# Patient Record
Sex: MALE | Race: BLACK OR AFRICAN AMERICAN | NOT HISPANIC OR LATINO | Employment: OTHER | URBAN - METROPOLITAN AREA
[De-identification: names, ages, dates, MRNs, and addresses within clinical notes are randomized per-mention and may not be internally consistent; named-entity substitution may affect disease eponyms.]

---

## 2018-03-14 ENCOUNTER — HOSPITAL ENCOUNTER (EMERGENCY)
Facility: HOSPITAL | Age: 68
Discharge: HOME | End: 2018-03-14
Attending: EMERGENCY MEDICINE | Admitting: EMERGENCY MEDICINE
Payer: OTHER GOVERNMENT

## 2018-03-14 VITALS
RESPIRATION RATE: 20 BRPM | DIASTOLIC BLOOD PRESSURE: 96 MMHG | SYSTOLIC BLOOD PRESSURE: 154 MMHG | OXYGEN SATURATION: 97 % | BODY MASS INDEX: 36.4 KG/M2 | HEART RATE: 82 BPM | HEIGHT: 71 IN | WEIGHT: 260 LBS | TEMPERATURE: 98.4 F

## 2018-03-14 DIAGNOSIS — F32.A DEPRESSION: Primary | ICD-10-CM

## 2018-03-14 DIAGNOSIS — M25.50 JOINT PAIN: ICD-10-CM

## 2018-03-14 LAB
ALBUMIN SERPL BCP-MCNC: 3.8 G/DL (ref 3.5–5)
ALP SERPL-CCNC: 72 U/L (ref 46–116)
ALT SERPL W P-5'-P-CCNC: 37 U/L (ref 12–78)
ANION GAP SERPL CALCULATED.3IONS-SCNC: 10 MMOL/L (ref 4–13)
AST SERPL W P-5'-P-CCNC: 15 U/L (ref 5–45)
BASOPHILS # BLD AUTO: 0.05 THOUSANDS/ΜL (ref 0–0.1)
BASOPHILS NFR BLD AUTO: 1 % (ref 0–1)
BILIRUB SERPL-MCNC: 0.3 MG/DL (ref 0.2–1)
BUN SERPL-MCNC: 13 MG/DL (ref 5–25)
CALCIUM SERPL-MCNC: 9.5 MG/DL (ref 8.3–10.1)
CHLORIDE SERPL-SCNC: 106 MMOL/L (ref 100–108)
CO2 SERPL-SCNC: 27 MMOL/L (ref 21–32)
CREAT SERPL-MCNC: 1 MG/DL (ref 0.6–1.3)
EOSINOPHIL # BLD AUTO: 0.13 THOUSAND/ΜL (ref 0–0.61)
EOSINOPHIL NFR BLD AUTO: 2 % (ref 0–6)
ERYTHROCYTE [DISTWIDTH] IN BLOOD BY AUTOMATED COUNT: 13.7 % (ref 11.6–15.1)
ERYTHROCYTE [SEDIMENTATION RATE] IN BLOOD: 4 MM/HOUR (ref 0–10)
GFR SERPL CREATININE-BSD FRML MDRD: 88 ML/MIN/1.73SQ M
GLUCOSE SERPL-MCNC: 122 MG/DL (ref 65–140)
HCT VFR BLD AUTO: 39.4 % (ref 36.5–49.3)
HGB BLD-MCNC: 12.3 G/DL (ref 12–17)
LYMPHOCYTES # BLD AUTO: 3.61 THOUSANDS/ΜL (ref 0.6–4.47)
LYMPHOCYTES NFR BLD AUTO: 49 % (ref 14–44)
MCH RBC QN AUTO: 24 PG (ref 26.8–34.3)
MCHC RBC AUTO-ENTMCNC: 31.2 G/DL (ref 31.4–37.4)
MCV RBC AUTO: 77 FL (ref 82–98)
MONOCYTES # BLD AUTO: 0.52 THOUSAND/ΜL (ref 0.17–1.22)
MONOCYTES NFR BLD AUTO: 7 % (ref 4–12)
NEUTROPHILS # BLD AUTO: 3.12 THOUSANDS/ΜL (ref 1.85–7.62)
NEUTS SEG NFR BLD AUTO: 42 % (ref 43–75)
NRBC BLD AUTO-RTO: 0 /100 WBCS
PLATELET # BLD AUTO: 184 THOUSANDS/UL (ref 149–390)
PMV BLD AUTO: 13.3 FL (ref 8.9–12.7)
POTASSIUM SERPL-SCNC: 3.9 MMOL/L (ref 3.5–5.3)
PROT SERPL-MCNC: 8 G/DL (ref 6.4–8.2)
RBC # BLD AUTO: 5.12 MILLION/UL (ref 3.88–5.62)
SODIUM SERPL-SCNC: 143 MMOL/L (ref 136–145)
TSH SERPL DL<=0.05 MIU/L-ACNC: 1.01 UIU/ML (ref 0.36–3.74)
WBC # BLD AUTO: 7.45 THOUSAND/UL (ref 4.31–10.16)

## 2018-03-14 PROCEDURE — 84443 ASSAY THYROID STIM HORMONE: CPT | Performed by: EMERGENCY MEDICINE

## 2018-03-14 PROCEDURE — 85652 RBC SED RATE AUTOMATED: CPT | Performed by: EMERGENCY MEDICINE

## 2018-03-14 PROCEDURE — 99284 EMERGENCY DEPT VISIT MOD MDM: CPT

## 2018-03-14 PROCEDURE — 80053 COMPREHEN METABOLIC PANEL: CPT | Performed by: EMERGENCY MEDICINE

## 2018-03-14 PROCEDURE — 85025 COMPLETE CBC W/AUTO DIFF WBC: CPT | Performed by: EMERGENCY MEDICINE

## 2018-03-14 PROCEDURE — 36415 COLL VENOUS BLD VENIPUNCTURE: CPT | Performed by: EMERGENCY MEDICINE

## 2018-03-14 RX ORDER — METOPROLOL TARTRATE 100 MG/1
25 TABLET ORAL EVERY 12 HOURS SCHEDULED
COMMUNITY
End: 2018-03-29 | Stop reason: HOSPADM

## 2018-03-14 RX ORDER — SIMVASTATIN 20 MG
20 TABLET ORAL
COMMUNITY
End: 2018-03-23 | Stop reason: HOSPADM
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
## 2018-03-14 NOTE — DISCHARGE INSTRUCTIONS
Arthralgia   WHAT YOU NEED TO KNOW:   Arthralgia is pain in one or more joints, with no inflammation  It may be short-term and get better within 6 to 8 weeks  Arthralgia can be an early sign of arthritis  Arthralgia may be caused by a medical condition, such as a hormone disorder or a tumor  It may also be caused by an infection or injury  DISCHARGE INSTRUCTIONS:   Medicines: The following medicines may  be ordered for you:  · Acetaminophen  decreases pain  Ask how much to take and how often to take it  Follow directions  Acetaminophen can cause liver damage if not taken correctly  · NSAIDs  decrease pain and prevent swelling  Ask your healthcare provider which medicine is right for you  Ask how much to take and when to take it  Take as directed  NSAIDs can cause stomach bleeding and kidney problems if not taken correctly  · Pain relief cream  decreases pain  Use this cream as directed  · Take your medicine as directed  Contact your healthcare provider if you think your medicine is not helping or if you have side effects  Tell him of her if you are allergic to any medicine  Keep a list of the medicines, vitamins, and herbs you take  Include the amounts, and when and why you take them  Bring the list or the pill bottles to follow-up visits  Carry your medicine list with you in case of an emergency  Follow up with your healthcare provider or specialist as directed:  Write down your questions so you remember to ask them during your visits  Self-care:   · Apply heat  to help decrease pain  Use a heating pad or heat wrap  Apply heat for 20 to 30 minutes every 2 hours for as many days as directed  · Rest  as much as possible  Avoid activities that cause joint pain  · Apply ice  to help decrease swelling and pain  Ice may also help prevent tissue damage  Use an ice pack, or put crushed ice in a plastic bag   Cover it with a towel and place it on your painful joint for 15 to 20 minutes every hour or as directed  · Support  the joint with a brace or elastic wrap as directed  · Elevate  your joint above the level of your heart as often as you can to help decrease swelling and pain  Prop your painful joint on pillows or blankets to keep it elevated comfortably  · Lose weight  if you are overweight  Extra weight can put pressure on your joints and cause more pain  Ask your healthcare provider how much you should weigh  Ask him to help you create a weight loss plan  · Exercise  regularly to help improve joint movement and to decrease pain  Ask about the best exercise plan for you  Low-impact exercises can help take the pressure off your joints  Examples are walking, swimming, and water aerobics  Physical therapy:  A physical therapist teaches you exercises to help improve movement and strength, and to decrease pain  Ask your healthcare provider if physical therapy is right for you  Contact your healthcare provider or specialist if:   · You have a fever  · You continue to have joint pain that cannot be relieved with heat, ice, or medicine  · You have pain and inflammation around your joint  · You have questions or concerns about your condition or care  Return to the emergency department if:   · You have sudden, severe pain when you move your joint  · You have a fever and shaking chills  · You cannot move your joint  · You lose feeling on the side of your body where you have the painful joint  © 2017 [address]  Information is for End User's use only and may not be sold, redistributed or otherwise used for commercial purposes  All illustrations and images included in CareNotes® are the copyrighted property of [employer]  or [employer]  The above information is an  only  It is not intended as medical advice for individual conditions or treatments   Talk to your doctor, nurse or pharmacist before following any medical regimen to see if it is safe and effective for you  Depression, Ambulatory Care   GENERAL INFORMATION:   Depression  is a medical condition that causes feelings of sadness or hopelessness that do not go away  Depression may cause you to lose interest in things you used to enjoy  These feelings may interfere with your daily life  Common symptoms include the following:   · Appetite changes, or weight gain or loss    · Trouble going to sleep or staying asleep, or sleeping too much    · Fatigue or lack of energy    · Feeling restless, irritable, or withdrawn    · Feeling worthless, hopeless, discouraged, or very guilty    · Trouble concentrating, remembering things, doing daily tasks, or making decisions    · Thoughts about hurting or killing yourself  Seek immediate care for the following symptoms:   · You think about harming yourself or someone else  Treatment for depression  may include medicine to improve or balance your mood  Therapy may also be used to treat your depression  A therapist will help you learn to cope with your thoughts and feelings  Therapy can be done alone or in a group  It may also be done with family members or a significant other  Manage depression:   · Get regular physical activity  Try to exercise for 30 minutes, 3 to 5 days a week  Work with your healthcare provider to develop an exercise plan that you enjoy  · Get enough sleep  Create a routine to help you relax before bed  Try to go to bed and wake up at the same time every day  Sleep is important for emotional health  · Eat a variety of healthy foods  Healthy foods include fruits, vegetables, whole-grain breads, low-fat dairy products, beans, lean meats, and fish  A healthy meal plan is low in fat, salt, and added sugar  · Avoid or limit alcohol  Ask your healthcare provider how much alcohol is safe for you to drink  A drink of alcohol is 12 ounces of beer, 5 ounces of wine, or 1½ ounces of liquor    Follow up with your healthcare provider as directed: You will need to return so your healthcare provider can monitor your progress  Write down your questions so you remember to ask them during your visits  CARE AGREEMENT:   You have the right to help plan your care  Learn about your health condition and how it may be treated  Discuss treatment options with your caregivers to decide what care you want to receive  You always have the right to refuse treatment  The above information is an  only  It is not intended as medical advice for individual conditions or treatments  Talk to your doctor, nurse or pharmacist before following any medical regimen to see if it is safe and effective for you  © 2014 [address] is for End User's use only and may not be sold, redistributed or otherwise used for commercial purposes  All illustrations and images included in CareNotes® are the copyrighted property of A D [employer] M [employer]  or [employer]

## 2018-03-14 NOTE — ED NOTES
Patient ambulated around unit with cane  No distress noted  Patient back in room resting comfortably at this time        Faina Martins RN  03/14/18 4807
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
## 2018-03-16 ENCOUNTER — APPOINTMENT (EMERGENCY)
Dept: RADIOLOGY | Facility: HOSPITAL | Age: 68
DRG: 313 | End: 2018-03-16
Payer: OTHER GOVERNMENT

## 2018-03-16 ENCOUNTER — HOSPITAL ENCOUNTER (INPATIENT)
Facility: HOSPITAL | Age: 68
LOS: 6 days | DRG: 313 | End: 2018-03-23
Attending: EMERGENCY MEDICINE | Admitting: INTERNAL MEDICINE
Payer: OTHER GOVERNMENT

## 2018-03-16 DIAGNOSIS — K59.00 CONSTIPATED: ICD-10-CM

## 2018-03-16 DIAGNOSIS — F20.0 PARANOID SCHIZOPHRENIA (HCC): ICD-10-CM

## 2018-03-16 DIAGNOSIS — E78.2 MIXED HYPERLIPIDEMIA: ICD-10-CM

## 2018-03-16 DIAGNOSIS — F20.9 SCHIZOPHRENIA, UNSPECIFIED TYPE (HCC): ICD-10-CM

## 2018-03-16 DIAGNOSIS — R07.9 CHEST PAIN: Primary | ICD-10-CM

## 2018-03-16 DIAGNOSIS — F20.9 ACUTE EXACERBATION OF CHRONIC SCHIZOPHRENIA (HCC): ICD-10-CM

## 2018-03-16 PROBLEM — E11.69 DIABETES MELLITUS TYPE 2 IN OBESE (HCC): Status: ACTIVE | Noted: 2018-03-16

## 2018-03-16 PROBLEM — I10 HYPERTENSION: Status: ACTIVE | Noted: 2018-03-16

## 2018-03-16 PROBLEM — E78.5 HYPERLIPIDEMIA: Status: ACTIVE | Noted: 2018-03-16

## 2018-03-16 PROBLEM — E66.9 DIABETES MELLITUS TYPE 2 IN OBESE (HCC): Status: ACTIVE | Noted: 2018-03-16

## 2018-03-16 LAB
ALBUMIN SERPL BCP-MCNC: 3.7 G/DL (ref 3.5–5)
ALP SERPL-CCNC: 66 U/L (ref 46–116)
ALT SERPL W P-5'-P-CCNC: 35 U/L (ref 12–78)
ANION GAP SERPL CALCULATED.3IONS-SCNC: 9 MMOL/L (ref 4–13)
AST SERPL W P-5'-P-CCNC: 17 U/L (ref 5–45)
ATRIAL RATE: 74 BPM
ATRIAL RATE: 76 BPM
BASOPHILS # BLD AUTO: 0.04 THOUSANDS/ΜL (ref 0–0.1)
BASOPHILS NFR BLD AUTO: 1 % (ref 0–1)
BILIRUB DIRECT SERPL-MCNC: 0.08 MG/DL (ref 0–0.2)
BILIRUB SERPL-MCNC: 0.3 MG/DL (ref 0.2–1)
BUN SERPL-MCNC: 14 MG/DL (ref 5–25)
CALCIUM SERPL-MCNC: 9.3 MG/DL (ref 8.3–10.1)
CHLORIDE SERPL-SCNC: 105 MMOL/L (ref 100–108)
CO2 SERPL-SCNC: 28 MMOL/L (ref 21–32)
CREAT SERPL-MCNC: 0.93 MG/DL (ref 0.6–1.3)
EOSINOPHIL # BLD AUTO: 0.15 THOUSAND/ΜL (ref 0–0.61)
EOSINOPHIL NFR BLD AUTO: 2 % (ref 0–6)
ERYTHROCYTE [DISTWIDTH] IN BLOOD BY AUTOMATED COUNT: 13.6 % (ref 11.6–15.1)
EST. AVERAGE GLUCOSE BLD GHB EST-MCNC: 123 MG/DL
GFR SERPL CREATININE-BSD FRML MDRD: 96 ML/MIN/1.73SQ M
GLUCOSE SERPL-MCNC: 102 MG/DL (ref 65–140)
GLUCOSE SERPL-MCNC: 118 MG/DL (ref 65–140)
GLUCOSE SERPL-MCNC: 127 MG/DL (ref 65–140)
GLUCOSE SERPL-MCNC: 91 MG/DL (ref 65–140)
HBA1C MFR BLD: 5.9 % (ref 4.2–6.3)
HCT VFR BLD AUTO: 39.7 % (ref 36.5–49.3)
HGB BLD-MCNC: 12.3 G/DL (ref 12–17)
LYMPHOCYTES # BLD AUTO: 3.62 THOUSANDS/ΜL (ref 0.6–4.47)
LYMPHOCYTES NFR BLD AUTO: 45 % (ref 14–44)
MCH RBC QN AUTO: 23.9 PG (ref 26.8–34.3)
MCHC RBC AUTO-ENTMCNC: 31 G/DL (ref 31.4–37.4)
MCV RBC AUTO: 77 FL (ref 82–98)
MONOCYTES # BLD AUTO: 0.58 THOUSAND/ΜL (ref 0.17–1.22)
MONOCYTES NFR BLD AUTO: 7 % (ref 4–12)
NEUTROPHILS # BLD AUTO: 3.71 THOUSANDS/ΜL (ref 1.85–7.62)
NEUTS SEG NFR BLD AUTO: 46 % (ref 43–75)
NRBC BLD AUTO-RTO: 0 /100 WBCS
P AXIS: 29 DEGREES
P AXIS: 35 DEGREES
PLATELET # BLD AUTO: 170 THOUSANDS/UL (ref 149–390)
POTASSIUM SERPL-SCNC: 4.3 MMOL/L (ref 3.5–5.3)
PR INTERVAL: 154 MS
PR INTERVAL: 156 MS
PROT SERPL-MCNC: 8 G/DL (ref 6.4–8.2)
QRS AXIS: -1 DEGREES
QRS AXIS: 3 DEGREES
QRSD INTERVAL: 82 MS
QRSD INTERVAL: 84 MS
QT INTERVAL: 358 MS
QT INTERVAL: 366 MS
QTC INTERVAL: 397 MS
QTC INTERVAL: 411 MS
RBC # BLD AUTO: 5.15 MILLION/UL (ref 3.88–5.62)
SODIUM SERPL-SCNC: 142 MMOL/L (ref 136–145)
T WAVE AXIS: -10 DEGREES
T WAVE AXIS: -6 DEGREES
TROPONIN I SERPL-MCNC: 0.04 NG/ML
TROPONIN I SERPL-MCNC: 0.05 NG/ML
VENTRICULAR RATE: 74 BPM
VENTRICULAR RATE: 76 BPM
WBC # BLD AUTO: 8.12 THOUSAND/UL (ref 4.31–10.16)

## 2018-03-16 PROCEDURE — 93005 ELECTROCARDIOGRAM TRACING: CPT

## 2018-03-16 PROCEDURE — 99220 PR INITIAL OBSERVATION CARE/DAY 70 MINUTES: CPT | Performed by: PHYSICIAN ASSISTANT

## 2018-03-16 PROCEDURE — 93010 ELECTROCARDIOGRAM REPORT: CPT | Performed by: INTERNAL MEDICINE

## 2018-03-16 PROCEDURE — 99285 EMERGENCY DEPT VISIT HI MDM: CPT

## 2018-03-16 PROCEDURE — 85025 COMPLETE CBC W/AUTO DIFF WBC: CPT | Performed by: EMERGENCY MEDICINE

## 2018-03-16 PROCEDURE — 80076 HEPATIC FUNCTION PANEL: CPT | Performed by: EMERGENCY MEDICINE

## 2018-03-16 PROCEDURE — 84484 ASSAY OF TROPONIN QUANT: CPT | Performed by: HOSPITALIST

## 2018-03-16 PROCEDURE — 80048 BASIC METABOLIC PNL TOTAL CA: CPT | Performed by: EMERGENCY MEDICINE

## 2018-03-16 PROCEDURE — 36415 COLL VENOUS BLD VENIPUNCTURE: CPT | Performed by: HOSPITALIST

## 2018-03-16 PROCEDURE — 96372 THER/PROPH/DIAG INJ SC/IM: CPT

## 2018-03-16 PROCEDURE — 36415 COLL VENOUS BLD VENIPUNCTURE: CPT | Performed by: EMERGENCY MEDICINE

## 2018-03-16 PROCEDURE — 84484 ASSAY OF TROPONIN QUANT: CPT | Performed by: EMERGENCY MEDICINE

## 2018-03-16 PROCEDURE — 82948 REAGENT STRIP/BLOOD GLUCOSE: CPT

## 2018-03-16 PROCEDURE — 71046 X-RAY EXAM CHEST 2 VIEWS: CPT

## 2018-03-16 PROCEDURE — 83036 HEMOGLOBIN GLYCOSYLATED A1C: CPT | Performed by: HOSPITALIST

## 2018-03-16 RX ORDER — HYDRALAZINE HYDROCHLORIDE 20 MG/ML
5 INJECTION INTRAMUSCULAR; INTRAVENOUS EVERY 6 HOURS PRN
Status: DISCONTINUED | OUTPATIENT
Start: 2018-03-16 | End: 2018-03-23 | Stop reason: HOSPADM

## 2018-03-16 RX ORDER — PRAVASTATIN SODIUM 40 MG
40 TABLET ORAL
Status: DISCONTINUED | OUTPATIENT
Start: 2018-03-16 | End: 2018-03-23 | Stop reason: HOSPADM

## 2018-03-16 RX ORDER — ASPIRIN 81 MG/1
81 TABLET, CHEWABLE ORAL DAILY
Status: DISCONTINUED | OUTPATIENT
Start: 2018-03-17 | End: 2018-03-23 | Stop reason: HOSPADM

## 2018-03-16 RX ORDER — ASPIRIN 81 MG/1
81 TABLET, CHEWABLE ORAL DAILY
Status: DISCONTINUED | OUTPATIENT
Start: 2018-03-17 | End: 2018-03-16 | Stop reason: SDUPTHER

## 2018-03-16 RX ORDER — TAMSULOSIN HYDROCHLORIDE 0.4 MG/1
0.4 CAPSULE ORAL
Status: DISCONTINUED | OUTPATIENT
Start: 2018-03-16 | End: 2018-03-23 | Stop reason: HOSPADM

## 2018-03-16 RX ORDER — ZIPRASIDONE MESYLATE 20 MG/ML
10 INJECTION, POWDER, LYOPHILIZED, FOR SOLUTION INTRAMUSCULAR ONCE
Status: COMPLETED | OUTPATIENT
Start: 2018-03-16 | End: 2018-03-16

## 2018-03-16 RX ORDER — ACETAMINOPHEN 325 MG/1
650 TABLET ORAL EVERY 4 HOURS PRN
Status: DISCONTINUED | OUTPATIENT
Start: 2018-03-16 | End: 2018-03-23 | Stop reason: HOSPADM

## 2018-03-16 RX ADMIN — PRAVASTATIN SODIUM 40 MG: 40 TABLET ORAL at 18:40

## 2018-03-16 RX ADMIN — ZIPRASIDONE MESYLATE 10 MG: 20 INJECTION, POWDER, LYOPHILIZED, FOR SOLUTION INTRAMUSCULAR at 14:40

## 2018-03-16 RX ADMIN — METOPROLOL TARTRATE 25 MG: 25 TABLET ORAL at 21:21

## 2018-03-16 RX ADMIN — TAMSULOSIN HYDROCHLORIDE 0.4 MG: 0.4 CAPSULE ORAL at 18:40

## 2018-03-16 RX ADMIN — WATER 10 ML: 1 INJECTION INTRAMUSCULAR; INTRAVENOUS; SUBCUTANEOUS at 14:41

## 2018-03-16 NOTE — ASSESSMENT & PLAN NOTE
· BP elevated on admission, but has now improved  · Continue Lopressor 25 mg b i d   · P r n  hydralazine

## 2018-03-16 NOTE — PROGRESS NOTES
Admit Note Addendum  Patient seen and examined in ED  Presents with complaints of left sided chest pain, sharp, reproducible to palpation  Started today, reports recently shoveling snow  States had hx of similar pain in past had extensive w/u at [facility] that was negative within past year or so, has not seen cardiology since  Exam: vitals stable  RRR  Clear b/l  +TTP palpation left anterior chest wall and lateral wall  Soft, +bs  No sign edema  Calm and cooperative    Plan: assign to observation status for chest pain r/o ACS  Check lipid profile, hgb a1c in am  Repeat ekg  If w/u negative d/c tomorrow with Op cardiology f/u    Schizophrenia: some paranoia, cleared by crisis yesterday, case mgt consultation

## 2018-03-16 NOTE — ASSESSMENT & PLAN NOTE
· Patient reports left-sided sharp chest pain, nonradiating and sharp in nature  Reproducible on exam, does not appear to be cardiac related, however patient with multiple risk factors including HTN, HLD, Dm2 need to r/o ACS  · Reports history of cardiac workup many years ago which was normal per patient, unable to find records here    · NSTEMI with first Troponin 0 05, trend serial troponins, could be related to hypertension on admission   · Cardiology consult   · Telemetry monitoring  · Obtain lipid panel and hemoglobin A1c  · Obtain rapid drug screen  · Continue statin, aspirin and Lopressor

## 2018-03-16 NOTE — ED NOTES
Attempted POCT BAT 3 times, patient unable to complete  Informed RN       Gina Pruitt  03/16/18 2948

## 2018-03-16 NOTE — H&P
H&P- Deneen Reaves 1950, 69 y o  male MRN: [id number]    Unit/Bed#: -01 Encounter: [id number]    Primary Care Provider: No primary care provider on file  Date and time admitted to hospital: 3/16/2018  1:31 PM       DOS: 3/16/2018      * Chest pain   Assessment & Plan    · Patient reports left-sided sharp chest pain, nonradiating and sharp in nature  Reproducible on exam, does not appear to be cardiac related, however patient with multiple risk factors including HTN, HLD, Dm2 need to r/o ACS  · Reports history of cardiac workup many years ago which was normal per patient, unable to find records here  · NSTEMI with first Troponin 0 05, trend serial troponins, could be related to hypertension on admission   · Cardiology consult   · Telemetry monitoring  · Obtain lipid panel and hemoglobin A1c  · Obtain rapid drug screen  · Continue statin, aspirin and Lopressor        Diabetes mellitus type 2 in [facility])   Assessment & Plan    · Patient reports taking metformin at home b i d  · Will hold metformin and initiate sliding scale insulin coverage  · Glucose checks        Hyperlipidemia   Assessment & Plan    · Continue statin  · Obtain lipid panel        Hypertension   Assessment & Plan    · BP elevated on admission, but has now improved  · Continue Lopressor 25 mg b i d   · P r n  hydralazine        Schizophrenia ([facility] [address] )   Assessment & Plan    · Patient with history of  · Reports that he has taken himself off of his medications over 1 year ago and feels better without them  · Was previously on multiple medications per patient including Haldol and Geodon  · Patient reports that he feels as though his wife and stepdaughter are out to hurt him  Reports that they took away his cane and reading glasses  Reports that they give him  food that make him sick  Also took away the phones in the home and go through his wallet without his consent    · Case management consult, patient was recently in the ED yesterday with concerns of the same issue  Apparently crisis was called yesterday but nothing came of this  · Patient received 1 dose Geodon in the ED  · PT/OT          VTE Prophylaxis: Enoxaparin (Lovenox)  / sequential compression device   Code Status:  Level 1-full code, discussed with patient at bedside  POLST: There is no POLST form on file for this patient (pre-hospital)  Discussion with family:  Discussed with patient at bedside  Anticipated Length of Stay:  Patient will be admitted on an Observation basis with an anticipated length of stay of  < 2 midnights  Justification for Hospital Stay:  Trend troponins, cardiac evaluation    Total Time for Visit, including Counseling / Coordination of Care: 45 minutes  Greater than 50% of this total time spent on direct patient counseling and coordination of care  Chief Complaint:   "I had chest pain "    History of Present Illness:    Marielena Stanley is a 69 y o  male with significant past medical history of schizophrenia, hypertension, hyperlipidemia, diabetes mellitus type 2 who presents with chest pain x 1 day  Patient reports that the pain started early this morning at around 9 o'clock that was sudden and sharp  It is nonradiating and he denies diaphoresis or nausea  He denies any recent illnesses  He reports that when the chest pain began it was constant but then became intermittent  Reports that is still a sharp stabbing pain  Patient reports he has had previous episodes like this in the past and was evaluated by Cardiology a number of years ago at [facility], [place], operated by [facility]  He reports that these test resulted in normal findings  He does not follow up with the cardiologist   He reports some left-sided abdominal pain as well  He confirms history of schizophrenia for which she was previously taking antipsychotic medications but took himself off of them about 1 year ago    Patient reports that he feels much better off his medications and that he no longer hears voices or seeing things like he was previously  He currently reports that he feels as though his wife has a boyfriend and he sees them texting often  He also reports that he feels as though his wife and stepdaughter are out to hurt him  Reports that they took all the phones out of the house and go through his wallet  Also reports that he walks with a cane at home and does well with this  However reports that his cane was then taken away and his reading glasses  Patient reports that he is a member of the [place] and has been on multiple antipsychotic medications in the past, too many to count "    Review of Systems:    Review of Systems   Constitutional: Negative for chills, diaphoresis and fever  HENT: Negative for congestion, rhinorrhea, sinus pain, sinus pressure, sneezing, sore throat and tinnitus  Eyes: Negative for pain, redness and visual disturbance  Respiratory: Negative for cough and shortness of breath  Cardiovascular: Positive for chest pain  Negative for palpitations and leg swelling  Gastrointestinal: Positive for abdominal pain and constipation  Negative for diarrhea, nausea and vomiting  Genitourinary: Negative for difficulty urinating, dysuria and hematuria  Musculoskeletal: Negative for gait problem, joint swelling and myalgias  Skin: Negative for pallor and rash  Neurological: Positive for light-headedness  Negative for dizziness and headaches  Past Medical and Surgical History:     Past Medical History:   Diagnosis Date    Diabetes mellitus ([facility] [address] )     Enlarged prostate     Hyperlipidemia     Hypertension     Psychiatric disorder        History reviewed  No pertinent surgical history  Meds/Allergies:    Prior to Admission medications    Medication Sig Start Date End Date Taking?  Authorizing Provider   metFORMIN (GLUCOPHAGE) 1000 MG tablet Take 500 mg by mouth 2 (two) times a day with meals    Historical Provider, MD   metoprolol tartrate (LOPRESSOR) 100 mg tablet Take 25 mg by mouth every 12 (twelve) hours    Historical Provider, MD   simvastatin (ZOCOR) 20 mg tablet Take 20 mg by mouth daily at bedtime    Historical Provider, MD   TAMSULOSIN HCL PO Take by mouth    Historical Provider, MD     I have reviewed home medications with patient personally  and [employer] records  Allergies: No Known Allergies    Social History:     Marital Status: /Civil Union   Occupation:   Patient Pre-hospital Living Situation:  Patient lives at home with wife and stepdaughter and 3 grandchildren  Patient Pre-hospital Level of Mobility:  Cane at baseline, however patient reports this was taken away from him by family  Patient Pre-hospital Diet Restrictions:  None  Substance Use History:   History   Alcohol Use No     History   Smoking Status    Former Smoker   Smokeless Tobacco    Never Used     Comment: quit in 1993     History   Drug Use No       Family History:    non-contributory, denies family history of cardiac events    Physical Exam:     Vitals:   Blood Pressure: 119/71 (03/16/18 1722)  Pulse: 75 (03/16/18 1722)  Temperature: 98 7 °F (37 1 °C) (03/16/18 1332)  Respirations: 18 (03/16/18 1332)  Height: 5' 11" (180 3 cm) (03/16/18 1332)  Weight - Scale: 118 kg (260 lb) (03/16/18 1332)  SpO2: 94 % (03/16/18 1722)    Physical Exam   Constitutional: No distress  Patient is in no acute distress resting comfortably in his hospital bed   HENT:   Head: Normocephalic and atraumatic  Eyes: Conjunctivae are normal  No scleral icterus  Cardiovascular: Normal rate, regular rhythm and intact distal pulses  Pulmonary/Chest: No respiratory distress  He has no wheezes  Breath sounds decreased bilaterally   Abdominal: Soft  Bowel sounds are normal  He exhibits distension (Mild)  There is tenderness (Mild generalized tenderness to palpation)  There is no rebound  Musculoskeletal: He exhibits no edema  Neurological: He is alert  Skin: Skin is warm and dry   He is not diaphoretic  No erythema  Psychiatric:   Tardive dyskinesia noted on exam with some twitching of the left upper extremity  Vitals reviewed  Additional Data:     Lab Results: I have personally reviewed pertinent reports  Results from last 7 days  Lab Units 03/16/18  1402   WBC Thousand/uL 8 12   HEMOGLOBIN g/dL 12 3   HEMATOCRIT % 39 7   PLATELETS Thousands/uL 170   NEUTROS PCT % 46   LYMPHS PCT % 45*   MONOS PCT % 7   EOS PCT % 2       Results from last 7 days  Lab Units 03/16/18  1402   SODIUM mmol/L 142   POTASSIUM mmol/L 4 3   CHLORIDE mmol/L 105   CO2 mmol/L 28   BUN mg/dL 14   CREATININE mg/dL 0 93   CALCIUM mg/dL 9 3   TOTAL PROTEIN g/dL 8 0   BILIRUBIN TOTAL mg/dL 0 30   ALK PHOS U/L 66   ALT U/L 35   AST U/L 17   GLUCOSE RANDOM mg/dL 102           Imaging: I have personally reviewed pertinent reports  XR chest pa & lateral   Final Result by Kenji Velásquez MD (03/16 1619)      No acute cardiopulmonary disease  Workstation performed: [id number]             EKG, Pathology, and Other Studies Reviewed on Admission:   · EKG: No ST elevations noted  · CXR results reviewed    [facility] Review[facility]: Yes     ** Please Note: This note has been constructed using a voice recognition system   **

## 2018-03-16 NOTE — ASSESSMENT & PLAN NOTE
· Patient reports taking metformin at home b i d    · Will hold metformin and initiate sliding scale insulin coverage  · Glucose checks

## 2018-03-16 NOTE — ASSESSMENT & PLAN NOTE
· Patient with history of  · Reports that he has taken himself off of his medications over 1 year ago and feels better without them  · Was previously on multiple medications per patient including Haldol and Geodon  · Patient reports that he feels as though his wife and stepdaughter are out to hurt him  Reports that they took away his cane and reading glasses  Reports that they give him  food that make him sick  Also took away the phones in the home and go through his wallet without his consent  · Case management consult, patient was recently in the ED yesterday with concerns of the same issue  Apparently crisis was called yesterday but nothing came of this    · Patient received 1 dose Geodon in the ED  · PT/OT

## 2018-03-16 NOTE — ED PROVIDER NOTES
History  Chief Complaint   Patient presents with    Chest Pain     cp started today, pt also concerned about living situation     HPI patient is a 24-year-old male, reports about 0 9 o'clock today sudden onset of a fairly sharp left chest pain describes it as somewhat stabbing, intermittent since then  Patient also has multiple other complaints, complains of some left arm numbness, complains of some left-sided abdominal pain  He denies any vomiting or diarrhea  Denies any shortness of breath  There is no diaphoresis  Patient's history chronic schizophrenia, he was here yesterday with some leg pain  Patient has nonlinear thinking at time and run on speech  He reports his wife is taking control of his life and he does not want her to  She reports he is taking his phone away from him  He reports he does not want a live at home anymore  He reports they are not allowing him to communicate properly  Apparently was apparently here yesterday seen by crisis, there are no grounds for commitment the patient has not been taking his psychotic medications  Patient was apparently seen in the [place] in the past from those all records from the person is saw him yesterday apparently was on Haldol and Geodon in the past   Patient denies any current chest pain  Past medical history psychiatric history, diabetes, hypertension, hyperlipidemia  Family history noncontributory  Social history, lives with his wife, nonsmoker, denies drug abuse    Prior to Admission Medications   Prescriptions Last Dose Informant Patient Reported? Taking?    TAMSULOSIN HCL PO 3/15/2018 at Unknown time  Yes Yes   Sig: Take by mouth   metFORMIN (GLUCOPHAGE) 1000 MG tablet 3/15/2018 at Unknown time  Yes Yes   Sig: Take 500 mg by mouth 2 (two) times a day with meals   metoprolol tartrate (LOPRESSOR) 100 mg tablet 3/15/2018 at Unknown time  Yes Yes   Sig: Take 25 mg by mouth every 12 (twelve) hours   simvastatin (ZOCOR) 20 mg tablet 3/15/2018 at Unknown time  Yes Yes   Sig: Take 20 mg by mouth daily at bedtime      Facility-Administered Medications: None       Past Medical History:   Diagnosis Date    Diabetes mellitus ([facility] [address] )     Enlarged prostate     Hyperlipidemia     Hypertension     Psychiatric disorder        History reviewed  No pertinent surgical history  History reviewed  No pertinent family history  I have reviewed and agree with the history as documented  Social History   Substance Use Topics    Smoking status: Former Smoker    Smokeless tobacco: Never Used      Comment: quit in 1993    Alcohol use No        Review of Systems   Constitutional: Negative for diaphoresis, fatigue and fever  HENT: Negative for congestion, ear pain, nosebleeds and sore throat  Eyes: Negative for photophobia, pain, discharge and visual disturbance  Respiratory: Negative for cough, choking, chest tightness, shortness of breath and wheezing  Cardiovascular: Positive for chest pain  Negative for palpitations  Gastrointestinal: Positive for abdominal pain  Negative for abdominal distention, diarrhea and vomiting  Genitourinary: Negative for dysuria, flank pain and frequency  Musculoskeletal: Negative for back pain, gait problem and joint swelling  Skin: Negative for color change and rash  Neurological: Positive for numbness  Negative for dizziness, syncope and headaches  Psychiatric/Behavioral: Positive for decreased concentration  Negative for behavioral problems and confusion  The patient is hyperactive  The patient is not nervous/anxious  All other systems reviewed and are negative        Physical Exam  ED Triage Vitals   Temperature Pulse Respirations Blood Pressure SpO2   03/16/18 1332 03/16/18 1332 03/16/18 1332 03/16/18 1332 03/16/18 1332   98 7 °F (37 1 °C) 78 18 (!) 138/108 100 %      Temp Source Heart Rate Source Patient Position - Orthostatic VS BP Location FiO2 (%)   03/16/18 1742 03/16/18 1722 03/16/18 1722 03/16/18 1722 --   Oral Monitor Lying Right arm       Pain Score       03/16/18 1332       8           Orthostatic Vital Signs  Vitals:    03/16/18 2300 03/17/18 0300 03/17/18 0700 03/17/18 1100   BP: 157/96 159/92 140/84 161/86   Pulse: 71 72 79 71   Patient Position - Orthostatic VS: Lying Lying Lying Sitting       Physical Exam   Constitutional: He is oriented to person, place, and time  He appears well-developed and well-nourished  HENT:   Head: Normocephalic  Right Ear: External ear normal    Left Ear: External ear normal    Nose: Nose normal    Mouth/Throat: Oropharynx is clear and moist    Eyes: EOM and lids are normal  Pupils are equal, round, and reactive to light  Neck: Normal range of motion  Neck supple  Cardiovascular: Normal rate, regular rhythm, normal heart sounds and intact distal pulses  Pulmonary/Chest: Effort normal and breath sounds normal  No respiratory distress  Abdominal: Soft  Bowel sounds are normal  He exhibits no distension and no mass  There is no tenderness  There is no guarding  Musculoskeletal: Normal range of motion  He exhibits no deformity  Neurological: He is alert and oriented to person, place, and time  He has normal strength  No cranial nerve deficit or sensory deficit  Coordination normal  GCS eye subscore is 4  GCS verbal subscore is 5  GCS motor subscore is 6  Skin: Skin is warm and dry  Psychiatric:   Able to give a history but the patient runs on with his stories, nonlinear thinking at time, history of chronic schizophrenia   Nursing note and vitals reviewed     Patient has run on speech, at times seems delusional, no hallucinations  Pulse oximetry 96% on room air adequate oxygenation, there is no hypoxia    ED Medications  Medications   metoprolol tartrate (LOPRESSOR) tablet 25 mg (25 mg Oral Given 3/17/18 0829)   pravastatin (PRAVACHOL) tablet 40 mg (40 mg Oral Given 3/16/18 1840)   tamsulosin (FLOMAX) capsule 0 4 mg (0 4 mg Oral Given 3/16/18 1840)   insulin lispro (HumaLOG) 100 units/mL subcutaneous injection 1-5 Units (0 Units Subcutaneous Not Given 3/17/18 0758)   insulin lispro (HumaLOG) 100 units/mL subcutaneous injection 1-5 Units (1 Units Subcutaneous Not Given 3/17/18 0013)   aspirin chewable tablet 81 mg (81 mg Oral Given 3/17/18 0829)   enoxaparin (LOVENOX) subcutaneous injection 40 mg (40 mg Subcutaneous Given 3/17/18 0829)   acetaminophen (TYLENOL) tablet 650 mg (not administered)   hydrALAZINE (APRESOLINE) injection 5 mg (not administered)   ziprasidone (GEODON) IM injection 10 mg (10 mg Intramuscular Given 3/16/18 1440)   sterile water injection **AcuDose Override Pull** (10 mL  Given 3/16/18 1441)       Diagnostic Studies  Results Reviewed     Procedure Component Value Units Date/Time    Lipid panel [[id number]]  (Abnormal) Collected:  03/17/18 0537    Lab Status:  Final result Specimen:  Blood from Arm, Left Updated:  03/17/18 0601     Cholesterol 131 mg/dL      Triglycerides 59 mg/dL      HDL, Direct 32 (L) mg/dL      LDL Calculated 87 mg/dL     Narrative:         Triglyceride:        Normal               <150 mg/dl        Borderline High     150-199 mg/dl        High               200-499 mg/dl        Very High           >499 mg/dl      Platelet count [[id number]]  (Abnormal) Collected:  03/17/18 0537    Lab Status:  Final result Specimen:  Blood from Arm, Left Updated:  03/17/18 0554     Platelets 756 (L) Thousands/uL      MPV 12 4 fL     Rapid drug screen, urine [[id number]]  (Normal) Collected:  03/17/18 0537    Lab Status:  Final result Specimen:  Urine from Urine, Clean Catch Updated:  03/17/18 0551     Amph/Meth UR Negative     Barbiturate Ur Negative     Benzodiazepine Urine Negative     Cocaine Urine Negative     Methadone Urine Negative     Opiate Urine Negative     PCP Ur Negative     THC Urine Negative    Narrative:         FOR MEDICAL PURPOSES ONLY  IF CONFIRMATION NEEDED PLEASE CONTACT THE LAB WITHIN 5 DAYS      Drug Screen Cutoff Levels:  AMPHETAMINE/METHAMPHETAMINES  1000 ng/mL  BARBITURATES     200 ng/mL  BENZODIAZEPINES     200 ng/mL  COCAINE      300 ng/mL  METHADONE      300 ng/mL  OPIATES      300 ng/mL  PHENCYCLIDINE     25 ng/mL  THC       50 ng/mL    Hemoglobin A1c w/EAG Estimation (Orders if not completed within the last 90 days) [[id number]]  (Normal) Collected:  03/16/18 1718    Lab Status:  Final result Specimen:  Blood from Arm, Right Updated:  03/16/18 2341     Hemoglobin A1C 5 9 %       mg/dl     Troponin I [[id number]]  (Normal) Collected:  03/16/18 1718    Lab Status:  Final result Specimen:  Blood from Arm, Right Updated:  03/16/18 1743     Troponin I 0 04 ng/mL     Narrative:         [employer] Chemistry analyzer 99% cutoff is > 0 04 ng/mL in network labs    o cTnI 99% cutoff is useful only when applied to patients in the clinical setting of myocardial ischemia  o cTnI 99% cutoff should be interpreted in the context of clinical history, ECG findings and possibly cardiac imaging to establish correct diagnosis  o cTnI 99% cutoff may be suggestive but clearly not indicative of a coronary event without the clinical setting of myocardial ischemia  Troponin I [[id number]]  (Abnormal) Collected:  03/16/18 1402    Lab Status:  Final result Specimen:  Blood from Arm, Right Updated:  03/16/18 1433     Troponin I 0 05 (H) ng/mL     Narrative:         [employer] Chemistry analyzer 99% cutoff is > 0 04 ng/mL in network labs    o cTnI 99% cutoff is useful only when applied to patients in the clinical setting of myocardial ischemia  o cTnI 99% cutoff should be interpreted in the context of clinical history, ECG findings and possibly cardiac imaging to establish correct diagnosis  o cTnI 99% cutoff may be suggestive but clearly not indicative of a coronary event without the clinical setting of myocardial ischemia      Basic metabolic panel [[id number]] Collected:  03/16/18 1402    Lab Status:  Final result Specimen:  Blood from Arm, Right Updated:  03/16/18 1430     Sodium 142 mmol/L      Potassium 4 3 mmol/L      Chloride 105 mmol/L      CO2 28 mmol/L      Anion Gap 9 mmol/L      BUN 14 mg/dL      Creatinine 0 93 mg/dL      Glucose 102 mg/dL      Calcium 9 3 mg/dL      eGFR 96 ml/min/1 73sq m     Narrative:         National Kidney Disease Education Program recommendations are as follows:  GFR calculation is accurate only with a steady state creatinine  Chronic Kidney disease less than 60 ml/min/1 73 sq  meters  Kidney failure less than 15 ml/min/1 73 sq  meters  Hepatic function panel [[id number]]  (Normal) Collected:  03/16/18 1402    Lab Status:  Final result Specimen:  Blood from Arm, Right Updated:  03/16/18 1430     Total Bilirubin 0 30 mg/dL      Bilirubin, Direct 0 08 mg/dL      Alkaline Phosphatase 66 U/L      AST 17 U/L      ALT 35 U/L      Total Protein 8 0 g/dL      Albumin 3 7 g/dL     CBC and differential [[id number]]  (Abnormal) Collected:  03/16/18 1402    Lab Status:  Final result Specimen:  Blood from Arm, Right Updated:  03/16/18 1413     WBC 8 12 Thousand/uL      RBC 5 15 Million/uL      Hemoglobin 12 3 g/dL      Hematocrit 39 7 %      MCV 77 (L) fL      MCH 23 9 (L) pg      MCHC 31 0 (L) g/dL      RDW 13 6 %      Platelets 689 Thousands/uL      nRBC 0 /100 WBCs      Neutrophils Relative 46 %      Lymphocytes Relative 45 (H) %      Monocytes Relative 7 %      Eosinophils Relative 2 %      Basophils Relative 1 %      Neutrophils Absolute 3 71 Thousands/µL      Lymphocytes Absolute 3 62 Thousands/µL      Monocytes Absolute 0 58 Thousand/µL      Eosinophils Absolute 0 15 Thousand/µL      Basophils Absolute 0 04 Thousands/µL     POCT alcohol breath test [[id number]]     Lab Status:  No result                  XR chest pa & lateral   Final Result by Fang Pearson MD (03/16 1619)      No acute cardiopulmonary disease              Workstation performed: [id number]                    Procedures  ECG 12 Lead Documentation  Date/Time: 3/16/2018 2:51 PM  Performed by: Simran Marquez  Authorized by: Simran Marquez     Indications / Diagnosis:  Chest pain  ECG reviewed by me, the ED Provider: yes    Patient location:  ED  Previous ECG:     Previous ECG:  Unavailable  Interpretation:     Interpretation: non-specific    Rate:     ECG rate:  Seventy-four    ECG rate assessment: normal    Rhythm:     Rhythm: sinus rhythm    Ectopy:     Ectopy: none    ST segments:     ST segments:  Non-specific  Comments:      Normal sinus rhythm, LVH by voltage, no acute ST-T wave changes           Phone Contacts  ED Phone Contact    ED Course  ED Course         Chest x-ray: Chest x-ray showed a normal cardiac silhouette, no pneumothorax no infiltrates, No sign of pathology, interpreted by me, I was the primary   HEART Risk Score    Flowsheet Row Most Recent Value   History  2 Filed at: 03/17/2018 1201   ECG  0 Filed at: 03/17/2018 1201   Age  2 Filed at: 03/17/2018 1201   Risk Factors  1 Filed at: 03/17/2018 1201   Troponin  1 Filed at: 03/17/2018 1201   Heart Score Risk Calculator   History  2 Filed at: 03/17/2018 1201   ECG  0 Filed at: 03/17/2018 1201   Age  2 Filed at: 03/17/2018 1201   Risk Factors  1 Filed at: 03/17/2018 1201   Troponin  1 Filed at: 03/17/2018 1201   HEART Score  6 Filed at: 03/17/2018 1201   HEART Score  6 Filed at: 03/17/2018 1201           very difficult presentation is the patient is a chronic schizophrenic off his medications, patient is fairly concerned that his wife is decreasing his communication, I spoke with the provider resolved patient yesterday, they reported he was somewhat delusional at that time the wife seemed appropriate  Apparently patient stop taking his psychiatric medications  Seen by crisis yesterday but no indication for involuntary commitment or hospitalization at that time    Today toxicology screen is negative, patient's liver functions were normal      cardiac troponin unfortunately was 0 05 which is elevated, because the patient does have chest pain despite the fact that he is somewhat psychotic and delusional I believe the patient requires observation for further diagnostic evaluation, further rule out of myocardial infarction  Patient's electrolytes were within normal limits  Normal creatinine making the elevated troponin more significant  Patient's white count was normal at 8 1, hemoglobin was normal at 12 3 no sign of inflammation no sign of anemia  MDM medical decision making 57-year-old male presents by EMS with multiple complaints, patient has history of chronic schizophrenia and apparently stop this med seen recently yesterday for leg pain, felt to be arthritis, patient had a psychiatric evaluation at that time by the crisis worker, no grounds for admission but the patient clearly was delusional and somewhat paranoid at that time  Patient presents today with chest pain unfortunately his cardiac troponin is positive there are no EKG changes I cannot rule out cardiac ischemia in this patient because the lack of historical elements due to his psychiatric illness  Patient will require observation but is hospitalist service  Discussed with the hospitalist agree with admission  CritCare Time    Disposition  Final diagnoses:   Chest pain   Acute exacerbation of chronic schizophrenia ([facility] [address] )     Time reflects when diagnosis was documented in both MDM as applicable and the Disposition within this note     Time User Action Codes Description Comment    3/16/2018  3:20 PM Abner Villegas Add [R07 9] Chest pain     3/16/2018  3:21 PM Abner Villegas Add [F20 9] Acute exacerbation of chronic schizophrenia [facility])       ED Disposition     ED Disposition Condition Comment    Admit  Case was discussed with Dr Jeanne Mukherjee and the patient's admission status was agreed to be observation status service of Dr Jeanne Mukherjee          Follow-up Information    None       Current Discharge Medication List      CONTINUE these medications which have NOT CHANGED    Details   metFORMIN (GLUCOPHAGE) 1000 MG tablet Take 500 mg by mouth 2 (two) times a day with meals      metoprolol tartrate (LOPRESSOR) 100 mg tablet Take 25 mg by mouth every 12 (twelve) hours      simvastatin (ZOCOR) 20 mg tablet Take 20 mg by mouth daily at bedtime      TAMSULOSIN HCL PO Take by mouth           No discharge procedures on file      ED Provider  Electronically Signed by           Kevyn Gallagher MD  03/17/18 5314

## 2018-03-17 LAB
AMPHETAMINES SERPL QL SCN: NEGATIVE
BARBITURATES UR QL: NEGATIVE
BENZODIAZ UR QL: NEGATIVE
CHOLEST SERPL-MCNC: 131 MG/DL (ref 50–200)
COCAINE UR QL: NEGATIVE
GLUCOSE SERPL-MCNC: 106 MG/DL (ref 65–140)
GLUCOSE SERPL-MCNC: 114 MG/DL (ref 65–140)
GLUCOSE SERPL-MCNC: 126 MG/DL (ref 65–140)
GLUCOSE SERPL-MCNC: 84 MG/DL (ref 65–140)
HDLC SERPL-MCNC: 32 MG/DL (ref 40–60)
LDLC SERPL CALC-MCNC: 87 MG/DL (ref 0–100)
METHADONE UR QL: NEGATIVE
OPIATES UR QL SCN: NEGATIVE
PCP UR QL: NEGATIVE
PLATELET # BLD AUTO: 142 THOUSANDS/UL (ref 149–390)
PMV BLD AUTO: 12.4 FL (ref 8.9–12.7)
THC UR QL: NEGATIVE
TRIGL SERPL-MCNC: 59 MG/DL
TROPONIN I SERPL-MCNC: 0.04 NG/ML

## 2018-03-17 PROCEDURE — 80061 LIPID PANEL: CPT | Performed by: HOSPITALIST

## 2018-03-17 PROCEDURE — 99204 OFFICE O/P NEW MOD 45 MIN: CPT | Performed by: INTERNAL MEDICINE

## 2018-03-17 PROCEDURE — 97163 PT EVAL HIGH COMPLEX 45 MIN: CPT

## 2018-03-17 PROCEDURE — 85049 AUTOMATED PLATELET COUNT: CPT | Performed by: PHYSICIAN ASSISTANT

## 2018-03-17 PROCEDURE — 84484 ASSAY OF TROPONIN QUANT: CPT | Performed by: HOSPITALIST

## 2018-03-17 PROCEDURE — 99233 SBSQ HOSP IP/OBS HIGH 50: CPT | Performed by: PHYSICIAN ASSISTANT

## 2018-03-17 PROCEDURE — G8979 MOBILITY GOAL STATUS: HCPCS

## 2018-03-17 PROCEDURE — G8978 MOBILITY CURRENT STATUS: HCPCS

## 2018-03-17 PROCEDURE — 80307 DRUG TEST PRSMV CHEM ANLYZR: CPT | Performed by: EMERGENCY MEDICINE

## 2018-03-17 PROCEDURE — 82948 REAGENT STRIP/BLOOD GLUCOSE: CPT

## 2018-03-17 RX ADMIN — TAMSULOSIN HYDROCHLORIDE 0.4 MG: 0.4 CAPSULE ORAL at 16:10

## 2018-03-17 RX ADMIN — METOPROLOL TARTRATE 25 MG: 25 TABLET ORAL at 20:16

## 2018-03-17 RX ADMIN — METOPROLOL TARTRATE 25 MG: 25 TABLET ORAL at 08:29

## 2018-03-17 RX ADMIN — ASPIRIN 81 MG 81 MG: 81 TABLET ORAL at 08:29

## 2018-03-17 RX ADMIN — ENOXAPARIN SODIUM 40 MG: 40 INJECTION SUBCUTANEOUS at 08:29

## 2018-03-17 RX ADMIN — PRAVASTATIN SODIUM 40 MG: 40 TABLET ORAL at 16:10

## 2018-03-17 NOTE — PLAN OF CARE
Problem: DISCHARGE PLANNING - CARE MANAGEMENT  Goal: Discharge to post-acute care or home with appropriate resources  INTERVENTIONS:  - Conduct assessment to determine patient/family and health care team treatment goals, and need for post-acute services based on payer coverage, community resources, and patient preferences, and barriers to discharge  - Address psychosocial, clinical, and financial barriers to discharge as identified in assessment in conjunction with the patient/family and health care team  - Arrange appropriate level of post-acute services according to patients   needs and preference and payer coverage in collaboration with the physician and health care team  - Communicate with and update the patient/family, physician, and health care team regarding progress on the discharge plan  - Arrange appropriate transportation to post-acute venues  Outcome: Progressing  CM met with pt at bedside  Pt lives with family in [facility]  Pt lives in a two story home with stairs and railings  Pt denies hx of DME/Oxygen, Rehab, HHC, Substance abuse  Pt has hx of PTSD  Pt is able to complete ADL's on his own  Pt uses [place] pharmacy  Pt has hx of Anxiety and Depression  Pt's sister (wes) is POA/AD  Pt is retired  Pt does not currently drive  Pt's will need assistance with transport when ready for discharge  Pt has no other questions at this time  CM department to follow pt through discharge process  CM reviewed [facility] with pt  Pt was in agreement  Pt had no questions and signed  CM provided pt with a copy and placed original in pt's medical record for scanning

## 2018-03-17 NOTE — ASSESSMENT & PLAN NOTE
· Patient reports taking metformin at home b i d , hold while here  Continue sliding scale with blood glucose checks    · A1c shows control at 5 9%

## 2018-03-17 NOTE — ASSESSMENT & PLAN NOTE
· Patient reports left-sided sharp chest pain, nonradiating and sharp in nature  Reproducible on exam, does not appear to be cardiac related, however patient with multiple risk factors including HTN, HLD, Dm2 need to r/o ACS  · [facility]  · Troponin 0 05/0 04/0 04   · Cardiology consult - given risk factors, they will plan for stress on Monday    · Continue Telemetry monitoring, no acute events  · Rapid drug screen (-)    · Continue statin, aspirin and Lopressor

## 2018-03-17 NOTE — CONSULTS
Consultation - Cardiology Team One  Tani Lugo 69 y o  male MRN: [id number]  Unit/Bed#: -01 Encounter: [id number]    Inpatient consult to Cardiology  Consult performed by: Melquiades Padilla  Consult ordered by: Emerson Schulz          Physician Requesting Consult: Marck Krause, DO  Reason for Consult / Principal Problem: Chest pain    HPI: Cardiologist Dr Emerson Hernandez is a 69y o  year old male who has a history of hypertension, hyperlipidemia, DM presents with chest pain for 1 day  Chest pain started yesterday morning, sharp, sudden, nonradiating  Located in the L anterior chest   Denies SOB, lightheadedness, palpitations, leg swelling, syncope  No nausea or diaphoresis  Has been having this sharp stabbing chest pain intermittently  Has not had a recent stress test   No prior cardiac history  Former smoker  REVIEW OF SYSTEMS:  Constitutional:  Denies fever or chills   Eyes:  Denies change in visual acuity   HENT:  Denies nasal congestion or sore throat   Respiratory:  Denies cough or shortness of breath   Cardiovascular:  +chest pain  Denies edema   GI:  Denies abdominal pain, nausea, vomiting, bloody stools or diarrhea   :  Denies dysuria, frequency, difficulty in micturition and nocturia  Musculoskeletal:  Denies back pain or joint pain   Neurologic:  Denies headache, focal weakness or sensory changes   Endocrine:  Denies polyuria or polydipsia   Lymphatic:  Denies swollen glands   Psychiatric:  Denies depression or anxiety     Historical Information   Past Medical History:   Diagnosis Date    Diabetes mellitus ([facility] [address] )     Enlarged prostate     Hyperlipidemia     Hypertension     Psychiatric disorder      History reviewed  No pertinent surgical history  History   Alcohol Use No     History   Drug Use No     History   Smoking Status    Former Smoker   Smokeless Tobacco    Never Used     Comment: quit in 1993       Family History: History reviewed   No pertinent family history  MEDS & ALLERGIES:  all current active meds have been reviewed and current meds: Current Facility-Administered Medications   Medication Dose Route Frequency    acetaminophen (TYLENOL) tablet 650 mg  650 mg Oral Q4H PRN    aspirin chewable tablet 81 mg  81 mg Oral Daily    enoxaparin (LOVENOX) subcutaneous injection 40 mg  40 mg Subcutaneous Daily    hydrALAZINE (APRESOLINE) injection 5 mg  5 mg Intravenous Q6H PRN    insulin lispro (HumaLOG) 100 units/mL subcutaneous injection 1-5 Units  1-5 Units Subcutaneous TID AC    insulin lispro (HumaLOG) 100 units/mL subcutaneous injection 1-5 Units  1-5 Units Subcutaneous HS    metoprolol tartrate (LOPRESSOR) tablet 25 mg  25 mg Oral Q12H TREVOR    pravastatin (PRAVACHOL) tablet 40 mg  40 mg Oral Daily With Dinner    tamsulosin (FLOMAX) capsule 0 4 mg  0 4 mg Oral Daily With Dinner        No Known Allergies    OBJECTIVE:  Vitals:   Vitals:    18 0700   BP: 140/84   Pulse: 79   Resp: 20   Temp: 97 9 °F (36 6 °C)   SpO2: 97%     Body mass index is 36 26 kg/m²  Systolic (96FLW), NJW:253 , Min:119 , ATN:493     Diastolic (24QUJ), CV, Min:71, Max:108      Intake/Output Summary (Last 24 hours) at 18 0820  Last data filed at 18 0543   Gross per 24 hour   Intake                0 ml   Output              600 ml   Net             -600 ml     Weight (last 2 days)     Date/Time   Weight    18 1742  118 (260)    18 1332  118 (260)            Invasive Devices     Peripheral Intravenous Line            Peripheral IV 18 Right Antecubital 2 days    Peripheral IV 18 Left Hand less than 1 day                PHYSICAL EXAMS:  General:  Patient is not in acute distress, laying in the bed comfortably, awake, alert responding to commands  Head: Normocephalic, Atraumatic     HEENT: White sclera, pink conjunctiva,  PERRLA,pharynx benign  Neck:  Supple, no neck vein distention, carotids+2/+2 no bruits, thyromegaly, adenopathy  Respiratory: clear to P/A  Cardiovascular:  PMI normal, S1-S2 normal, No  Murmurs, thrills, gallops, rubs   Regular rhythm  GI:  Abdomen soft nontender   No hepatosplenomegaly, adenopathy, ascites,or rebound tenderness  Extremities: No edema, normal pulses, no calf tenderness, no joint deformities, no venous disease   Integument:  No skin rashes or ulceration  Lymphatic:  No cervical or inguinal lymphadenopathy  Neurologic:  Patient is awake alert, responding to command, well-oriented to time and place and person moving all extremities    LABORATORY RESULTS:    Results from last 7 days  Lab Units 03/16/18  1718 03/16/18  1402   TROPONIN I ng/mL 0 04 0 05*     CBC with diff:   Results from last 7 days  Lab Units 03/17/18  0537 03/16/18  1402 03/14/18  1141   WBC Thousand/uL  --  8 12 7 45   HEMOGLOBIN g/dL  --  12 3 12 3   HEMATOCRIT %  --  39 7 39 4   MCV fL  --  77* 77*   PLATELETS Thousands/uL 142* 170 184   MCH pg  --  23 9* 24 0*   MCHC g/dL  --  31 0* 31 2*   RDW %  --  13 6 13 7   MPV fL 12 4  --  13 3*   NRBC AUTO /100 WBCs  --  0 0       CMP:  Results from last 7 days  Lab Units 03/16/18  1402 03/14/18  1141   SODIUM mmol/L 142 143   POTASSIUM mmol/L 4 3 3 9   CHLORIDE mmol/L 105 106   CO2 mmol/L 28 27   ANION GAP mmol/L 9 10   BUN mg/dL 14 13   CREATININE mg/dL 0 93 1 00   GLUCOSE RANDOM mg/dL 102 122   CALCIUM mg/dL 9 3 9 5   AST U/L 17 15   ALT U/L 35 37   ALK PHOS U/L 66 72   TOTAL PROTEIN g/dL 8 0 8 0   BILIRUBIN TOTAL mg/dL 0 30 0 30   EGFR ml/min/1 73sq m 96 88       BMP:  Results from last 7 days  Lab Units 03/16/18  1402 03/14/18  1141   SODIUM mmol/L 142 143   POTASSIUM mmol/L 4 3 3 9   CHLORIDE mmol/L 105 106   CO2 mmol/L 28 27   BUN mg/dL 14 13   CREATININE mg/dL 0 93 1 00   GLUCOSE RANDOM mg/dL 102 122   CALCIUM mg/dL 9 3 9 5                  Results from last 7 days  Lab Units 03/16/18  1718   HEMOGLOBIN A1C % 5 9       Results from last 7 days  Lab Units 03/14/18  1141   TSH 3RD JOHN uIU/mL 1 010           Lipid Profile:   Lab Results   Component Value Date    CHOL 131 03/17/2018     Lab Results   Component Value Date    HDL 32 (L) 03/17/2018     Lab Results   Component Value Date    LDLCALC 87 03/17/2018     Lab Results   Component Value Date    TRIG 59 03/17/2018       Cardiac testing:   No results found for this or any previous visit  No results found for this or any previous visit  No procedure found  No results found for this or any previous visit  Imaging:   I have personally reviewed pertinent reports  EKG reviewed personally:  NSR    Assessment/Plan:  1  Atypical chest pain:  -Serial troponins 0 05, 0 04  Continue to trend  -Given risk factors, will do ECHO and stress test   -ECHO to assess EF and regional wall motion abnormalities  -Stress test to r/o coronary ischemia   -Continue ASA, statin, lopressor    2  HTN: Stable, continue present regimen    Code Status: Level 1 - Full Code    Counseling / Coordination of Care  Total floor / unit time spent today 35 minutes  Greater than 50% of total time was spent with the patient and / or family counseling and / or coordination of care  A description of the counseling / coordination of care: Review of history, current assessment, development of a plan      Josesito Cadena PA-C  3/17/2018,8:20 AM

## 2018-03-17 NOTE — CASE MANAGEMENT
Initial Clinical Review    Admission: Date/Time/Statement: Observation 3/16/18 @ 1522    Orders Placed This Encounter   Procedures    Place in Observation (expected length of stay for this patient is less than two midnights)     Standing Status:   Standing     Number of Occurrences:   1     Order Specific Question:   Admitting Physician     Answer:   Runell Snellen [[id number]]     Order Specific Question:   Level of Care     Answer:   Med Surg [[id number]]     ED: Date/Time/Mode of Arrival:   ED Arrival Information     Expected Arrival Acuity Means of Arrival Escorted By Service Admission Type    - 3/16/2018 13:30 Urgent Ambulance [address] Urgent    Arrival Complaint    CHEST PAIN        Chief Complaint:   Chief Complaint   Patient presents with    Chest Pain     cp started today, pt also concerned about living situation     History of Illness: Rafael Cervantes is a 69 y o  male with significant past medical history of schizophrenia, hypertension, hyperlipidemia, diabetes mellitus type 2 who presents with chest pain x 1 day  Patient reports that the pain started early this morning at around 9 o'clock that was sudden and sharp  It is nonradiating and he denies diaphoresis or nausea  He denies any recent illnesses  He reports that when the chest pain began it was constant but then became intermittent  Reports that is still a sharp stabbing pain  Patient reports he has had previous episodes like this in the past and was evaluated by Cardiology a number of years ago at [facility]  He reports that these test resulted in normal findings  He does not follow up with the cardiologist   He reports some left-sided abdominal pain as well  He confirms history of schizophrenia for which she was previously taking antipsychotic medications but took himself off of them about 1 year ago    Patient reports that he feels much better off his medications and that he no longer hears voices or seeing things like he was previously  He currently reports that he feels as though his wife has a boyfriend and he sees them texting often  He also reports that he feels as though his wife and stepdaughter are out to hurt him  Reports that they took all the phones out of the house and go through his wallet  Also reports that he walks with a cane at home and does well with this  However reports that his cane was then taken away and his reading glasses    Patient reports that he is a member of the [place] and has been on multiple antipsychotic medications in the past, too many to count "     ED Vital Signs:   ED Triage Vitals   Temperature Pulse Respirations Blood Pressure SpO2   03/16/18 1332 03/16/18 1332 03/16/18 1332 03/16/18 1332 03/16/18 1332   98 7 °F (37 1 °C) 78 18 (!) 138/108 100 %      Temp Source Heart Rate Source Patient Position - Orthostatic VS BP Location FiO2 (%)   03/16/18 1742 03/16/18 1722 03/16/18 1722 03/16/18 1722 --   Oral Monitor Lying Right arm       Pain Score       03/16/18 1332       8        Wt Readings from Last 1 Encounters:   03/16/18 118 kg (260 lb)     Vital Signs (abnormal):   03/16/18 1900  98 1 °F (36 7 °C)  78  19   194/104  96 %  None (Room air)  Lying   03/16/18 1742  97 6 °F (36 4 °C)  72  19   175/106  95 %  None (Room air)  Lying   03/16/18 1722  --  75  --  119/71  94 %  --  Lying   03/16/18 1332  98 7 °F (37 1 °C)  78  18   138/108  100 %  --  --     Abnormal Labs:    Trop 0 05, 0 04 x 2  Platelets 052    Diagnostic Test Results:     3/16 EKG - Normal sinus rhythm  Marked T wave abnormality, consider inferior ischemia    3/17 Cardiac Echo ordered    3/17 NM myocardial perfusion stress test ordered     ED Treatment:   Medication Administration from 03/16/2018 1330 to 03/16/2018 1733    Date/Time Order Dose Route Action   03/16/2018 1440 ziprasidone (GEODON) IM injection 10 mg 10 mg Intramuscular Given   03/16/2018 1441 sterile water injection **AcuDose Override Pull** 10 mL  Given Past Medical/Surgical History: Active Ambulatory Problems     Diagnosis Date Noted    No Active Ambulatory Problems     Resolved Ambulatory Problems     Diagnosis Date Noted    No Resolved Ambulatory Problems     Past Medical History:   Diagnosis Date    Diabetes mellitus ([facility] )     Enlarged prostate     Hyperlipidemia     Hypertension     Psychiatric disorder      Admitting Diagnosis: Chest pain [R07 9]  Acute exacerbation of chronic schizophrenia ([facility] ) [F20 9]    Age/Sex: 69 y o  male    Assessment/Plan:   * Chest pain   Assessment & Plan     · Patient reports left-sided sharp chest pain, nonradiating and sharp in nature  Reproducible on exam, does not appear to be cardiac related, however patient with multiple risk factors including HTN, HLD, Dm2 need to r/o ACS  · Reports history of cardiac workup many years ago which was normal per patient, unable to find records here  · NSTEMI with first Troponin 0 05, trend serial troponins, could be related to hypertension on admission   · Cardiology consult   · Telemetry monitoring  · Obtain lipid panel and hemoglobin A1c  · Obtain rapid drug screen  · Continue statin, aspirin and Lopressor        Diabetes mellitus type 2 in [facility])   Assessment & Plan     · Patient reports taking metformin at home b i d    · Will hold metformin and initiate sliding scale insulin coverage  · Glucose checks        Hyperlipidemia   Assessment & Plan     · Continue statin  · Obtain lipid panel        Hypertension   Assessment & Plan     · BP elevated on admission, but has now improved  · Continue Lopressor 25 mg b i d   · P r n  hydralazine        Schizophrenia ([place] [address] )   Assessment & Plan     · Patient with history of  · Reports that he has taken himself off of his medications over 1 year ago and feels better without them  · Was previously on multiple medications per patient including Haldol and Geodon  · Patient reports that he feels as though his wife and stepdaughter are out to hurt him  Reports that they took away his cane and reading glasses  Reports that they give him  food that make him sick  Also took away the phones in the home and go through his wallet without his consent  · Case management consult, patient was recently in the ED yesterday with concerns of the same issue  Apparently crisis was called yesterday but nothing came of this  · Patient received 1 dose Geodon in the ED  · PT/OT           VTE Prophylaxis: Enoxaparin (Lovenox)  / sequential compression device   Code Status:  Level 1-full code, discussed with patient at bedside  Anticipated Length of Stay:  Patient will be admitted on an Observation basis with an anticipated length of stay of  < 2 midnights  Justification for Hospital Stay:  Trend troponins, cardiac evaluation    Admission Orders:  Scheduled Meds:   Current Facility-Administered Medications:  acetaminophen 650 mg Oral Q4H PRN Bluford Hashimoto, PA-C   aspirin 81 mg Oral Daily Heather Sheppard PA-C   enoxaparin 40 mg Subcutaneous Daily Heather Sheppard PA-C   hydrALAZINE 5 mg Intravenous Q6H PRN Heather Sheppard PA-C   insulin lispro 1-5 Units Subcutaneous TID AC Steffi Dow MD   insulin lispro 1-5 Units Subcutaneous HS Steffi Dow MD   metoprolol tartrate 25 mg Oral Q12H [facility] Steffi Dow MD   pravastatin 40 mg Oral Daily With aNthaly Marcial MD   tamsulosin 0 4 mg Oral Daily With Nathaly Marcial MD     PRN Meds:   acetaminophen    hydrALAZINE    Tele  SCDs  POC glucose ac/hs   Act as kiersten   POC alcohol breath test  Up w/ assist  Diet Cardiac; Cardiac Step 1; Consistent Carbohydrate Diet Level 3 (6 carb servings/90 grams CHO/meal)   Cons Cardiology   Cons CM   Cardiac echo pending  Stress test pending   PT eval/tx   ____________________________  3/17 Cardiology Consult     1  Atypical chest pain:  -Serial troponins 0 05, 0 04   Continue to trend  -Given risk factors, will do ECHO and stress test   -ECHO to assess EF and regional wall motion abnormalities  -Stress test to r/o coronary ischemia   -Continue ASA, statin, lopressor     2  HTN: Stable, continue present regimen     Code Status: Level 1 - Full Code    Thank you,  [address] [facility] in the [facility] by Ajay Renteria for 2017  Network Utilization Review Department  Phone: [phone]; Fax [phone]  ATTENTION: The Network Utilization Review Department is now centralized for our 7 Facilities  Make a note that we have a new phone and fax numbers for our Department  Please call with any questions or concerns to [phone] and carefully follow the prompts so that you are directed to the right person  All voicemails are confidential  Fax any determinations, approvals, denials, and requests for initial or continue stay review clinical to [phone]  Due to HIGH CALL volume, it would be easier if you could please send faxed requests to expedite your requests and in part, help us provide discharge notifications faster

## 2018-03-17 NOTE — PHYSICAL THERAPY NOTE
PT Evaluation (17min)  (8:40-8:57)    Past Medical History:   Diagnosis Date    Diabetes mellitus ([facility] [address] )     Enlarged prostate     Hyperlipidemia     Hypertension     Psychiatric disorder       03/17/18 0857   Note Type   Note type Eval only   Pain Assessment   Pain Assessment No/denies pain   Home Living   Type of [address] Two level;Stairs to enter with rails  (2 [address]; 13 steps to 2nd floor)   Home Equipment (had SPC, however reports family "took it away"  )   Prior Function   Level of [place] Independent with ADLs and functional mobility  (ambulates c SPC prn)   Lives With Spouse; Family  (alone during the day)   Receives Help From [facility] [address] in the last 6 months 0   Vocational On disability   Comments (+) drives   Restrictions/Precautions   Other Precautions Chair Alarm; Bed Alarm;Telemetry; Fall Risk  (tremulous)   General   Additional Pertinent History pt presents to [facility] c chest pain  currently undergoing cardiac workup c pending cardiac cath  PT consulted for mobility + d/c planning  up c (A)  Family/Caregiver Present No   Cognition   Orientation Level Oriented X4   RUE Assessment   RUE Assessment WFL  (4+/5)   LUE Assessment   LUE Assessment WFL  (4+/5)   RLE Assessment   RLE Assessment WFL   Strength RLE   R Hip Flexion 3-/5   R Knee Flexion 4/5   R Knee Extension 4/5   R Ankle Dorsiflexion 4/5   R Ankle Plantar Flexion 4/5   LLE Assessment   LLE Assessment WFL   Strength LLE   L Hip Flexion 3-/5   L Knee Flexion 4/5   L Knee Extension 4/5   L Ankle Dorsiflexion 4/5   L Ankle Plantar Flexion 4/5   Coordination   Sensation WFL   Bed Mobility   Supine to Sit 5  Supervision   Additional items HOB elevated; Increased time required;Verbal cues   Transfers   Sit to Stand 4  Minimal assistance   Additional items Assist x 1;Verbal cues; Increased time required   Stand to Sit 4  Minimal assistance   Additional items Assist x 1;Armrests; Verbal cues; Increased time required   Ambulation/Elevation   Gait pattern Narrow HAO; Forward Flexion;Decreased foot clearance; Improper Weight shift; Ataxia; Shuffling  (tremulous)   Gait Assistance 3  Moderate assist   Additional items Assist x 1;Verbal cues   Assistive Device Rolling walker   Distance 5' to chair; limited by instability   Balance   Static Sitting Good   Dynamic Sitting Good   Static Standing Poor +   Dynamic Standing Poor   Ambulatory Poor   Activity Tolerance   Activity Tolerance Patient limited by fatigue   Nurse Made Aware Brie   Assessment   Prognosis Good   Problem List Decreased strength;Decreased endurance; Impaired balance;Decreased mobility;Obesity   Assessment pt is a 62y/o m who [place] [facility] [place]  currently undergoing cardiac workup c pending cardiac cath  PMH significant for obesity, schizophrenia, HT, hyperlipidemia, + DM  at baseline, pt mod (I) c SPC prn, however reports unsteadiness  resides c family in 2 story home c 2 [address] + 13 steps to 2nd floor to access bedroom  pt alone during the day; denies h/o falls  presents c significant deficits in strength, balance, gait quality, + activity tolerance noted in PT exam above requiring min-mod (A)x1  Barthel Index 50/100  upon standing, pt required increased time to regain balance  ambulated 5' to chair c RW c min verbal/tactile cues for sequencing + RW advancement  gait deviations above increase pt's risk for falls  gait distance limited by unsteadiness + fatigue  pt would benefit from chair follow next PT session  able to sit OOB in chair c chair alarm activated  would benefit from skilled PT to maximize functional mobility + return to PLOF  upon d/c, will recommend STR 2* pt being alone during the day, 2 story home, + high fall risk  PT eval of high complexity 2* unstable med status c pt pending cardiac cath   pt c multiple co-morbidities including obesity + schizophrenia impacting PT  alone during the day in 2 story home c 13 steps to 2nd floor; currently at high fall risk  presents c significant decline in mobility requiring min-mod (A) for mobility tasks along c RW  Barriers to Discharge Inaccessible home environment;Decreased caregiver support   Goals   Patient Goals "to get stronger so I can get around better"  STG Expiration Date 03/27/18   Short Term Goal #1 1  increase strength 1/2 grade to improve overall functional mobility, 2  perform bed mobility mod (I) to sit up + eat a meal, 3  perform tranfsers mod (I) to safely perform ADLs, 4  ambulate 150' c least restrictive AD mod (I) to safely navigate home environment, 5  negotiate 13 stairs mod (I) to safely access 2nd floor of home   Plan   Treatment/Interventions Functional transfer training;LE strengthening/ROM; Elevations; Therapeutic exercise;Patient/family training; Endurance training;Equipment eval/education; Bed mobility;Gait training;Spoke to nursing   PT Frequency 5x/wk   Recommendation   Recommendation Short-term skilled PT   Equipment Recommended Walker  (RW)   PT - OK to Discharge Yes  (to STR )   Barthel Index   Feeding 10   Bathing 0   Grooming Score 5   Dressing Score 5   Bladder Score 10   Bowels Score 10   Toilet Use Score 5   Transfers (Bed/Chair) Score 5   Mobility (Level Surface) Score 0   Stairs Score 0   Barthel Index Score 50     Arsen Guerin, PT

## 2018-03-17 NOTE — ASSESSMENT & PLAN NOTE
· Patient with history of, reports that he has taken himself off of his medications over 1 year ago and feels better without them  · Was previously on multiple medications per patient including Haldol and Geodon  · Patient reports that he feels as though his wife and stepdaughter are out to hurt him  Reports that they took away his cane and reading glasses  Reports that they give him  food that make him sick  Also took away the phones in the home and go through his wallet without his consent  · Case management consult, patient was recently in the ED day prior to admit with concerns of the same issue  Apparently crisis was called yesterday but nothing came of this    · PT/OT, he is unsteady
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
## 2018-03-17 NOTE — SOCIAL WORK
CM met with pt at bedside  Pt lives with family in [facility]  Pt lives in a two story home with stairs and railings  Pt denies hx of DME/Oxygen, Rehab, HHC, Substance abuse  Pt has hx of PTSD  Pt is able to complete ADL's on his own  Pt uses [address] [facility]  Pt has hx of Anxiety and Depression  Pt's sister (wes) is POA/AD  Pt is retired  Pt does not currently drive  Pt's will need assistance with transport when ready for discharge  Pt has no other questions at this time  CM department to follow pt through discharge process  CM reviewed [facility] with pt  Pt was in agreement  Pt had no questions and signed  CM provided pt with a copy and placed original in pt's medical record for scanning

## 2018-03-17 NOTE — ASSESSMENT & PLAN NOTE
· BP elevated on admission, remains improved though above goal, continue Lopressor b i d   hydralazine as needed  Will consider adding ACE-inhibitor given his diabetes

## 2018-03-17 NOTE — PLAN OF CARE
Problem: PHYSICAL THERAPY ADULT  Goal: Performs mobility at highest level of function for planned discharge setting  See evaluation for individualized goals  Treatment/Interventions: Functional transfer training, LE strengthening/ROM, Elevations, Therapeutic exercise, Patient/family training, Endurance training, Equipment eval/education, Bed mobility, Gait training, Spoke to nursing  Equipment Recommended: [place] [facility] [place]       See flowsheet documentation for full assessment, interventions and recommendations  Prognosis: Good  Problem List: Decreased strength, Decreased endurance, Impaired balance, Decreased mobility, Obesity  Assessment: pt is a 62y/o m who presents to [facility] c chest pain  currently undergoing cardiac workup c pending cardiac cath  PMH significant for obesity, schizophrenia, HT, hyperlipidemia, + DM  at baseline, pt mod (I) c SPC prn, however reports unsteadiness  resides c family in 2 story home c 2 GARRICK + 13 steps to 2nd floor to access bedroom  pt alone during the day; denies h/o falls  presents c significant deficits in strength, balance, gait quality, + activity tolerance noted in PT exam above requiring min-mod (A)x1  Barthel Index 50/100  upon standing, pt required increased time to regain balance  ambulated 5' to chair c RW c min verbal/tactile cues for sequencing + RW advancement  gait deviations above increase pt's risk for falls  gait distance limited by unsteadiness + fatigue  pt would benefit from chair follow next PT session  able to sit OOB in chair c chair alarm activated  would benefit from skilled PT to maximize functional mobility + return to PLOF  upon d/c, will recommend STR 2* pt being alone during the day, 2 story home, + high fall risk  PT eval of high complexity 2* unstable med status c pt pending cardiac cath  pt c multiple co-morbidities including obesity + schizophrenia impacting PT  alone during the day in 2 story home c 13 steps to 2nd floor; currently at high fall risk  presents c significant decline in mobility requiring min-mod (A) for mobility tasks along c RW  Barriers to Discharge: Inaccessible home environment, Decreased caregiver support     Recommendation: Short-term skilled PT     PT - OK to Discharge: Yes (to STR )    See flowsheet documentation for full assessment

## 2018-03-18 LAB
GLUCOSE SERPL-MCNC: 104 MG/DL (ref 65–140)
GLUCOSE SERPL-MCNC: 111 MG/DL (ref 65–140)
GLUCOSE SERPL-MCNC: 123 MG/DL (ref 65–140)
GLUCOSE SERPL-MCNC: 128 MG/DL (ref 65–140)

## 2018-03-18 PROCEDURE — 99233 SBSQ HOSP IP/OBS HIGH 50: CPT | Performed by: PHYSICIAN ASSISTANT

## 2018-03-18 PROCEDURE — 82948 REAGENT STRIP/BLOOD GLUCOSE: CPT

## 2018-03-18 RX ORDER — LORAZEPAM 2 MG/ML
1 INJECTION INTRAMUSCULAR
Status: DISCONTINUED | OUTPATIENT
Start: 2018-03-18 | End: 2018-03-23 | Stop reason: HOSPADM

## 2018-03-18 RX ADMIN — ACETAMINOPHEN 650 MG: 325 TABLET, FILM COATED ORAL at 00:39

## 2018-03-18 RX ADMIN — ASPIRIN 81 MG 81 MG: 81 TABLET ORAL at 08:36

## 2018-03-18 RX ADMIN — TAMSULOSIN HYDROCHLORIDE 0.4 MG: 0.4 CAPSULE ORAL at 17:26

## 2018-03-18 RX ADMIN — ENOXAPARIN SODIUM 40 MG: 40 INJECTION SUBCUTANEOUS at 08:37

## 2018-03-18 RX ADMIN — ACETAMINOPHEN 650 MG: 325 TABLET, FILM COATED ORAL at 12:01

## 2018-03-18 RX ADMIN — PRAVASTATIN SODIUM 40 MG: 40 TABLET ORAL at 17:26

## 2018-03-18 RX ADMIN — METOPROLOL TARTRATE 25 MG: 25 TABLET ORAL at 20:20

## 2018-03-18 RX ADMIN — METOPROLOL TARTRATE 25 MG: 25 TABLET ORAL at 08:36

## 2018-03-18 RX ADMIN — ACETAMINOPHEN 650 MG: 325 TABLET, FILM COATED ORAL at 21:24

## 2018-03-18 RX ADMIN — ACETAMINOPHEN 650 MG: 325 TABLET, FILM COATED ORAL at 06:33

## 2018-03-18 NOTE — ASSESSMENT & PLAN NOTE
· Resolved at present time  Patient reports left-sided sharp chest pain, nonradiating and sharp in nature  Reproducible on exam, does not appear to be cardiac related, however patient with multiple risk factors including HTN, HLD, T2DM  Cardiology following, plan for echo and stress tomorrow  · [facility]    · Troponin flat 0 05/0 04/0 04   · Continue Telemetry monitoring, no acute events  · Rapid drug screen (-)    · Continue statin, aspirin and Lopressor
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
## 2018-03-18 NOTE — PHYSICIAN ADVISOR
Current patient class: Observation  The patient is currently on Hospital Day: 2      The patient was admitted to the hospital at N/A on N/A for the following diagnosis:  Chest pain [R07 9]  Acute exacerbation of chronic schizophrenia ([facility] [address] ) [F20 9]       There is documentation in the medical record of an expected length of stay of at least 2 midnights  The patient is therefore expected to satisfy the 2 midnight benchmark and given the 2 midnight presumption is appropriate for INPATIENT ADMISSION  Given this expectation of a satisfying stay, CMS instructs us that the patient is most often appropriate for inpatient admission under part A provided medical necessity is documented in the chart  After review of the relevant documentation, labs, vital signs and test results, the patient is appropriate for INPATIENT ADMISSION  Admission to the hospital as an inpatient is a complex decision making process which requires the practitioner to consider the patients presenting complaint, history and physical examination and all relevant testing  With this in mind, in this case, the patient was deemed appropriate for INPATIENT ADMISSION  After review of the documentation and testing available at the time of the admission I concur with this clinical determination of medical necessity  Rationale is as follows: The patient is a 69 yrs old Male who presented to the ED at 3/16/2018  1:31 PM with a chief complaint of Chest Pain (cp started today, pt also concerned about living situation)     The patient will require at least a 2 midnight length of stay for continued evaluation of cardiac risk factors  The patient present time has been hospitalized for 1 midnight, and is expected to remain hospitalized at least a 2nd midnight  The patient will stay for echocardiogram, stress testing  Given this, the patient will satisfy the 2 midnight benchmark and is appropriate for inpatient admission      The patients vitals on arrival were ED Triage Vitals   Temperature Pulse Respirations Blood Pressure SpO2   03/16/18 1332 03/16/18 1332 03/16/18 1332 03/16/18 1332 03/16/18 1332   98 7 °F (37 1 °C) 78 18 (!) 138/108 100 %      Temp Source Heart Rate Source Patient Position - Orthostatic VS BP Location FiO2 (%)   03/16/18 1742 03/16/18 1722 03/16/18 1722 03/16/18 1722 --   Oral Monitor Lying Right arm       Pain Score       03/16/18 1332       8           Past Medical History:   Diagnosis Date    Diabetes mellitus ([facility] [address] )     Enlarged prostate     Hyperlipidemia     Hypertension     Psychiatric disorder      History reviewed  No pertinent surgical history  Consults have been placed to:   IP CONSULT TO ED CRISIS WORKER  IP CONSULT TO CASE MANAGEMENT  IP CONSULT TO CARDIOLOGY    Vitals:    03/17/18 0700 03/17/18 1100 03/17/18 1500 03/17/18 1900   BP: 140/84 161/86 135/88 144/81   BP Location: Left arm Right arm Right arm Right arm   Pulse: 79 71 70 70   Resp: 20 18 18 18   Temp: 97 9 °F (36 6 °C) 98 °F (36 7 °C) 98 °F (36 7 °C) 98 3 °F (36 8 °C)   TempSrc: Oral Oral Oral Oral   SpO2: 97% 96% 96% 96%   Weight:       Height:           Most recent labs:    Recent Labs      03/16/18   1402   03/17/18   0537  03/17/18   0829   WBC  8 12   --    --    --    HGB  12 3   --    --    --    HCT  39 7   --    --    --    PLT  170   --   142*   --    K  4 3   --    --    --    NA  142   --    --    --    CALCIUM  9 3   --    --    --    BUN  14   --    --    --    CREATININE  0 93   --    --    --    TROPONINI  0 05*   < >   --   0 04   AST  17   --    --    --    ALT  35   --    --    --    ALKPHOS  66   --    --    --    BILITOT  0 30   --    --    --     < > = values in this interval not displayed         Scheduled Meds:  Current Facility-Administered Medications:  acetaminophen 650 mg Oral Q4H PRN Viji Brunson PA-C   aspirin 81 mg Oral Daily Heather Sheppard PA-C   enoxaparin 40 mg Subcutaneous Daily Heather Sheppard PA-C   hydrALAZINE 5 mg Intravenous Q6H PRN Leo Castellanos PA-C   insulin lispro 1-5 Units Subcutaneous TID AC Antoinette Roca MD   insulin lispro 1-5 Units Subcutaneous HS Antoinette Roca MD   metoprolol tartrate 25 mg Oral Q12H Diego Rees MD   pravastatin 40 mg Oral Daily With Benton Emery MD   tamsulosin 0 4 mg Oral Daily With Benton Emery MD     Continuous Infusions:   PRN Meds:   acetaminophen    hydrALAZINE    Surgical procedures (if appropriate):

## 2018-03-18 NOTE — ASSESSMENT & PLAN NOTE
· BP elevated on admission, remains improved though above goal, continue Lopressor b i d   hydralazine as needed      · Will consider adding ACE-inhibitor given his diabetes, for now monitor

## 2018-03-19 ENCOUNTER — APPOINTMENT (INPATIENT)
Dept: NON INVASIVE DIAGNOSTICS | Facility: HOSPITAL | Age: 68
DRG: 313 | End: 2018-03-19
Payer: OTHER GOVERNMENT

## 2018-03-19 ENCOUNTER — APPOINTMENT (INPATIENT)
Dept: NUCLEAR MEDICINE | Facility: HOSPITAL | Age: 68
DRG: 313 | End: 2018-03-19
Payer: OTHER GOVERNMENT

## 2018-03-19 ENCOUNTER — APPOINTMENT (INPATIENT)
Dept: NEUROLOGY | Facility: HOSPITAL | Age: 68
DRG: 313 | End: 2018-03-19
Attending: HOSPITALIST
Payer: OTHER GOVERNMENT

## 2018-03-19 LAB
ANION GAP SERPL CALCULATED.3IONS-SCNC: 9 MMOL/L (ref 4–13)
APTT PPP: 30 SECONDS (ref 23–35)
ARRHY DURING EX: NORMAL
BASOPHILS # BLD AUTO: 0.04 THOUSANDS/ΜL (ref 0–0.1)
BASOPHILS NFR BLD AUTO: 1 % (ref 0–1)
BUN SERPL-MCNC: 12 MG/DL (ref 5–25)
CALCIUM SERPL-MCNC: 9.2 MG/DL (ref 8.3–10.1)
CHEST PAIN STATEMENT: NORMAL
CHLORIDE SERPL-SCNC: 102 MMOL/L (ref 100–108)
CO2 SERPL-SCNC: 26 MMOL/L (ref 21–32)
CREAT SERPL-MCNC: 0.83 MG/DL (ref 0.6–1.3)
EOSINOPHIL # BLD AUTO: 0.21 THOUSAND/ΜL (ref 0–0.61)
EOSINOPHIL NFR BLD AUTO: 3 % (ref 0–6)
ERYTHROCYTE [DISTWIDTH] IN BLOOD BY AUTOMATED COUNT: 13.5 % (ref 11.6–15.1)
GFR SERPL CREATININE-BSD FRML MDRD: 104 ML/MIN/1.73SQ M
GLUCOSE SERPL-MCNC: 109 MG/DL (ref 65–140)
GLUCOSE SERPL-MCNC: 121 MG/DL (ref 65–140)
GLUCOSE SERPL-MCNC: 121 MG/DL (ref 65–140)
GLUCOSE SERPL-MCNC: 130 MG/DL (ref 65–140)
GLUCOSE SERPL-MCNC: 131 MG/DL (ref 65–140)
HCT VFR BLD AUTO: 38 % (ref 36.5–49.3)
HGB BLD-MCNC: 12 G/DL (ref 12–17)
LYMPHOCYTES # BLD AUTO: 3.49 THOUSANDS/ΜL (ref 0.6–4.47)
LYMPHOCYTES NFR BLD AUTO: 49 % (ref 14–44)
MAX DIASTOLIC BP: 74 MMHG
MAX HEART RATE: 93 BPM
MAX PREDICTED HEART RATE: 151 BPM
MAX. SYSTOLIC BP: 128 MMHG
MCH RBC QN AUTO: 23.9 PG (ref 26.8–34.3)
MCHC RBC AUTO-ENTMCNC: 31.6 G/DL (ref 31.4–37.4)
MCV RBC AUTO: 76 FL (ref 82–98)
MONOCYTES # BLD AUTO: 0.51 THOUSAND/ΜL (ref 0.17–1.22)
MONOCYTES NFR BLD AUTO: 7 % (ref 4–12)
NEUTROPHILS # BLD AUTO: 2.86 THOUSANDS/ΜL (ref 1.85–7.62)
NEUTS SEG NFR BLD AUTO: 40 % (ref 43–75)
NRBC BLD AUTO-RTO: 0 /100 WBCS
PLATELET # BLD AUTO: 145 THOUSANDS/UL (ref 149–390)
PMV BLD AUTO: 12.7 FL (ref 8.9–12.7)
POTASSIUM SERPL-SCNC: 3.8 MMOL/L (ref 3.5–5.3)
PROTOCOL NAME: NORMAL
RBC # BLD AUTO: 5.03 MILLION/UL (ref 3.88–5.62)
REASON FOR TERMINATION: NORMAL
SODIUM SERPL-SCNC: 137 MMOL/L (ref 136–145)
TARGET HR FORMULA: NORMAL
TEST INDICATION: NORMAL
TIME IN EXERCISE PHASE: NORMAL
WBC # BLD AUTO: 7.13 THOUSAND/UL (ref 4.31–10.16)

## 2018-03-19 PROCEDURE — 93306 TTE W/DOPPLER COMPLETE: CPT

## 2018-03-19 PROCEDURE — 85025 COMPLETE CBC W/AUTO DIFF WBC: CPT | Performed by: PHYSICIAN ASSISTANT

## 2018-03-19 PROCEDURE — 80048 BASIC METABOLIC PNL TOTAL CA: CPT | Performed by: PHYSICIAN ASSISTANT

## 2018-03-19 PROCEDURE — A9502 TC99M TETROFOSMIN: HCPCS

## 2018-03-19 PROCEDURE — 82948 REAGENT STRIP/BLOOD GLUCOSE: CPT

## 2018-03-19 PROCEDURE — 99232 SBSQ HOSP IP/OBS MODERATE 35: CPT | Performed by: PHYSICIAN ASSISTANT

## 2018-03-19 PROCEDURE — 85730 THROMBOPLASTIN TIME PARTIAL: CPT | Performed by: PHYSICIAN ASSISTANT

## 2018-03-19 PROCEDURE — 95816 EEG AWAKE AND DROWSY: CPT | Performed by: PSYCHIATRY & NEUROLOGY

## 2018-03-19 PROCEDURE — 93017 CV STRESS TEST TRACING ONLY: CPT

## 2018-03-19 PROCEDURE — 95816 EEG AWAKE AND DROWSY: CPT

## 2018-03-19 PROCEDURE — 78452 HT MUSCLE IMAGE SPECT MULT: CPT

## 2018-03-19 RX ORDER — ARIPIPRAZOLE 5 MG/1
10 TABLET ORAL DAILY
Status: DISCONTINUED | OUTPATIENT
Start: 2018-03-19 | End: 2018-03-23 | Stop reason: HOSPADM

## 2018-03-19 RX ORDER — CLONAZEPAM 0.5 MG/1
0.5 TABLET ORAL 2 TIMES DAILY
Status: DISCONTINUED | OUTPATIENT
Start: 2018-03-19 | End: 2018-03-23 | Stop reason: HOSPADM

## 2018-03-19 RX ADMIN — METOPROLOL TARTRATE 25 MG: 25 TABLET ORAL at 08:24

## 2018-03-19 RX ADMIN — ARIPIPRAZOLE 10 MG: 5 TABLET ORAL at 12:00

## 2018-03-19 RX ADMIN — TAMSULOSIN HYDROCHLORIDE 0.4 MG: 0.4 CAPSULE ORAL at 15:44

## 2018-03-19 RX ADMIN — CLONAZEPAM 0.5 MG: 0.5 TABLET ORAL at 17:33

## 2018-03-19 RX ADMIN — REGADENOSON 0.4 MG: 0.08 INJECTION, SOLUTION INTRAVENOUS at 10:25

## 2018-03-19 RX ADMIN — ASPIRIN 81 MG 81 MG: 81 TABLET ORAL at 08:24

## 2018-03-19 RX ADMIN — CLONAZEPAM 0.5 MG: 0.5 TABLET ORAL at 12:00

## 2018-03-19 RX ADMIN — ENOXAPARIN SODIUM 40 MG: 40 INJECTION SUBCUTANEOUS at 08:24

## 2018-03-19 RX ADMIN — METOPROLOL TARTRATE 25 MG: 25 TABLET ORAL at 21:48

## 2018-03-19 RX ADMIN — PRAVASTATIN SODIUM 40 MG: 40 TABLET ORAL at 15:44

## 2018-03-19 NOTE — ASSESSMENT & PLAN NOTE
· Patient reports taking metformin at home b i d , continue to hold while here  Continue sliding scale with blood glucose checks    · A1c shows control at 5 9%

## 2018-03-19 NOTE — PLAN OF CARE
Problem: Potential for Falls  Goal: Patient will remain free of falls  INTERVENTIONS:  - Assess patient frequently for physical needs  -  Identify cognitive and physical deficits and behaviors that affect risk of falls    -  [place] fall precautions as indicated by assessment   - Educate patient/family on patient safety including physical limitations  - Instruct patient to call for assistance with activity based on assessment  - Modify environment to reduce risk of injury  - Consider OT/PT consult to assist with strengthening/mobility   Outcome: Progressing      Problem: PAIN - ADULT  Goal: Verbalizes/displays adequate comfort level or baseline comfort level  Interventions:  - Encourage patient to monitor pain and request assistance  - Assess pain using appropriate pain scale  - Administer analgesics based on type and severity of pain and evaluate response  - Implement non-pharmacological measures as appropriate and evaluate response  - Consider cultural and social influences on pain and pain management  - Notify physician/advanced practitioner if interventions unsuccessful or patient reports new pain   Outcome: Progressing      Problem: INFECTION - ADULT  Goal: Absence or prevention of progression during hospitalization  INTERVENTIONS:  - Assess and monitor for signs and symptoms of infection  - Monitor lab/diagnostic results  - Monitor all insertion sites, i e  indwelling lines, tubes, and drains  - Monitor endotracheal (as able) and nasal secretions for changes in amount and color  - [place] appropriate cooling/warming therapies per order  - Administer medications as ordered  - Instruct and encourage patient and family to use good hand hygiene technique  - Identify and instruct in appropriate isolation precautions for identified infection/condition   Outcome: Progressing    Goal: Absence of fever/infection during neutropenic period  INTERVENTIONS:  - Monitor WBC  - Implement neutropenic guidelines   Outcome: Progressing      Problem: SAFETY ADULT  Goal: Maintain or return to baseline ADL function  INTERVENTIONS:  -  Assess patient's ability to carry out ADLs; assess patient's baseline for ADL function and identify physical deficits which impact ability to perform ADLs (bathing, care of mouth/teeth, toileting, grooming, dressing, etc )  - Assess/evaluate cause of self-care deficits   - Assess range of motion  - Assess patient's mobility; develop plan if impaired  - Assess patient's need for assistive devices and provide as appropriate  - Encourage maximum independence but intervene and supervise when necessary  ¯ Involve family in performance of ADLs  ¯ Assess for home care needs following discharge   ¯ Request OT consult to assist with ADL evaluation and planning for discharge  ¯ Provide patient education as appropriate   Outcome: Progressing    Goal: Maintain or return mobility status to optimal level  INTERVENTIONS:  - Assess patient's baseline mobility status (ambulation, transfers, stairs, etc )    - Identify cognitive and physical deficits and behaviors that affect mobility  - Identify mobility aids required to assist with transfers and/or ambulation (gait belt, sit-to-stand, lift, walker, cane, etc )  - [place] fall precautions as indicated by assessment  - Record patient progress and toleration of activity level on Mobility SBAR; progress patient to next Phase/Stage  - Instruct patient to call for assistance with activity based on assessment  - Request Rehabilitation consult to assist with strengthening/weightbearing, etc    Outcome: Progressing      Problem: DISCHARGE PLANNING  Goal: Discharge to home or other facility with appropriate resources  INTERVENTIONS:  - Identify barriers to discharge w/patient and caregiver  - Arrange for needed discharge resources and transportation as appropriate  - Identify discharge learning needs (meds, wound care, etc )  - Arrange for interpretive services to assist at discharge as needed  - Refer to Case Management Department for coordinating discharge planning if the patient needs post-hospital services based on physician/advanced practitioner order or complex needs related to functional status, cognitive ability, or social support system   Outcome: Progressing      Problem: Knowledge Deficit  Goal: Patient/family/caregiver demonstrates understanding of disease process, treatment plan, medications, and discharge instructions  Complete learning assessment and assess knowledge base    Interventions:  - Provide teaching at level of understanding  - Provide teaching via preferred learning methods   Outcome: Progressing      Problem: DISCHARGE PLANNING - CARE MANAGEMENT  Goal: Discharge to post-acute care or home with appropriate resources  INTERVENTIONS:  - Conduct assessment to determine patient/family and health care team treatment goals, and need for post-acute services based on payer coverage, community resources, and patient preferences, and barriers to discharge  - Address psychosocial, clinical, and financial barriers to discharge as identified in assessment in conjunction with the patient/family and health care team  - Arrange appropriate level of post-acute services according to patients   needs and preference and payer coverage in collaboration with the physician and health care team  - Communicate with and update the patient/family, physician, and health care team regarding progress on the discharge plan  - Arrange appropriate transportation to post-acute venues   Outcome: Progressing      Problem: Prexisting or High Potential for Compromised Skin Integrity  Goal: Skin integrity is maintained or improved  INTERVENTIONS:  - Identify patients at risk for skin breakdown  - Assess and monitor skin integrity  - Assess and monitor nutrition and hydration status  - Monitor labs (i e  albumin)  - Assess for incontinence   - Turn and reposition patient  - Assist with mobility/ambulation  - Relieve pressure over bony prominences  - Avoid friction and shearing  - Provide appropriate hygiene as needed including keeping skin clean and dry  - Evaluate need for skin moisturizer/barrier cream  - Collaborate with interdisciplinary team (i e  Nutrition, Rehabilitation, etc )   - Patient/family teaching   Outcome: Progressing
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
## 2018-03-19 NOTE — PHYSICAL THERAPY NOTE
Physical Therapy Cancellation Note        Chart review performed  Patient unavailable for PT session secondary to gone for testing  Will continue to follow and provide PT interventions as appropriate       Arian Trevino PT
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
## 2018-03-19 NOTE — ASSESSMENT & PLAN NOTE
· Resolved at present time  Patient reports left-sided sharp chest pain, nonradiating and sharp in nature  Reproducible on exam   Cardiology following, stress today (-) for inducible ischemia  Echo pending  · [facility]    · Troponin flat 0 05/0 04/0 04   · Continue Telemetry monitoring until echo reviewed, no significant events  · Rapid drug screen (-)    · Continue statin, aspirin and Lopressor

## 2018-03-19 NOTE — ED NOTES
Discussed benefits of inpt MH tx w/ pt who was agreeable to such  Pt signed 201       ARIANE Woodall  03/19/18   [id number]
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
## 2018-03-19 NOTE — PROGRESS NOTES
Progress Note - Freddi Koyanagi 1950, 69 y o  male MRN: [id number]    Unit/Bed#: -01 Encounter: [id number]    Primary Care Provider: No primary care provider on file  Date and time admitted to hospital: 3/16/2018  1:31 PM    * Chest pain   Assessment & Plan    · Resolved at present time  Patient reports left-sided sharp chest pain, nonradiating and sharp in nature  Reproducible on exam   Cardiology following, stress today (-) for inducible ischemia  Echo pending  · [facility]  · Troponin flat 0 05/0 04/0 04   · Continue Telemetry monitoring until echo reviewed, no significant events  · Rapid drug screen (-)    · Continue statin, aspirin and Lopressor        Diabetes mellitus type 2 in [facility])   Assessment & Plan    · Patient reports taking metformin at home b i d , continue to hold while here  Continue sliding scale with blood glucose checks  · A1c shows control at 5 9%        Hypertension   Assessment & Plan    · BP elevated on admission, improved  Continue Lopressor b i d , hydralazine as needed  Hyperlipidemia   Assessment & Plan    · Continue statin  · Lipids: Total 131, LDL 87, HDL 32, TG 59        Schizophrenia (HCC)   Assessment & Plan    · Patient with history of, reports that he has taken himself off of his medications over 1 year ago and feels better without them  · Was previously on multiple medications per patient including Haldol and Geodon  · Patient with paranoid and delusional thoughts that his wife and stepdaughter are trying to harm him, sequestered or information from him, and isolate him  Secondary to these, he was recommended for psychiatry consultation to which he agreed, occurred 3/18  · Recommendations for Abilify 10 mg daily, clonazepam 0 5 mg b i d  noted; these have been started 3/19  · Case management consult - he most likely will need a complex case manager  · ?   Lip-smacking and head movements noted - reports started since being on antipsychotics - ? Tardive dyskinesia vs seizure  He has no reported h/o seizures  EEG is pending  · PT/OT, he is unsteady  Recommending STR  VTE Pharmacologic Prophylaxis:   Pharmacologic: Enoxaparin (Lovenox)  Mechanical VTE Prophylaxis in Place: No - just up from NM stress test    Patient Centered Rounds: I have performed bedside rounds with nursing staff today  Discussions with Specialists or Other Care Team Provider:  Psychiatry note reviewed    Education and Discussions with Family / Patient:  Discussed with patient again regarding recommendations today from Psychiatry, he again declined to have me call his wife  He did agree to me calling his son Zoraida Rodríguez  [phone] and divulge details of his hospital course  Time Spent for Care: 30 minutes  More than 50% of total time spent on counseling and coordination of care as described above  Current Length of Stay: 2 day(s)    Current Patient Status: Inpatient   Certification Statement: The patient will continue to require additional inpatient hospital stay due to Inpatient psych placement    Discharge Plan:  Medically stable from a cardiac standpoint, await psych placement    Code Status: Level 1 - Full Code      Subjective:   Patient has any further episodes of chest pain, palpitations shortness of breath  He can refuses tele medical his wife  He states that he is going to move to [place] to live with son, Zoraida Rodríguez  He is agreeable to inpatient psych, he states he "has tried all medications"  Objective:     Vitals:   Temp (24hrs), Av 1 °F (36 7 °C), Min:97 8 °F (36 6 °C), Max:98 4 °F (36 9 °C)    HR:  [71-79] 72  Resp:  [18] 18  BP: (115-164)/() 115/85  SpO2:  [95 %-97 %] 97 %  Body mass index is 36 26 kg/m²  Input and Output Summary (last 24 hours):        Intake/Output Summary (Last 24 hours) at 18 1449  Last data filed at 18 1300   Gross per 24 hour   Intake              780 ml Output             1600 ml   Net             -820 ml       Physical Exam:     Physical Exam   Constitutional: Vital signs are normal  He appears well-developed and well-nourished  No distress  AAM in NAD   Cardiovascular: Normal rate, regular rhythm, S1 normal, S2 normal, normal heart sounds and intact distal pulses  No murmur heard  Pulmonary/Chest: Effort normal and breath sounds normal  No respiratory distress  He has no wheezes  He has no rhonchi  He has no rales  He exhibits no tenderness  Abdominal: Soft  Bowel sounds are normal  He exhibits no distension  There is no tenderness  obese   Musculoskeletal: He exhibits no edema  Neurological:   Still with lip smacking, tongue protruding   Nursing note and vitals reviewed  Additional Data:     Labs:      Results from last 7 days  Lab Units 03/19/18  0640   WBC Thousand/uL 7 13   HEMOGLOBIN g/dL 12 0   HEMATOCRIT % 38 0   PLATELETS Thousands/uL 145*   NEUTROS PCT % 40*   LYMPHS PCT % 49*   MONOS PCT % 7   EOS PCT % 3       Results from last 7 days  Lab Units 03/19/18  0640 03/16/18  1402   SODIUM mmol/L 137 142   POTASSIUM mmol/L 3 8 4 3   CHLORIDE mmol/L 102 105   CO2 mmol/L 26 28   BUN mg/dL 12 14   CREATININE mg/dL 0 83 0 93   CALCIUM mg/dL 9 2 9 3   TOTAL PROTEIN g/dL  --  8 0   BILIRUBIN TOTAL mg/dL  --  0 30   ALK PHOS U/L  --  66   ALT U/L  --  35   AST U/L  --  17   GLUCOSE RANDOM mg/dL 121 102           * I Have Reviewed All Lab Data Listed Above  * Additional Pertinent Lab Tests Reviewed:  All Labs Within Last 24 Hours Reviewed    Imaging:    Imaging Reports Reviewed Today Include:  Nuclear medicine stress test  Imaging Personally Reviewed by Myself Includes:  None    Recent Cultures (last 7 days):           Last 24 Hours Medication List:     Current Facility-Administered Medications:  acetaminophen 650 mg Oral Q4H PRN Ludin Delgado PA-C   ARIPiprazole 10 mg Oral Daily Sheri Victor PA-C   aspirin 81 mg Oral Daily Ludin Delgado HUNG   clonazePAM 0 5 mg Oral BID Sheri Victor PA-C   enoxaparin 40 mg Subcutaneous Daily Heather Sheppard PA-C   hydrALAZINE 5 mg Intravenous Q6H PRN Heather Sheppard PA-C   insulin lispro 1-5 Units Subcutaneous TID AC Aminata Delaney MD   insulin lispro 1-5 Units Subcutaneous HS Aminata Delaney MD   LORazepam 1 mg Intravenous Q5 Min PRN Sheri Victor PA-C   metoprolol tartrate 25 mg Oral Q12H Sandy Calix MD   pravastatin 40 mg Oral Daily With Marlyn Beckman MD   tamsulosin 0 4 mg Oral Daily With Marlyn Beckman MD        Today, Patient Was Seen By: Alon Melo PA-C    ** Please Note: Dictation voice to text software may have been used in the creation of this document   **

## 2018-03-20 LAB
GLUCOSE SERPL-MCNC: 110 MG/DL (ref 65–140)
GLUCOSE SERPL-MCNC: 114 MG/DL (ref 65–140)
GLUCOSE SERPL-MCNC: 164 MG/DL (ref 65–140)
GLUCOSE SERPL-MCNC: 88 MG/DL (ref 65–140)

## 2018-03-20 PROCEDURE — 82948 REAGENT STRIP/BLOOD GLUCOSE: CPT

## 2018-03-20 PROCEDURE — 99232 SBSQ HOSP IP/OBS MODERATE 35: CPT | Performed by: PHYSICIAN ASSISTANT

## 2018-03-20 PROCEDURE — 93306 TTE W/DOPPLER COMPLETE: CPT | Performed by: INTERNAL MEDICINE

## 2018-03-20 RX ADMIN — MAGNESIUM HYDROXIDE 30 ML: 400 SUSPENSION ORAL at 10:49

## 2018-03-20 RX ADMIN — PRAVASTATIN SODIUM 40 MG: 40 TABLET ORAL at 16:15

## 2018-03-20 RX ADMIN — INSULIN LISPRO 1 UNITS: 100 INJECTION, SOLUTION INTRAVENOUS; SUBCUTANEOUS at 21:40

## 2018-03-20 RX ADMIN — METOPROLOL TARTRATE 25 MG: 25 TABLET ORAL at 08:03

## 2018-03-20 RX ADMIN — METOPROLOL TARTRATE 25 MG: 25 TABLET ORAL at 21:40

## 2018-03-20 RX ADMIN — TAMSULOSIN HYDROCHLORIDE 0.4 MG: 0.4 CAPSULE ORAL at 16:15

## 2018-03-20 RX ADMIN — ENOXAPARIN SODIUM 40 MG: 40 INJECTION SUBCUTANEOUS at 08:03

## 2018-03-20 RX ADMIN — ARIPIPRAZOLE 10 MG: 5 TABLET ORAL at 08:03

## 2018-03-20 RX ADMIN — CLONAZEPAM 0.5 MG: 0.5 TABLET ORAL at 08:03

## 2018-03-20 RX ADMIN — ASPIRIN 81 MG 81 MG: 81 TABLET ORAL at 08:03

## 2018-03-20 NOTE — ASSESSMENT & PLAN NOTE
· Patient with history of, reports that he has taken himself off of his medications over 1 year ago and feels better without them  · Was previously on multiple medications per patient including Haldol and Geodon  · Patient with paranoid and delusional thoughts that his wife and stepdaughter are trying to harm him, sequestered or information from him, and isolate him  Secondary to these, he was recommended for psychiatry consultation to which he agreed, occurred 3/18  · Recommendations for Abilify 10 mg daily, clonazepam 0 5 mg b i d  noted; these have been started 3/19  · Case management consult - he most likely will need a complex case manager  · EEG negative for epileptiform activity  · PT/OT, he is unsteady  Recommending STR

## 2018-03-20 NOTE — PLAN OF CARE
Problem: Potential for Falls  Goal: Patient will remain free of falls  INTERVENTIONS:  - Assess patient frequently for physical needs  -  Identify cognitive and physical deficits and behaviors that affect risk of falls    -  [place] fall precautions as indicated by assessment   - Educate patient/family on patient safety including physical limitations  - Instruct patient to call for assistance with activity based on assessment  - Modify environment to reduce risk of injury  - Consider OT/PT consult to assist with strengthening/mobility   Outcome: Progressing      Problem: PAIN - ADULT  Goal: Verbalizes/displays adequate comfort level or baseline comfort level  Interventions:  - Encourage patient to monitor pain and request assistance  - Assess pain using appropriate pain scale  - Administer analgesics based on type and severity of pain and evaluate response  - Implement non-pharmacological measures as appropriate and evaluate response  - Consider cultural and social influences on pain and pain management  - Notify physician/advanced practitioner if interventions unsuccessful or patient reports new pain   Outcome: Progressing      Problem: INFECTION - ADULT  Goal: Absence or prevention of progression during hospitalization  INTERVENTIONS:  - Assess and monitor for signs and symptoms of infection  - Monitor lab/diagnostic results  - Monitor all insertion sites, i e  indwelling lines, tubes, and drains  - Monitor endotracheal (as able) and nasal secretions for changes in amount and color  - [place] appropriate cooling/warming therapies per order  - Administer medications as ordered  - Instruct and encourage patient and family to use good hand hygiene technique  - Identify and instruct in appropriate isolation precautions for identified infection/condition   Outcome: Progressing    Goal: Absence of fever/infection during neutropenic period  INTERVENTIONS:  - Monitor WBC  - Implement neutropenic guidelines   Outcome: Progressing      Problem: SAFETY ADULT  Goal: Maintain or return to baseline ADL function  INTERVENTIONS:  -  Assess patient's ability to carry out ADLs; assess patient's baseline for ADL function and identify physical deficits which impact ability to perform ADLs (bathing, care of mouth/teeth, toileting, grooming, dressing, etc )  - Assess/evaluate cause of self-care deficits   - Assess range of motion  - Assess patient's mobility; develop plan if impaired  - Assess patient's need for assistive devices and provide as appropriate  - Encourage maximum independence but intervene and supervise when necessary  ¯ Involve family in performance of ADLs  ¯ Assess for home care needs following discharge   ¯ Request OT consult to assist with ADL evaluation and planning for discharge  ¯ Provide patient education as appropriate   Outcome: Progressing    Goal: Maintain or return mobility status to optimal level  INTERVENTIONS:  - Assess patient's baseline mobility status (ambulation, transfers, stairs, etc )    - Identify cognitive and physical deficits and behaviors that affect mobility  - Identify mobility aids required to assist with transfers and/or ambulation (gait belt, sit-to-stand, lift, walker, cane, etc )  - [place] fall precautions as indicated by assessment  - Record patient progress and toleration of activity level on Mobility SBAR; progress patient to next Phase/Stage  - Instruct patient to call for assistance with activity based on assessment  - Request Rehabilitation consult to assist with strengthening/weightbearing, etc    Outcome: Progressing      Problem: DISCHARGE PLANNING  Goal: Discharge to home or other facility with appropriate resources  INTERVENTIONS:  - Identify barriers to discharge w/patient and caregiver  - Arrange for needed discharge resources and transportation as appropriate  - Identify discharge learning needs (meds, wound care, etc )  - Arrange for interpretive services to assist at discharge as needed  - Refer to Case Management Department for coordinating discharge planning if the patient needs post-hospital services based on physician/advanced practitioner order or complex needs related to functional status, cognitive ability, or social support system   Outcome: Progressing      Problem: Knowledge Deficit  Goal: Patient/family/caregiver demonstrates understanding of disease process, treatment plan, medications, and discharge instructions  Complete learning assessment and assess knowledge base    Interventions:  - Provide teaching at level of understanding  - Provide teaching via preferred learning methods   Outcome: Progressing      Problem: DISCHARGE PLANNING - CARE MANAGEMENT  Goal: Discharge to post-acute care or home with appropriate resources  INTERVENTIONS:  - Conduct assessment to determine patient/family and health care team treatment goals, and need for post-acute services based on payer coverage, community resources, and patient preferences, and barriers to discharge  - Address psychosocial, clinical, and financial barriers to discharge as identified in assessment in conjunction with the patient/family and health care team  - Arrange appropriate level of post-acute services according to patients   needs and preference and payer coverage in collaboration with the physician and health care team  - Communicate with and update the patient/family, physician, and health care team regarding progress on the discharge plan  - Arrange appropriate transportation to post-acute venues   Outcome: Progressing      Problem: Prexisting or High Potential for Compromised Skin Integrity  Goal: Skin integrity is maintained or improved  INTERVENTIONS:  - Identify patients at risk for skin breakdown  - Assess and monitor skin integrity  - Assess and monitor nutrition and hydration status  - Monitor labs (i e  albumin)  - Assess for incontinence   - Turn and reposition patient  - Assist with mobility/ambulation  - Relieve pressure over bony prominences  - Avoid friction and shearing  - Provide appropriate hygiene as needed including keeping skin clean and dry  - Evaluate need for skin moisturizer/barrier cream  - Collaborate with interdisciplinary team (i e  Nutrition, Rehabilitation, etc )   - Patient/family teaching   Outcome: Progressing

## 2018-03-20 NOTE — PLAN OF CARE
Problem: Potential for Falls  Goal: Patient will remain free of falls  INTERVENTIONS:  - Assess patient frequently for physical needs  -  Identify cognitive and physical deficits and behaviors that affect risk of falls    -  [place] fall precautions as indicated by assessment   - Educate patient/family on patient safety including physical limitations  - Instruct patient to call for assistance with activity based on assessment  - Modify environment to reduce risk of injury  - Consider OT/PT consult to assist with strengthening/mobility   Outcome: Progressing      Problem: PAIN - ADULT  Goal: Verbalizes/displays adequate comfort level or baseline comfort level  Interventions:  - Encourage patient to monitor pain and request assistance  - Assess pain using appropriate pain scale  - Administer analgesics based on type and severity of pain and evaluate response  - Implement non-pharmacological measures as appropriate and evaluate response  - Consider cultural and social influences on pain and pain management  - Notify physician/advanced practitioner if interventions unsuccessful or patient reports new pain   Outcome: Progressing      Problem: INFECTION - ADULT  Goal: Absence or prevention of progression during hospitalization  INTERVENTIONS:  - Assess and monitor for signs and symptoms of infection  - Monitor lab/diagnostic results  - Monitor all insertion sites, i e  indwelling lines, tubes, and drains  - Monitor endotracheal (as able) and nasal secretions for changes in amount and color  - [place] appropriate cooling/warming therapies per order  - Administer medications as ordered  - Instruct and encourage patient and family to use good hand hygiene technique  - Identify and instruct in appropriate isolation precautions for identified infection/condition   Outcome: Progressing    Goal: Absence of fever/infection during neutropenic period  INTERVENTIONS:  - Monitor WBC  - Implement neutropenic guidelines   Outcome: Progressing      Problem: SAFETY ADULT  Goal: Maintain or return to baseline ADL function  INTERVENTIONS:  -  Assess patient's ability to carry out ADLs; assess patient's baseline for ADL function and identify physical deficits which impact ability to perform ADLs (bathing, care of mouth/teeth, toileting, grooming, dressing, etc )  - Assess/evaluate cause of self-care deficits   - Assess range of motion  - Assess patient's mobility; develop plan if impaired  - Assess patient's need for assistive devices and provide as appropriate  - Encourage maximum independence but intervene and supervise when necessary  ¯ Involve family in performance of ADLs  ¯ Assess for home care needs following discharge   ¯ Request OT consult to assist with ADL evaluation and planning for discharge  ¯ Provide patient education as appropriate   Outcome: Progressing    Goal: Maintain or return mobility status to optimal level  INTERVENTIONS:  - Assess patient's baseline mobility status (ambulation, transfers, stairs, etc )    - Identify cognitive and physical deficits and behaviors that affect mobility  - Identify mobility aids required to assist with transfers and/or ambulation (gait belt, sit-to-stand, lift, walker, cane, etc )  - [place] fall precautions as indicated by assessment  - Record patient progress and toleration of activity level on Mobility SBAR; progress patient to next Phase/Stage  - Instruct patient to call for assistance with activity based on assessment  - Request Rehabilitation consult to assist with strengthening/weightbearing, etc    Outcome: Progressing      Problem: DISCHARGE PLANNING  Goal: Discharge to home or other facility with appropriate resources  INTERVENTIONS:  - Identify barriers to discharge w/patient and caregiver  - Arrange for needed discharge resources and transportation as appropriate  - Identify discharge learning needs (meds, wound care, etc )  - Arrange for interpretive services to assist at discharge as needed  - Refer to Case Management Department for coordinating discharge planning if the patient needs post-hospital services based on physician/advanced practitioner order or complex needs related to functional status, cognitive ability, or social support system   Outcome: Progressing      Problem: Knowledge Deficit  Goal: Patient/family/caregiver demonstrates understanding of disease process, treatment plan, medications, and discharge instructions  Complete learning assessment and assess knowledge base    Interventions:  - Provide teaching at level of understanding  - Provide teaching via preferred learning methods   Outcome: Progressing      Problem: DISCHARGE PLANNING - CARE MANAGEMENT  Goal: Discharge to post-acute care or home with appropriate resources  INTERVENTIONS:  - Conduct assessment to determine patient/family and health care team treatment goals, and need for post-acute services based on payer coverage, community resources, and patient preferences, and barriers to discharge  - Address psychosocial, clinical, and financial barriers to discharge as identified in assessment in conjunction with the patient/family and health care team  - Arrange appropriate level of post-acute services according to patients   needs and preference and payer coverage in collaboration with the physician and health care team  - Communicate with and update the patient/family, physician, and health care team regarding progress on the discharge plan  - Arrange appropriate transportation to post-acute venues   Outcome: Progressing      Problem: Prexisting or High Potential for Compromised Skin Integrity  Goal: Skin integrity is maintained or improved  INTERVENTIONS:  - Identify patients at risk for skin breakdown  - Assess and monitor skin integrity  - Assess and monitor nutrition and hydration status  - Monitor labs (i e  albumin)  - Assess for incontinence   - Turn and reposition patient  - Assist with mobility/ambulation  - Relieve pressure over bony prominences  - Avoid friction and shearing  - Provide appropriate hygiene as needed including keeping skin clean and dry  - Evaluate need for skin moisturizer/barrier cream  - Collaborate with interdisciplinary team (i e  Nutrition, Rehabilitation, etc )   - Patient/family teaching   Outcome: Progressing
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
## 2018-03-20 NOTE — ASSESSMENT & PLAN NOTE
· Resolved at present time  Patient reports left-sided sharp chest pain, nonradiating and sharp in nature  Reproducible on exam   Cardiology following, stress today (-) for inducible ischemia  Echo normal EF, grade 1 diastolic dysfunction    · Have request records from [place]  · Troponin flat 0 05/0 04/0 04   · Rapid drug screen (-)  · Tele d/c'd    · Continue statin, aspirin and Lopressor

## 2018-03-20 NOTE — ED NOTES
Call placed to [address] [place] [address] [address] who stated to fax clinical to [phone] to Dr Figueroa Courser       Talha Platt, SARAHY  03/20/18  0405

## 2018-03-21 LAB
GLUCOSE SERPL-MCNC: 118 MG/DL (ref 65–140)
GLUCOSE SERPL-MCNC: 128 MG/DL (ref 65–140)
GLUCOSE SERPL-MCNC: 128 MG/DL (ref 65–140)
GLUCOSE SERPL-MCNC: 95 MG/DL (ref 65–140)

## 2018-03-21 PROCEDURE — 97167 OT EVAL HIGH COMPLEX 60 MIN: CPT

## 2018-03-21 PROCEDURE — 82948 REAGENT STRIP/BLOOD GLUCOSE: CPT

## 2018-03-21 PROCEDURE — 99232 SBSQ HOSP IP/OBS MODERATE 35: CPT | Performed by: NURSE PRACTITIONER

## 2018-03-21 PROCEDURE — 97530 THERAPEUTIC ACTIVITIES: CPT

## 2018-03-21 PROCEDURE — G8987 SELF CARE CURRENT STATUS: HCPCS

## 2018-03-21 PROCEDURE — 97110 THERAPEUTIC EXERCISES: CPT

## 2018-03-21 PROCEDURE — G8988 SELF CARE GOAL STATUS: HCPCS

## 2018-03-21 RX ORDER — MAGNESIUM CARB/ALUMINUM HYDROX 105-160MG
296 TABLET,CHEWABLE ORAL ONCE
Status: COMPLETED | OUTPATIENT
Start: 2018-03-21 | End: 2018-03-21

## 2018-03-21 RX ORDER — DOCUSATE SODIUM 100 MG/1
100 CAPSULE, LIQUID FILLED ORAL 2 TIMES DAILY
Status: DISCONTINUED | OUTPATIENT
Start: 2018-03-21 | End: 2018-03-23 | Stop reason: HOSPADM

## 2018-03-21 RX ORDER — POLYETHYLENE GLYCOL 3350 17 G/17G
17 POWDER, FOR SOLUTION ORAL DAILY PRN
Status: DISCONTINUED | OUTPATIENT
Start: 2018-03-21 | End: 2018-03-23 | Stop reason: HOSPADM

## 2018-03-21 RX ADMIN — METOPROLOL TARTRATE 25 MG: 25 TABLET ORAL at 21:00

## 2018-03-21 RX ADMIN — CLONAZEPAM 0.5 MG: 0.5 TABLET ORAL at 17:26

## 2018-03-21 RX ADMIN — METOPROLOL TARTRATE 25 MG: 25 TABLET ORAL at 10:25

## 2018-03-21 RX ADMIN — TAMSULOSIN HYDROCHLORIDE 0.4 MG: 0.4 CAPSULE ORAL at 16:18

## 2018-03-21 RX ADMIN — DOCUSATE SODIUM 100 MG: 100 CAPSULE, LIQUID FILLED ORAL at 17:26

## 2018-03-21 RX ADMIN — MAGESIUM CITRATE 296 ML: 1.75 LIQUID ORAL at 14:48

## 2018-03-21 RX ADMIN — ASPIRIN 81 MG 81 MG: 81 TABLET ORAL at 10:25

## 2018-03-21 RX ADMIN — PRAVASTATIN SODIUM 40 MG: 40 TABLET ORAL at 16:18

## 2018-03-21 RX ADMIN — ARIPIPRAZOLE 10 MG: 5 TABLET ORAL at 10:25

## 2018-03-21 RX ADMIN — CLONAZEPAM 0.5 MG: 0.5 TABLET ORAL at 10:25

## 2018-03-21 RX ADMIN — ENOXAPARIN SODIUM 40 MG: 40 INJECTION SUBCUTANEOUS at 10:24

## 2018-03-21 NOTE — PROGRESS NOTES
Attempted to change patient IV x 2 patient started to yell and flare arms   Will have staff try at another time

## 2018-03-21 NOTE — ASSESSMENT & PLAN NOTE
· Patient reports taking metformin at home b i d , continue to hold while here  · Continue sliding scale with blood glucose checks    · A1c shows control at 5 9%

## 2018-03-21 NOTE — OCCUPATIONAL THERAPY NOTE
Occupational Therapy Evaluation      Tani Lugo    3/21/2018    Patient Active Problem List   Diagnosis    Chest pain    Schizophrenia ([facility] [address] [facility] )    Hypertension    Hyperlipidemia    Diabetes mellitus type 2 in obese [facility])       Past Medical History:   Diagnosis Date    Diabetes mellitus ([facility] [address] )     Enlarged prostate     Hyperlipidemia     Hypertension     Psychiatric disorder        History reviewed  No pertinent surgical history  03/21/18 0991   Note Type   Note type Eval/Treat   Restrictions/Precautions   Weight Bearing Precautions Per Order No   Other Precautions Chair Alarm; Bed Alarm; Fall Risk;Telemetry   Pain Assessment   Pain Assessment No/denies pain   Pain Score No Pain   Home Living   Type of [address] [place] [address] Two level;Stairs to enter with rails  (2 [address]ARR[address]; 13 steps to 2nd floor)   Bathroom Shower/Tub Tub/shower unit  (Pt reports he is going to a shelter when he leaves, not home)   [address] (none at baseline)   Bathroom Accessibility Not accessible   Home Equipment Other (Comment)  (had SPC, however reports family "took it away)   Prior Function   Level of [place] Independent with ADLs and functional mobility  (ambulates c SPC prn)   Lives With Spouse; Family  (alone during the day)   [address] Help From Family  (reports help at home is minimal)   ADL Assistance Independent   IADLs Independent   Falls in the last 6 months 0   Vocational On disability  ()   Comments (+) ; reports help at home is minimal   Lifestyle   Autonomy pt reports ind with ADLs and IADL PLOF  Reciprocal Relationships family but reports minimal help at home   Psychosocial   Psychosocial (WDL) WDL   Patient Behaviors/Mood Calm; Cooperative   ADL   Grooming Assistance 4  Minimal Assistance   Grooming Deficit Setup; Increased time to complete; Impulsive;Verbal cueing;Supervision/safety   UB Bathing Assistance 4  Minimal Assistance   UB Bathing Deficit Setup;Supervision/safety; Impulsive; Increased time to complete;Verbal cueing   LB Bathing Assistance 4  Minimal Assistance   LB Bathing Deficit Setup; Impulsive;Verbal cueing;Supervision/safety; Increased time to complete   UB Dressing Assistance 4  Minimal Assistance   UB Dressing Deficit Setup; Impulsive;Verbal cueing;Supervision/safety; Increased time to complete   LB Dressing Assistance 4  Minimal Assistance   LB Dressing Deficit Setup; Impulsive;Verbal cueing;Supervision/safety; Increased time to complete   Toileting Assistance  4  Minimal Assistance   Toileting Deficit Setup;Verbal cueing;Supervison/safety; Increased time to complete; Impulsive   Functional Assistance 4  Minimal Assistance   Functional Deficit Setup; Impulsive;Verbal cueing;Supervision/safety; Increased time to complete   Bed Mobility   Rolling R 5  Supervision   Additional items Assist x 1; Impulsive; Bedrails;Verbal cues   Rolling L 5  Supervision   Additional items Assist x 1;Bedrails; Impulsive;Verbal cues   Supine to Sit 5  Supervision   Additional items Assist x 1;Bedrails; Impulsive;Verbal cues   Sit to Supine 5  Supervision   Additional items Assist x 1;Bedrails; Impulsive;Verbal cues   Transfers   Sit to Stand 4  Minimal assistance   Additional items Assist x 1;Bedrails; Impulsive;Verbal cues   Stand to Sit 4  Minimal assistance   Additional items Assist x 1;Bedrails; Impulsive;Verbal cues   Functional Mobility   Functional Mobility 4  Minimal assistance   Additional Comments RW; very impulsive    Balance   Static Sitting Good   Dynamic Sitting Good   Static Standing Poor +   Dynamic Standing Poor   Ambulatory Poor   Activity Tolerance   Activity Tolerance Patient limited by fatigue   Nurse Made Aware yes, spoke with pts rn who stated pt was appropriate for therapy and made aware of outcomes   RUE Assessment   RUE Assessment WFL  (4+/5)   LUE Assessment   LUE Assessment WFL  (4+/5)   Hand Function   Gross Motor Coordination Functional   Fine Motor Coordination Functional   Sensation   Light Touch No apparent deficits   Proprioception   Proprioception No apparent deficits   Vision-Basic Assessment   Current Vision No visual deficits   Vision - Complex Assessment   Ocular Range of Motion [facility]   Perception   Inattention/Neglect Appears intact   Cognition   Overall Cognitive Status Impaired  (insight, safety, and judgement are impaired)   Arousal/Participation Alert; Cooperative   Attention Attends with cues to redirect   Orientation Level Oriented X4   Memory Within functional limits   Following Commands Follows multistep commands with increased time or repetition   Comments insight was poor   Assessment   Limitation Decreased ADL status; Decreased UE strength;Decreased Safe judgement during ADL;Decreased cognition;Decreased high-level ADLs; Decreased self-care trans   Prognosis Fair   Assessment Pt is a 69 y o  male seen for OT evaluation s/p admit to [facility] on 3/16/2018 w/ Chest pain  Comorbidities affecting pt's functional performance at time of assessment include:DM, HTN, hyperlipidemia, schizophrenia and chestpain   Personal factors affecting pt at time of IE include:steps to enter environment, limited home support, behavioral pattern, difficulty performing ADLS, difficulty performing IADLS , limited insight into deficits, compliance, health management  and environment  Prior to admission, pt was Ind with ADLs IADLs, and driving  Upon evaluation: Pt requires min A with UB ADLs, min A with LB ADLs, min A with xfers and min A with functional mobility 2* the following deficits impacting occupational performance: weakness, decreased balance, impaired attention, impaired sequencing, impaired problem solving, impulsivity, decreased safety awareness, impaired interpersonal skills, decreased mobilty and environmental deficits   Pt to benefit from continued skilled OT tx while in the hospital to address deficits as defined above and maximize level of functional independence w ADL's and functional mobility  Occupational Performance areas to address include: bathing/shower, toilet hygiene, dressing, medication management, socialization, health maintenance, functional mobility, community mobility, clothing management, cleaning, meal prep, money management, household maintenance and job performance/volunteering  From OT standpoint, recommendation at time of d/c would be STR  Plan   Treatment Interventions ADL retraining;Functional transfer training; Endurance training;Cognitive reorientation;Patient/family training; Compensatory technique education;Continued evaluation; Energy conservation; Activityengagement   Goal Expiration Date 04/03/18   OT Frequency 3-5x/wk   Recommendation   OT Discharge Recommendation Short Term Rehab   OT - OK to Discharge (when medically cleared and agreeable to STR)   Barthel Index   Feeding 10   Bathing 0   Grooming Score 5   Dressing Score 5   Bladder Score 5   Bowels Score 5   Toilet Use Score 5   Transfers (Bed/Chair) Score 5   Mobility (Level Surface) Score 0   Stairs Score 0   Barthel Index Score 40   Occupational Therapy goals: In 7-14 days:    1 - Patient will verbalize and demonstrate use of energy conservation/ deep breathing technique and work simplification skills during functional activity with no verbal cues  2 - Patient will verbalize and demonstrate good body mechanics and joint protection techniques during  ADLs/ IADLs with no verbal cues  3 - Patient will increase OOB/ sitting tolerance to 6-8 hours per day for increased participation in self care and leisure tasks with no s/s of exertion  4 - Patient will increase standing tolerance time to 7-10 minutes with unilateral UE support to complete sink level ADLs@ mod I level  5 - Patient will increase sitting tolerance at edge of bed to 45 minutes to complete UB ADLs @ set up assist level      6 - Patient will transfer bed to Chair / toilet at Set up assist level with AD as indicated      7 - Patient will complete UB ADLs with set up assist     8 - Patient will complete LB ADLs with set up assist     9 - Patient will complete toileting hygiene with set up assist     10 - Patient/ Family  will demonstrate competency with UE Home Exercise Program       Alessandro Saez MS OTR/L
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
## 2018-03-21 NOTE — ASSESSMENT & PLAN NOTE
· Resolved at present time  Patient reports left-sided sharp chest pain, nonradiating and sharp in nature  Was reproducible on exam   Cardiology following, stress (-) for inducible ischemia  Echo normal EF, grade 1 diastolic dysfunction    · Have request records from [place]  · Troponin flat 0 05/0 04/0 04   · Rapid drug screen (-)  · Tele d/c'd  · Continue statin, aspirin and Lopressor

## 2018-03-21 NOTE — PHYSICAL THERAPY NOTE
PT Progress Note (23min)  (13:15-13:38)       03/21/18 1338   Pain Assessment   Pain Assessment No/denies pain   Restrictions/Precautions   Other Precautions Chair Alarm; Bed Alarm; Impulsive;Seizure; Fall Risk   General   Chart Reviewed Yes   Response to Previous Treatment Patient with no complaints from previous session  Family/Caregiver Present No   Cognition   Orientation Level Oriented X4   Subjective   Subjective pt agreeable to PT session  "I want them to get me a [employer]"  Bed Mobility   Supine to Sit 5  Supervision   Additional items HOB elevated; Increased time required;Verbal cues; Impulsive  (pt flails UEs)   Sit to Supine 5  Supervision   Additional items Verbal cues; Impulsive   Transfers   Sit to Stand 4  Minimal assistance   Additional items Assist x 1;Verbal cues; Increased time required; Impulsive  (unsteady upon standing requiring steadying (A))   Stand to Sit 4  Minimal assistance   Additional items Assist x 1;Verbal cues; Impulsive  ((A) for controlled descent)   Ambulation/Elevation   Gait pattern Not appropriate  (pt c poor standing tolerance + tremulous)   Balance   Static Sitting Good   Dynamic Sitting Good   Static Standing Poor +  (tolerance 10sec c steadying (A) of RW)   Dynamic Standing Poor   Ambulatory Poor   Activity Tolerance   Activity Tolerance Patient limited by fatigue   Nurse Made Aware Vickie   Exercises   Heelslides Sitting;20 reps;AROM; Bilateral   Hip Abduction Sitting;20 reps;AROM; Bilateral   Knee AROM Long Arc Quad Sitting;20 reps;AROM; Bilateral   Ankle Pumps Sitting;20 reps;AROM; Bilateral   Marching Sitting;20 reps;AROM; Bilateral   Assessment   Prognosis Good   Problem List Decreased strength;Decreased endurance; Impaired balance;Decreased mobility; Decreased coordination;Decreased safety awareness; Impaired judgement;Obesity   Assessment pt agreed to PT session  presents c increased weakness since previous PT session   now on seizure precautions; pt intermittently flails UEs + very impulsive c transitions  complete seated AROM B/L LE ther ex [id number] c min verbal cues for technique  able to complete sit<>stand x2 trials min (A)x1, however very unsteady + ataxic c poor standing tolerance limited to 10sec  upon initial stance, pt rapidly descended back to surface stating, "my knees just give out on me"  mod (A) required to maintain RW contact on surface in standing as pt slightly retropulsive  ambulation deferred at this time 2* above  pt returned to supine at end of session c bed alarm activated  will cont skilled PT to further maximize functional mobility + improve quality of life  upon d/c, pt would benefit from STR  Barriers to Discharge Inaccessible home environment   Goals   Patient Goals "to go to my son's in [place]"  Treatment Day 1   Plan   Treatment/Interventions Functional transfer training;LE strengthening/ROM; Elevations; Therapeutic exercise; Endurance training;Patient/family training;Equipment eval/education; Bed mobility;Gait training;Spoke to nursing   Progress Progressing toward goals   PT Frequency 5x/wk   Recommendation   Recommendation Short-term skilled PT   PT - OK to Discharge Yes  (to STR )     Soy Olson PT

## 2018-03-21 NOTE — ASSESSMENT & PLAN NOTE
· Continue statin  Home Pravastatin was increased from 20 to 40 this admission  · Lipids: Total 131, LDL 87, HDL 32, TG 59  · Will need repeat lipid panel done in 3 months time

## 2018-03-21 NOTE — PLAN OF CARE
INFECTION - ADULT     Absence or prevention of progression during hospitalization Progressing     Absence of fever/infection during neutropenic period Progressing        Knowledge Deficit     Patient/family/caregiver demonstrates understanding of disease process, treatment plan, medications, and discharge instructions Progressing        PAIN - ADULT     Verbalizes/displays adequate comfort level or baseline comfort level Progressing        Potential for Falls     Patient will remain free of falls Progressing        Prexisting or High Potential for Compromised Skin Integrity     Skin integrity is maintained or improved Progressing        SAFETY ADULT     Maintain or return to baseline ADL function Progressing     Maintain or return mobility status to optimal level Progressing

## 2018-03-21 NOTE — SOCIAL WORK
LOS: 4 CM spoke with [id number] nurse who reported she has clinicals and doctor will review later today after rounds then they will call back with update

## 2018-03-21 NOTE — PLAN OF CARE
Problem: OCCUPATIONAL THERAPY ADULT  Goal: Performs self-care activities at highest level of function for planned discharge setting  See evaluation for individualized goals  Treatment Interventions: ADL retraining, Functional transfer training, Endurance training, Cognitive reorientation, Patient/family training, Compensatory technique education, Continued evaluation, Energy conservation, Activityengagement          See flowsheet documentation for full assessment, interventions and recommendations  Limitation: Decreased ADL status, Decreased UE strength, Decreased Safe judgement during ADL, Decreased cognition, Decreased high-level ADLs, Decreased self-care trans  Prognosis: Fair  Assessment: Pt is a 69 y o  male seen for OT evaluation [facility] w/ Chest pain  Comorbidities affecting pt's functional performance at time of assessment include:DM, HTN, hyperlipidemia, schizophrenia and chestpain   Personal factors affecting pt at time of IE include:steps to enter environment, limited home support, behavioral pattern, difficulty performing ADLS, difficulty performing IADLS , limited insight into deficits, compliance, health management  and environment  Prior to admission, pt was Ind with ADLs IADLs, and driving  Upon evaluation: Pt requires min A with UB ADLs, min A with LB ADLs, min A with xfers and min A with functional mobility 2* the following deficits impacting occupational performance: weakness, decreased balance, impaired attention, impaired sequencing, impaired problem solving, impulsivity, decreased safety awareness, impaired interpersonal skills, decreased mobilty and environmental deficits  Pt to benefit from continued skilled OT tx while in the hospital to address deficits as defined above and maximize level of functional independence w ADL's and functional mobility   Occupational Performance areas to address include: bathing/shower, toilet hygiene, dressing, medication management, socialization, health maintenance, functional mobility, community mobility, clothing management, cleaning, meal prep, money management, household maintenance and job performance/volunteering  From OT standpoint, recommendation at time of d/c would be STR         OT Discharge Recommendation: Short Term Rehab  OT - OK to Discharge:  (when medically cleared and agreeable to STR)

## 2018-03-21 NOTE — PROGRESS NOTES
[facility]  Progress Note - Lissa Racer 1950, 69 y o  male MRN: [id number]    Unit/Bed#: -[facility] Encounter: [id number]    Primary Care Provider: No primary care provider on file  Date and time admitted to hospital: 3/16/2018  1:31 PM    * Chest pain   Assessment & Plan    · Resolved at present time  Patient reports left-sided sharp chest pain, nonradiating and sharp in nature  Was reproducible on exam   Cardiology following, stress (-) for inducible ischemia  Echo normal EF, grade 1 diastolic dysfunction  · Have request records from [place]  · Troponin flat 0 05/0 04/0 04   · Rapid drug screen (-)  · Tele d/c'd  · Continue statin, aspirin and Lopressor        Schizophrenia ([facility] [address] )   Assessment & Plan    · Patient with history of, reports that he has taken himself off of his medications over 1 year ago and feels better without them  · Was previously on multiple medications per patient including Haldol and Geodon  · Patient was with paranoid and delusional thoughts that his wife and stepdaughter are trying to harm him, sequestered or information from him, and isolate him  Secondary to these, he was recommended for psychiatry consultation to which he agreed, occurred 3/18  · Recommendations for Abilify 10 mg daily, clonazepam 0 5 mg b i d  noted; these have been started 3/19  · Case management consult - he most likely will need a complex case manager  · EEG negative for epileptiform activity  · PT/OT, he is unsteady  Recommending STR however I discussed this with CM, will need psychiatric treatment prior to STR all of which will be done at the [place]  Diabetes mellitus type 2 in [facility])   Assessment & Plan    · Patient reports taking metformin at home b i d , continue to hold while here  · Continue sliding scale with blood glucose checks  · A1c shows control at 5 9%        Hyperlipidemia   Assessment & Plan    · Continue statin    Home Pravastatin was increased from 20 to 40 this admission  · Lipids: Total 131, LDL 87, HDL 32, TG 59  · Will need repeat lipid panel done in 3 months time  Hypertension   Assessment & Plan    · BP elevated on admission, improved  · Continue Lopressor b i d , hydralazine as needed  · Monitor          Pharmacologic: Enoxaparin (Lovenox)  Mechanical VTE Prophylaxis in Place: Yes    Patient Centered Rounds: I have performed bedside rounds with nursing staff today  Discussions with Specialists or Other Care Team Provider: nursing, case management     Education and Discussions with Family / Patient: Patient and son    Time Spent for Care: 30 minutes  More than 50% of total time spent on counseling and coordination of care as described above  Current Length of Stay: 4 day(s)    Current Patient Status: Inpatient   Certification Statement: The patient will continue to require additional inpatient hospital stay due to dischage plan, medically stable for d/c  Discharge Plan / Estimated Discharge Date: Pending inpatient psych bed at [place]      Code Status: Level 1 - Full Code      Subjective:   Patient offers no complaints  Has multiple concerns about where he will go after inpatient psychiatric treatment and short-term rehab  States that he wants to move to [place] with his son  Denies any chest pain or shortness of breath  Objective:     Vitals:   Temp (24hrs), Av 8 °F (36 6 °C), Min:97 4 °F (36 3 °C), Max:98 1 °F (36 7 °C)    HR:  [73-94] 94  Resp:  [17-18] 17  BP: (147-149)/(77-90) 149/90  SpO2:  [93 %-96 %] 93 %  Body mass index is 36 26 kg/m²  Input and Output Summary (last 24 hours): Intake/Output Summary (Last 24 hours) at 18 1152  Last data filed at 18 0656   Gross per 24 hour   Intake              220 ml   Output             1400 ml   Net            -1180 ml       Physical Exam:     Physical Exam   Constitutional: He is oriented to person, place, and time  No distress     HENT:   Head: Normocephalic  Eyes: Pupils are equal, round, and reactive to light  Neck: Normal range of motion  Cardiovascular: Normal rate and regular rhythm  No murmur heard  Pulmonary/Chest: Effort normal and breath sounds normal    Abdominal: Soft  Bowel sounds are normal  He exhibits no distension  There is no tenderness  Musculoskeletal: He exhibits edema  Neurological: He is alert and oriented to person, place, and time  Skin: Skin is warm and dry  Psychiatric: His affect is labile  His speech is rapid and/or pressured  Nursing note and vitals reviewed        Additional Data:     Labs:      Results from last 7 days  Lab Units 03/19/18  0640   WBC Thousand/uL 7 13   HEMOGLOBIN g/dL 12 0   HEMATOCRIT % 38 0   PLATELETS Thousands/uL 145*   NEUTROS PCT % 40*   LYMPHS PCT % 49*   MONOS PCT % 7   EOS PCT % 3       Results from last 7 days  Lab Units 03/19/18  0640 03/16/18  1402   SODIUM mmol/L 137 142   POTASSIUM mmol/L 3 8 4 3   CHLORIDE mmol/L 102 105   CO2 mmol/L 26 28   BUN mg/dL 12 14   CREATININE mg/dL 0 83 0 93   CALCIUM mg/dL 9 2 9 3   TOTAL PROTEIN g/dL  --  8 0   BILIRUBIN TOTAL mg/dL  --  0 30   ALK PHOS U/L  --  66   ALT U/L  --  35   AST U/L  --  17   GLUCOSE RANDOM mg/dL 121 102             Recent Cultures (last 7 days):           Last 24 Hours Medication List:     Current Facility-Administered Medications:  acetaminophen 650 mg Oral Q4H PRN Axel West PA-C   ARIPiprazole 10 mg Oral Daily Sheri Victor PA-C   aspirin 81 mg Oral Daily Heather Sheppard PA-C   clonazePAM 0 5 mg Oral BID Sheri Victor PA-C   enoxaparin 40 mg Subcutaneous Daily Heather Sheppard PA-C   hydrALAZINE 5 mg Intravenous Q6H PRN Heather Sheppard PA-C   insulin lispro 1-5 Units Subcutaneous TID AC Sandra Horowitz MD   insulin lispro 1-5 Units Subcutaneous HS Sandra Horowitz MD   LORazepam 1 mg Intravenous Q5 Min PRN Sheri Victor PA-C   metoprolol tartrate 25 mg Oral Q12H [facility] Sandra Horowitz MD pravastatin 40 mg Oral Daily With Loraine Sanderson MD   tamsulosin 0 4 mg Oral Daily With Loraine Sanderson MD        Today, Patient Was Seen By: MACKENZIE Gramajo    ** Please Note: Dragon 360 Dictation voice to text software may have been used in the creation of this document   **

## 2018-03-21 NOTE — PLAN OF CARE
Problem: Potential for Falls  Goal: Patient will remain free of falls  INTERVENTIONS:  - Assess patient frequently for physical needs  -  Identify cognitive and physical deficits and behaviors that affect risk of falls    -  [place] fall precautions as indicated by assessment   - Educate patient/family on patient safety including physical limitations  - Instruct patient to call for assistance with activity based on assessment  - Modify environment to reduce risk of injury  - Consider OT/PT consult to assist with strengthening/mobility   Outcome: Progressing      Problem: PAIN - ADULT  Goal: Verbalizes/displays adequate comfort level or baseline comfort level  Interventions:  - Encourage patient to monitor pain and request assistance  - Assess pain using appropriate pain scale  - Administer analgesics based on type and severity of pain and evaluate response  - Implement non-pharmacological measures as appropriate and evaluate response  - Consider cultural and social influences on pain and pain management  - Notify physician/advanced practitioner if interventions unsuccessful or patient reports new pain   Outcome: Progressing      Problem: INFECTION - ADULT  Goal: Absence or prevention of progression during hospitalization  INTERVENTIONS:  - Assess and monitor for signs and symptoms of infection  - Monitor lab/diagnostic results  - Monitor all insertion sites, i e  indwelling lines, tubes, and drains  - Monitor endotracheal (as able) and nasal secretions for changes in amount and color  - [place] appropriate cooling/warming therapies per order  - Administer medications as ordered  - Instruct and encourage patient and family to use good hand hygiene technique  - Identify and instruct in appropriate isolation precautions for identified infection/condition   Outcome: Progressing    Goal: Absence of fever/infection during neutropenic period  INTERVENTIONS:  - Monitor WBC  - Implement neutropenic guidelines   Outcome: Progressing      Problem: SAFETY ADULT  Goal: Maintain or return to baseline ADL function  INTERVENTIONS:  -  Assess patient's ability to carry out ADLs; assess patient's baseline for ADL function and identify physical deficits which impact ability to perform ADLs (bathing, care of mouth/teeth, toileting, grooming, dressing, etc )  - Assess/evaluate cause of self-care deficits   - Assess range of motion  - Assess patient's mobility; develop plan if impaired  - Assess patient's need for assistive devices and provide as appropriate  - Encourage maximum independence but intervene and supervise when necessary  ¯ Involve family in performance of ADLs  ¯ Assess for home care needs following discharge   ¯ Request OT consult to assist with ADL evaluation and planning for discharge  ¯ Provide patient education as appropriate   Outcome: Progressing    Goal: Maintain or return mobility status to optimal level  INTERVENTIONS:  - Assess patient's baseline mobility status (ambulation, transfers, stairs, etc )    - Identify cognitive and physical deficits and behaviors that affect mobility  - Identify mobility aids required to assist with transfers and/or ambulation (gait belt, sit-to-stand, lift, walker, cane, etc )  - [place] fall precautions as indicated by assessment  - Record patient progress and toleration of activity level on Mobility SBAR; progress patient to next Phase/Stage  - Instruct patient to call for assistance with activity based on assessment  - Request Rehabilitation consult to assist with strengthening/weightbearing, etc    Outcome: Progressing      Problem: DISCHARGE PLANNING  Goal: Discharge to home or other facility with appropriate resources  INTERVENTIONS:  - Identify barriers to discharge w/patient and caregiver  - Arrange for needed discharge resources and transportation as appropriate  - Identify discharge learning needs (meds, wound care, etc )  - Arrange for interpretive services to assist at discharge as needed  - Refer to Case Management Department for coordinating discharge planning if the patient needs post-hospital services based on physician/advanced practitioner order or complex needs related to functional status, cognitive ability, or social support system   Outcome: Progressing      Problem: Knowledge Deficit  Goal: Patient/family/caregiver demonstrates understanding of disease process, treatment plan, medications, and discharge instructions  Complete learning assessment and assess knowledge base    Interventions:  - Provide teaching at level of understanding  - Provide teaching via preferred learning methods   Outcome: Progressing      Problem: DISCHARGE PLANNING - CARE MANAGEMENT  Goal: Discharge to post-acute care or home with appropriate resources  INTERVENTIONS:  - Conduct assessment to determine patient/family and health care team treatment goals, and need for post-acute services based on payer coverage, community resources, and patient preferences, and barriers to discharge  - Address psychosocial, clinical, and financial barriers to discharge as identified in assessment in conjunction with the patient/family and health care team  - Arrange appropriate level of post-acute services according to patients   needs and preference and payer coverage in collaboration with the physician and health care team  - Communicate with and update the patient/family, physician, and health care team regarding progress on the discharge plan  - Arrange appropriate transportation to post-acute venues   Outcome: Progressing      Problem: Prexisting or High Potential for Compromised Skin Integrity  Goal: Skin integrity is maintained or improved  INTERVENTIONS:  - Identify patients at risk for skin breakdown  - Assess and monitor skin integrity  - Assess and monitor nutrition and hydration status  - Monitor labs (i e  albumin)  - Assess for incontinence   - Turn and reposition patient  - Assist with mobility/ambulation  - Relieve pressure over bony prominences  - Avoid friction and shearing  - Provide appropriate hygiene as needed including keeping skin clean and dry  - Evaluate need for skin moisturizer/barrier cream  - Collaborate with interdisciplinary team (i e  Nutrition, Rehabilitation, etc )   - Patient/family teaching   Outcome: Progressing

## 2018-03-21 NOTE — SOCIAL WORK
CM attempted to contact pt's son Vivian Lyn  [phone]  CM left a message requesting pt's son call Gagan Kaurrickey if he has any questions regards to pt's discharge plans  Pt has no other needs at this time  CM department to follow pt through discharge process

## 2018-03-21 NOTE — ASSESSMENT & PLAN NOTE
· BP elevated on admission, improved  · Continue Lopressor b i d , hydralazine as needed      · Monitor

## 2018-03-21 NOTE — PLAN OF CARE
Problem: PHYSICAL THERAPY ADULT  Goal: Performs mobility at highest level of function for planned discharge setting  See evaluation for individualized goals  Treatment/Interventions: Functional transfer training, LE strengthening/ROM, Elevations, Therapeutic exercise, Patient/family training, Endurance training, Equipment eval/education, Bed mobility, Gait training, Spoke to nursing  Equipment Recommended: Liliane Oakes ([address] [place] [address])       See flowsheet documentation for full assessment, interventions and recommendations  Outcome: Progressing  Prognosis: Good  Problem List: Decreased strength, Decreased endurance, Impaired balance, Decreased mobility, Decreased coordination, Decreased safety awareness, Impaired judgement, Obesity  Assessment: pt agreed to PT session  presents c increased weakness since previous PT session  now on seizure precautions; pt intermittently flails UEs + very impulsive c transitions  complete seated AROM B/L LE ther ex [id number] c min verbal cues for technique  able to complete sit<>stand x2 trials min (A)x1, however very unsteady + ataxic c poor standing tolerance limited to 10sec  upon initial stance, pt rapidly descended back to surface stating, "my knees just give out on me"  mod (A) required to maintain RW contact on surface in standing as pt slightly retropulsive  ambulation deferred at this time 2* above  pt returned to supine at end of session c bed alarm activated  will cont skilled PT to further maximize functional mobility + improve quality of life  upon d/c, pt would benefit from STR  Barriers to Discharge: Inaccessible home environment     Recommendation: Short-term skilled PT     PT - OK to Discharge: Yes (to STR )    See flowsheet documentation for full assessment

## 2018-03-21 NOTE — CASE MANAGEMENT
Continued Stay Review    Date: 3/21    Vital Signs: /90 (BP Location: Left arm)   Pulse 94   Temp (!) 97 4 °F (36 3 °C) (Oral)   Resp 17   Ht 5' 11" (1 803 m)   Wt 118 kg (260 lb)   SpO2 93%   BMI 36 26 kg/m²     Medications:   Scheduled Meds:   Current Facility-Administered Medications:  acetaminophen 650 mg Oral Q4H PRN Divya Clark PA-C   ARIPiprazole 10 mg Oral Daily Sheri Victor PA-C   aspirin 81 mg Oral Daily Heather Sheppard PA-C   clonazePAM 0 5 mg Oral BID Sheri Victor PA-C   enoxaparin 40 mg Subcutaneous Daily Heather Sheppard PA-C   hydrALAZINE 5 mg Intravenous Q6H PRN Heather Sheppard PA-C   insulin lispro 1-5 Units Subcutaneous TID AC Misti Panchal MD   insulin lispro 1-5 Units Subcutaneous HS Misti Panchal MD   LORazepam 1 mg Intravenous Q5 Min PRN Sheri Victor PA-C   metoprolol tartrate 25 mg Oral Q12H Nakia Cook MD   pravastatin 40 mg Oral Daily With Rosalba Damon MD   tamsulosin 0 4 mg Oral Daily With Rosalba Damon MD     Continuous Infusions:    PRN Meds:   acetaminophen    hydrALAZINE    LORazepam    Abnormal Labs/Diagnostic Results: N/A    Age/Sex: 69 y o  male     Assessment/Plan:   Chest pain   Assessment & Plan     · Resolved at present time  Patient reports left-sided sharp chest pain, nonradiating and sharp in nature  Was reproducible on exam   Cardiology following, stress (-) for inducible ischemia  Echo normal EF, grade 1 diastolic dysfunction  ? Have request records from [facility]  ? Troponin flat 0 05/0 04/0 04   ? Rapid drug screen (-)  ? Tele d/c'd  · Continue statin, aspirin and Lopressor          Schizophrenia ([facility])   Assessment & Plan     · Patient with history of, reports that he has taken himself off of his medications over 1 year ago and feels better without them  ?  Was previously on multiple medications per patient including Haldol and Geodon  · Patient was with paranoid and delusional thoughts that his wife and stepdaughter are trying to harm him, sequestered or information from him, and isolate him  Secondary to these, he was recommended for psychiatry consultation to which he agreed, occurred 3/18   ? Recommendations for Abilify 10 mg daily, clonazepam 0 5 mg b i d  noted; these have been started 3/19  ? Case management consult - he most likely will need a complex case manager  ? EEG negative for epileptiform activity  · PT/OT, he is unsteady  Recommending STR however I discussed this with CM, will need psychiatric treatment prior to STR all of which will be done at the [place]            Diabetes mellitus type 2 in [facility])   Assessment & Plan     · Patient reports taking metformin at home b i d , continue to hold while here  · Continue sliding scale with blood glucose checks  · A1c shows control at 5 9%          Hyperlipidemia   Assessment & Plan     · Continue statin  Home Pravastatin was increased from 20 to 40 this admission  ? Lipids: Total 131, LDL 87, HDL 32, TG 59  ? Will need repeat lipid panel done in 3 months time            Hypertension   Assessment & Plan     · BP elevated on admission, improved  · Continue Lopressor b i d , hydralazine as needed  · Monitor             Pharmacologic: Enoxaparin (Lovenox)  Mechanical VTE Prophylaxis in Place: Yes  Thank you,  [facility] in the [facility] by Ajay Renteria for 2017  Network Utilization Review Department  Phone: [phone]; Fax [phone]  ATTENTION: The Network Utilization Review Department is now centralized for our 7 Facilities  Make a note that we have a new phone and fax numbers for our Department  Please call with any questions or concerns to [phone] and carefully follow the prompts so that you are directed to the right person  All voicemails are confidential  Fax any determinations, approvals, denials, and requests for initial or continue stay review clinical to [phone]  Due to HIGH CALL volume, it would be easier if you could please send faxed requests to expedite your requests and in part, help us provide discharge notifications faster    Current Length of Stay: 4 day(s)     Current Patient Status: Inpatient   Certification Statement: The patient will continue to require additional inpatient hospital stay due to dischage plan, medically stable for d/c       Discharge Plan / Estimated Discharge Date: Pending inpatient psych bed at [facility]

## 2018-03-21 NOTE — PLAN OF CARE
Problem: DISCHARGE PLANNING - CARE MANAGEMENT  Goal: Discharge to post-acute care or home with appropriate resources  INTERVENTIONS:  - Conduct assessment to determine patient/family and health care team treatment goals, and need for post-acute services based on payer coverage, community resources, and patient preferences, and barriers to discharge  - Address psychosocial, clinical, and financial barriers to discharge as identified in assessment in conjunction with the patient/family and health care team  - Arrange appropriate level of post-acute services according to patients   needs and preference and payer coverage in collaboration with the physician and health care team  - Communicate with and update the patient/family, physician, and health care team regarding progress on the discharge plan  - Arrange appropriate transportation to post-acute venues   Outcome: Progressing  CM attempted to contact pt's son Sahil Jones  [phone]  CM left a message requesting pt's son call Hayden Cervantes if he has any questions regards to pt's discharge plans  Pt has no other needs at this time  CM department to follow pt through discharge process

## 2018-03-22 LAB
GLUCOSE SERPL-MCNC: 116 MG/DL (ref 65–140)
GLUCOSE SERPL-MCNC: 124 MG/DL (ref 65–140)
GLUCOSE SERPL-MCNC: 126 MG/DL (ref 65–140)
GLUCOSE SERPL-MCNC: 159 MG/DL (ref 65–140)

## 2018-03-22 PROCEDURE — 82948 REAGENT STRIP/BLOOD GLUCOSE: CPT

## 2018-03-22 PROCEDURE — 99232 SBSQ HOSP IP/OBS MODERATE 35: CPT | Performed by: NURSE PRACTITIONER

## 2018-03-22 PROCEDURE — 97530 THERAPEUTIC ACTIVITIES: CPT

## 2018-03-22 PROCEDURE — 97535 SELF CARE MNGMENT TRAINING: CPT

## 2018-03-22 RX ADMIN — TAMSULOSIN HYDROCHLORIDE 0.4 MG: 0.4 CAPSULE ORAL at 17:42

## 2018-03-22 RX ADMIN — CLONAZEPAM 0.5 MG: 0.5 TABLET ORAL at 17:42

## 2018-03-22 RX ADMIN — PRAVASTATIN SODIUM 40 MG: 40 TABLET ORAL at 17:42

## 2018-03-22 RX ADMIN — ARIPIPRAZOLE 10 MG: 5 TABLET ORAL at 08:55

## 2018-03-22 RX ADMIN — METOPROLOL TARTRATE 25 MG: 25 TABLET ORAL at 21:28

## 2018-03-22 RX ADMIN — CLONAZEPAM 0.5 MG: 0.5 TABLET ORAL at 08:55

## 2018-03-22 RX ADMIN — ENOXAPARIN SODIUM 40 MG: 40 INJECTION SUBCUTANEOUS at 08:55

## 2018-03-22 RX ADMIN — ASPIRIN 81 MG 81 MG: 81 TABLET ORAL at 08:55

## 2018-03-22 RX ADMIN — METOPROLOL TARTRATE 25 MG: 25 TABLET ORAL at 08:55

## 2018-03-22 RX ADMIN — DOCUSATE SODIUM 100 MG: 100 CAPSULE, LIQUID FILLED ORAL at 17:42

## 2018-03-22 RX ADMIN — POLYETHYLENE GLYCOL 3350 17 G: 17 POWDER, FOR SOLUTION ORAL at 16:44

## 2018-03-22 RX ADMIN — INSULIN LISPRO 1 UNITS: 100 INJECTION, SOLUTION INTRAVENOUS; SUBCUTANEOUS at 21:32

## 2018-03-22 RX ADMIN — DOCUSATE SODIUM 100 MG: 100 CAPSULE, LIQUID FILLED ORAL at 08:55

## 2018-03-22 NOTE — ASSESSMENT & PLAN NOTE
· Patient with history of, reports that he has taken himself off of his medications over 1 year ago and feels better without them  · Was previously on multiple medications per patient including Haldol and Geodon  · Patient was with paranoid and delusional thoughts that his wife and stepdaughter are trying to harm him, sequestered or information from him, and isolate him  Secondary to these, he was recommended for psychiatry consultation to which he agreed, occurred 3/18  · Recommendations for Abilify 10 mg daily, clonazepam 0 5 mg b i d  noted; these have been started 3/19  · Case management consult - he most likely will need a complex case manager  · EEG negative for epileptiform activity  · PT/OT, he is unsteady  Recommending STR however I discussed this with CM, will need psychiatric treatment prior to STR all of which will be done at the [place]

## 2018-03-22 NOTE — OCCUPATIONAL THERAPY NOTE
OCCUPATIONAL THERAPY TREATMENT  NOTE       03/22/18 1015   Restrictions/Precautions   Weight Bearing Precautions Per Order No   Other Precautions Impulsive; Chair Alarm; Bed Alarm;Seizure; Fall Risk   Pain Assessment   Pain Assessment No/denies pain   Pain Score No Pain   ADL   Where Assessed Edge of bed   Grooming Assistance 5  Supervision/Setup   UB Bathing Assistance 5  Supervision/Setup   UB Bathing Deficit (quick, and impulsive)   UB Bathing Comments cues for activity pacing   LB Bathing Assistance 5  Supervision/Setup   LB Bathing Comments cues for safe pacing of activity   UB Dressing Assistance 5  Supervision/Setup   UB Dressing Comments gown only   LB Dressing Assistance 4  Minimal Assistance   LB Dressing Comments sock aid to don B socks   Bed Mobility   Rolling R 5  Supervision   Additional items Assist x 1;HOB elevated; Bedrails; Impulsive;Verbal cues   Supine to Sit 5  Supervision   Additional items Assist x 1;HOB elevated; Bedrails; Impulsive;Verbal cues   Transfers   Sit to Stand 4  Minimal assistance   Additional items Assist x 1;Bedrails; Impulsive;Verbal cues   Stand to Sit 5  Supervision   Additional items Assist x 1; Armrests; Impulsive;Verbal cues   Functional Mobility   Functional Mobility 4  Minimal assistance   Additional Comments (RW, CS due to impulsivity)   Cognition   Overall Cognitive Status Impaired  (impaired judgement and safety)   Arousal/Participation Alert; Cooperative   Attention Attends with cues to redirect   Orientation Level Oriented X4   Memory Within functional limits   Following Commands Follows one step commands with increased time or repetition   Assessment   Assessment Pt cooperative and agreeable to skilled OT services with focus on improving functional mobility and self care skills  Pt supine in bed upon start of session  Pt performed ADL EOB, declined to ambulate into bathroom   Pt demonstrated some impulsivity , requiring cues to slow down pace of activities for safety  Pt able to maintain balance at RW at bedside with S  Pt activity tolerance fair requiring intermittant  rest breaks during presented tasks    Pt  educated in energy conservation techniques  Pursed lip breathing technique for improved O2 recovery and func  mobility technique  Pt performance demonstrated good carryover of learned techniques and strategies to facilitate safety during self care and daily routine, however pt continues to demonstrate deficits in the areas of self care and functional mobility  Pt would continue to benefit from skilled OT POC to facilitate return to PLOF  In addition, pt expressed concern regarding speech difficulties  Therapist recommended that he consult with SLP  Plan   Treatment Interventions ADL retraining;Visual perceptual retraining;Functional transfer training;UE strengthening/ROM; Endurance training;Cognitive reorientation;Patient/family training;Equipment evaluation/education; Compensatory technique education;Continued evaluation; Energy conservation; Activityengagement   Goal Expiration Date 04/03/18   OT Frequency 3-5x/wk   Recommendation   Recommendation Speech consult   OT Discharge Recommendation Short Term Rehab   Equipment Recommended Other (comment)  (Pt stated that his living situation may change soon  )   OT - OK to Discharge Yes  (when medically cleared)                                                       KOLBY Basilio/RENETTA

## 2018-03-22 NOTE — SOCIAL WORK
Nurse requested CM meet with pt at bedside  CM met with pt at bedside  Pt requested CM write letter to his bank to have check moved to alternate account  CM explained she was unable to do so and asked if family could assist  Pt called sister with CM at bedside and she agreed to help and ask son for assistance  CM reviewed dcp and pt is agreeable to [facility]  Pt has no other needs at this time
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
## 2018-03-22 NOTE — SOCIAL WORK
ANAHY [facility]w [facility] and she reported chart is currently under review by doctor Alexandra vazquez cb with determination

## 2018-03-22 NOTE — PLAN OF CARE
Problem: OCCUPATIONAL THERAPY ADULT  Goal: Performs self-care activities at highest level of function for planned discharge setting  See evaluation for individualized goals  Outcome: Progressing  Limitation: Decreased ADL status, Decreased UE strength, Decreased Safe judgement during ADL, Decreased cognition, Decreased high-level ADLs, Decreased self-care trans  Prognosis: Fair  Assessment: Pt cooperative and agreeable to skilled OT services with focus on improving functional mobility and self care skills  Pt supine in bed upon start of session  Pt performed ADL EOB, declined to ambulate into bathroom  Pt demonstrated some impulsivity , requiring cues to slow down pace of activities for safety  Pt able to maintain balance at RW at bedside with S  Pt activity tolerance fair requiring intermittant  rest breaks during presented tasks    Pt  educated in energy conservation techniques  Pursed lip breathing technique for improved O2 recovery and func  mobility technique  Pt performance demonstrated good carryover of learned techniques and strategies to facilitate safety during self care and daily routine, however pt continues to demonstrate deficits in the areas of self care and functional mobility  Pt would continue to benefit from skilled OT POC to facilitate return to PLOF  In addition, pt expressed concern regarding speech difficulties  Therapist recommended that he consult with SLP    Recommendation: Speech consult  OT Discharge Recommendation: Short Term Rehab  OT - OK to Discharge: Yes (when medically cleared)

## 2018-03-22 NOTE — SOCIAL WORK
LOS: 5 Complex CM s/w Sahil Wagoner who reported they may have a bed available today but she is unsure at this time  Sahil Wagoner requested CM fax updated clinicals and she will ask doctor to review  CM faxed and is awaiting cb

## 2018-03-23 ENCOUNTER — HOSPITAL ENCOUNTER (INPATIENT)
Facility: HOSPITAL | Age: 68
LOS: 6 days | Discharge: HOME | DRG: 885 | End: 2018-03-29
Attending: PSYCHIATRY & NEUROLOGY | Admitting: PSYCHIATRY & NEUROLOGY
Payer: OTHER GOVERNMENT

## 2018-03-23 VITALS
HEIGHT: 71 IN | HEART RATE: 87 BPM | TEMPERATURE: 98 F | OXYGEN SATURATION: 96 % | BODY MASS INDEX: 36.4 KG/M2 | RESPIRATION RATE: 18 BRPM | WEIGHT: 260 LBS | SYSTOLIC BLOOD PRESSURE: 142 MMHG | DIASTOLIC BLOOD PRESSURE: 87 MMHG

## 2018-03-23 DIAGNOSIS — I10 HYPERTENSION: Primary | ICD-10-CM

## 2018-03-23 DIAGNOSIS — E66.9 DIABETES MELLITUS TYPE 2 IN OBESE (HCC): ICD-10-CM

## 2018-03-23 DIAGNOSIS — E11.69 DIABETES MELLITUS TYPE 2 IN OBESE (HCC): ICD-10-CM

## 2018-03-23 DIAGNOSIS — F20.9 SCHIZOPHRENIA (HCC): Primary | ICD-10-CM

## 2018-03-23 DIAGNOSIS — F43.12 CHRONIC POST-TRAUMATIC STRESS DISORDER (PTSD): Chronic | ICD-10-CM

## 2018-03-23 DIAGNOSIS — E78.2 MIXED HYPERLIPIDEMIA: ICD-10-CM

## 2018-03-23 DIAGNOSIS — F20.9 SCHIZOPHRENIA (HCC): ICD-10-CM

## 2018-03-23 DIAGNOSIS — F41.9 ANXIETY: Chronic | ICD-10-CM

## 2018-03-23 PROBLEM — R07.9 CHEST PAIN: Status: RESOLVED | Noted: 2018-03-16 | Resolved: 2018-03-23

## 2018-03-23 PROBLEM — K59.00 CONSTIPATED: Status: ACTIVE | Noted: 2018-03-23

## 2018-03-23 PROBLEM — K59.00 CONSTIPATED: Status: RESOLVED | Noted: 2018-03-23 | Resolved: 2018-03-23

## 2018-03-23 LAB
AMPHETAMINES SERPL QL SCN: NEGATIVE
BARBITURATES UR QL: NEGATIVE
BENZODIAZ UR QL: NEGATIVE
COCAINE UR QL: NEGATIVE
GLUCOSE SERPL-MCNC: 122 MG/DL (ref 65–140)
GLUCOSE SERPL-MCNC: 125 MG/DL (ref 65–140)
GLUCOSE SERPL-MCNC: 147 MG/DL (ref 65–140)
METHADONE UR QL: NEGATIVE
OPIATES UR QL SCN: NEGATIVE
PCP UR QL: NEGATIVE
THC UR QL: NEGATIVE

## 2018-03-23 PROCEDURE — 82948 REAGENT STRIP/BLOOD GLUCOSE: CPT

## 2018-03-23 PROCEDURE — 99239 HOSP IP/OBS DSCHRG MGMT >30: CPT | Performed by: NURSE PRACTITIONER

## 2018-03-23 PROCEDURE — 80307 DRUG TEST PRSMV CHEM ANLYZR: CPT | Performed by: PSYCHIATRY & NEUROLOGY

## 2018-03-23 RX ORDER — IBUPROFEN 600 MG/1
600 TABLET ORAL EVERY 8 HOURS PRN
Status: DISCONTINUED | OUTPATIENT
Start: 2018-03-23 | End: 2018-03-29 | Stop reason: HOSPADM

## 2018-03-23 RX ORDER — BENZTROPINE MESYLATE 1 MG/ML
1 INJECTION INTRAMUSCULAR; INTRAVENOUS
Status: DISCONTINUED | OUTPATIENT
Start: 2018-03-23 | End: 2018-03-29 | Stop reason: HOSPADM

## 2018-03-23 RX ORDER — ACETAMINOPHEN 325 MG/1
650 TABLET ORAL EVERY 6 HOURS PRN
Status: DISCONTINUED | OUTPATIENT
Start: 2018-03-23 | End: 2018-03-29 | Stop reason: HOSPADM

## 2018-03-23 RX ORDER — LORAZEPAM 2 MG/ML
1 INJECTION INTRAMUSCULAR EVERY 4 HOURS PRN
Status: DISCONTINUED | OUTPATIENT
Start: 2018-03-23 | End: 2018-03-29 | Stop reason: HOSPADM

## 2018-03-23 RX ORDER — ASPIRIN 81 MG/1
81 TABLET, CHEWABLE ORAL DAILY
Refills: 0
Start: 2018-03-24

## 2018-03-23 RX ORDER — HYDRALAZINE HYDROCHLORIDE 20 MG/ML
5 INJECTION INTRAMUSCULAR; INTRAVENOUS EVERY 6 HOURS PRN
Status: CANCELLED | OUTPATIENT
Start: 2018-03-23

## 2018-03-23 RX ORDER — PRAVASTATIN SODIUM 40 MG
40 TABLET ORAL
Status: CANCELLED | OUTPATIENT
Start: 2018-03-23

## 2018-03-23 RX ORDER — MAGNESIUM HYDROXIDE/ALUMINUM HYDROXICE/SIMETHICONE 120; 1200; 1200 MG/30ML; MG/30ML; MG/30ML
30 SUSPENSION ORAL EVERY 4 HOURS PRN
Status: DISCONTINUED | OUTPATIENT
Start: 2018-03-23 | End: 2018-03-29 | Stop reason: HOSPADM

## 2018-03-23 RX ORDER — DOCUSATE SODIUM 100 MG/1
100 CAPSULE, LIQUID FILLED ORAL 2 TIMES DAILY
Status: CANCELLED | OUTPATIENT
Start: 2018-03-23

## 2018-03-23 RX ORDER — MAGNESIUM HYDROXIDE/ALUMINUM HYDROXICE/SIMETHICONE 120; 1200; 1200 MG/30ML; MG/30ML; MG/30ML
30 SUSPENSION ORAL EVERY 4 HOURS PRN
Status: CANCELLED | OUTPATIENT
Start: 2018-03-23

## 2018-03-23 RX ORDER — LORAZEPAM 2 MG/ML
1 INJECTION INTRAMUSCULAR EVERY 4 HOURS PRN
Status: CANCELLED | OUTPATIENT
Start: 2018-03-23

## 2018-03-23 RX ORDER — LORAZEPAM 1 MG/1
1 TABLET ORAL EVERY 4 HOURS PRN
Status: DISCONTINUED | OUTPATIENT
Start: 2018-03-23 | End: 2018-03-29 | Stop reason: HOSPADM

## 2018-03-23 RX ORDER — ACETAMINOPHEN 325 MG/1
650 TABLET ORAL EVERY 6 HOURS PRN
Status: DISCONTINUED | OUTPATIENT
Start: 2018-03-23 | End: 2018-03-24

## 2018-03-23 RX ORDER — LORAZEPAM 1 MG/1
1 TABLET ORAL EVERY 4 HOURS PRN
Status: CANCELLED | OUTPATIENT
Start: 2018-03-23

## 2018-03-23 RX ORDER — ACETAMINOPHEN 325 MG/1
650 TABLET ORAL EVERY 6 HOURS PRN
Status: CANCELLED | OUTPATIENT
Start: 2018-03-23

## 2018-03-23 RX ORDER — ASPIRIN 81 MG/1
81 TABLET, CHEWABLE ORAL DAILY
Status: CANCELLED | OUTPATIENT
Start: 2018-03-24

## 2018-03-23 RX ORDER — BENZTROPINE MESYLATE 1 MG/1
1 TABLET ORAL
Status: DISCONTINUED | OUTPATIENT
Start: 2018-03-23 | End: 2018-03-29 | Stop reason: HOSPADM

## 2018-03-23 RX ORDER — HALOPERIDOL 5 MG
5 TABLET ORAL EVERY 6 HOURS PRN
Status: DISCONTINUED | OUTPATIENT
Start: 2018-03-23 | End: 2018-03-29 | Stop reason: HOSPADM

## 2018-03-23 RX ORDER — TRAZODONE HYDROCHLORIDE 50 MG/1
50 TABLET ORAL
Status: DISCONTINUED | OUTPATIENT
Start: 2018-03-23 | End: 2018-03-29 | Stop reason: HOSPADM

## 2018-03-23 RX ORDER — HYDROXYZINE HYDROCHLORIDE 25 MG/1
25 TABLET, FILM COATED ORAL EVERY 4 HOURS PRN
Status: DISCONTINUED | OUTPATIENT
Start: 2018-03-23 | End: 2018-03-29 | Stop reason: HOSPADM

## 2018-03-23 RX ORDER — RISPERIDONE 1 MG/1
1 TABLET, ORALLY DISINTEGRATING ORAL
Status: CANCELLED | OUTPATIENT
Start: 2018-03-23

## 2018-03-23 RX ORDER — PRAVASTATIN SODIUM 40 MG
40 TABLET ORAL
Refills: 0
Start: 2018-03-23 | End: 2018-03-29 | Stop reason: HOSPADM

## 2018-03-23 RX ORDER — IBUPROFEN 400 MG/1
800 TABLET ORAL EVERY 8 HOURS PRN
Status: DISCONTINUED | OUTPATIENT
Start: 2018-03-23 | End: 2018-03-29 | Stop reason: HOSPADM

## 2018-03-23 RX ORDER — OLANZAPINE 10 MG/1
2.5 INJECTION, POWDER, LYOPHILIZED, FOR SOLUTION INTRAMUSCULAR
Status: CANCELLED | OUTPATIENT
Start: 2018-03-23

## 2018-03-23 RX ORDER — ARIPIPRAZOLE 10 MG/1
10 TABLET ORAL DAILY
Refills: 0
Start: 2018-03-24 | End: 2018-03-29 | Stop reason: HOSPADM

## 2018-03-23 RX ORDER — IBUPROFEN 400 MG/1
800 TABLET ORAL EVERY 8 HOURS PRN
Status: CANCELLED | OUTPATIENT
Start: 2018-03-23

## 2018-03-23 RX ORDER — POLYETHYLENE GLYCOL 3350 17 G/17G
17 POWDER, FOR SOLUTION ORAL DAILY PRN
Qty: 14 EACH | Refills: 0
Start: 2018-03-23

## 2018-03-23 RX ORDER — TAMSULOSIN HYDROCHLORIDE 0.4 MG/1
0.4 CAPSULE ORAL
Status: CANCELLED | OUTPATIENT
Start: 2018-03-23

## 2018-03-23 RX ORDER — ARIPIPRAZOLE 5 MG/1
10 TABLET ORAL DAILY
Status: CANCELLED | OUTPATIENT
Start: 2018-03-24

## 2018-03-23 RX ORDER — RISPERIDONE 1 MG/1
1 TABLET, ORALLY DISINTEGRATING ORAL
Status: DISCONTINUED | OUTPATIENT
Start: 2018-03-23 | End: 2018-03-29 | Stop reason: HOSPADM

## 2018-03-23 RX ORDER — ACETAMINOPHEN 325 MG/1
650 TABLET ORAL EVERY 4 HOURS PRN
Status: CANCELLED | OUTPATIENT
Start: 2018-03-23

## 2018-03-23 RX ORDER — HALOPERIDOL 5 MG
5 TABLET ORAL EVERY 6 HOURS PRN
Status: CANCELLED | OUTPATIENT
Start: 2018-03-23

## 2018-03-23 RX ORDER — CLONAZEPAM 0.5 MG/1
0.5 TABLET ORAL 2 TIMES DAILY
Qty: 20 TABLET | Refills: 0
Start: 2018-03-23 | End: 2018-03-29 | Stop reason: HOSPADM

## 2018-03-23 RX ORDER — HYDROXYZINE HYDROCHLORIDE 25 MG/1
25 TABLET, FILM COATED ORAL EVERY 4 HOURS PRN
Status: CANCELLED | OUTPATIENT
Start: 2018-03-23

## 2018-03-23 RX ORDER — TRAZODONE HYDROCHLORIDE 50 MG/1
50 TABLET ORAL
Status: CANCELLED | OUTPATIENT
Start: 2018-03-23

## 2018-03-23 RX ORDER — LORAZEPAM 2 MG/ML
1 INJECTION INTRAMUSCULAR
Status: CANCELLED | OUTPATIENT
Start: 2018-03-23

## 2018-03-23 RX ORDER — DOCUSATE SODIUM 100 MG/1
100 CAPSULE, LIQUID FILLED ORAL 2 TIMES DAILY PRN
Qty: 10 CAPSULE | Refills: 0
Start: 2018-03-23

## 2018-03-23 RX ORDER — OLANZAPINE 10 MG/1
2.5 INJECTION, POWDER, LYOPHILIZED, FOR SOLUTION INTRAMUSCULAR
Status: DISCONTINUED | OUTPATIENT
Start: 2018-03-23 | End: 2018-03-29 | Stop reason: HOSPADM

## 2018-03-23 RX ORDER — IBUPROFEN 600 MG/1
600 TABLET ORAL EVERY 8 HOURS PRN
Status: CANCELLED | OUTPATIENT
Start: 2018-03-23

## 2018-03-23 RX ORDER — CLONAZEPAM 0.5 MG/1
0.5 TABLET ORAL 2 TIMES DAILY
Status: CANCELLED | OUTPATIENT
Start: 2018-03-23

## 2018-03-23 RX ORDER — BENZTROPINE MESYLATE 1 MG/ML
1 INJECTION INTRAMUSCULAR; INTRAVENOUS
Status: CANCELLED | OUTPATIENT
Start: 2018-03-23

## 2018-03-23 RX ORDER — BENZTROPINE MESYLATE 1 MG/1
1 TABLET ORAL
Status: CANCELLED | OUTPATIENT
Start: 2018-03-23

## 2018-03-23 RX ORDER — POLYETHYLENE GLYCOL 3350 17 G/17G
17 POWDER, FOR SOLUTION ORAL DAILY PRN
Status: CANCELLED | OUTPATIENT
Start: 2018-03-23

## 2018-03-23 RX ADMIN — ARIPIPRAZOLE 10 MG: 5 TABLET ORAL at 10:49

## 2018-03-23 RX ADMIN — METOPROLOL TARTRATE 25 MG: 25 TABLET ORAL at 10:49

## 2018-03-23 RX ADMIN — ASPIRIN 81 MG 81 MG: 81 TABLET ORAL at 10:49

## 2018-03-23 RX ADMIN — DOCUSATE SODIUM 100 MG: 100 CAPSULE, LIQUID FILLED ORAL at 10:49

## 2018-03-23 RX ADMIN — ENOXAPARIN SODIUM 40 MG: 40 INJECTION SUBCUTANEOUS at 10:48

## 2018-03-23 RX ADMIN — CLONAZEPAM 0.5 MG: 0.5 TABLET ORAL at 10:49

## 2018-03-23 NOTE — DISCHARGE INSTR - AVS FIRST PAGE
· Please take Abilify daily and clonazepam two times per day  · Change your cholesterol medication to Pravastatin 40 mg daily and have a repeat cholesterol panel done in 3 months

## 2018-03-23 NOTE — ED NOTES
Patient is accepted at [facility]  Patient is accepted by Dr Bob Gonzalez per Loreto Castellanos CM to schedule transport  Nurse report is to be called to [phone] prior to patient transfer       Lucian Temple LMSW  03/23/18 9803

## 2018-03-23 NOTE — PLAN OF CARE
Problem: DISCHARGE PLANNING - CARE MANAGEMENT  Goal: Discharge to post-acute care or home with appropriate resources  INTERVENTIONS:  - Conduct assessment to determine patient/family and health care team treatment goals, and need for post-acute services based on payer coverage, community resources, and patient preferences, and barriers to discharge  - Address psychosocial, clinical, and financial barriers to discharge as identified in assessment in conjunction with the patient/family and health care team  - Arrange appropriate level of post-acute services according to patients   needs and preference and payer coverage in collaboration with the physician and health care team  - Communicate with and update the patient/family, physician, and health care team regarding progress on the discharge plan  - Arrange appropriate transportation to post-acute venues   Outcome: Progressing  Complex CM s/w Srikanth Reasoner to setup transport for 2100  CM spoke with pt and he was agreeable to [facility] for IP   CM updated nurse  CM phoned pt's son, Ti Mccall, [phone] to discuss dcp and he was agreeable to [facility]  CM lm for JOSH [facility] to provide update with dcp as requested  Pt has no other needs at this time

## 2018-03-23 NOTE — PROGRESS NOTES
The patient has been admitted for acute psychosis, admission orders have been placed  The patient will be seen by the treatment team in the morning

## 2018-03-23 NOTE — ED NOTES
201 faxed to [address] for consideration within network      Loretta Juarez, [facility]  03/23/18  1715

## 2018-03-23 NOTE — SOCIAL WORK
CM received call from JOSH Dickerson for update and CM provided  [place] in agreement for CM to call with update prior to dc

## 2018-03-23 NOTE — SOCIAL WORK
Complex CM s/w Tyrese Arreguinman to setup transport for 2100  CM spoke with pt and he was agreeable to [facility] for IP   CM updated nurse  CM phoned pt's son, Mc De Leon, [phone] to discuss dcp and he was agreeable to [facility] HIGGINBOTHAM lm for Membreno Brain, [employer] to provide update with dcp as requested  Pt has no other needs at this time
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
## 2018-03-23 NOTE — PLAN OF CARE
DISCHARGE PLANNING     Discharge to home or other facility with appropriate resources Adequate for Discharge        DISCHARGE PLANNING - CARE MANAGEMENT     Discharge to post-acute care or home with appropriate resources Adequate for Discharge        INFECTION - ADULT     Absence or prevention of progression during hospitalization Adequate for Discharge     Absence of fever/infection during neutropenic period Adequate for Discharge        Knowledge Deficit     Patient/family/caregiver demonstrates understanding of disease process, treatment plan, medications, and discharge instructions Adequate for Discharge        PAIN - ADULT     Verbalizes/displays adequate comfort level or baseline comfort level Adequate for Discharge        Potential for Falls     Patient will remain free of falls Adequate for Discharge        Prexisting or High Potential for Compromised Skin Integrity     Skin integrity is maintained or improved Adequate for Discharge        SAFETY ADULT     Maintain or return to baseline ADL function Adequate for Discharge     Maintain or return mobility status to optimal level Adequate for Discharge

## 2018-03-23 NOTE — SOCIAL WORK
CM s/w Tyrese Schreiber who reported transportation would be here at 1700 instead  CM notified charge

## 2018-03-23 NOTE — SOCIAL WORK
[facility] but [facility] therefore he will need in network bed
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
## 2018-03-23 NOTE — ED NOTES
Call placed to [facility], and spoke with Adore Sender on [address], who indicated that they called back yesterday and weren't going to accept the pt  At that time, Dr Malaika Baldwin informed this writer that they feel he is in need of a dementia unit       Loretta Juarez, [facility]  03/23/18  0036

## 2018-03-24 PROBLEM — G24.01 TARDIVE DYSKINESIA: Chronic | Status: ACTIVE | Noted: 2018-03-24

## 2018-03-24 PROBLEM — F20.9 SCHIZOPHRENIA (HCC): Chronic | Status: ACTIVE | Noted: 2018-03-16

## 2018-03-24 PROBLEM — F41.9 ANXIETY: Chronic | Status: ACTIVE | Noted: 2018-03-24

## 2018-03-24 PROBLEM — F43.12 CHRONIC POST-TRAUMATIC STRESS DISORDER (PTSD): Chronic | Status: ACTIVE | Noted: 2018-03-24

## 2018-03-24 LAB
GLUCOSE SERPL-MCNC: 117 MG/DL (ref 65–140)
GLUCOSE SERPL-MCNC: 134 MG/DL (ref 65–140)
GLUCOSE SERPL-MCNC: 138 MG/DL (ref 65–140)
T3 SERPL-MCNC: 1.2 NG/ML (ref 0.6–1.8)
T4 SERPL-MCNC: 9.6 UG/DL (ref 4.7–13.3)
TSH SERPL DL<=0.05 MIU/L-ACNC: 1.04 UIU/ML (ref 0.36–3.74)

## 2018-03-24 PROCEDURE — 82948 REAGENT STRIP/BLOOD GLUCOSE: CPT

## 2018-03-24 PROCEDURE — 99222 1ST HOSP IP/OBS MODERATE 55: CPT | Performed by: PSYCHIATRY & NEUROLOGY

## 2018-03-24 PROCEDURE — 84443 ASSAY THYROID STIM HORMONE: CPT | Performed by: PSYCHIATRY & NEUROLOGY

## 2018-03-24 PROCEDURE — 84436 ASSAY OF TOTAL THYROXINE: CPT | Performed by: PSYCHIATRY & NEUROLOGY

## 2018-03-24 PROCEDURE — 84480 ASSAY TRIIODOTHYRONINE (T3): CPT | Performed by: PSYCHIATRY & NEUROLOGY

## 2018-03-24 PROCEDURE — 99253 IP/OBS CNSLTJ NEW/EST LOW 45: CPT | Performed by: PHYSICIAN ASSISTANT

## 2018-03-24 RX ORDER — PRAVASTATIN SODIUM 40 MG
40 TABLET ORAL
Status: DISCONTINUED | OUTPATIENT
Start: 2018-03-24 | End: 2018-03-29 | Stop reason: HOSPADM

## 2018-03-24 RX ORDER — DOCUSATE SODIUM 100 MG/1
100 CAPSULE, LIQUID FILLED ORAL 2 TIMES DAILY
Status: DISCONTINUED | OUTPATIENT
Start: 2018-03-24 | End: 2018-03-29 | Stop reason: HOSPADM

## 2018-03-24 RX ORDER — ACETAMINOPHEN 325 MG/1
975 TABLET ORAL EVERY 6 HOURS PRN
Status: DISCONTINUED | OUTPATIENT
Start: 2018-03-24 | End: 2018-03-24

## 2018-03-24 RX ORDER — ASPIRIN 81 MG/1
81 TABLET, CHEWABLE ORAL DAILY
Status: DISCONTINUED | OUTPATIENT
Start: 2018-03-24 | End: 2018-03-29 | Stop reason: HOSPADM

## 2018-03-24 RX ORDER — POLYETHYLENE GLYCOL 3350 17 G/17G
17 POWDER, FOR SOLUTION ORAL DAILY PRN
Status: DISCONTINUED | OUTPATIENT
Start: 2018-03-24 | End: 2018-03-29 | Stop reason: HOSPADM

## 2018-03-24 RX ORDER — TAMSULOSIN HYDROCHLORIDE 0.4 MG/1
0.4 CAPSULE ORAL
Status: DISCONTINUED | OUTPATIENT
Start: 2018-03-24 | End: 2018-03-29 | Stop reason: HOSPADM

## 2018-03-24 RX ORDER — POLYETHYLENE GLYCOL 3350 17 G/17G
17 POWDER, FOR SOLUTION ORAL DAILY PRN
Status: DISCONTINUED | OUTPATIENT
Start: 2018-03-24 | End: 2018-03-24 | Stop reason: SDUPTHER

## 2018-03-24 RX ORDER — CLONAZEPAM 0.5 MG/1
0.5 TABLET ORAL 2 TIMES DAILY
Status: DISCONTINUED | OUTPATIENT
Start: 2018-03-24 | End: 2018-03-29 | Stop reason: HOSPADM

## 2018-03-24 RX ORDER — ACETAMINOPHEN 325 MG/1
650 TABLET ORAL EVERY 4 HOURS PRN
Status: DISCONTINUED | OUTPATIENT
Start: 2018-03-24 | End: 2018-03-24

## 2018-03-24 RX ORDER — ARIPIPRAZOLE 10 MG/1
10 TABLET ORAL DAILY
Status: DISCONTINUED | OUTPATIENT
Start: 2018-03-24 | End: 2018-03-26

## 2018-03-24 RX ORDER — ASPIRIN 81 MG/1
81 TABLET, CHEWABLE ORAL DAILY
Status: DISCONTINUED | OUTPATIENT
Start: 2018-03-25 | End: 2018-03-24

## 2018-03-24 RX ADMIN — DOCUSATE SODIUM 100 MG: 100 CAPSULE, LIQUID FILLED ORAL at 09:15

## 2018-03-24 RX ADMIN — CLONAZEPAM 0.5 MG: 0.5 TABLET ORAL at 17:15

## 2018-03-24 RX ADMIN — METOPROLOL TARTRATE 25 MG: 25 TABLET ORAL at 21:16

## 2018-03-24 RX ADMIN — PRAVASTATIN SODIUM 40 MG: 40 TABLET ORAL at 17:16

## 2018-03-24 RX ADMIN — CLONAZEPAM 0.5 MG: 0.5 TABLET ORAL at 09:15

## 2018-03-24 RX ADMIN — DOCUSATE SODIUM 100 MG: 100 CAPSULE, LIQUID FILLED ORAL at 17:15

## 2018-03-24 RX ADMIN — ARIPIPRAZOLE 10 MG: 10 TABLET ORAL at 09:15

## 2018-03-24 RX ADMIN — TAMSULOSIN HYDROCHLORIDE 0.4 MG: 0.4 CAPSULE ORAL at 17:15

## 2018-03-24 RX ADMIN — METOPROLOL TARTRATE 25 MG: 25 TABLET ORAL at 09:15

## 2018-03-24 RX ADMIN — ASPIRIN 81 MG 81 MG: 81 TABLET ORAL at 09:15

## 2018-03-24 NOTE — PROGRESS NOTES
Patient reports being anxious  He denies depression, SI, HI, AH and VH  Currently patient's main source of anxiety is his relationship at home   Patient has been out in milieu and social  Patient is calm and cooperative with care

## 2018-03-24 NOTE — TREATMENT PLAN
TREATMENT PLAN REVIEW - [address]illo [address] 69 y o  1950 male MRN: [id number]    [address] Room / Bed: [facility] 565/[facility] 565-01 Encounter: [id number]          Admit Date/Time:  3/23/2018  5:56 PM    Treatment Team: Attending Provider: Elicia Beard MD; Patient Care Technician: Maeve Avila; Registered Nurse: Alexandria Wright RN; Patient Care Technician: Domi Curran; Patient Care Assistant: Praneeth Gallego; Patient Care Technician: Summer Amador; Patient Care Technician: Ale Spain    Diagnosis: Principal Problem:    Schizophrenia [facility]  Active Problems:    Anxiety    Chronic post-traumatic stress disorder (PTSD)    Tardive dyskinesia      Patient Strengths/Assets: support of children  Able to make needs known    Patient Barriers/Limitations: limited insight, difficult relationship with wife, h/o noncompliance to medications    Short Term Goals: decrease in anxiety symptoms, decrease in paranoid thoughts, acceptance of psychiatric medications    Long Term Goals: improvement in anxiety, improved insight, stable living arrangements upon discharge    Progress Towards Goals: None as of this time    Recommended Treatment: medication management, patient medication education, group therapy, milieu therapy, continued Behavioral Health psychiatric evaluation/assessment process    Treatment Frequency: daily medication monitoring, group and milieu therapy daily, monitoring through interdisciplinary rounds, monitoring through weekly patient care conferences    Expected Discharge Date:   To be determined    Discharge Plan: Return to outpatient psychiatry    Treatment Plan Created/Updated By: Palak Grey PA-C

## 2018-03-24 NOTE — PROGRESS NOTES
Consult to family medicine discontinued r/t prophylactic Lovenox not indicated for patient that ambulates frequently
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
## 2018-03-24 NOTE — PLAN OF CARE
Alteration in Thoughts and Perception     Treatment Goal: Gain control of psychotic behaviors/thinking, reduce/eliminate presenting symptoms and demonstrate improved reality functioning upon discharge Progressing     Verbalize thoughts and feelings Progressing     Refrain from acting on delusional thinking/internal stimuli Progressing     Agree to be compliant with medication regime, as prescribed and report medication side effects Progressing     Recognize dysfunctional thoughts, communicate reality-based thoughts at the time of discharge Progressing     Complete daily ADLs, including personal hygiene independently, as able Progressing        Anxiety     Anxiety is at manageable level Progressing        Prexisting or High Potential for Compromised Skin Integrity     Skin integrity is maintained or improved Progressing
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
## 2018-03-25 LAB
GLUCOSE SERPL-MCNC: 120 MG/DL (ref 65–140)
GLUCOSE SERPL-MCNC: 149 MG/DL (ref 65–140)
GLUCOSE SERPL-MCNC: 169 MG/DL (ref 65–140)
GLUCOSE SERPL-MCNC: 93 MG/DL (ref 65–140)

## 2018-03-25 PROCEDURE — 86592 SYPHILIS TEST NON-TREP QUAL: CPT | Performed by: PHYSICIAN ASSISTANT

## 2018-03-25 PROCEDURE — 82948 REAGENT STRIP/BLOOD GLUCOSE: CPT

## 2018-03-25 PROCEDURE — 99231 SBSQ HOSP IP/OBS SF/LOW 25: CPT | Performed by: PSYCHIATRY & NEUROLOGY

## 2018-03-25 RX ADMIN — DOCUSATE SODIUM 100 MG: 100 CAPSULE, LIQUID FILLED ORAL at 08:18

## 2018-03-25 RX ADMIN — DOCUSATE SODIUM 100 MG: 100 CAPSULE, LIQUID FILLED ORAL at 17:10

## 2018-03-25 RX ADMIN — CLONAZEPAM 0.5 MG: 0.5 TABLET ORAL at 08:18

## 2018-03-25 RX ADMIN — METOPROLOL TARTRATE 25 MG: 25 TABLET ORAL at 21:49

## 2018-03-25 RX ADMIN — PRAVASTATIN SODIUM 40 MG: 40 TABLET ORAL at 17:10

## 2018-03-25 RX ADMIN — ASPIRIN 81 MG 81 MG: 81 TABLET ORAL at 08:18

## 2018-03-25 RX ADMIN — CLONAZEPAM 0.5 MG: 0.5 TABLET ORAL at 17:10

## 2018-03-25 RX ADMIN — METOPROLOL TARTRATE 25 MG: 25 TABLET ORAL at 08:18

## 2018-03-25 RX ADMIN — IBUPROFEN 600 MG: 600 TABLET, FILM COATED ORAL at 00:06

## 2018-03-25 RX ADMIN — ARIPIPRAZOLE 10 MG: 10 TABLET ORAL at 08:18

## 2018-03-25 RX ADMIN — TAMSULOSIN HYDROCHLORIDE 0.4 MG: 0.4 CAPSULE ORAL at 17:10

## 2018-03-25 NOTE — PROGRESS NOTES
Pt calm and cooperative  Medication compliant  Denies all s/s  C/o pain medicated with ibuprofen which was effective for his Lt  Knee pain  Discusses that his wife knows that he is here on unit and unsure who disclosed this information  Did not attend group  Social  Amado littlejohn with walker

## 2018-03-25 NOTE — PROGRESS NOTES
Patient has been calm, cooperative and pleasant  Patient's ambulation has improved and his gait is no longer shuffling  Patient states he "feels better"  Patient states that he does not want to go live with his wife and plans on living with his brother  Patient was not preoccupied with his wife and did not make paranoid statements  Patient has been social in milieu

## 2018-03-25 NOTE — PROGRESS NOTES
Progress Note - Behavioral Health     Kev Hurtado 69 y o  male MRN: [id number]  Unit/Bed#: FP2 565-01 Encounter: [id number]    Kev Hurtado was seen for continuing care and reviewed with treatment team   Pt  Reported "I feel like I have to live for myself" "I don't want to worry "  He presently denies depression, SI, HI, anxiety, PTSD Sxs, or psychotic Sxs  He does not verbalize any paranoid accusations regarding his family at this moment  Floor team relays he has been calm, cooperative, compliant with medications and social in the milieu        Sleep: Good  Appetite: Good  Medication side effects: None per Pt  ROS: No present complaints    Labs/Tests: Reviewed     Mental Status Evaluation:  Appearance:  Dressed in hospital robe, clean, good eye contact   Behavior:  Calm, cooperative, pleasant   Speech:  Clear, normal rate and volume   Mood:  Euthymic   Affect:  Appropriately broad   Thought Process:  Organized   Associations: Intact associations   Thought Content:  No verbalized delusions   Perceptual Disturbances: Pt denies any hallucinations or paranoia and does not appear to be responding to internal stimuli   Risk Potential: Pt presently denies SI or HI    Sensorium:  Self, birthday, Place, Day of the week, Month, Year   Memory:  short term memory grossly intact   Consciousness:  alert, awake   Attention: Good   Insight:  Limited   Judgment: Limited   Gait/Station: Stable with walker   Motor Activity: No tremors or tongue protrusions at time of this interview     Vitals:    03/25/18 0709   BP: 121/73   Pulse: 72   Resp: 17   Temp: (!) 96 4 °F (35 8 °C)   SpO2: 95%       Admission on 03/23/2018   Component Date Value    Amph/Meth UR 03/23/2018 Negative     Barbiturate Ur 03/23/2018 Negative     Benzodiazepine Urine 03/23/2018 Negative     Cocaine Urine 03/23/2018 Negative     Methadone Urine 03/23/2018 Negative     Opiate Urine 03/23/2018 Negative     PCP Ur 03/23/2018 Negative     THC Urine 03/23/2018 Negative     T3, Total 03/24/2018 1 20     T4 TOTAL 03/24/2018 9 6     TSH 3RD GENERATON 03/24/2018 1 040     POC Glucose 03/24/2018 138        Progress Toward Goals:  Pt reports feeling better since being in hospital   Continue the present regimen,  Team to discuss housing options and f/u with [facility] during the week  RPR and UA to be collected    Assessment/Plan   Principal Problem:    Schizophrenia ([address] )  Active Problems:    Anxiety    Chronic post-traumatic stress disorder (PTSD)    Tardive dyskinesia    Encounter for examination and observation for other specified reasons      Recommended Treatment: Continue with pharmacotherapy, group therapy, milieu therapy and occupational therapy    The patient will be maintained on the following medications:    Current Facility-Administered Medications:  acetaminophen 650 mg Oral Q6H PRN Juan Salinas MD   aluminum-magnesium hydroxide-simethicone 30 mL Oral Q4H PRN Juan Salinas MD   ARIPiprazole 10 mg Oral Daily Chana M Michel, CRNP   aspirin 81 mg Oral Daily Chana M Michel, CRNP   benztropine 1 mg Intramuscular Q1H PRN Emily Juarez MD   benztropine 1 mg Oral Q1H PRN Juan Salinas MD   clonazePAM 0 5 mg Oral BID Chana M Michel, CRNP   docusate sodium 100 mg Oral BID Chana M Michel, CRNP   haloperidol 5 mg Oral Q6H PRN Emily Juarez MD   hydrOXYzine HCL 25 mg Oral Q4H PRN Juan Salinas MD   ibuprofen 600 mg Oral Q8H PRPAT Salinas MD   ibuprofen 800 mg Oral Q8H PRN Juan Salinas MD   LORazepam 1 mg Intramuscular Q4H PRN Juan Salinas MD   LORazepam 1 mg Oral Q4H PRN Juan Salinas MD   magnesium hydroxide 30 mL Oral Daily PRN Juan Salinas MD   metoprolol tartrate 25 mg Oral Q12H [address] Chana M Michel, CRNP   OLANZapine 2 5 mg Intramuscular Q3H PRN Juan Salinas MD   polyethylene glycol 17 g Oral Daily PRN Chana M Michel, CRNP   pravastatin 40 mg Oral Daily With [employer], CRNP   risperiDONE 1 mg Oral Q3H PRN Marbella Butler MD   tamsulosin 0 4 mg Oral Daily With [employer], CRNP   traZODone 50 mg Oral HS PRN Marbella Butler MD       Risks, benefits and possible side effects of Medications:   Risks, benefits, and possible side effects of medications explained to patient and patient verbalizes understanding

## 2018-03-26 LAB
GLUCOSE SERPL-MCNC: 106 MG/DL (ref 65–140)
GLUCOSE SERPL-MCNC: 122 MG/DL (ref 65–140)
GLUCOSE SERPL-MCNC: 124 MG/DL (ref 65–140)
GLUCOSE SERPL-MCNC: 141 MG/DL (ref 65–140)
RPR SER QL: NORMAL

## 2018-03-26 PROCEDURE — 99231 SBSQ HOSP IP/OBS SF/LOW 25: CPT | Performed by: PSYCHIATRY & NEUROLOGY

## 2018-03-26 PROCEDURE — 82948 REAGENT STRIP/BLOOD GLUCOSE: CPT

## 2018-03-26 RX ORDER — ARIPIPRAZOLE 15 MG/1
15 TABLET ORAL DAILY
Status: DISCONTINUED | OUTPATIENT
Start: 2018-03-27 | End: 2018-03-27

## 2018-03-26 RX ADMIN — DOCUSATE SODIUM 100 MG: 100 CAPSULE, LIQUID FILLED ORAL at 17:08

## 2018-03-26 RX ADMIN — DOCUSATE SODIUM 100 MG: 100 CAPSULE, LIQUID FILLED ORAL at 08:51

## 2018-03-26 RX ADMIN — ARIPIPRAZOLE 10 MG: 10 TABLET ORAL at 08:51

## 2018-03-26 RX ADMIN — ASPIRIN 81 MG 81 MG: 81 TABLET ORAL at 08:51

## 2018-03-26 RX ADMIN — TAMSULOSIN HYDROCHLORIDE 0.4 MG: 0.4 CAPSULE ORAL at 17:08

## 2018-03-26 RX ADMIN — PRAVASTATIN SODIUM 40 MG: 40 TABLET ORAL at 17:08

## 2018-03-26 RX ADMIN — METOPROLOL TARTRATE 25 MG: 25 TABLET ORAL at 08:51

## 2018-03-26 RX ADMIN — METOPROLOL TARTRATE 25 MG: 25 TABLET ORAL at 21:40

## 2018-03-26 RX ADMIN — CLONAZEPAM 0.5 MG: 0.5 TABLET ORAL at 08:51

## 2018-03-26 RX ADMIN — CLONAZEPAM 0.5 MG: 0.5 TABLET ORAL at 17:08

## 2018-03-26 NOTE — PROGRESS NOTES
Patient calm and bright  Denies all s/s  Denies pain  Withdrawn to room  Reports feeling well  Cooperative with care   Medication compliant

## 2018-03-26 NOTE — PLAN OF CARE
Problem: Ineffective Coping  Goal: Participates in unit activities  Interventions:  - Provide therapeutic environment   - Provide required programming   - Redirect inappropriate behaviors    Outcome: Progressing  Pt cooperative and attended groups as able today  He desires to get his financial affairs in order to leave his wife and move to brother in [place] or son in [place]  Pt  Reports that his emotions are in better control and sleep is improving

## 2018-03-26 NOTE — DISCHARGE INSTR - OTHER ORDERS
***  To change your site of deposit for your social security check call Social Security Office for [place], [place] [phone] or a national number [phone]    ***  To change your VA services, you may either go to the [place] site where you had been receiving services and gather your records with you to hand deliver to your destination or you may go to the new site where you plan to live and then contact the local [address] [place] [address] [address] in that area       [place] [address] [address] [address] Leonardo Johnson 4575 - [phone]

## 2018-03-26 NOTE — PROGRESS NOTES
Pt is bright upon approach and social with peers in the milieu  Pt is cooperative with care and is medication compliant  Pt currently denies s/s, attends group  Will continue to monitor

## 2018-03-26 NOTE — CASE MANAGEMENT
Met with pt who came to [employer] after an overdose  He was delusional and paranoid, thinking his wife is having an affair  He had been living with his wife of 7 years and she was/is in control of the banking accounts  Pty claims his own income of >$5000 mo and yet his wife, Bryon Perkins controls the money  Pt claims he has been treated at the [place] for PTSD, Depression, anxiety and schizophrenia  He has no intention of returning to his home in [place] or to his wife  "I'll just buy new things once I get to my sons home in [place] ' He denies there are any outstanding legal issues  States his son is his POA  He reports his two brothers will drive up from [place] and [place] to get him and take him to [place] until he makes arrangements to get to [place] to his sons, Gabby Lynn  [phone]  Pt denies access to firearms  Denies D&A use  Pt's speech is somewhat garbled and he readily repeats when asked, but when I stated he may have to make his own calls to the [facility] to change his auto deposit, he slowly and articulated, stated people don't always understand him when he speaks  I offered feedback that when he slows down, his speech is very easy to understand  I did review his Tx plan and he signed same  He states he has already signed an VALERIA for his son Yasmin Vega   As we ended our contact he was set up to call his son to discuss his plans

## 2018-03-26 NOTE — PROGRESS NOTES
Progress Note - [id number] Adria Magana 69 y o  male MRN: [id number]  Unit/Bed#: NH7 565-01 Encounter: [id number]    The patient was seen for continuing care and reviewed with treatment team   Continues to have delusions of persecution and believes that his wife is going to kill him through given him stale bread and food leftovers  In fact to refuse to eat the fruit that she had brought in for him and advising the staff not to touch it because it was poisonous  Mental Status Evaluation:  Appearance:  Good eye contact and disheveled   Behavior:  cooperative and friendly   Fund of knowledge  aware of current events   Speech:   Language: Normal rate and Normal volume  No overt abnormality   Mood:  euthymic   Affect:   Associations: appropriate and broad  Tightly connected   Thought Process:  Goal directed and coherent   Thought Content:  Paranoid and mistrustful and Delusions of persecution   Perceptual Disturbances: Uncertain   Risk Potential: No suicidal or homicidal ideation   Orientation  Oriented x 3   Memory grossly intact   Attention/Concentration attention span appeared shorter than expected for age   Insight:  limited   Judgment: Good judgment   Gait/Station: Not observed   Motor Activity: Dyskinetic movements     Progress Toward Goals:  No significant changes    Assessment/Plan    Principal Problem:    Schizophrenia (HCC)  Active Problems:    Anxiety    Chronic post-traumatic stress disorder (PTSD)    Tardive dyskinesia    Encounter for examination and observation for other specified reasons      Recommended Treatment: Continue with pharmacotherapy, group therapy, milieu therapy and occupational therapy    Gradually increase Abilify The patient will be maintained on the following medications:    Current Facility-Administered Medications:  acetaminophen 650 mg Oral Q6H PRN Juan Salinas MD   aluminum-magnesium hydroxide-simethicone 30 mL Oral Q4H PRN Tam Aldana MD   [START ON 3/27/2018] ARIPiprazole 15 mg Oral Daily Melissa Junior MD   aspirin 81 mg Oral Daily Chana JOSE DANIEL Michel, CRNP   benztropine 1 mg Intramuscular Q1H PRN Isaías Sanchez MD   benztropine 1 mg Oral Q1H PRN Juan Salinas MD   clonazePAM 0 5 mg Oral BID Chana M Michel, CRNP   docusate sodium 100 mg Oral BID Chana JOSE DANIEL Michel, CRNP   haloperidol 5 mg Oral Q6H PRN Isaías Sanchez MD   hydrOXYzine HCL 25 mg Oral Q4H PRN Juan Salinas MD   ibuprofen 600 mg Oral Q8H PRN Juan Salinas MD   ibuprofen 800 mg Oral Q8H PRN Juan Salinas MD   LORazepam 1 mg Intramuscular Q4H PRN Juan Salinas MD   LORazepam 1 mg Oral Q4H PRN Juan Salinas MD   magnesium hydroxide 30 mL Oral Daily PRN Isaías Sanchez MD   metoprolol tartrate 25 mg Oral Q12H [address] Chana JOSE DANIEL Pearson, CRNP   OLANZapine 2 5 mg Intramuscular Q3H PRN Juan Salinas MD   polyethylene glycol 17 g Oral Daily PRN Chana Pearson, CRNP   pravastatin 40 mg Oral Daily With [employer], CRNP   risperiDONE 1 mg Oral Q3H PRN Isaías Sanchez MD   tamsulosin 0 4 mg Oral Daily With [employer], CRNP   traZODone 50 mg Oral HS PRN Isaías Sanchez MD       Risks, benefits and possible side effects of Medications:   Risks, benefits, and possible side effects of medications explained to patient and patient verbalizes understanding

## 2018-03-27 LAB
GLUCOSE SERPL-MCNC: 102 MG/DL (ref 65–140)
GLUCOSE SERPL-MCNC: 107 MG/DL (ref 65–140)
GLUCOSE SERPL-MCNC: 111 MG/DL (ref 65–140)
GLUCOSE SERPL-MCNC: 141 MG/DL (ref 65–140)

## 2018-03-27 PROCEDURE — 82948 REAGENT STRIP/BLOOD GLUCOSE: CPT

## 2018-03-27 PROCEDURE — 97163 PT EVAL HIGH COMPLEX 45 MIN: CPT | Performed by: PHYSICAL THERAPIST

## 2018-03-27 PROCEDURE — G8978 MOBILITY CURRENT STATUS: HCPCS | Performed by: PHYSICAL THERAPIST

## 2018-03-27 PROCEDURE — G8980 MOBILITY D/C STATUS: HCPCS | Performed by: PHYSICAL THERAPIST

## 2018-03-27 PROCEDURE — G8979 MOBILITY GOAL STATUS: HCPCS | Performed by: PHYSICAL THERAPIST

## 2018-03-27 PROCEDURE — 99232 SBSQ HOSP IP/OBS MODERATE 35: CPT | Performed by: PSYCHIATRY & NEUROLOGY

## 2018-03-27 RX ORDER — ARIPIPRAZOLE 10 MG/1
20 TABLET ORAL DAILY
Status: DISCONTINUED | OUTPATIENT
Start: 2018-03-28 | End: 2018-03-29 | Stop reason: HOSPADM

## 2018-03-27 RX ADMIN — METOPROLOL TARTRATE 25 MG: 25 TABLET ORAL at 08:44

## 2018-03-27 RX ADMIN — DOCUSATE SODIUM 100 MG: 100 CAPSULE, LIQUID FILLED ORAL at 17:49

## 2018-03-27 RX ADMIN — CLONAZEPAM 0.5 MG: 0.5 TABLET ORAL at 17:49

## 2018-03-27 RX ADMIN — ARIPIPRAZOLE 15 MG: 15 TABLET ORAL at 08:44

## 2018-03-27 RX ADMIN — CLONAZEPAM 0.5 MG: 0.5 TABLET ORAL at 08:44

## 2018-03-27 RX ADMIN — DOCUSATE SODIUM 100 MG: 100 CAPSULE, LIQUID FILLED ORAL at 08:45

## 2018-03-27 RX ADMIN — PRAVASTATIN SODIUM 40 MG: 40 TABLET ORAL at 17:49

## 2018-03-27 RX ADMIN — TAMSULOSIN HYDROCHLORIDE 0.4 MG: 0.4 CAPSULE ORAL at 17:49

## 2018-03-27 RX ADMIN — METOPROLOL TARTRATE 25 MG: 25 TABLET ORAL at 21:20

## 2018-03-27 RX ADMIN — ASPIRIN 81 MG 81 MG: 81 TABLET ORAL at 08:44

## 2018-03-27 NOTE — PHYSICAL THERAPY NOTE
Physical Therapy Evaluation      Patient Active Problem List   Diagnosis    Schizophrenia ([place] [address] )    Hypertension    Hyperlipidemia    Diabetes mellitus type 2 in obese (HCC)    Anxiety    Chronic post-traumatic stress disorder (PTSD)    Tardive dyskinesia    Encounter for examination and observation for other specified reasons       Past Medical History:   Diagnosis Date    Anxiety     Depression     Diabetes mellitus ([facility] )     Enlarged prostate     Hallucination     Hyperlipidemia     Hypertension     Memory loss     Panic attack     Panic disorder     Psychiatric disorder     Psychiatric illness     Psychosis     PTSD (post-traumatic stress disorder)     Schizoaffective disorder ([place] [address] )     Schizophrenia ([place] [address] )        History reviewed  No pertinent surgical history  03/27/18 1110   Note Type   Note type Eval only   Pain Assessment   Pain Assessment 0-10   Home Living   Type of [address] [facility] Two level  (2 stairs to enter)   [address] [facility]   Prior Function   Level of [place] Independent with ADLs and functional mobility   Lives With Spouse   Receives Help From Family   ADL Assistance Independent   IADLs Independent   Falls in the last 6 months 1 to 4   Comments pt reports he was using cane for most amb  has been using rw here  pt reports he will not be returning to his home   plans ultimately to go to [facility]   Family/Caregiver Present No   Cognition   Overall Cognitive Status Impaired   Orientation Level Oriented X4   RUE Assessment   RUE Assessment WFL   LUE Assessment   LUE Assessment WFL   RLE Assessment   RLE Assessment X  (strength 4+/5, knee djd)   LLE Assessment   LLE Assessment X  (strength 4/5)   Coordination   Movements are Fluid and Coordinated 1   Sensation WFL  (tingling in toes)   Bed Mobility   Rolling R 7  Independent   Rolling L 7  Independent   Supine to Sit 7  Independent   Sit to Supine 7  Independent   Transfers   Sit to Stand 6  Modified independent   Stand to Sit 6  Modified independent   Ambulation/Elevation   Gait pattern Antalgic  (lle)   Gait Assistance 6  Modified independent   Assistive Device Rolling walker   Distance 200'   Balance   Static Sitting Normal   Dynamic Sitting Normal   Static Standing Fair   Dynamic Standing Fair -   Ambulatory Fair -   Endurance Deficit   Endurance Deficit No   Activity Tolerance   Activity Tolerance Patient tolerated treatment well   Nurse Made Aware yes   Assessment   Assessment pt admitted with schizophrenia and has been receiving care  pt was indep PTA, using cane for amb  pt reports 1 fall  there is family discord presently and pt reports he will not be returning home and wants to go to [facility]  pt demonstrated mild functional limitations due to chronic debility and l knee djd  pt was able to amb indep using rw  pt needed min assist without device  pt is safe using rw  pt can continue to mobilize indep using rw on nursing unit  pt does not need skilled IPPT, recommend obtaining rw for use on discharge  Goals   Patient Goals go to [facility]  to Discharge Yes   Additional Comments pt wants assist at home for cleaning and laundry, feels he can get familly member to do this       Modified Armando Scale   Modified Armando Scale 2   Barthel Index   Feeding 10   Bathing 0   Grooming Score 5   Dressing Score 10   Bladder Score 10   Bowels Score 10   Toilet Use Score 10   Transfers (Bed/Chair) Score 15   Mobility (Level Surface) Score 15   Stairs Score 0   Barthel Index Score 85   History: co - morbidities, fall risk, use of assistive device, assist for adl's, cognition, social   Exam: impairments in locomotion, musculoskeletal, balance, joint integrity, cognition   Clinical: unstable/unpredictable  Complexity:high      Amanda Navarro, PT

## 2018-03-27 NOTE — PROGRESS NOTES
Patient was out of bed and visible on the milieu  He was not present during group, but was visible on the milieu interacting with his peers at other times during the day  He was pleasant and denied all signs and symptoms  He was medicaition compliant

## 2018-03-27 NOTE — PROGRESS NOTES
Progress Note - Behavioral Health   Ryanne Farias 69 y o  male MRN: [id number]  Unit/Bed#: DC2 565-01 Encounter: [id number]    Patient seen, chart reviewed, discussed with staff  Nursing staff notes that the patient has been calm and compliant medications and treatment plan  He continues with residual delusions specifically toward his spouse  Patient denies any suicidal homicidal ideation  He denies any auditory visual hallucinations  Patient reports that he feels better being on his medications including the Abilify  He states that he realizes that he needs to be compliant with medications and take them consistently as he does acknowledge she feels much better  The patient states that his plan is to reside with his brother for a short time and then plans to move to [place] with his son  He states that he has spoken to his son about this and that this is an option for him  He states that he has been hospitalized in the past and has attempted to have meetings with social work, himself, and his spouse which has been unsuccessful  The patient states that his spouse is not interested in family meetings  Patient maintains that his wife frequently lies and that he does not trust her  He did also report some trust issues with his 1st spouse  Patient denies any mistrust or paranoia  with any other family members or friends  Patient stated that staff can contact his wife although he reports no intention of returning with her      Behavior over the last 24 hours:  unchanged  Sleep: normal  Appetite: normal  Medication side effects: No  ROS: no complaints  /81 (BP Location: Right arm)   Pulse 71   Temp 97 8 °F (36 6 °C) (Tympanic)   Resp 18   Ht 5' 11" (1 803 m)   Wt 118 kg (259 lb 11 2 oz)   SpO2 97%   BMI 36 22 kg/m²     Medications:   Current Facility-Administered Medications   Medication Dose Route Frequency    acetaminophen (TYLENOL) tablet 650 mg  650 mg Oral Q6H PRN    aluminum-magnesium hydroxide-simethicone (MYLANTA) 200-200-20 mg/5 mL oral suspension 30 mL  30 mL Oral Q4H PRN    ARIPiprazole (ABILIFY) tablet 15 mg  15 mg Oral Daily    aspirin chewable tablet 81 mg  81 mg Oral Daily    benztropine (COGENTIN) injection 1 mg  1 mg Intramuscular Q1H PRN    benztropine (COGENTIN) tablet 1 mg  1 mg Oral Q1H PRN    clonazePAM (KlonoPIN) tablet 0 5 mg  0 5 mg Oral BID    docusate sodium (COLACE) capsule 100 mg  100 mg Oral BID    haloperidol (HALDOL) tablet 5 mg  5 mg Oral Q6H PRN    hydrOXYzine HCL (ATARAX) tablet 25 mg  25 mg Oral Q4H PRN    ibuprofen (MOTRIN) tablet 600 mg  600 mg Oral Q8H PRN    ibuprofen (MOTRIN) tablet 800 mg  800 mg Oral Q8H PRN    LORazepam (ATIVAN) 2 mg/mL injection 1 mg  1 mg Intramuscular Q4H PRN    LORazepam (ATIVAN) tablet 1 mg  1 mg Oral Q4H PRN    magnesium hydroxide (MILK OF MAGNESIA) 400 mg/5 mL oral suspension 30 mL  30 mL Oral Daily PRN    metoprolol tartrate (LOPRESSOR) tablet 25 mg  25 mg Oral Q12H TREVOR    OLANZapine (ZyPREXA) IM injection 2 5 mg  2 5 mg Intramuscular Q3H PRN    polyethylene glycol (MIRALAX) packet 17 g  17 g Oral Daily PRN    pravastatin (PRAVACHOL) tablet 40 mg  40 mg Oral Daily With [employer]    risperiDONE (RisperDAL M-TABS) dispersible tablet 1 mg  1 mg Oral Q3H PRN    tamsulosin (FLOMAX) capsule 0 4 mg  0 4 mg Oral Daily With [employer]    traZODone (DESYREL) tablet 50 mg  50 mg Oral HS PRN       Labs:   Admission on 03/23/2018   Component Date Value Ref Range Status    Amph/Meth UR 03/23/2018 Negative  Negative Final    Barbiturate Ur 03/23/2018 Negative  Negative Final    Benzodiazepine Urine 03/23/2018 Negative  Negative Final    Cocaine Urine 03/23/2018 Negative  Negative Final    Methadone Urine 03/23/2018 Negative  Negative Final    Opiate Urine 03/23/2018 Negative  Negative Final    PCP Ur 03/23/2018 Negative  Negative Final    THC Urine 03/23/2018 Negative  Negative Final    T3, Total 03/24/2018 1  20  0 60 - 1 80 ng/mL Final    T4 TOTAL 03/24/2018 9 6  4 7 - 13 3 ug/dL Final    TSH 3RD GENERATON 03/24/2018 1 040  0 358 - 3 740 uIU/mL Final    POC Glucose 03/24/2018 138  65 - 140 mg/dl Final    RPR 03/25/2018 Non-Reactive  Non-Reactive Final    POC Glucose 03/25/2018 149* 65 - 140 mg/dl Final    POC Glucose 03/26/2018 122  65 - 140 mg/dl Final    POC Glucose 03/26/2018 124  65 - 140 mg/dl Final    POC Glucose 03/26/2018 141* 65 - 140 mg/dl Final    POC Glucose 03/27/2018 107  65 - 140 mg/dl Final       Mental Status Evaluation:  Appearance:  age appropriate and casually dressed   Behavior:  Calm, cooperative, good eye contact   Speech:  normal pitch and normal volume   Mood:  euthymic   Affect:  mood-congruent   Thought Process:  goal directed   Thought Content:  delusions  persecutory regarding spouse   Perceptual Disturbances: Denies any auditory or visual hallucinations   Risk Potential: Suicidal Ideations none, Homicidal Ideations none and Potential for Aggression No   Sensorium:  person, place, time/date and situation   Cognition:  grossly intact   Consciousness:  alert and awake    Attention: attention span appeared shorter than expected for age   Insight:  fair   Judgment: fair   Gait/Station: normal gait/station with walker   Motor Activity: no abnormal movements     Progress Toward Goals:  Gradually improving    Assessment/Plan   Principal Problem:    Schizophrenia (HCC)  Active Problems:    Anxiety    Chronic post-traumatic stress disorder (PTSD)    Tardive dyskinesia    Encounter for examination and observation for other specified reasons      Recommended Treatment:      Increase Abilify to 20 mg p o  daily  Continue Klonopin 0 5 mg p o  b i d  Disposition and discharge is pending  Will discharge at the end of the week if patient is stable  Continue with group therapy, milieu therapy and occupational therapy        Risks, benefits and possible side effects of Medications:   Risks, benefits, and possible side effects of medications explained to patient and patient verbalizes understanding  Counseling / Coordination of Care  Total floor / unit time spent today 25 minutes  Greater than 50% of total time was spent with the patient and / or family counseling and / or coordination of care  A description of the counseling / coordination of care:   Medication managemen, chart review and patient interview

## 2018-03-27 NOTE — CASE MANAGEMENT
Per pt request I have placed a call to speak to pt's son, Leonel Sanchez at [phone]  There was no identifying VM so I left a vague VM asking for a return call

## 2018-03-27 NOTE — DISCHARGE INSTR - APPOINTMENTS
**** Please contact the nearest [address] [place] [address] 16 in [place] for you to be seen and get checked ASAP

## 2018-03-27 NOTE — PLAN OF CARE
Problem: Ineffective Coping  Goal: Participates in unit activities  Interventions:  - Provide therapeutic environment   - Provide required programming   - Redirect inappropriate behaviors    Outcome: Progressing  Pt plans to move forward with getting away from his wife

## 2018-03-27 NOTE — CASE MANAGEMENT
I have contacted Brijesh Cortes ) Pt's son and spoke to him regarding his father discharge planning  Terri Tom his father has a long standing mental health illness and for many years will forget conversations pt had with people then blame the other person and have them fix any wrongs from his memory failures  Tonia Pimentel has been given the contact phone numbers for [facility] and [facility] so he can assit his father in having his records moved once pt arrives at his final housing destination  The plan at this moment is for pt's brothers to pick him up on Thursday and take him to [place] for about 3 weeks at which time pt will then relocate to his son's home in [place]     I offered Tonia Pimentel  The contact phone number for Social Security and he refused, stating he told his father he must do this himself, as Tonia Pimentel  Knew that void of POA he would not be able to accomplish transfer of auto deposit via phone call  Regarding the POA, this is an ongoing process, the document has not yet been developed

## 2018-03-27 NOTE — PROGRESS NOTES
Pt continues to be cooperative with care, and medication compliant  Pt is bright upon approach, present in the milieu and out for groups  Pt is social with peers  Currently denies s/s  Will continue to monitor

## 2018-03-28 LAB
GLUCOSE SERPL-MCNC: 100 MG/DL (ref 65–140)
GLUCOSE SERPL-MCNC: 112 MG/DL (ref 65–140)
GLUCOSE SERPL-MCNC: 174 MG/DL (ref 65–140)
GLUCOSE SERPL-MCNC: 92 MG/DL (ref 65–140)

## 2018-03-28 PROCEDURE — 99232 SBSQ HOSP IP/OBS MODERATE 35: CPT | Performed by: PSYCHIATRY & NEUROLOGY

## 2018-03-28 PROCEDURE — 82948 REAGENT STRIP/BLOOD GLUCOSE: CPT

## 2018-03-28 RX ADMIN — ARIPIPRAZOLE 20 MG: 10 TABLET ORAL at 08:35

## 2018-03-28 RX ADMIN — ASPIRIN 81 MG 81 MG: 81 TABLET ORAL at 08:36

## 2018-03-28 RX ADMIN — DOCUSATE SODIUM 100 MG: 100 CAPSULE, LIQUID FILLED ORAL at 08:35

## 2018-03-28 RX ADMIN — TAMSULOSIN HYDROCHLORIDE 0.4 MG: 0.4 CAPSULE ORAL at 17:12

## 2018-03-28 RX ADMIN — METOPROLOL TARTRATE 25 MG: 25 TABLET ORAL at 21:09

## 2018-03-28 RX ADMIN — CLONAZEPAM 0.5 MG: 0.5 TABLET ORAL at 17:12

## 2018-03-28 RX ADMIN — METOPROLOL TARTRATE 25 MG: 25 TABLET ORAL at 08:35

## 2018-03-28 RX ADMIN — CLONAZEPAM 0.5 MG: 0.5 TABLET ORAL at 08:35

## 2018-03-28 RX ADMIN — PRAVASTATIN SODIUM 40 MG: 40 TABLET ORAL at 17:12

## 2018-03-28 RX ADMIN — DOCUSATE SODIUM 100 MG: 100 CAPSULE, LIQUID FILLED ORAL at 17:12

## 2018-03-28 NOTE — CASE MANAGEMENT
Spoke to pt's son, Shima Soliz  And informed him that his father's insurance will not cover the cost of a walker and if he is able to assist his father in securing $35 cash he will be able to purchase same in our [address] [place], [place] [address]  Both father and son are aware of this situation  Next spoke to pt's brother, Kris Silverio, after Pt signed an VALERIA for same  Kris Silverio will be coming with his other brother, Мария Mckinnon, from [place] on 3/29 and hopes to arrive here about 11:00-12:00 to provide transportation home for Esmond Najjar to stay with his brother Мария Mckinnon in [place] until he can work things out with the [place] and the [employer] office and redirecting his auto deposit

## 2018-03-28 NOTE — PROGRESS NOTES
Pt cooperative with care and medication compliant  Visible in dayroom for meals  Pt states he feels ready for discharge tomorrow  Denies SI  Will continue to monitor

## 2018-03-28 NOTE — PLAN OF CARE
Problem: Ineffective Coping  Goal: Participates in unit activities  Interventions:  - Provide therapeutic environment   - Provide required programming   - Redirect inappropriate behaviors    Outcome: Progressing  Pt attends groups, when staff prompts him to get out of bed  In sessions, he continues to need motivational cues to focus to task, as he becomes easily diverted by social interactions

## 2018-03-28 NOTE — PLAN OF CARE
Alteration in Thoughts and Perception     Treatment Goal: Gain control of psychotic behaviors/thinking, reduce/eliminate presenting symptoms and demonstrate improved reality functioning upon discharge Progressing     Verbalize thoughts and feelings Progressing     Refrain from acting on delusional thinking/internal stimuli Progressing     Agree to be compliant with medication regime, as prescribed and report medication side effects Progressing     Attend and participate in unit activities, including therapeutic, recreational, and educational groups Progressing     Recognize dysfunctional thoughts, communicate reality-based thoughts at the time of discharge Progressing     Complete daily ADLs, including personal hygiene independently, as able Progressing        Anxiety     Anxiety is at manageable level Progressing        DISCHARGE PLANNING     Discharge to home or other facility with appropriate resources Progressing        Ineffective Coping     Participates in unit activities Progressing        Prexisting or High Potential for Compromised Skin Integrity     Skin integrity is maintained or improved Progressing

## 2018-03-28 NOTE — PROGRESS NOTES
Progress Note - Behavioral Health   Wandy Gann 69 y o  male MRN: [id number]  Unit/Bed#: DQ7 565-01 Encounter: [id number]    Patient seen, chart reviewed, discussed with staff  Nursing staff notes the patient has been calm and cooperative with medications and treatment plan  Patient has not been aggressive or agitated  He is sleeping well and eating well  He is ambulating with a rolling walker and was seen by physical therapy yesterday who recommended continuing with walker  The patient is calm and cooperative with questions this morning  He is feeling more positive and anticipating his discharge  Patient states he has some residual anxiety regarding his upcoming discharge however he denies any suicidal homicidal ideation  He continues with residual paranoia toward his spouse  No other delusions voiced  Patient denies any suicidal homicidal ideation and no auditory visual hallucinations      Behavior over the last 24 hours:  improved  Sleep: normal  Appetite: normal  Medication side effects: No  ROS: no complaints  /78 (BP Location: Left arm)   Pulse 78   Temp (!) 96 °F (35 6 °C) (Tympanic)   Resp 18   Ht 5' 11" (1 803 m)   Wt 118 kg (259 lb 11 2 oz)   SpO2 97%   BMI 36 22 kg/m²     Medications:   Current Facility-Administered Medications   Medication Dose Route Frequency    acetaminophen (TYLENOL) tablet 650 mg  650 mg Oral Q6H PRN    aluminum-magnesium hydroxide-simethicone (MYLANTA) 200-200-20 mg/5 mL oral suspension 30 mL  30 mL Oral Q4H PRN    ARIPiprazole (ABILIFY) tablet 20 mg  20 mg Oral Daily    aspirin chewable tablet 81 mg  81 mg Oral Daily    benztropine (COGENTIN) injection 1 mg  1 mg Intramuscular Q1H PRN    benztropine (COGENTIN) tablet 1 mg  1 mg Oral Q1H PRN    clonazePAM (KlonoPIN) tablet 0 5 mg  0 5 mg Oral BID    docusate sodium (COLACE) capsule 100 mg  100 mg Oral BID    haloperidol (HALDOL) tablet 5 mg  5 mg Oral Q6H PRN    hydrOXYzine HCL (ATARAX) tablet 25 mg  25 mg Oral Q4H PRN    ibuprofen (MOTRIN) tablet 600 mg  600 mg Oral Q8H PRN    ibuprofen (MOTRIN) tablet 800 mg  800 mg Oral Q8H PRN    LORazepam (ATIVAN) 2 mg/mL injection 1 mg  1 mg Intramuscular Q4H PRN    LORazepam (ATIVAN) tablet 1 mg  1 mg Oral Q4H PRN    magnesium hydroxide (MILK OF MAGNESIA) 400 mg/5 mL oral suspension 30 mL  30 mL Oral Daily PRN    metoprolol tartrate (LOPRESSOR) tablet 25 mg  25 mg Oral Q12H [address]    OLANZapine (ZyPREXA) IM injection 2 5 mg  2 5 mg Intramuscular Q3H PRN    polyethylene glycol (MIRALAX) packet 17 g  17 g Oral Daily PRN    pravastatin (PRAVACHOL) tablet 40 mg  40 mg Oral Daily With Dinner    risperiDONE (RisperDAL M-TABS) dispersible tablet 1 mg  1 mg Oral Q3H PRN    tamsulosin (FLOMAX) capsule 0 4 mg  0 4 mg Oral Daily With Dinner    traZODone (DESYREL) tablet 50 mg  50 mg Oral HS PRN       Labs:   Admission on 03/23/2018   Component Date Value Ref Range Status    Amph/Meth UR 03/23/2018 Negative  Negative Final    Barbiturate Ur 03/23/2018 Negative  Negative Final    Benzodiazepine Urine 03/23/2018 Negative  Negative Final    Cocaine Urine 03/23/2018 Negative  Negative Final    Methadone Urine 03/23/2018 Negative  Negative Final    Opiate Urine 03/23/2018 Negative  Negative Final    PCP Ur 03/23/2018 Negative  Negative Final    THC Urine 03/23/2018 Negative  Negative Final    T3, Total 03/24/2018 1 20  0 60 - 1 80 ng/mL Final    T4 TOTAL 03/24/2018 9 6  4 7 - 13 3 ug/dL Final    TSH 3RD GENERATON 03/24/2018 1 040  0 358 - 3 740 uIU/mL Final    POC Glucose 03/24/2018 138  65 - 140 mg/dl Final    RPR 03/25/2018 Non-Reactive  Non-Reactive Final    POC Glucose 03/25/2018 149* 65 - 140 mg/dl Final    POC Glucose 03/26/2018 122  65 - 140 mg/dl Final    POC Glucose 03/26/2018 124  65 - 140 mg/dl Final    POC Glucose 03/26/2018 141* 65 - 140 mg/dl Final    POC Glucose 03/27/2018 107  65 - 140 mg/dl Final    POC Glucose 03/27/2018 102  65 - 140 mg/dl Final    POC Glucose 03/27/2018 111  65 - 140 mg/dl Final    POC Glucose 03/27/2018 141* 65 - 140 mg/dl Final    POC Glucose 03/28/2018 112  65 - 140 mg/dl Final       Mental Status Evaluation:  Appearance:  age appropriate and casually dressed   Behavior:  Calm, cooperative, good eye contact   Speech:  normal pitch and normal volume   Mood:  euthymic   Affect:  mood-congruent   Thought Process:  goal directed and logical   Thought Content:  Residual paranoia toward spouse   Perceptual Disturbances: Denies auditory visual hallucinations   Risk Potential: Suicidal Ideations none, Homicidal Ideations none and Potential for Aggression No   Sensorium:  person, place, time/date and situation   Cognition:  grossly intact   Consciousness:  alert and awake    Attention: attention span appeared shorter than expected for age   Insight:  fair   Judgment: fair   Gait/Station: Ambulates with walker   Motor Activity: no abnormal movements     Progress Toward Goals:  Approaching baseline    Assessment/Plan   Principal Problem:    Schizophrenia (HCC)  Active Problems:    Anxiety    Chronic post-traumatic stress disorder (PTSD)    Tardive dyskinesia    Encounter for examination and observation for other specified reasons      Recommended Treatment:   Continue with Abilify 20 mg daily as ordered  Disposition and discharge is pending for tomorrow with family  Continue with physical therapy recommendations for walker  Continue with group therapy, milieu therapy and occupational therapy  Risks, benefits and possible side effects of Medications:   Risks, benefits, and possible side effects of medications explained to patient and patient verbalizes understanding  Counseling / Coordination of Care  Total floor / unit time spent today 25 minutes  Greater than 50% of total time was spent with the patient and / or family counseling and / or coordination of care   A description of the counseling / coordination of care:  Medication management, chart review, patient interview

## 2018-03-28 NOTE — PLAN OF CARE

## 2018-03-28 NOTE — PROGRESS NOTES
Pt calm and brightens with approach  Denies SI/Hi and is excited about discharge tomorrow and eventually moving to [place] to stay with son  Attended group and took all medications with no issues  Will continue to monitor

## 2018-03-28 NOTE — PROGRESS NOTES
Patient spent evening resting in room  States he sat in the day room all day and with at groups and visited with peers  States he is going to live with his brother at discharge then moving to [place] to live with his son  States he is very happy with these arrangements and looking forward to being discharged

## 2018-03-28 NOTE — PLAN OF CARE
Alteration in Thoughts and Perception     Treatment Goal: Gain control of psychotic behaviors/thinking, reduce/eliminate presenting symptoms and demonstrate improved reality functioning upon discharge Progressing     Verbalize thoughts and feelings Progressing     Refrain from acting on delusional thinking/internal stimuli Progressing     Agree to be compliant with medication regime, as prescribed and report medication side effects Progressing     Attend and participate in unit activities, including therapeutic, recreational, and educational groups Progressing     Recognize dysfunctional thoughts, communicate reality-based thoughts at the time of discharge Progressing     Complete daily ADLs, including personal hygiene independently, as able Progressing        Anxiety     Anxiety is at manageable level Progressing        Prexisting or High Potential for Compromised Skin Integrity     Skin integrity is maintained or improved Progressing

## 2018-03-28 NOTE — CASE MANAGEMENT
Peggy Clay made effort to purchase a walker for this pt in time for his discharge  Since he only has Medicare A and VA is secondary he will either have to purchase his own walker or get it throu the [place] once he is discharged  The RW costs $35 in our pharmacy if pt has the financial means to purchase same  I will inform him of such

## 2018-03-29 VITALS
WEIGHT: 259.7 LBS | OXYGEN SATURATION: 98 % | HEART RATE: 72 BPM | SYSTOLIC BLOOD PRESSURE: 150 MMHG | DIASTOLIC BLOOD PRESSURE: 86 MMHG | HEIGHT: 71 IN | RESPIRATION RATE: 16 BRPM | TEMPERATURE: 97.6 F | BODY MASS INDEX: 36.36 KG/M2

## 2018-03-29 LAB — GLUCOSE SERPL-MCNC: 106 MG/DL (ref 65–140)

## 2018-03-29 PROCEDURE — 99239 HOSP IP/OBS DSCHRG MGMT >30: CPT | Performed by: PSYCHIATRY & NEUROLOGY

## 2018-03-29 PROCEDURE — 82948 REAGENT STRIP/BLOOD GLUCOSE: CPT

## 2018-03-29 RX ORDER — TAMSULOSIN HYDROCHLORIDE 0.4 MG/1
0.4 CAPSULE ORAL
Qty: 30 CAPSULE | Refills: 1 | Status: SHIPPED | OUTPATIENT
Start: 2018-03-29 | End: 2020-10-07

## 2018-03-29 RX ORDER — CLONAZEPAM 0.5 MG/1
0.5 TABLET ORAL 2 TIMES DAILY
Qty: 60 TABLET | Refills: 1 | Status: SHIPPED | OUTPATIENT
Start: 2018-03-29 | End: 2020-05-11

## 2018-03-29 RX ORDER — PRAVASTATIN SODIUM 40 MG
40 TABLET ORAL
Qty: 30 TABLET | Refills: 1 | Status: ON HOLD | OUTPATIENT
Start: 2018-03-29 | End: 2019-04-29 | Stop reason: SDUPTHER

## 2018-03-29 RX ORDER — ARIPIPRAZOLE 20 MG/1
20 TABLET ORAL DAILY
Qty: 30 TABLET | Refills: 1 | Status: SHIPPED | OUTPATIENT
Start: 2018-03-30

## 2018-03-29 RX ADMIN — ASPIRIN 81 MG 81 MG: 81 TABLET ORAL at 08:11

## 2018-03-29 RX ADMIN — METOPROLOL TARTRATE 25 MG: 25 TABLET ORAL at 08:11

## 2018-03-29 RX ADMIN — ARIPIPRAZOLE 20 MG: 10 TABLET ORAL at 08:11

## 2018-03-29 RX ADMIN — CLONAZEPAM 0.5 MG: 0.5 TABLET ORAL at 08:11

## 2018-03-29 RX ADMIN — DOCUSATE SODIUM 100 MG: 100 CAPSULE, LIQUID FILLED ORAL at 08:11

## 2018-03-29 NOTE — PLAN OF CARE
Problem: Ineffective Coping  Goal: Participates in unit activities  Interventions:  - Provide therapeutic environment   - Provide required programming   - Redirect inappropriate behaviors    Outcome: Completed Date Met: 03/29/18  Pt  alert and interacting well with peers when encouraged to attend morning group  Pt looking forward to this mornings discharge and trip [address] to stay with his brother

## 2018-03-29 NOTE — PLAN OF CARE
Alteration in Thoughts and Perception     Treatment Goal: Gain control of psychotic behaviors/thinking, reduce/eliminate presenting symptoms and demonstrate improved reality functioning upon discharge Adequate for Discharge     Verbalize thoughts and feelings Adequate for Discharge     Refrain from acting on delusional thinking/internal stimuli Adequate for Discharge     Agree to be compliant with medication regime, as prescribed and report medication side effects Adequate for Discharge     Attend and participate in unit activities, including therapeutic, recreational, and educational groups Adequate for Discharge     Recognize dysfunctional thoughts, communicate reality-based thoughts at the time of discharge Adequate for Discharge     Complete daily ADLs, including personal hygiene independently, as able Adequate for Discharge        Anxiety     Anxiety is at manageable level Adequate for Discharge        DISCHARGE PLANNING     Discharge to home or other facility with appropriate resources Adequate for Discharge        Ineffective Coping     Participates in unit activities Adequate for Discharge        METABOLIC, FLUID AND ELECTROLYTES - ADULT     Glucose maintained within target range Adequate for Discharge        Prexisting or High Potential for Compromised Skin Integrity     Skin integrity is maintained or improved Adequate for Discharge

## 2018-03-29 NOTE — DISCHARGE INSTR - LAB
Contact Information: If you have any questions, concerns, pended studies, tests and/or procedures, or emergencies regarding your inpatient behavioral health visit  Please contact [facility] [phone] and ask to speak to a , nurse or physician  A contact is available 24 hours/ 7 days a week at this number  Summary of Procedures Performed During your Stay:  Below is a list of major procedures performed during your hospital stay and a summary of results:  - No major procedures performed  If studies are pending at discharge, follow up with your PCP and/or referring provider

## 2018-03-29 NOTE — CASE MANAGEMENT
Per request of Deni Keene of [address] I have faxed her the contact info for pt's brother Onelia Garcia (c) [phone] (H) [phone] his home address is : [facility], [address]    Triny Fuel has been given Carito's contact and is supportive of my giving Drew Sayres his contact info
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
## 2018-03-29 NOTE — PROGRESS NOTES
Pt is present in the milieu, out for meals  Pt continues to be medication compliant and cooperative with care  Pt denies s/s  Looks forward to discharge  Will continue to monitor

## 2018-03-29 NOTE — NURSING NOTE
Discharge instructions explained to pt and pt's brother's  Scripts given to pt  Pt had all belongings

## 2018-05-22 ENCOUNTER — HOSPITAL ENCOUNTER (EMERGENCY)
Facility: HOSPITAL | Age: 68
End: 2018-05-23
Attending: EMERGENCY MEDICINE | Admitting: EMERGENCY MEDICINE
Payer: OTHER GOVERNMENT

## 2018-05-22 DIAGNOSIS — G24.02 ACUTE DYSTONIC REACTION DUE TO DRUGS: Primary | ICD-10-CM

## 2018-05-22 DIAGNOSIS — F20.9 SCHIZOPHRENIA (HCC): ICD-10-CM

## 2018-05-22 PROCEDURE — 96372 THER/PROPH/DIAG INJ SC/IM: CPT

## 2018-05-22 RX ORDER — DIPHENHYDRAMINE HYDROCHLORIDE 50 MG/ML
25 INJECTION INTRAMUSCULAR; INTRAVENOUS ONCE
Status: COMPLETED | OUTPATIENT
Start: 2018-05-22 | End: 2018-05-22

## 2018-05-22 RX ADMIN — DIPHENHYDRAMINE HYDROCHLORIDE 25 MG: 50 INJECTION, SOLUTION INTRAMUSCULAR; INTRAVENOUS at 23:26

## 2018-05-22 NOTE — LETTER
Section I - General Information    Name of Patient: Jose Daniel Subramanian                 : 1950    Medicare #:____________________  Transport Date: 18 (PCS is valid for round trips on this date and for all repetitive trips in the 60-day range as noted below )  Origin: [address]                                                         Destination:_____[facility]__________  Is the pt's stay covered under Medicare Part A (PPS/DRG)     (_X) YES  (_) NO  Closest appropriate facility? (_X) YES  (_) NO  If no, why is transport to more distant facility required?________________________  If hosp-hosp transfer, describe services needed at 2nd facility not available at 1st facility? [facility]________________________  If hospice pt, is this transport related to pt's terminal illness? (_) YES (_) NO Describe____________________________________    Section II - Medical Necessity Questionnaire  Ambulance transportation is medically necessary only if other means of transport are contraindicated or would be potentially harmful to the patient  To meet this requirement, the patient must either be "bed confined" or suffer from a condition such that transport by means other than ambulance is contraindicated by the patient's condition   The following questions must be answered by the medical professional signing below for this form to be valid:    1)  Describe the MEDICAL CONDITION (physical and/or mental) of this patient AT [address] [place] [address] that requires the patient to be transported in an ambulance and why transport by other means is contraindicated by the patient's condition:_____________________________Psychosis__________________________________________________    2) Is the patient "bed confined" as defined below?     (_) YES  (X_) NO  To be "be confined" the patient must satisfy all three of the following conditions: (1) unable to get up from bed without Assistance; AND (2) unable to ambulate; AND (3) unable to sit in a chair or wheelchair  3) Can this patient safely be transported by car or wheelchair Chioma Aj (i e , seated during transport without a medical attendant or monitoring)?   (_) YES  (X NO    4) In addition to completing questions 1-3 above, please check any of the following conditions that apply*:  *Note: supporting documentation for any boxes checked must be maintained in the patient's medical records  (_)Contractures   (_)Non-Healed Fractures  (_)Patient is confused (_)Patient is comatose (_)Moderate/severe pain on movement (_)Danger to self/others  (_)IV meds/fluids required (_)Patient is combative(_)Need or possible need for restraints (_)DVT requires elevation of lower extremity  (_)Medical attendant required (_)Requires oxygen-unable to self administer (_)Special handling/isolation/infection control precautions required (_)Unable to tolerate seated position for time needed to transport (_)Hemodynamic monitoring required en route (_)Unable to sit in a chair or wheelchair due to decubitus ulcers or other wounds (_)Cardiac monitoring required en route (_)Morbid obesity requires additional personnel/equipment to safely handle patient (_)Orthopedic device (backboard, halo, pins, traction, brace, wedge, etc,) requiring special handling during transport (_)Other(specify)_______________________________________________    Section III - Signature of Physician or Healthcare Professional  I certify that the above information is true and correct based on my evaluation of this patient, and represent that the patient requires transport by ambulance and that other forms of transport are contraindicated   I understand that this information will be used by the Centers for Medicare and Medicaid Services (CMS) to support the determination of medical necessity for ambulance services, and I represent that I have personal knowledge of the patient's condition at time of transport  (_) If this box is checked, I also certify that the patient is physically or mentally incapable of signing the ambulance service's claim and that the institution with which I am affiliated has furnished care, services, or assistance to the patient  My signature below is made on behalf of the patient pursuant to 42 CFR §424 36(b)(4)  In accordance with 42 CFR §424 37, the specific reason(s) that the patient is physically or mentally incapable of signing the claim form is as follows: _________________________________________________________________________________________________________      Signature of Physician* or Healthcare Professional______________________________________________________________  Signature Date 05/23/18 (For scheduled repetitive transports, this form is not valid for transports performed more than 60 days after this date)    Printed Name & Credentials of Physician or Healthcare Professional (MD, DO, RN, etc )_A  SAFIA Kee_________  *Form must be signed by patient's attending physician for scheduled, repetitive transports   For non-repetitive, unscheduled ambulance transports, if unable to obtain the signature of the attending physician, any of the following may sign (choose appropriate option below)  (_) Physician Assistant (_)  Clinical Nurse Specialist (_)  Registered Nurse  (_)  Nurse Practitioner  Isabel Rubio Discharge Planner

## 2018-05-22 NOTE — LETTER
[address] [address]  Dept: [phone]            EMTALA TRANSFER CONSENT    NAME Amanda Mays                            1950                              MRN [id number]    I have been informed of my rights regarding examination, treatment, and transfer   by Dr Lima Callahan DO    Benefits: Continuity of care    Risks: Potential for delay in receiving treatment      { ED EMTALA TRANSFER CHOICES:[id number]}    I authorize the performance of emergency medical procedures and treatments upon me in both transit and upon arrival at the receiving facility  Additionally, I authorize the release of any and all medical records to the receiving facility and request they be transported with me, if possible  I understand that the safest mode of transportation during a medical emergency is an ambulance and that the Hospital advocates the use of this mode of transport  Risks of traveling to the receiving facility by car, including absence of medical control, life sustaining equipment, such as oxygen, and medical personnel has been explained to me and I fully understand them  (NICKI CORRECT BOX BELOW)  [ X ]  I consent to the stated transfer and to be transported by ambulance/helicopter  [  ]  I consent to the stated transfer, but refuse transportation by ambulance and accept full responsibility for my transportation by car    I understand the risks of non-ambulance transfers and I exonerate the Hospital and its staff from any deterioration in my condition that results from this refusal     X___________________________________________    DATE  18  TIME__22:00______  Signature of patient or legally responsible individual signing on patient behalf           RELATIONSHIP TO PATIENT_________________________                                      Provider Certification    NAME Amanda Mays                           [place] 1950 MRN [id number]    A medical screening exam was performed on the above named patient  Based on the examination:    Condition Necessitating Transfer The primary encounter diagnosis was Acute dystonic reaction due to drugs  A diagnosis of Schizophrenia ([facility] [address] ) was also pertinent to this visit  Patient Condition: The patient has been stabilized such that within reasonable medical probability, no material deterioration of the patient condition or the condition of the unborn child(jevon) is likely to result from the transfer    Reason for Transfer: Level of Care needed not available at this facility    Transfer Requirements: Facility \[facility]\""   · Space available and qualified personnel available for treatment as acknowledged by KRISTI Banegas [address] [place] [address]  · Agreed to accept transfer and to provide appropriate medical treatment as acknowledged by       Dr Kellen Church  · Appropriate medical records of the examination and treatment of the patient are provided at the time of transfer   [facility], [address] __A  A __  · Transfer will be performed by qualified personnel from [facility]  and appropriate transfer equipment as required, including the use of necessary and appropriate life support measures      Provider Certification: I have examined the patient and explained the following risks and benefits of being transferred/refusing transfer to the patient/family:  General risk, such as traffic hazards, adverse weather conditions, rough terrain or turbulence, possible failure of equipment (including vehicle or aircraft), or consequences of actions of persons outside the control of the transport personnel      Based on these reasonable risks and benefits to the patient and/or the unborn child(jevon), and based upon the information available at the time of the patients examination, I certify that the medical benefits reasonably to be expected from the provision of appropriate medical treatments at another medical facility outweigh the increasing risks, if any, to the individuals medical condition, and in the case of labor to the unborn child, from effecting the transfer      X____________________________________________ DATE 05/23/18        TIME_______      ORIGINAL - SEND TO MEDICAL RECORDS   COPY - SEND WITH PATIENT DURING TRANSFER

## 2018-05-23 VITALS
DIASTOLIC BLOOD PRESSURE: 89 MMHG | TEMPERATURE: 98.2 F | OXYGEN SATURATION: 95 % | HEART RATE: 80 BPM | HEIGHT: 71 IN | SYSTOLIC BLOOD PRESSURE: 144 MMHG | RESPIRATION RATE: 18 BRPM | WEIGHT: 265 LBS | BODY MASS INDEX: 37.1 KG/M2

## 2018-05-23 LAB
ALBUMIN SERPL BCP-MCNC: 3.4 G/DL (ref 3.5–5)
ALP SERPL-CCNC: 71 U/L (ref 46–116)
ALT SERPL W P-5'-P-CCNC: 30 U/L (ref 12–78)
AMPHETAMINES SERPL QL SCN: NEGATIVE
ANION GAP SERPL CALCULATED.3IONS-SCNC: 6 MMOL/L (ref 4–13)
AST SERPL W P-5'-P-CCNC: 19 U/L (ref 5–45)
BARBITURATES UR QL: NEGATIVE
BASOPHILS # BLD AUTO: 0.05 THOUSANDS/ΜL (ref 0–0.1)
BASOPHILS NFR BLD AUTO: 1 % (ref 0–1)
BENZODIAZ UR QL: NEGATIVE
BILIRUB SERPL-MCNC: 0.2 MG/DL (ref 0.2–1)
BILIRUB UR QL STRIP: NEGATIVE
BUN SERPL-MCNC: 11 MG/DL (ref 5–25)
CALCIUM SERPL-MCNC: 9.4 MG/DL (ref 8.3–10.1)
CHLORIDE SERPL-SCNC: 106 MMOL/L (ref 100–108)
CLARITY UR: CLEAR
CO2 SERPL-SCNC: 30 MMOL/L (ref 21–32)
COCAINE UR QL: NEGATIVE
COLOR UR: YELLOW
CREAT SERPL-MCNC: 1 MG/DL (ref 0.6–1.3)
EOSINOPHIL # BLD AUTO: 0.22 THOUSAND/ΜL (ref 0–0.61)
EOSINOPHIL NFR BLD AUTO: 3 % (ref 0–6)
ERYTHROCYTE [DISTWIDTH] IN BLOOD BY AUTOMATED COUNT: 13.7 % (ref 11.6–15.1)
ETHANOL EXG-MCNC: 0 MG/DL
GFR SERPL CREATININE-BSD FRML MDRD: 88 ML/MIN/1.73SQ M
GLUCOSE SERPL-MCNC: 138 MG/DL (ref 65–140)
GLUCOSE SERPL-MCNC: 158 MG/DL (ref 65–140)
GLUCOSE SERPL-MCNC: 205 MG/DL (ref 65–140)
GLUCOSE UR STRIP-MCNC: NEGATIVE MG/DL
HCT VFR BLD AUTO: 36.5 % (ref 36.5–49.3)
HGB BLD-MCNC: 11.3 G/DL (ref 12–17)
HGB UR QL STRIP.AUTO: NEGATIVE
IMM GRANULOCYTES # BLD AUTO: 0.01 THOUSAND/UL (ref 0–0.2)
IMM GRANULOCYTES NFR BLD AUTO: 0 % (ref 0–2)
KETONES UR STRIP-MCNC: NEGATIVE MG/DL
LEUKOCYTE ESTERASE UR QL STRIP: NEGATIVE
LYMPHOCYTES # BLD AUTO: 3.03 THOUSANDS/ΜL (ref 0.6–4.47)
LYMPHOCYTES NFR BLD AUTO: 40 % (ref 14–44)
MCH RBC QN AUTO: 23.8 PG (ref 26.8–34.3)
MCHC RBC AUTO-ENTMCNC: 31 G/DL (ref 31.4–37.4)
MCV RBC AUTO: 77 FL (ref 82–98)
METHADONE UR QL: NEGATIVE
MONOCYTES # BLD AUTO: 0.51 THOUSAND/ΜL (ref 0.17–1.22)
MONOCYTES NFR BLD AUTO: 7 % (ref 4–12)
NEUTROPHILS # BLD AUTO: 3.72 THOUSANDS/ΜL (ref 1.85–7.62)
NEUTS SEG NFR BLD AUTO: 49 % (ref 43–75)
NITRITE UR QL STRIP: NEGATIVE
NRBC BLD AUTO-RTO: 0 /100 WBCS
OPIATES UR QL SCN: NEGATIVE
PCP UR QL: NEGATIVE
PH UR STRIP.AUTO: 6.5 [PH] (ref 4.5–8)
PLATELET # BLD AUTO: 206 THOUSANDS/UL (ref 149–390)
PMV BLD AUTO: 12.1 FL (ref 8.9–12.7)
POTASSIUM SERPL-SCNC: 4.4 MMOL/L (ref 3.5–5.3)
PROT SERPL-MCNC: 7.3 G/DL (ref 6.4–8.2)
PROT UR STRIP-MCNC: NEGATIVE MG/DL
RBC # BLD AUTO: 4.74 MILLION/UL (ref 3.88–5.62)
SODIUM SERPL-SCNC: 142 MMOL/L (ref 136–145)
SP GR UR STRIP.AUTO: 1.02 (ref 1–1.03)
THC UR QL: NEGATIVE
TSH SERPL DL<=0.05 MIU/L-ACNC: 1.03 UIU/ML (ref 0.36–3.74)
UROBILINOGEN UR QL STRIP.AUTO: 0.2 E.U./DL
WBC # BLD AUTO: 7.54 THOUSAND/UL (ref 4.31–10.16)

## 2018-05-23 PROCEDURE — 82948 REAGENT STRIP/BLOOD GLUCOSE: CPT

## 2018-05-23 PROCEDURE — 93005 ELECTROCARDIOGRAM TRACING: CPT

## 2018-05-23 PROCEDURE — 82075 ASSAY OF BREATH ETHANOL: CPT | Performed by: EMERGENCY MEDICINE

## 2018-05-23 PROCEDURE — 99285 EMERGENCY DEPT VISIT HI MDM: CPT

## 2018-05-23 PROCEDURE — 84443 ASSAY THYROID STIM HORMONE: CPT | Performed by: EMERGENCY MEDICINE

## 2018-05-23 PROCEDURE — 85025 COMPLETE CBC W/AUTO DIFF WBC: CPT | Performed by: EMERGENCY MEDICINE

## 2018-05-23 PROCEDURE — 36415 COLL VENOUS BLD VENIPUNCTURE: CPT | Performed by: EMERGENCY MEDICINE

## 2018-05-23 PROCEDURE — 81003 URINALYSIS AUTO W/O SCOPE: CPT | Performed by: EMERGENCY MEDICINE

## 2018-05-23 PROCEDURE — 80307 DRUG TEST PRSMV CHEM ANLYZR: CPT | Performed by: EMERGENCY MEDICINE

## 2018-05-23 PROCEDURE — 80053 COMPREHEN METABOLIC PANEL: CPT | Performed by: EMERGENCY MEDICINE

## 2018-05-23 RX ORDER — TAMSULOSIN HYDROCHLORIDE 0.4 MG/1
0.4 CAPSULE ORAL ONCE
Status: COMPLETED | OUTPATIENT
Start: 2018-05-23 | End: 2018-05-23

## 2018-05-23 RX ORDER — ARIPIPRAZOLE 10 MG/1
20 TABLET ORAL DAILY
Status: DISCONTINUED | OUTPATIENT
Start: 2018-05-24 | End: 2018-05-23

## 2018-05-23 RX ORDER — ASPIRIN 81 MG/1
81 TABLET, CHEWABLE ORAL ONCE
Status: COMPLETED | OUTPATIENT
Start: 2018-05-23 | End: 2018-05-23

## 2018-05-23 RX ORDER — DOCUSATE SODIUM 100 MG/1
100 CAPSULE, LIQUID FILLED ORAL ONCE
Status: COMPLETED | OUTPATIENT
Start: 2018-05-23 | End: 2018-05-23

## 2018-05-23 RX ORDER — CLONAZEPAM 0.5 MG/1
0.5 TABLET ORAL ONCE
Status: DISCONTINUED | OUTPATIENT
Start: 2018-05-23 | End: 2018-05-24 | Stop reason: HOSPADM

## 2018-05-23 RX ORDER — PRAVASTATIN SODIUM 40 MG
40 TABLET ORAL
Status: DISCONTINUED | OUTPATIENT
Start: 2018-05-23 | End: 2018-05-24 | Stop reason: HOSPADM

## 2018-05-23 RX ORDER — ARIPIPRAZOLE 10 MG/1
20 TABLET ORAL DAILY
Status: DISCONTINUED | OUTPATIENT
Start: 2018-05-23 | End: 2018-05-24 | Stop reason: HOSPADM

## 2018-05-23 RX ADMIN — PRAVASTATIN SODIUM 40 MG: 40 TABLET ORAL at 17:22

## 2018-05-23 RX ADMIN — TAMSULOSIN HYDROCHLORIDE 0.4 MG: 0.4 CAPSULE ORAL at 17:21

## 2018-05-23 RX ADMIN — METFORMIN HYDROCHLORIDE 500 MG: 500 TABLET, FILM COATED ORAL at 17:21

## 2018-05-23 RX ADMIN — ARIPIPRAZOLE 20 MG: 10 TABLET ORAL at 17:19

## 2018-05-23 RX ADMIN — DOCUSATE SODIUM 100 MG: 100 CAPSULE, LIQUID FILLED ORAL at 17:22

## 2018-05-23 RX ADMIN — METOPROLOL TARTRATE 25 MG: 25 TABLET, FILM COATED ORAL at 07:14

## 2018-05-23 RX ADMIN — ASPIRIN 81 MG 81 MG: 81 TABLET ORAL at 17:23

## 2018-05-23 RX ADMIN — METOPROLOL TARTRATE 25 MG: 25 TABLET ORAL at 21:38

## 2018-05-23 RX ADMIN — METFORMIN HYDROCHLORIDE 500 MG: 500 TABLET, FILM COATED ORAL at 07:14

## 2018-05-23 NOTE — ED NOTES
MEDICATIONS SENT TO PHARMACY    ARIPIPRAZOLE 15MG-2 TABS  ARIPIPRAZOLE 30 MG- 2 TABS  HYDROXYZINE HCL 50MG- 8 TABS     Robert Walsh RN  05/23/18 1143

## 2018-05-23 NOTE — ED NOTES
Report received from ED tech Ness Spears  Pt appears to be resting  No signs of distress  Will continue to monitor       April C sonya Ford  05/23/18 0132

## 2018-05-23 NOTE — ED NOTES
Patient Belongings  Sweatpants  Underwear  Shirt   Undershirt  Socks  Shoes  Black bag  - various pens  - denture cream  - books  - 3 one dollar bills  - wallet  - key  - various cards  Prescription Meds  - Hydroxyzine 50 mg ( 1 at bedtime)  - generic aripiprazole 15 mg (1 daily)  - aripiprazole 15 mg (one half daily)       Gustavo Tse  05/23/18 0754

## 2018-05-23 NOTE — ED NOTES
Patient is accepted at [facility]  Patient is accepted by Dr Stefany Paulino per [facility]  Transportation is arranged with [facility]  Transportation is scheduled for 22:00  Patient may go to the floor after 10p        ARIANE Valdivia  05/23/18   2594

## 2018-05-23 NOTE — ED NOTES
Pt appears to be resting  No signs of distress  Will continue to monitor       April Carissa Guillermo  05/23/18 1914

## 2018-05-23 NOTE — ED NOTES
In-Network beds available later today at [facility], bong Garza; chart faxed for review       ARIANE Peterson  05/23/18   6315

## 2018-05-23 NOTE — ED NOTES
Patient appears to be sleeping, no signs of distress  Will continue to monitor        Gurpreet Balloon  05/23/18 0034

## 2018-05-23 NOTE — ED NOTES
Patient appears to be sleeping, no signs of distress  Will continue to monitor        Dorothea Triplett  05/23/18 9993

## 2018-05-23 NOTE — ED NOTES
Informed patient on the process of going into secure holding  Pt informed on getting changed and belongings being inventoried        Timothy Guzman RN  05/23/18 7564

## 2018-05-23 NOTE — ED NOTES
Pt is to be admitted to the Older Adult Unit on the 3rd Flr of -  Nursing report to be called at [phone]  Pt's primary insurance is Medicare so no pre-cert is needed  CW spoke with Angel Luis Valle, Health  at the [facility] at [phone], who stated that they do not perform a COB, rather they will pay all hospital bills at a rate of 100% when presented to them by pt  Josesito agreed to send an email to the [address] [place] [address] Department as well, to notify them of same

## 2018-05-23 NOTE — ED NOTES
Pt appears to be rocking in bed  No signs of distress  Will continue to monitor       April BRIDGET Madrigal  05/23/18 9855

## 2018-05-23 NOTE — ED NOTES
Patient appears to be sleeping, no signs of distress  Will continue to monitor        Gurpreet Balloon  05/23/18 4703

## 2018-05-23 NOTE — ED NOTES
BAT performed, patient laying down at this time  No signs of distress, will continue to monitor        Yvette Allen  05/23/18 6207
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
## 2018-05-23 NOTE — ED NOTES
Patient brought back by [facility]  Informed he needs to change into scrubs, patient ambulated to the bathroom  Changing into scrubs at this time  Will continue to monitor        Lyndon Baldwin  05/23/18 0800

## 2018-05-23 NOTE — ED NOTES
Pt appears to be resting  No signs of distress  Will continue to monitor       April C de Tristan Seats  05/23/18 6693

## 2018-05-23 NOTE — ED NOTES
Patient ambulated to the bathroom  Sitting on chair in room at this time  Will continue to monitor        Derik Sprague  05/23/18 101

## 2018-05-23 NOTE — ED NOTES
Patient appears to be sleeping, no signs of distress  Will continue to monitor        Sarmad Collado  05/23/18 8292

## 2018-05-23 NOTE — ED CARE HANDOFF
Emergency Department Sign Out Note        Sign out and transfer of care from Dr Marilu Sandoval  See Separate Emergency Department note  The patient, Darvin Bartlett, was evaluated by the previous provider for Dystonic Reaction/schizophrenia    Workup Completed:  Patient was treated for his dystonia  Patient is currently getting undressed for evaluation for crisis  ED Course / Workup Pending (followup): Awaiting crisis eval      4:25 p m :  Patient was accepted at [facility] by Dr Quentin Gilbert and will be transferred there around 10:00 p m  [place]  5:40 PM:  Care transferred to Dr Cain Mckeon                             Procedures  MDM  CritCare Time      Disposition  Final diagnoses:   Acute dystonic reaction due to drugs   Schizophrenia [facility])     Time reflects when diagnosis was documented in both MDM as applicable and the Disposition within this note     Time User Action Codes Description Comment    5/23/2018  6:34 AM Evangelista Salvador Add [G24 02] Acute dystonic reaction due to drugs     5/23/2018  6:34 AM Evangelista Salvador Add [F20 9] Schizophrenia [facility])       ED Disposition     None      MD Documentation      Most Recent Value   Sending MD Dr Farrah Parikh    None       Patient's Medications   Discharge Prescriptions    No medications on file     No discharge procedures on file         ED Provider  Electronically Signed by     Padma Medina DO  05/23/18 3556

## 2018-05-23 NOTE — ED PROVIDER NOTES
History  Chief Complaint   Patient presents with    Psychiatric Evaluation     Patient reports [address] for the past few days, Patient denies SI and HI  Patient was given medication which is helping, but he is feeling very jittery, nervous, axious  68-year-old male presents with dystonic reaction after being started on Abilify  Patient notes a sensation of ill being with an inability to sit still for any period of time  Patient is dyskinetic upon my initial interview  Patient had been changed to Abilify during his prior psychiatric admission  Patient states he has been compliant with this medication  Patient is a history of schizophrenia, follows appropriately with providers  Denies any thoughts of self-harm or harm to others  Impression and plan:  Dystonic reaction likely secondary to patient's Abilify  Will treat patient symptomatically with IM diphenhydramine  Considering patient's history of schizophrenia, I have offered involuntary admission to the hospital for adjustment of his medications as he will no longer be able to take his Abilify  Patient is in agreement with voluntary admission to the hospital   Patient does not meet any criteria for involuntary admission to the hospital and he does not represent a clear present danger to himself  Psychiatric Evaluation   Associated symptoms: distractible    Associated symptoms: no abdominal pain, no anhedonia, no anxiety, no appetite change, no chest pain, no decreased need for sleep, no euphoric mood, no fatigue, no feelings of worthlessness, no headaches, no hypersomnia, no hyperventilation, no insomnia, no irritability and no weight change        Prior to Admission Medications   Prescriptions Last Dose Informant Patient Reported? Taking?    ARIPiprazole (ABILIFY) 20 MG tablet   No No   Sig: Take 1 tablet (20 mg total) by mouth daily   aspirin 81 mg chewable tablet   No No   Sig: Chew 1 tablet (81 mg total) daily   clonazePAM (KlonoPIN) 0 5 mg tablet   No No   Sig: Take 1 tablet (0 5 mg total) by mouth 2 (two) times a day for 60 days   docusate sodium (COLACE) 100 mg capsule   No No   Sig: Take 1 capsule (100 mg total) by mouth 2 (two) times a day as needed for constipation   metFORMIN (GLUCOPHAGE) 500 mg tablet   No No   Sig: Take 1 tablet (500 mg total) by mouth 2 (two) times a day with meals   metoprolol tartrate (LOPRESSOR) 25 mg tablet   No No   Sig: Take 1 tablet (25 mg total) by mouth every 12 (twelve) hours   polyethylene glycol (MIRALAX) 17 g packet   No No   Sig: Take 17 g by mouth daily as needed (constipation)   pravastatin (PRAVACHOL) 40 mg tablet   No No   Sig: Take 1 tablet (40 mg total) by mouth daily with dinner   tamsulosin (FLOMAX) 0 4 mg   No No   Sig: Take 1 capsule (0 4 mg total) by mouth daily with dinner      Facility-Administered Medications: None       Past Medical History:   Diagnosis Date    Anxiety     Depression     Diabetes mellitus ([place] [address] )     Enlarged prostate     Hallucination     Hyperlipidemia     Hypertension     Memory loss     Panic attack     Panic disorder     Psychiatric disorder     Psychiatric illness     Psychosis     PTSD (post-traumatic stress disorder)     Schizoaffective disorder ([place] [address] )     Schizophrenia ([place] [address] )        History reviewed  No pertinent surgical history  History reviewed  No pertinent family history  I have reviewed and agree with the history as documented  Social History   Substance Use Topics    Smoking status: Former Smoker     Packs/day: 2 00     Years: 0 50     Types: Cigarettes     Quit date: 1993    Smokeless tobacco: Never Used      Comment: quit in 1993    Alcohol use No        Review of Systems   Constitutional: Negative for appetite change, fatigue and irritability  Cardiovascular: Negative for chest pain  Gastrointestinal: Negative for abdominal pain  Neurological: Negative for headaches     Psychiatric/Behavioral: The patient is not nervous/anxious and does not have insomnia  Physical Exam  Physical Exam   Constitutional: He appears well-developed and well-nourished  HENT:   Head: Normocephalic and atraumatic  Eyes: Pupils are equal, round, and reactive to light  Neck: Neck supple  Cardiovascular: Normal rate and regular rhythm  Pulmonary/Chest: Effort normal and breath sounds normal    Abdominal: He exhibits no distension  Musculoskeletal: He exhibits no deformity  Neurological: He is alert  He displays normal reflexes  No cranial nerve deficit  Coordination normal    [place]  No focal motor deficits  Skin: Skin is dry  Psychiatric: He has a normal mood and affect  His speech is normal and behavior is normal    The patient notes auditory hallucinations  Denies command hallucinations  Vitals reviewed        Vital Signs  ED Triage Vitals [05/22/18 2234]   Temperature Pulse Respirations Blood Pressure SpO2   98 2 °F (36 8 °C) 78 18 (!) 173/97 100 %      Temp Source Heart Rate Source Patient Position - Orthostatic VS BP Location FiO2 (%)   Oral Monitor Sitting Left arm --      Pain Score       No Pain           Vitals:    05/23/18 0710 05/23/18 1605 05/23/18 2049 05/23/18 2223   BP: 153/81 135/78 124/86 144/89   Pulse: 84 79 81 80   Patient Position - Orthostatic VS: Lying Sitting Lying Sitting       Visual Acuity      ED Medications  Medications   diphenhydrAMINE (BENADRYL) injection 25 mg (25 mg Intramuscular Given 5/22/18 2326)   metFORMIN (GLUCOPHAGE) tablet 500 mg (500 mg Oral Given 5/23/18 0714)   metoprolol tartrate (LOPRESSOR) tablet 25 mg (25 mg Oral Given 5/23/18 0714)   aspirin chewable tablet 81 mg (81 mg Oral Given 5/23/18 1723)   docusate sodium (COLACE) capsule 100 mg (100 mg Oral Given 5/23/18 1722)   tamsulosin (FLOMAX) capsule 0 4 mg (0 4 mg Oral Given 5/23/18 1721)       Diagnostic Studies  Results Reviewed     Procedure Component Value Units Date/Time    Fingerstick Glucose (POCT) [[id number]]  (Abnormal) Collected: 05/23/18 1526    Lab Status:  Final result Updated:  05/23/18 1529     POC Glucose 205 (H) mg/dl     TSH [[id number]]  (Normal) Collected:  05/23/18 0910    Lab Status:  Final result Specimen:  Blood from Arm, Right Updated:  05/23/18 0942     TSH 3RD GENERATON 1 031 uIU/mL     Narrative:         Patients undergoing fluorescein dye angiography may retain small amounts of fluorescein in the body for 48-72 hours post procedure  Samples containing fluorescein can produce falsely depressed TSH values  If the patient had this procedure,a specimen should be resubmitted post fluorescein clearance  Comprehensive metabolic panel [[id number]]  (Abnormal) Collected:  05/23/18 0910    Lab Status:  Final result Specimen:  Blood from Arm, Right Updated:  05/23/18 0936     Sodium 142 mmol/L      Potassium 4 4 mmol/L      Chloride 106 mmol/L      CO2 30 mmol/L      Anion Gap 6 mmol/L      BUN 11 mg/dL      Creatinine 1 00 mg/dL      Glucose 138 mg/dL      Calcium 9 4 mg/dL      AST 19 U/L      ALT 30 U/L      Alkaline Phosphatase 71 U/L      Total Protein 7 3 g/dL      Albumin 3 4 (L) g/dL      Total Bilirubin 0 20 mg/dL      eGFR 88 ml/min/1 73sq m     Narrative:         National Kidney Disease Education Program recommendations are as follows:  GFR calculation is accurate only with a steady state creatinine  Chronic Kidney disease less than 60 ml/min/1 73 sq  meters  Kidney failure less than 15 ml/min/1 73 sq  meters      CBC and differential [[id number]]  (Abnormal) Collected:  05/23/18 0910    Lab Status:  Final result Specimen:  Blood from Arm, Right Updated:  05/23/18 0920     WBC 7 54 Thousand/uL      RBC 4 74 Million/uL      Hemoglobin 11 3 (L) g/dL      Hematocrit 36 5 %      MCV 77 (L) fL      MCH 23 8 (L) pg      MCHC 31 0 (L) g/dL      RDW 13 7 %      MPV 12 1 fL      Platelets 315 Thousands/uL      nRBC 0 /100 WBCs      Neutrophils Relative 49 %      Immat GRANS % 0 %      Lymphocytes Relative 40 %      Monocytes Relative 7 %      Eosinophils Relative 3 %      Basophils Relative 1 %      Neutrophils Absolute 3 72 Thousands/µL      Immature Grans Absolute 0 01 Thousand/uL      Lymphocytes Absolute 3 03 Thousands/µL      Monocytes Absolute 0 51 Thousand/µL      Eosinophils Absolute 0 22 Thousand/µL      Basophils Absolute 0 05 Thousands/µL     UA w Reflex to Microscopic w Reflex to Culture [[id number]] Collected:  05/23/18 0911    Lab Status:  Final result Specimen:  Urine from Urine, Clean Catch Updated:  05/23/18 0919     Color, UA Yellow     Clarity, UA Clear     Specific Gravity, UA 1 025     pH, UA 6 5     Leukocytes, UA Negative     Nitrite, UA Negative     Protein, UA Negative mg/dl      Glucose, UA Negative mg/dl      Ketones, UA Negative mg/dl      Urobilinogen, UA 0 2 E U /dl      Bilirubin, UA Negative     Blood, UA Negative    POCT alcohol breath test [[id number]]  (Normal) Resulted:  05/23/18 0808    Lab Status:  Final result Updated:  05/23/18 0808     EXTBreath Alcohol 0 000    Rapid drug screen, urine [[id number]]  (Normal) Collected:  05/23/18 0742    Lab Status:  Final result Specimen:  Urine from Urine, Clean Catch Updated:  05/23/18 0800     Amph/Meth UR Negative     Barbiturate Ur Negative     Benzodiazepine Urine Negative     Cocaine Urine Negative     Methadone Urine Negative     Opiate Urine Negative     PCP Ur Negative     THC Urine Negative    Narrative:         FOR MEDICAL PURPOSES ONLY  IF CONFIRMATION NEEDED PLEASE CONTACT THE LAB WITHIN 5 DAYS      Drug Screen Cutoff Levels:  AMPHETAMINE/METHAMPHETAMINES  1000 ng/mL  BARBITURATES     200 ng/mL  BENZODIAZEPINES     200 ng/mL  COCAINE      300 ng/mL  METHADONE      300 ng/mL  OPIATES      300 ng/mL  PHENCYCLIDINE     25 ng/mL  THC       50 ng/mL    Fingerstick Glucose (POCT) [[id number]]  (Abnormal) Collected:  05/23/18 0703    Lab Status:  Final result Updated:  05/23/18 0707     POC Glucose 158 (H) mg/dl                  No orders to display Procedures  Procedures       Phone Contacts  ED Phone Contact    ED Course                               MDM  CritCare Time    Disposition  Final diagnoses:   Acute dystonic reaction due to drugs   Schizophrenia [facility])     Time reflects when diagnosis was documented in both MDM as applicable and the Disposition within this note     Time User Action Codes Description Comment    5/23/2018  6:34 AM Adrian Lehmandiane Add [G24 02] Acute dystonic reaction due to drugs     5/23/2018  6:34 AM Adrian Lehmandiane Add [F20 9] Schizophrenia [facility])       ED Disposition     ED Disposition Condition Comment    Transfer to Another [address] [place] [address] Blvd  should be transferred out to [place]  MD Documentation      Most Recent Value   Patient Condition  The patient has been stabilized such that within reasonable medical probability, no material deterioration of the patient condition or the condition of the unborn child(jevon) is likely to result from the transfer   Reason for Transfer  Level of Care needed not available at this facility   Benefits of Transfer  Continuity of care   Risks of Transfer  Potential for delay in receiving treatment   Accepting Physician  Dr Mandy Luciano Name, [address]   SL-SH    (Name & Tel number)  A  [address]   Transported by (Company and Unit #)  [facility]atie Salvador   Provider Certification  General risk, such as traffic hazards, adverse weather conditions, rough terrain or turbulence, possible failure of equipment (including vehicle or aircraft), or consequences of actions of persons outside the control of the transport personnel      RN Documentation      [address] Name, [address]   SL-SH    (Name & Tel number)  A   [facility], [facility]   Transport Mode  Ambulance   Transported by [employer] and Unit #)  [facility]   Level of Care  Basic life support   Transfer Date  05/23/18 Transfer Time  2200      Follow-up Information    None         Discharge Medication List as of 5/23/2018 11:06 PM      CONTINUE these medications which have NOT CHANGED    Details   ARIPiprazole (ABILIFY) 20 MG tablet Take 1 tablet (20 mg total) by mouth daily, Starting Fri 3/30/2018, Print      aspirin 81 mg chewable tablet Chew 1 tablet (81 mg total) daily, Starting Sat 3/24/2018, No Print      clonazePAM (KlonoPIN) 0 5 mg tablet Take 1 tablet (0 5 mg total) by mouth 2 (two) times a day for 60 days, Starting Thu 3/29/2018, Until Mon 5/28/2018, Print      docusate sodium (COLACE) 100 mg capsule Take 1 capsule (100 mg total) by mouth 2 (two) times a day as needed for constipation, Starting Fri 3/23/2018, No Print      metFORMIN (GLUCOPHAGE) 500 mg tablet Take 1 tablet (500 mg total) by mouth 2 (two) times a day with meals, Starting Thu 3/29/2018, Print      metoprolol tartrate (LOPRESSOR) 25 mg tablet Take 1 tablet (25 mg total) by mouth every 12 (twelve) hours, Starting Thu 3/29/2018, Print      polyethylene glycol (MIRALAX) 17 g packet Take 17 g by mouth daily as needed (constipation), Starting Fri 3/23/2018, No Print      pravastatin (PRAVACHOL) 40 mg tablet Take 1 tablet (40 mg total) by mouth daily with dinner, Starting Thu 3/29/2018, Print      tamsulosin (FLOMAX) 0 4 mg Take 1 capsule (0 4 mg total) by mouth daily with dinner, Starting Thu 3/29/2018, Print           No discharge procedures on file      ED Provider  Electronically Signed by           Hunter Westfall MD  05/24/18 0418

## 2018-05-23 NOTE — ED NOTES
Patient sitting on bed, calm and cooperative  No signs of distress, will continue to monitor        Lyndon Baldwin  05/23/18 [id number]

## 2018-05-24 LAB
ABSOL LYMPHOCYTES (HISTORICAL): 3.2 K/UL (ref 0.5–4)
ATRIAL RATE: 70 BPM
BASOPHILS # BLD AUTO: 0 % (ref 0–1)
BASOPHILS # BLD AUTO: 0 K/UL (ref 0–0.1)
COMMENT (HISTORICAL): ABNORMAL
DEPRECATED RDW RBC AUTO: 13.6 %
EOSINOPHIL # BLD AUTO: 0.2 K/UL (ref 0–0.4)
EOSINOPHIL NFR BLD AUTO: 3 % (ref 0–6)
GLUCOSE SERPL-MCNC: 110 MG/DL (ref 70–99)
GLUCOSE SERPL-MCNC: 126 MG/DL (ref 70–99)
GLUCOSE SERPL-MCNC: 129 MG/DL (ref 70–99)
HCT VFR BLD AUTO: 39 % (ref 41–53)
HGB BLD-MCNC: 12.2 G/DL (ref 13.5–17.5)
LYMPHOCYTES NFR BLD AUTO: 40 % (ref 25–45)
MCH RBC QN AUTO: 24.1 PG (ref 26–34)
MCHC RBC AUTO-ENTMCNC: 31.3 % (ref 31–36)
MCV RBC AUTO: 77 FL (ref 80–100)
MONOCYTES # BLD AUTO: 0.6 K/UL (ref 0.2–0.9)
MONOCYTES NFR BLD AUTO: 7 % (ref 1–10)
NEUTROPHILS ABS COUNT (HISTORICAL): 3.9 K/UL (ref 1.8–7.8)
NEUTS SEG NFR BLD AUTO: 50 % (ref 45–65)
P AXIS: 52 DEGREES
PLATELET # BLD AUTO: 198 K/MCL (ref 150–450)
PR INTERVAL: 142 MS
QRS AXIS: 31 DEGREES
QRSD INTERVAL: 88 MS
QT INTERVAL: 364 MS
QTC INTERVAL: 393 MS
RBC # BLD AUTO: 5.05 M/MCL (ref 4.5–5.9)
RBC MORPHOLOGY (HISTORICAL): ABNORMAL
T WAVE AXIS: 9 DEGREES
VENTRICULAR RATE: 70 BPM
WBC # BLD AUTO: 7.9 K/MCL (ref 4.5–11)

## 2018-05-24 PROCEDURE — 93010 ELECTROCARDIOGRAM REPORT: CPT | Performed by: INTERNAL MEDICINE

## 2018-05-24 NOTE — ED NOTES
Attempted to call report on patient   was informed it is change of shift and would receive a call in 20 min     Alberto Sosa RN  05/23/18 [id number]

## 2018-05-24 NOTE — ED NOTES
Pt is sitting on bed  No signs of distress  Will continue to monitor       April C sonya David Barrier  05/23/18 2022

## 2018-05-24 NOTE — ED NOTES
Pt picked up by [facility] for transport to [facility]  Pt belongings were taken out of [address] locker and from pharmacy and given to [facility]       April C sonya Coronel  05/23/18 [id number]

## 2018-05-25 LAB
ALBUMIN SERPL BCP-MCNC: 3.9 G/DL (ref 3–5.2)
ALP SERPL-CCNC: 57 U/L (ref 43–122)
ALT SERPL W P-5'-P-CCNC: 33 U/L (ref 9–52)
ANION GAP SERPL CALCULATED.3IONS-SCNC: 9 MMOL/L (ref 5–14)
AST SERPL W P-5'-P-CCNC: 21 U/L (ref 17–59)
BILIRUB SERPL-MCNC: 0.4 MG/DL
BUN SERPL-MCNC: 13 MG/DL (ref 5–25)
CALCIUM SERPL-MCNC: 9.4 MG/DL (ref 8.4–10.2)
CHLORIDE SERPL-SCNC: 99 MEQ/L (ref 97–108)
CO2 SERPL-SCNC: 28 MMOL/L (ref 22–30)
CREATINE, SERUM (HISTORICAL): 0.87 MG/DL (ref 0.7–1.5)
EGFR (HISTORICAL): >60 ML/MIN/1.73 M2
GLUCOSE FASTING (HISTORICAL): 102 MG/DL (ref 70–99)
GLUCOSE SERPL-MCNC: 124 MG/DL (ref 70–99)
GLUCOSE SERPL-MCNC: 157 MG/DL (ref 70–99)
POTASSIUM SERPL-SCNC: 4.4 MEQ/L (ref 3.6–5)
SODIUM SERPL-SCNC: 136 MEQ/L (ref 137–147)
TOTAL PROTEIN (HISTORICAL): 7.2 G/DL (ref 5.9–8.4)

## 2018-05-26 LAB
GLUCOSE SERPL-MCNC: 126 MG/DL (ref 70–99)
GLUCOSE SERPL-MCNC: 151 MG/DL (ref 70–99)

## 2018-05-27 LAB
GLUCOSE SERPL-MCNC: 116 MG/DL (ref 70–99)
GLUCOSE SERPL-MCNC: 145 MG/DL (ref 70–99)

## 2018-05-28 LAB — GLUCOSE SERPL-MCNC: 105 MG/DL (ref 70–99)

## 2018-05-29 LAB
GLUCOSE SERPL-MCNC: 103 MG/DL (ref 70–99)
GLUCOSE SERPL-MCNC: 85 MG/DL (ref 70–99)

## 2018-05-30 LAB
GLUCOSE SERPL-MCNC: 111 MG/DL (ref 70–99)
GLUCOSE SERPL-MCNC: 115 MG/DL (ref 70–99)

## 2018-05-31 LAB
GLUCOSE SERPL-MCNC: 100 MG/DL (ref 70–99)
GLUCOSE SERPL-MCNC: 106 MG/DL (ref 70–99)

## 2018-06-01 LAB — GLUCOSE SERPL-MCNC: 104 MG/DL (ref 70–99)

## 2018-06-02 LAB — GLUCOSE SERPL-MCNC: 106 MG/DL (ref 70–99)

## 2019-04-28 ENCOUNTER — APPOINTMENT (OUTPATIENT)
Dept: RADIOLOGY | Facility: HOSPITAL | Age: 69
DRG: 282 | End: 2019-04-28
Payer: OTHER GOVERNMENT

## 2019-04-28 ENCOUNTER — APPOINTMENT (OUTPATIENT)
Dept: NON INVASIVE DIAGNOSTICS | Facility: HOSPITAL | Age: 69
DRG: 282 | End: 2019-04-28
Payer: OTHER GOVERNMENT

## 2019-04-28 ENCOUNTER — APPOINTMENT (OUTPATIENT)
Dept: CT IMAGING | Facility: HOSPITAL | Age: 69
DRG: 282 | End: 2019-04-28
Payer: OTHER GOVERNMENT

## 2019-04-28 ENCOUNTER — HOSPITAL ENCOUNTER (INPATIENT)
Facility: HOSPITAL | Age: 69
LOS: 4 days | Discharge: NURSING, REHAB OR LONG-TERM CARE | DRG: 282 | End: 2019-05-03
Attending: EMERGENCY MEDICINE | Admitting: INTERNAL MEDICINE
Payer: OTHER GOVERNMENT

## 2019-04-28 DIAGNOSIS — E78.2 MIXED HYPERLIPIDEMIA: ICD-10-CM

## 2019-04-28 DIAGNOSIS — I50.33 ACUTE ON CHRONIC DIASTOLIC (CONGESTIVE) HEART FAILURE (HCC): ICD-10-CM

## 2019-04-28 DIAGNOSIS — E83.42 HYPOMAGNESEMIA: ICD-10-CM

## 2019-04-28 DIAGNOSIS — F20.0 PARANOID SCHIZOPHRENIA (HCC): Chronic | ICD-10-CM

## 2019-04-28 DIAGNOSIS — R55 NEAR SYNCOPE: ICD-10-CM

## 2019-04-28 DIAGNOSIS — R77.8 ELEVATED TROPONIN: Primary | ICD-10-CM

## 2019-04-28 DIAGNOSIS — F41.9 ANXIETY: Chronic | ICD-10-CM

## 2019-04-28 DIAGNOSIS — R06.02 SHORTNESS OF BREATH: ICD-10-CM

## 2019-04-28 DIAGNOSIS — M25.562 PAIN AND SWELLING OF LEFT KNEE: ICD-10-CM

## 2019-04-28 DIAGNOSIS — M25.462 PAIN AND SWELLING OF LEFT KNEE: ICD-10-CM

## 2019-04-28 DIAGNOSIS — I10 ESSENTIAL HYPERTENSION: ICD-10-CM

## 2019-04-28 PROBLEM — R79.89 ELEVATED TROPONIN: Status: ACTIVE | Noted: 2019-04-28

## 2019-04-28 LAB
ALBUMIN SERPL BCP-MCNC: 3.8 G/DL (ref 3.5–5)
ALP SERPL-CCNC: 66 U/L (ref 46–116)
ALT SERPL W P-5'-P-CCNC: 37 U/L (ref 12–78)
ANION GAP SERPL CALCULATED.3IONS-SCNC: 13 MMOL/L (ref 4–13)
ANION GAP SERPL CALCULATED.3IONS-SCNC: 8 MMOL/L (ref 4–13)
AST SERPL W P-5'-P-CCNC: 14 U/L (ref 5–45)
BACTERIA UR QL AUTO: ABNORMAL /HPF
BASOPHILS # BLD AUTO: 0.03 THOUSANDS/ΜL (ref 0–0.1)
BASOPHILS # BLD AUTO: 0.04 THOUSANDS/ΜL (ref 0–0.1)
BASOPHILS NFR BLD AUTO: 0 % (ref 0–1)
BASOPHILS NFR BLD AUTO: 1 % (ref 0–1)
BILIRUB SERPL-MCNC: 0.3 MG/DL (ref 0.2–1)
BILIRUB UR QL STRIP: NEGATIVE
BUN SERPL-MCNC: 11 MG/DL (ref 5–25)
BUN SERPL-MCNC: 13 MG/DL (ref 5–25)
CALCIUM SERPL-MCNC: 9 MG/DL (ref 8.3–10.1)
CALCIUM SERPL-MCNC: 9.4 MG/DL (ref 8.3–10.1)
CHLORIDE SERPL-SCNC: 101 MMOL/L (ref 100–108)
CHLORIDE SERPL-SCNC: 104 MMOL/L (ref 100–108)
CHOLEST SERPL-MCNC: 135 MG/DL (ref 50–200)
CLARITY UR: CLEAR
CO2 SERPL-SCNC: 24 MMOL/L (ref 21–32)
CO2 SERPL-SCNC: 27 MMOL/L (ref 21–32)
COLOR UR: YELLOW
CREAT SERPL-MCNC: 0.83 MG/DL (ref 0.6–1.3)
CREAT SERPL-MCNC: 1 MG/DL (ref 0.6–1.3)
EOSINOPHIL # BLD AUTO: 0.16 THOUSAND/ΜL (ref 0–0.61)
EOSINOPHIL # BLD AUTO: 0.19 THOUSAND/ΜL (ref 0–0.61)
EOSINOPHIL NFR BLD AUTO: 3 % (ref 0–6)
EOSINOPHIL NFR BLD AUTO: 3 % (ref 0–6)
ERYTHROCYTE [DISTWIDTH] IN BLOOD BY AUTOMATED COUNT: 13.8 % (ref 11.6–15.1)
ERYTHROCYTE [DISTWIDTH] IN BLOOD BY AUTOMATED COUNT: 14 % (ref 11.6–15.1)
EST. AVERAGE GLUCOSE BLD GHB EST-MCNC: 131 MG/DL
GFR SERPL CREATININE-BSD FRML MDRD: 103 ML/MIN/1.73SQ M
GFR SERPL CREATININE-BSD FRML MDRD: 88 ML/MIN/1.73SQ M
GLUCOSE SERPL-MCNC: 109 MG/DL (ref 65–140)
GLUCOSE SERPL-MCNC: 119 MG/DL (ref 65–140)
GLUCOSE SERPL-MCNC: 138 MG/DL (ref 65–140)
GLUCOSE SERPL-MCNC: 143 MG/DL (ref 65–140)
GLUCOSE SERPL-MCNC: 151 MG/DL (ref 65–140)
GLUCOSE UR STRIP-MCNC: NEGATIVE MG/DL
HBA1C MFR BLD: 6.2 % (ref 4.2–6.3)
HCT VFR BLD AUTO: 38.6 % (ref 36.5–49.3)
HCT VFR BLD AUTO: 39.7 % (ref 36.5–49.3)
HDLC SERPL-MCNC: 31 MG/DL (ref 40–60)
HGB BLD-MCNC: 12 G/DL (ref 12–17)
HGB BLD-MCNC: 12.3 G/DL (ref 12–17)
HGB UR QL STRIP.AUTO: NEGATIVE
IMM GRANULOCYTES # BLD AUTO: 0.01 THOUSAND/UL (ref 0–0.2)
IMM GRANULOCYTES # BLD AUTO: 0.02 THOUSAND/UL (ref 0–0.2)
IMM GRANULOCYTES NFR BLD AUTO: 0 % (ref 0–2)
IMM GRANULOCYTES NFR BLD AUTO: 0 % (ref 0–2)
KETONES UR STRIP-MCNC: NEGATIVE MG/DL
LDLC SERPL CALC-MCNC: 95 MG/DL (ref 0–100)
LEUKOCYTE ESTERASE UR QL STRIP: ABNORMAL
LYMPHOCYTES # BLD AUTO: 3.36 THOUSANDS/ΜL (ref 0.6–4.47)
LYMPHOCYTES # BLD AUTO: 3.37 THOUSANDS/ΜL (ref 0.6–4.47)
LYMPHOCYTES NFR BLD AUTO: 50 % (ref 14–44)
LYMPHOCYTES NFR BLD AUTO: 54 % (ref 14–44)
MAGNESIUM SERPL-MCNC: 1.6 MG/DL (ref 1.6–2.6)
MAGNESIUM SERPL-MCNC: 1.8 MG/DL (ref 1.6–2.6)
MCH RBC QN AUTO: 24 PG (ref 26.8–34.3)
MCH RBC QN AUTO: 24.2 PG (ref 26.8–34.3)
MCHC RBC AUTO-ENTMCNC: 31 G/DL (ref 31.4–37.4)
MCHC RBC AUTO-ENTMCNC: 31.1 G/DL (ref 31.4–37.4)
MCV RBC AUTO: 77 FL (ref 82–98)
MCV RBC AUTO: 78 FL (ref 82–98)
MONOCYTES # BLD AUTO: 0.43 THOUSAND/ΜL (ref 0.17–1.22)
MONOCYTES # BLD AUTO: 0.45 THOUSAND/ΜL (ref 0.17–1.22)
MONOCYTES NFR BLD AUTO: 7 % (ref 4–12)
MONOCYTES NFR BLD AUTO: 7 % (ref 4–12)
NEUTROPHILS # BLD AUTO: 2.17 THOUSANDS/ΜL (ref 1.85–7.62)
NEUTROPHILS # BLD AUTO: 2.63 THOUSANDS/ΜL (ref 1.85–7.62)
NEUTS SEG NFR BLD AUTO: 35 % (ref 43–75)
NEUTS SEG NFR BLD AUTO: 40 % (ref 43–75)
NITRITE UR QL STRIP: NEGATIVE
NON-SQ EPI CELLS URNS QL MICRO: ABNORMAL /HPF
NONHDLC SERPL-MCNC: 104 MG/DL
NRBC BLD AUTO-RTO: 0 /100 WBCS
NRBC BLD AUTO-RTO: 0 /100 WBCS
PH UR STRIP.AUTO: 5.5 [PH]
PHOSPHATE SERPL-MCNC: 4.1 MG/DL (ref 2.3–4.1)
PLATELET # BLD AUTO: 157 THOUSANDS/UL (ref 149–390)
PLATELET # BLD AUTO: 170 THOUSANDS/UL (ref 149–390)
PMV BLD AUTO: 12.7 FL (ref 8.9–12.7)
PMV BLD AUTO: 13.6 FL (ref 8.9–12.7)
POTASSIUM SERPL-SCNC: 3.7 MMOL/L (ref 3.5–5.3)
POTASSIUM SERPL-SCNC: 3.7 MMOL/L (ref 3.5–5.3)
PROT SERPL-MCNC: 8 G/DL (ref 6.4–8.2)
PROT UR STRIP-MCNC: ABNORMAL MG/DL
RBC # BLD AUTO: 5 MILLION/UL (ref 3.88–5.62)
RBC # BLD AUTO: 5.08 MILLION/UL (ref 3.88–5.62)
RBC #/AREA URNS AUTO: ABNORMAL /HPF
SODIUM SERPL-SCNC: 138 MMOL/L (ref 136–145)
SODIUM SERPL-SCNC: 139 MMOL/L (ref 136–145)
SP GR UR STRIP.AUTO: >=1.03 (ref 1–1.03)
TRIGL SERPL-MCNC: 45 MG/DL
TROPONIN I SERPL-MCNC: 0.05 NG/ML
TROPONIN I SERPL-MCNC: 0.06 NG/ML
TROPONIN I SERPL-MCNC: 0.06 NG/ML
TSH SERPL DL<=0.05 MIU/L-ACNC: 1.18 UIU/ML (ref 0.36–3.74)
UROBILINOGEN UR QL STRIP.AUTO: 0.2 E.U./DL
WBC # BLD AUTO: 6.18 THOUSAND/UL (ref 4.31–10.16)
WBC # BLD AUTO: 6.68 THOUSAND/UL (ref 4.31–10.16)
WBC #/AREA URNS AUTO: ABNORMAL /HPF

## 2019-04-28 PROCEDURE — G8978 MOBILITY CURRENT STATUS: HCPCS

## 2019-04-28 PROCEDURE — 80053 COMPREHEN METABOLIC PANEL: CPT | Performed by: PHYSICIAN ASSISTANT

## 2019-04-28 PROCEDURE — 99285 EMERGENCY DEPT VISIT HI MDM: CPT

## 2019-04-28 PROCEDURE — 83735 ASSAY OF MAGNESIUM: CPT | Performed by: PHYSICIAN ASSISTANT

## 2019-04-28 PROCEDURE — 84443 ASSAY THYROID STIM HORMONE: CPT | Performed by: PHYSICIAN ASSISTANT

## 2019-04-28 PROCEDURE — 93321 DOPPLER ECHO F-UP/LMTD STD: CPT | Performed by: INTERNAL MEDICINE

## 2019-04-28 PROCEDURE — 80048 BASIC METABOLIC PNL TOTAL CA: CPT | Performed by: EMERGENCY MEDICINE

## 2019-04-28 PROCEDURE — 99204 OFFICE O/P NEW MOD 45 MIN: CPT | Performed by: INTERNAL MEDICINE

## 2019-04-28 PROCEDURE — 83735 ASSAY OF MAGNESIUM: CPT | Performed by: EMERGENCY MEDICINE

## 2019-04-28 PROCEDURE — 83036 HEMOGLOBIN GLYCOSYLATED A1C: CPT | Performed by: PHYSICIAN ASSISTANT

## 2019-04-28 PROCEDURE — 82948 REAGENT STRIP/BLOOD GLUCOSE: CPT

## 2019-04-28 PROCEDURE — 93005 ELECTROCARDIOGRAM TRACING: CPT

## 2019-04-28 PROCEDURE — 99284 EMERGENCY DEPT VISIT MOD MDM: CPT | Performed by: EMERGENCY MEDICINE

## 2019-04-28 PROCEDURE — 80061 LIPID PANEL: CPT | Performed by: PHYSICIAN ASSISTANT

## 2019-04-28 PROCEDURE — 84100 ASSAY OF PHOSPHORUS: CPT | Performed by: PHYSICIAN ASSISTANT

## 2019-04-28 PROCEDURE — 85025 COMPLETE CBC W/AUTO DIFF WBC: CPT | Performed by: PHYSICIAN ASSISTANT

## 2019-04-28 PROCEDURE — 36415 COLL VENOUS BLD VENIPUNCTURE: CPT | Performed by: EMERGENCY MEDICINE

## 2019-04-28 PROCEDURE — 84484 ASSAY OF TROPONIN QUANT: CPT | Performed by: PHYSICIAN ASSISTANT

## 2019-04-28 PROCEDURE — 99219 PR INITIAL OBSERVATION CARE/DAY 50 MINUTES: CPT | Performed by: INTERNAL MEDICINE

## 2019-04-28 PROCEDURE — C8929 TTE W OR WO FOL WCON,DOPPLER: HCPCS

## 2019-04-28 PROCEDURE — 84484 ASSAY OF TROPONIN QUANT: CPT | Performed by: EMERGENCY MEDICINE

## 2019-04-28 PROCEDURE — 81001 URINALYSIS AUTO W/SCOPE: CPT | Performed by: EMERGENCY MEDICINE

## 2019-04-28 PROCEDURE — 93325 DOPPLER ECHO COLOR FLOW MAPG: CPT | Performed by: INTERNAL MEDICINE

## 2019-04-28 PROCEDURE — G8979 MOBILITY GOAL STATUS: HCPCS

## 2019-04-28 PROCEDURE — 71046 X-RAY EXAM CHEST 2 VIEWS: CPT

## 2019-04-28 PROCEDURE — 93308 TTE F-UP OR LMTD: CPT | Performed by: INTERNAL MEDICINE

## 2019-04-28 PROCEDURE — 97163 PT EVAL HIGH COMPLEX 45 MIN: CPT

## 2019-04-28 PROCEDURE — 70450 CT HEAD/BRAIN W/O DYE: CPT

## 2019-04-28 PROCEDURE — 85025 COMPLETE CBC W/AUTO DIFF WBC: CPT | Performed by: EMERGENCY MEDICINE

## 2019-04-28 RX ORDER — PRAVASTATIN SODIUM 40 MG
40 TABLET ORAL
Status: DISCONTINUED | OUTPATIENT
Start: 2019-04-28 | End: 2019-04-28

## 2019-04-28 RX ORDER — TAMSULOSIN HYDROCHLORIDE 0.4 MG/1
0.4 CAPSULE ORAL
Status: DISCONTINUED | OUTPATIENT
Start: 2019-04-28 | End: 2019-05-03 | Stop reason: HOSPADM

## 2019-04-28 RX ORDER — ASPIRIN 81 MG/1
324 TABLET, CHEWABLE ORAL ONCE
Status: COMPLETED | OUTPATIENT
Start: 2019-04-28 | End: 2019-04-28

## 2019-04-28 RX ORDER — ARIPIPRAZOLE 5 MG/1
20 TABLET ORAL DAILY
Status: DISCONTINUED | OUTPATIENT
Start: 2019-04-28 | End: 2019-04-28

## 2019-04-28 RX ORDER — FUROSEMIDE 10 MG/ML
40 INJECTION INTRAMUSCULAR; INTRAVENOUS DAILY
Status: DISCONTINUED | OUTPATIENT
Start: 2019-04-28 | End: 2019-04-29

## 2019-04-28 RX ORDER — HYDRALAZINE HYDROCHLORIDE 20 MG/ML
5 INJECTION INTRAMUSCULAR; INTRAVENOUS EVERY 4 HOURS PRN
Status: DISCONTINUED | OUTPATIENT
Start: 2019-04-28 | End: 2019-05-03 | Stop reason: HOSPADM

## 2019-04-28 RX ORDER — MAGNESIUM SULFATE HEPTAHYDRATE 40 MG/ML
2 INJECTION, SOLUTION INTRAVENOUS ONCE
Status: COMPLETED | OUTPATIENT
Start: 2019-04-28 | End: 2019-04-28

## 2019-04-28 RX ORDER — ACETAMINOPHEN 325 MG/1
650 TABLET ORAL EVERY 6 HOURS PRN
Status: DISCONTINUED | OUTPATIENT
Start: 2019-04-28 | End: 2019-05-03 | Stop reason: HOSPADM

## 2019-04-28 RX ORDER — ATORVASTATIN CALCIUM 40 MG/1
40 TABLET, FILM COATED ORAL
Status: DISCONTINUED | OUTPATIENT
Start: 2019-04-28 | End: 2019-05-03 | Stop reason: HOSPADM

## 2019-04-28 RX ORDER — ASPIRIN 81 MG/1
81 TABLET, CHEWABLE ORAL DAILY
Status: DISCONTINUED | OUTPATIENT
Start: 2019-04-28 | End: 2019-05-03 | Stop reason: HOSPADM

## 2019-04-28 RX ORDER — DOCUSATE SODIUM 100 MG/1
100 CAPSULE, LIQUID FILLED ORAL 2 TIMES DAILY PRN
Status: DISCONTINUED | OUTPATIENT
Start: 2019-04-28 | End: 2019-05-03 | Stop reason: HOSPADM

## 2019-04-28 RX ORDER — LISINOPRIL 10 MG/1
10 TABLET ORAL DAILY
Status: DISCONTINUED | OUTPATIENT
Start: 2019-04-28 | End: 2019-05-02

## 2019-04-28 RX ADMIN — MAGNESIUM SULFATE HEPTAHYDRATE 2 G: 40 INJECTION, SOLUTION INTRAVENOUS at 11:36

## 2019-04-28 RX ADMIN — ACETAMINOPHEN 650 MG: 325 TABLET, FILM COATED ORAL at 19:25

## 2019-04-28 RX ADMIN — TAMSULOSIN HYDROCHLORIDE 0.4 MG: 0.4 CAPSULE ORAL at 15:54

## 2019-04-28 RX ADMIN — ASPIRIN 81 MG 81 MG: 81 TABLET ORAL at 08:08

## 2019-04-28 RX ADMIN — PERFLUTREN 0.8 ML/MIN: 6.52 INJECTION, SUSPENSION INTRAVENOUS at 14:23

## 2019-04-28 RX ADMIN — ATORVASTATIN CALCIUM 40 MG: 40 TABLET, FILM COATED ORAL at 15:53

## 2019-04-28 RX ADMIN — Medication 400 MG: at 11:36

## 2019-04-28 RX ADMIN — LISINOPRIL 10 MG: 10 TABLET ORAL at 15:53

## 2019-04-28 RX ADMIN — METOPROLOL TARTRATE 25 MG: 25 TABLET, FILM COATED ORAL at 20:05

## 2019-04-28 RX ADMIN — FUROSEMIDE 40 MG: 10 INJECTION, SOLUTION INTRAMUSCULAR; INTRAVENOUS at 15:54

## 2019-04-28 RX ADMIN — ENOXAPARIN SODIUM 40 MG: 40 INJECTION SUBCUTANEOUS at 08:09

## 2019-04-28 RX ADMIN — Medication 400 MG: at 17:12

## 2019-04-28 RX ADMIN — METOPROLOL TARTRATE 25 MG: 25 TABLET, FILM COATED ORAL at 08:08

## 2019-04-28 RX ADMIN — ASPIRIN 81 MG 324 MG: 81 TABLET ORAL at 05:00

## 2019-04-29 ENCOUNTER — APPOINTMENT (INPATIENT)
Dept: RADIOLOGY | Facility: HOSPITAL | Age: 69
DRG: 282 | End: 2019-04-29
Payer: OTHER GOVERNMENT

## 2019-04-29 PROBLEM — I50.33 ACUTE ON CHRONIC DIASTOLIC (CONGESTIVE) HEART FAILURE (HCC): Status: ACTIVE | Noted: 2019-04-29

## 2019-04-29 PROBLEM — M17.0 OSTEOARTHRITIS OF BOTH KNEES: Status: ACTIVE | Noted: 2019-04-29

## 2019-04-29 LAB
ANION GAP SERPL CALCULATED.3IONS-SCNC: 7 MMOL/L (ref 4–13)
APTT PPP: 28 SECONDS (ref 26–38)
ATRIAL RATE: 65 BPM
BUN SERPL-MCNC: 18 MG/DL (ref 5–25)
CALCIUM SERPL-MCNC: 9.2 MG/DL (ref 8.3–10.1)
CHLORIDE SERPL-SCNC: 105 MMOL/L (ref 100–108)
CO2 SERPL-SCNC: 29 MMOL/L (ref 21–32)
CREAT SERPL-MCNC: 1.08 MG/DL (ref 0.6–1.3)
GFR SERPL CREATININE-BSD FRML MDRD: 80 ML/MIN/1.73SQ M
GLUCOSE SERPL-MCNC: 124 MG/DL (ref 65–140)
GLUCOSE SERPL-MCNC: 133 MG/DL (ref 65–140)
GLUCOSE SERPL-MCNC: 137 MG/DL (ref 65–140)
GLUCOSE SERPL-MCNC: 153 MG/DL (ref 65–140)
GLUCOSE SERPL-MCNC: 174 MG/DL (ref 65–140)
INR PPP: 1.04 (ref 0.86–1.17)
MAGNESIUM SERPL-MCNC: 1.9 MG/DL (ref 1.6–2.6)
P AXIS: 54 DEGREES
POTASSIUM SERPL-SCNC: 4 MMOL/L (ref 3.5–5.3)
PR INTERVAL: 164 MS
PROTHROMBIN TIME: 13.5 SECONDS (ref 11.8–14.2)
QRS AXIS: 33 DEGREES
QRSD INTERVAL: 92 MS
QT INTERVAL: 396 MS
QTC INTERVAL: 411 MS
SODIUM SERPL-SCNC: 141 MMOL/L (ref 136–145)
T WAVE AXIS: 17 DEGREES
VENTRICULAR RATE: 65 BPM

## 2019-04-29 PROCEDURE — 97110 THERAPEUTIC EXERCISES: CPT

## 2019-04-29 PROCEDURE — 85730 THROMBOPLASTIN TIME PARTIAL: CPT | Performed by: PHYSICIAN ASSISTANT

## 2019-04-29 PROCEDURE — 97167 OT EVAL HIGH COMPLEX 60 MIN: CPT

## 2019-04-29 PROCEDURE — 82948 REAGENT STRIP/BLOOD GLUCOSE: CPT

## 2019-04-29 PROCEDURE — 99233 SBSQ HOSP IP/OBS HIGH 50: CPT | Performed by: INTERNAL MEDICINE

## 2019-04-29 PROCEDURE — 93010 ELECTROCARDIOGRAM REPORT: CPT | Performed by: PHYSICIAN ASSISTANT

## 2019-04-29 PROCEDURE — 83735 ASSAY OF MAGNESIUM: CPT | Performed by: INTERNAL MEDICINE

## 2019-04-29 PROCEDURE — G8988 SELF CARE GOAL STATUS: HCPCS

## 2019-04-29 PROCEDURE — 73564 X-RAY EXAM KNEE 4 OR MORE: CPT

## 2019-04-29 PROCEDURE — 80048 BASIC METABOLIC PNL TOTAL CA: CPT | Performed by: INTERNAL MEDICINE

## 2019-04-29 PROCEDURE — G8987 SELF CARE CURRENT STATUS: HCPCS

## 2019-04-29 PROCEDURE — 85610 PROTHROMBIN TIME: CPT | Performed by: PHYSICIAN ASSISTANT

## 2019-04-29 PROCEDURE — 99232 SBSQ HOSP IP/OBS MODERATE 35: CPT | Performed by: PHYSICIAN ASSISTANT

## 2019-04-29 RX ORDER — PRAVASTATIN SODIUM 40 MG
40 TABLET ORAL
Qty: 1 TABLET | Refills: 0
Start: 2019-04-29

## 2019-04-29 RX ORDER — FUROSEMIDE 40 MG/1
40 TABLET ORAL DAILY
Qty: 30 TABLET | Refills: 0 | Status: SHIPPED | OUTPATIENT
Start: 2019-04-29 | End: 2020-05-11

## 2019-04-29 RX ORDER — FUROSEMIDE 40 MG/1
40 TABLET ORAL DAILY
Status: DISCONTINUED | OUTPATIENT
Start: 2019-04-30 | End: 2019-05-03 | Stop reason: HOSPADM

## 2019-04-29 RX ORDER — LISINOPRIL 10 MG/1
10 TABLET ORAL DAILY
Qty: 30 TABLET | Refills: 2 | Status: SHIPPED | OUTPATIENT
Start: 2019-04-30 | End: 2019-05-03

## 2019-04-29 RX ORDER — ATORVASTATIN CALCIUM 40 MG/1
40 TABLET, FILM COATED ORAL
Qty: 30 TABLET | Refills: 2 | Status: SHIPPED | OUTPATIENT
Start: 2019-04-29

## 2019-04-29 RX ADMIN — Medication 400 MG: at 08:35

## 2019-04-29 RX ADMIN — METOPROLOL TARTRATE 25 MG: 25 TABLET, FILM COATED ORAL at 08:35

## 2019-04-29 RX ADMIN — FUROSEMIDE 40 MG: 10 INJECTION, SOLUTION INTRAMUSCULAR; INTRAVENOUS at 08:35

## 2019-04-29 RX ADMIN — DOCUSATE SODIUM 100 MG: 100 CAPSULE, LIQUID FILLED ORAL at 20:12

## 2019-04-29 RX ADMIN — TAMSULOSIN HYDROCHLORIDE 0.4 MG: 0.4 CAPSULE ORAL at 16:23

## 2019-04-29 RX ADMIN — LISINOPRIL 10 MG: 10 TABLET ORAL at 08:35

## 2019-04-29 RX ADMIN — INSULIN LISPRO 4 UNITS: 100 INJECTION, SOLUTION INTRAVENOUS; SUBCUTANEOUS at 12:18

## 2019-04-29 RX ADMIN — ASPIRIN 81 MG 81 MG: 81 TABLET ORAL at 08:35

## 2019-04-29 RX ADMIN — ATORVASTATIN CALCIUM 40 MG: 40 TABLET, FILM COATED ORAL at 16:23

## 2019-04-29 RX ADMIN — METOPROLOL TARTRATE 25 MG: 25 TABLET, FILM COATED ORAL at 20:07

## 2019-04-29 RX ADMIN — Medication 400 MG: at 17:03

## 2019-04-29 RX ADMIN — ENOXAPARIN SODIUM 40 MG: 40 INJECTION SUBCUTANEOUS at 08:35

## 2019-04-30 LAB
ANION GAP SERPL CALCULATED.3IONS-SCNC: 6 MMOL/L (ref 4–13)
BUN SERPL-MCNC: 22 MG/DL (ref 5–25)
CALCIUM SERPL-MCNC: 9 MG/DL (ref 8.3–10.1)
CHLORIDE SERPL-SCNC: 103 MMOL/L (ref 100–108)
CO2 SERPL-SCNC: 27 MMOL/L (ref 21–32)
CREAT SERPL-MCNC: 1.24 MG/DL (ref 0.6–1.3)
GFR SERPL CREATININE-BSD FRML MDRD: 68 ML/MIN/1.73SQ M
GLUCOSE SERPL-MCNC: 127 MG/DL (ref 65–140)
GLUCOSE SERPL-MCNC: 134 MG/DL (ref 65–140)
GLUCOSE SERPL-MCNC: 140 MG/DL (ref 65–140)
GLUCOSE SERPL-MCNC: 150 MG/DL (ref 65–140)
GLUCOSE SERPL-MCNC: 198 MG/DL (ref 65–140)
POTASSIUM SERPL-SCNC: 4.4 MMOL/L (ref 3.5–5.3)
SODIUM SERPL-SCNC: 136 MMOL/L (ref 136–145)

## 2019-04-30 PROCEDURE — 80048 BASIC METABOLIC PNL TOTAL CA: CPT | Performed by: INTERNAL MEDICINE

## 2019-04-30 PROCEDURE — G0427 INPT/ED TELECONSULT70: HCPCS | Performed by: PSYCHIATRY & NEUROLOGY

## 2019-04-30 PROCEDURE — 99222 1ST HOSP IP/OBS MODERATE 55: CPT | Performed by: ORTHOPAEDIC SURGERY

## 2019-04-30 PROCEDURE — 99232 SBSQ HOSP IP/OBS MODERATE 35: CPT | Performed by: INTERNAL MEDICINE

## 2019-04-30 PROCEDURE — 99232 SBSQ HOSP IP/OBS MODERATE 35: CPT | Performed by: PHYSICIAN ASSISTANT

## 2019-04-30 PROCEDURE — 82948 REAGENT STRIP/BLOOD GLUCOSE: CPT

## 2019-04-30 RX ADMIN — ASPIRIN 81 MG 81 MG: 81 TABLET ORAL at 08:23

## 2019-04-30 RX ADMIN — Medication 400 MG: at 08:22

## 2019-04-30 RX ADMIN — TAMSULOSIN HYDROCHLORIDE 0.4 MG: 0.4 CAPSULE ORAL at 16:26

## 2019-04-30 RX ADMIN — ATORVASTATIN CALCIUM 40 MG: 40 TABLET, FILM COATED ORAL at 16:26

## 2019-04-30 RX ADMIN — LISINOPRIL 10 MG: 10 TABLET ORAL at 08:22

## 2019-04-30 RX ADMIN — ENOXAPARIN SODIUM 40 MG: 40 INJECTION SUBCUTANEOUS at 08:23

## 2019-04-30 RX ADMIN — Medication 400 MG: at 16:26

## 2019-04-30 RX ADMIN — METOPROLOL TARTRATE 25 MG: 25 TABLET, FILM COATED ORAL at 08:22

## 2019-04-30 RX ADMIN — METOPROLOL TARTRATE 25 MG: 25 TABLET, FILM COATED ORAL at 20:45

## 2019-04-30 RX ADMIN — FUROSEMIDE 40 MG: 40 TABLET ORAL at 08:22

## 2019-05-01 LAB
GLUCOSE SERPL-MCNC: 129 MG/DL (ref 65–140)
GLUCOSE SERPL-MCNC: 155 MG/DL (ref 65–140)
GLUCOSE SERPL-MCNC: 158 MG/DL (ref 65–140)
GLUCOSE SERPL-MCNC: 173 MG/DL (ref 65–140)

## 2019-05-01 PROCEDURE — 82948 REAGENT STRIP/BLOOD GLUCOSE: CPT

## 2019-05-01 PROCEDURE — 99232 SBSQ HOSP IP/OBS MODERATE 35: CPT | Performed by: INTERNAL MEDICINE

## 2019-05-01 RX ADMIN — METOPROLOL TARTRATE 25 MG: 25 TABLET, FILM COATED ORAL at 20:20

## 2019-05-01 RX ADMIN — INSULIN LISPRO 4 UNITS: 100 INJECTION, SOLUTION INTRAVENOUS; SUBCUTANEOUS at 11:28

## 2019-05-01 RX ADMIN — Medication 400 MG: at 09:18

## 2019-05-01 RX ADMIN — ATORVASTATIN CALCIUM 40 MG: 40 TABLET, FILM COATED ORAL at 16:38

## 2019-05-01 RX ADMIN — TAMSULOSIN HYDROCHLORIDE 0.4 MG: 0.4 CAPSULE ORAL at 16:38

## 2019-05-01 RX ADMIN — METOPROLOL TARTRATE 25 MG: 25 TABLET, FILM COATED ORAL at 09:18

## 2019-05-01 RX ADMIN — INSULIN LISPRO 4 UNITS: 100 INJECTION, SOLUTION INTRAVENOUS; SUBCUTANEOUS at 16:38

## 2019-05-01 RX ADMIN — ENOXAPARIN SODIUM 40 MG: 40 INJECTION SUBCUTANEOUS at 09:18

## 2019-05-01 RX ADMIN — Medication 400 MG: at 16:38

## 2019-05-01 RX ADMIN — LISINOPRIL 10 MG: 10 TABLET ORAL at 09:18

## 2019-05-01 RX ADMIN — ASPIRIN 81 MG 81 MG: 81 TABLET ORAL at 09:18

## 2019-05-01 RX ADMIN — FUROSEMIDE 40 MG: 40 TABLET ORAL at 09:18

## 2019-05-02 LAB
ANION GAP SERPL CALCULATED.3IONS-SCNC: 8 MMOL/L (ref 4–13)
BUN SERPL-MCNC: 16 MG/DL (ref 5–25)
CALCIUM SERPL-MCNC: 8.9 MG/DL (ref 8.3–10.1)
CHLORIDE SERPL-SCNC: 103 MMOL/L (ref 100–108)
CO2 SERPL-SCNC: 28 MMOL/L (ref 21–32)
CREAT SERPL-MCNC: 1.02 MG/DL (ref 0.6–1.3)
GFR SERPL CREATININE-BSD FRML MDRD: 86 ML/MIN/1.73SQ M
GLUCOSE SERPL-MCNC: 132 MG/DL (ref 65–140)
GLUCOSE SERPL-MCNC: 137 MG/DL (ref 65–140)
GLUCOSE SERPL-MCNC: 138 MG/DL (ref 65–140)
GLUCOSE SERPL-MCNC: 160 MG/DL (ref 65–140)
GLUCOSE SERPL-MCNC: 197 MG/DL (ref 65–140)
POTASSIUM SERPL-SCNC: 4.6 MMOL/L (ref 3.5–5.3)
SODIUM SERPL-SCNC: 139 MMOL/L (ref 136–145)

## 2019-05-02 PROCEDURE — 80048 BASIC METABOLIC PNL TOTAL CA: CPT | Performed by: INTERNAL MEDICINE

## 2019-05-02 PROCEDURE — 97110 THERAPEUTIC EXERCISES: CPT

## 2019-05-02 PROCEDURE — 82948 REAGENT STRIP/BLOOD GLUCOSE: CPT

## 2019-05-02 PROCEDURE — 99232 SBSQ HOSP IP/OBS MODERATE 35: CPT | Performed by: INTERNAL MEDICINE

## 2019-05-02 PROCEDURE — 97530 THERAPEUTIC ACTIVITIES: CPT

## 2019-05-02 RX ORDER — LISINOPRIL 20 MG/1
20 TABLET ORAL DAILY
Status: DISCONTINUED | OUTPATIENT
Start: 2019-05-02 | End: 2019-05-03 | Stop reason: HOSPADM

## 2019-05-02 RX ORDER — CHLORTHALIDONE 25 MG/1
25 TABLET ORAL DAILY
Status: DISCONTINUED | OUTPATIENT
Start: 2019-05-02 | End: 2019-05-03 | Stop reason: HOSPADM

## 2019-05-02 RX ADMIN — Medication 400 MG: at 18:52

## 2019-05-02 RX ADMIN — Medication 400 MG: at 08:41

## 2019-05-02 RX ADMIN — METOPROLOL TARTRATE 25 MG: 25 TABLET, FILM COATED ORAL at 20:10

## 2019-05-02 RX ADMIN — ACETAMINOPHEN 650 MG: 325 TABLET, FILM COATED ORAL at 20:10

## 2019-05-02 RX ADMIN — LISINOPRIL 20 MG: 20 TABLET ORAL at 10:07

## 2019-05-02 RX ADMIN — ATORVASTATIN CALCIUM 40 MG: 40 TABLET, FILM COATED ORAL at 16:37

## 2019-05-02 RX ADMIN — ASPIRIN 81 MG 81 MG: 81 TABLET ORAL at 08:42

## 2019-05-02 RX ADMIN — METOPROLOL TARTRATE 25 MG: 25 TABLET, FILM COATED ORAL at 08:41

## 2019-05-02 RX ADMIN — CHLORTHALIDONE 25 MG: 25 TABLET ORAL at 10:07

## 2019-05-02 RX ADMIN — TAMSULOSIN HYDROCHLORIDE 0.4 MG: 0.4 CAPSULE ORAL at 16:37

## 2019-05-02 RX ADMIN — INSULIN LISPRO 4 UNITS: 100 INJECTION, SOLUTION INTRAVENOUS; SUBCUTANEOUS at 12:05

## 2019-05-02 RX ADMIN — FUROSEMIDE 40 MG: 40 TABLET ORAL at 08:41

## 2019-05-02 RX ADMIN — ENOXAPARIN SODIUM 40 MG: 40 INJECTION SUBCUTANEOUS at 08:47

## 2019-05-03 VITALS
RESPIRATION RATE: 18 BRPM | SYSTOLIC BLOOD PRESSURE: 142 MMHG | HEIGHT: 71 IN | TEMPERATURE: 98.4 F | DIASTOLIC BLOOD PRESSURE: 84 MMHG | WEIGHT: 277.78 LBS | OXYGEN SATURATION: 97 % | BODY MASS INDEX: 38.89 KG/M2 | HEART RATE: 76 BPM

## 2019-05-03 LAB
GLUCOSE SERPL-MCNC: 136 MG/DL (ref 65–140)
GLUCOSE SERPL-MCNC: 147 MG/DL (ref 65–140)

## 2019-05-03 PROCEDURE — 82948 REAGENT STRIP/BLOOD GLUCOSE: CPT

## 2019-05-03 PROCEDURE — 99238 HOSP IP/OBS DSCHRG MGMT 30/<: CPT | Performed by: INTERNAL MEDICINE

## 2019-05-03 RX ORDER — CHLORTHALIDONE 25 MG/1
25 TABLET ORAL DAILY
Refills: 0
Start: 2019-05-04 | End: 2020-10-07

## 2019-05-03 RX ORDER — LISINOPRIL 20 MG/1
20 TABLET ORAL DAILY
Start: 2019-05-03

## 2019-05-03 RX ADMIN — ENOXAPARIN SODIUM 40 MG: 40 INJECTION SUBCUTANEOUS at 09:17

## 2019-05-03 RX ADMIN — ATORVASTATIN CALCIUM 40 MG: 40 TABLET, FILM COATED ORAL at 17:12

## 2019-05-03 RX ADMIN — CHLORTHALIDONE 25 MG: 25 TABLET ORAL at 09:19

## 2019-05-03 RX ADMIN — LISINOPRIL 20 MG: 20 TABLET ORAL at 09:18

## 2019-05-03 RX ADMIN — ASPIRIN 81 MG 81 MG: 81 TABLET ORAL at 09:18

## 2019-05-03 RX ADMIN — METOPROLOL TARTRATE 25 MG: 25 TABLET, FILM COATED ORAL at 09:18

## 2019-05-03 RX ADMIN — Medication 400 MG: at 09:18

## 2019-05-03 RX ADMIN — FUROSEMIDE 40 MG: 40 TABLET ORAL at 09:19

## 2019-05-03 RX ADMIN — TAMSULOSIN HYDROCHLORIDE 0.4 MG: 0.4 CAPSULE ORAL at 17:12

## 2019-05-03 RX ADMIN — Medication 400 MG: at 17:12

## 2020-05-11 ENCOUNTER — HOSPITAL ENCOUNTER (INPATIENT)
Facility: HOSPITAL | Age: 70
LOS: 3 days | Discharge: HOME HEALTH | DRG: 191 | End: 2020-05-14
Attending: EMERGENCY MEDICINE | Admitting: STUDENT IN AN ORGANIZED HEALTH CARE EDUCATION/TRAINING PROGRAM
Payer: COMMERCIAL

## 2020-05-11 ENCOUNTER — APPOINTMENT (EMERGENCY)
Dept: RADIOLOGY | Facility: HOSPITAL | Age: 70
DRG: 191 | End: 2020-05-11
Payer: COMMERCIAL

## 2020-05-11 DIAGNOSIS — J44.1 COPD WITH ACUTE EXACERBATION (HCC): Primary | ICD-10-CM

## 2020-05-11 PROBLEM — J98.4 ACUTE PULMONARY INSUFFICIENCY: Status: ACTIVE | Noted: 2020-05-11

## 2020-05-11 PROBLEM — R07.89 OTHER CHEST PAIN: Status: ACTIVE | Noted: 2018-03-16

## 2020-05-11 LAB
ALBUMIN SERPL BCP-MCNC: 3.9 G/DL (ref 3.5–5)
ALP SERPL-CCNC: 68 U/L (ref 46–116)
ALT SERPL W P-5'-P-CCNC: 36 U/L (ref 12–78)
ANION GAP SERPL CALCULATED.3IONS-SCNC: 13 MMOL/L (ref 4–13)
AST SERPL W P-5'-P-CCNC: 22 U/L (ref 5–45)
ATRIAL RATE: 87 BPM
BASOPHILS # BLD AUTO: 0.04 THOUSANDS/ΜL (ref 0–0.1)
BASOPHILS NFR BLD AUTO: 1 % (ref 0–1)
BILIRUB SERPL-MCNC: 0.2 MG/DL (ref 0.2–1)
BUN SERPL-MCNC: 15 MG/DL (ref 5–25)
CALCIUM SERPL-MCNC: 8.7 MG/DL (ref 8.3–10.1)
CHLORIDE SERPL-SCNC: 103 MMOL/L (ref 100–108)
CO2 SERPL-SCNC: 26 MMOL/L (ref 21–32)
CREAT SERPL-MCNC: 1.24 MG/DL (ref 0.6–1.3)
EOSINOPHIL # BLD AUTO: 0.32 THOUSAND/ΜL (ref 0–0.61)
EOSINOPHIL NFR BLD AUTO: 5 % (ref 0–6)
ERYTHROCYTE [DISTWIDTH] IN BLOOD BY AUTOMATED COUNT: 14 % (ref 11.6–15.1)
EST. AVERAGE GLUCOSE BLD GHB EST-MCNC: 134 MG/DL
GFR SERPL CREATININE-BSD FRML MDRD: 67 ML/MIN/1.73SQ M
GLUCOSE SERPL-MCNC: 121 MG/DL (ref 65–140)
GLUCOSE SERPL-MCNC: 149 MG/DL (ref 65–140)
GLUCOSE SERPL-MCNC: 239 MG/DL (ref 65–140)
HBA1C MFR BLD: 6.3 %
HCT VFR BLD AUTO: 37.3 % (ref 36.5–49.3)
HGB BLD-MCNC: 11.4 G/DL (ref 12–17)
IMM GRANULOCYTES # BLD AUTO: 0.02 THOUSAND/UL (ref 0–0.2)
IMM GRANULOCYTES NFR BLD AUTO: 0 % (ref 0–2)
LYMPHOCYTES # BLD AUTO: 2.42 THOUSANDS/ΜL (ref 0.6–4.47)
LYMPHOCYTES NFR BLD AUTO: 40 % (ref 14–44)
MCH RBC QN AUTO: 24.4 PG (ref 26.8–34.3)
MCHC RBC AUTO-ENTMCNC: 30.6 G/DL (ref 31.4–37.4)
MCV RBC AUTO: 80 FL (ref 82–98)
MONOCYTES # BLD AUTO: 0.49 THOUSAND/ΜL (ref 0.17–1.22)
MONOCYTES NFR BLD AUTO: 8 % (ref 4–12)
NEUTROPHILS # BLD AUTO: 2.84 THOUSANDS/ΜL (ref 1.85–7.62)
NEUTS SEG NFR BLD AUTO: 46 % (ref 43–75)
NRBC BLD AUTO-RTO: 0 /100 WBCS
NT-PROBNP SERPL-MCNC: 60 PG/ML
P AXIS: 47 DEGREES
PLATELET # BLD AUTO: 142 THOUSANDS/UL (ref 149–390)
POTASSIUM SERPL-SCNC: 3.7 MMOL/L (ref 3.5–5.3)
PR INTERVAL: 164 MS
PROT SERPL-MCNC: 8 G/DL (ref 6.4–8.2)
QRS AXIS: 1 DEGREES
QRSD INTERVAL: 82 MS
QT INTERVAL: 358 MS
QTC INTERVAL: 430 MS
RBC # BLD AUTO: 4.68 MILLION/UL (ref 3.88–5.62)
SARS-COV-2 RNA RESP QL NAA+PROBE: NEGATIVE
SODIUM SERPL-SCNC: 142 MMOL/L (ref 136–145)
T WAVE AXIS: 20 DEGREES
TROPONIN I SERPL-MCNC: 0.04 NG/ML
TROPONIN I SERPL-MCNC: 0.06 NG/ML
TROPONIN I SERPL-MCNC: 0.06 NG/ML
TROPONIN I SERPL-MCNC: 0.07 NG/ML
VENTRICULAR RATE: 87 BPM
WBC # BLD AUTO: 6.13 THOUSAND/UL (ref 4.31–10.16)

## 2020-05-11 PROCEDURE — 36415 COLL VENOUS BLD VENIPUNCTURE: CPT | Performed by: PHYSICIAN ASSISTANT

## 2020-05-11 PROCEDURE — 83036 HEMOGLOBIN GLYCOSYLATED A1C: CPT | Performed by: INTERNAL MEDICINE

## 2020-05-11 PROCEDURE — 99285 EMERGENCY DEPT VISIT HI MDM: CPT

## 2020-05-11 PROCEDURE — 99223 1ST HOSP IP/OBS HIGH 75: CPT | Performed by: INTERNAL MEDICINE

## 2020-05-11 PROCEDURE — 85025 COMPLETE CBC W/AUTO DIFF WBC: CPT | Performed by: PHYSICIAN ASSISTANT

## 2020-05-11 PROCEDURE — 93005 ELECTROCARDIOGRAM TRACING: CPT

## 2020-05-11 PROCEDURE — 87635 SARS-COV-2 COVID-19 AMP PRB: CPT | Performed by: PHYSICIAN ASSISTANT

## 2020-05-11 PROCEDURE — 84484 ASSAY OF TROPONIN QUANT: CPT | Performed by: PHYSICIAN ASSISTANT

## 2020-05-11 PROCEDURE — 71045 X-RAY EXAM CHEST 1 VIEW: CPT

## 2020-05-11 PROCEDURE — 94640 AIRWAY INHALATION TREATMENT: CPT

## 2020-05-11 PROCEDURE — 94760 N-INVAS EAR/PLS OXIMETRY 1: CPT

## 2020-05-11 PROCEDURE — 84484 ASSAY OF TROPONIN QUANT: CPT | Performed by: INTERNAL MEDICINE

## 2020-05-11 PROCEDURE — 93010 ELECTROCARDIOGRAM REPORT: CPT | Performed by: INTERNAL MEDICINE

## 2020-05-11 PROCEDURE — 82948 REAGENT STRIP/BLOOD GLUCOSE: CPT

## 2020-05-11 PROCEDURE — 80053 COMPREHEN METABOLIC PANEL: CPT | Performed by: PHYSICIAN ASSISTANT

## 2020-05-11 PROCEDURE — 87040 BLOOD CULTURE FOR BACTERIA: CPT | Performed by: PHYSICIAN ASSISTANT

## 2020-05-11 PROCEDURE — 99285 EMERGENCY DEPT VISIT HI MDM: CPT | Performed by: PHYSICIAN ASSISTANT

## 2020-05-11 PROCEDURE — 83880 ASSAY OF NATRIURETIC PEPTIDE: CPT | Performed by: PHYSICIAN ASSISTANT

## 2020-05-11 RX ORDER — LEVALBUTEROL 1.25 MG/.5ML
1.25 SOLUTION, CONCENTRATE RESPIRATORY (INHALATION)
Status: DISCONTINUED | OUTPATIENT
Start: 2020-05-11 | End: 2020-05-14 | Stop reason: HOSPADM

## 2020-05-11 RX ORDER — SODIUM CHLORIDE FOR INHALATION 0.9 %
3 VIAL, NEBULIZER (ML) INHALATION
Status: DISCONTINUED | OUTPATIENT
Start: 2020-05-11 | End: 2020-05-14 | Stop reason: HOSPADM

## 2020-05-11 RX ORDER — ARIPIPRAZOLE 5 MG/1
20 TABLET ORAL DAILY
Status: DISCONTINUED | OUTPATIENT
Start: 2020-05-12 | End: 2020-05-14 | Stop reason: HOSPADM

## 2020-05-11 RX ORDER — LEVALBUTEROL 1.25 MG/.5ML
1.25 SOLUTION, CONCENTRATE RESPIRATORY (INHALATION) EVERY 4 HOURS PRN
Status: DISCONTINUED | OUTPATIENT
Start: 2020-05-11 | End: 2020-05-14 | Stop reason: HOSPADM

## 2020-05-11 RX ORDER — ATORVASTATIN CALCIUM 40 MG/1
40 TABLET, FILM COATED ORAL
Status: DISCONTINUED | OUTPATIENT
Start: 2020-05-11 | End: 2020-05-14 | Stop reason: HOSPADM

## 2020-05-11 RX ORDER — DOCUSATE SODIUM 100 MG/1
100 CAPSULE, LIQUID FILLED ORAL 2 TIMES DAILY PRN
Status: DISCONTINUED | OUTPATIENT
Start: 2020-05-11 | End: 2020-05-14 | Stop reason: HOSPADM

## 2020-05-11 RX ORDER — SIMETHICONE 80 MG
80 TABLET,CHEWABLE ORAL 4 TIMES DAILY PRN
Status: DISCONTINUED | OUTPATIENT
Start: 2020-05-11 | End: 2020-05-14 | Stop reason: HOSPADM

## 2020-05-11 RX ORDER — METHYLPREDNISOLONE SODIUM SUCCINATE 125 MG/2ML
125 INJECTION, POWDER, LYOPHILIZED, FOR SOLUTION INTRAMUSCULAR; INTRAVENOUS ONCE
Status: COMPLETED | OUTPATIENT
Start: 2020-05-11 | End: 2020-05-11

## 2020-05-11 RX ORDER — LISINOPRIL 20 MG/1
20 TABLET ORAL DAILY
Status: DISCONTINUED | OUTPATIENT
Start: 2020-05-11 | End: 2020-05-14 | Stop reason: HOSPADM

## 2020-05-11 RX ORDER — ONDANSETRON 2 MG/ML
4 INJECTION INTRAMUSCULAR; INTRAVENOUS EVERY 6 HOURS PRN
Status: DISCONTINUED | OUTPATIENT
Start: 2020-05-11 | End: 2020-05-14 | Stop reason: HOSPADM

## 2020-05-11 RX ORDER — ALBUTEROL SULFATE 90 UG/1
2 AEROSOL, METERED RESPIRATORY (INHALATION) ONCE
Status: COMPLETED | OUTPATIENT
Start: 2020-05-11 | End: 2020-05-11

## 2020-05-11 RX ORDER — METHYLPREDNISOLONE SODIUM SUCCINATE 40 MG/ML
40 INJECTION, POWDER, LYOPHILIZED, FOR SOLUTION INTRAMUSCULAR; INTRAVENOUS EVERY 12 HOURS SCHEDULED
Status: DISCONTINUED | OUTPATIENT
Start: 2020-05-11 | End: 2020-05-14 | Stop reason: HOSPADM

## 2020-05-11 RX ORDER — ALBUTEROL SULFATE 2.5 MG/3ML
2.5 SOLUTION RESPIRATORY (INHALATION) ONCE
Status: DISCONTINUED | OUTPATIENT
Start: 2020-05-11 | End: 2020-05-14 | Stop reason: HOSPADM

## 2020-05-11 RX ORDER — ASPIRIN 81 MG/1
81 TABLET, CHEWABLE ORAL DAILY
Status: DISCONTINUED | OUTPATIENT
Start: 2020-05-12 | End: 2020-05-14 | Stop reason: HOSPADM

## 2020-05-11 RX ORDER — ACETAMINOPHEN 325 MG/1
650 TABLET ORAL EVERY 6 HOURS PRN
Status: DISCONTINUED | OUTPATIENT
Start: 2020-05-11 | End: 2020-05-14 | Stop reason: HOSPADM

## 2020-05-11 RX ORDER — AZITHROMYCIN 500 MG/1
500 TABLET, FILM COATED ORAL EVERY 24 HOURS
Status: DISCONTINUED | OUTPATIENT
Start: 2020-05-11 | End: 2020-05-14

## 2020-05-11 RX ORDER — PRAVASTATIN SODIUM 40 MG
40 TABLET ORAL
Status: CANCELLED | OUTPATIENT
Start: 2020-05-11

## 2020-05-11 RX ORDER — HEPARIN SODIUM 5000 [USP'U]/ML
5000 INJECTION, SOLUTION INTRAVENOUS; SUBCUTANEOUS EVERY 8 HOURS SCHEDULED
Status: DISCONTINUED | OUTPATIENT
Start: 2020-05-11 | End: 2020-05-14 | Stop reason: HOSPADM

## 2020-05-11 RX ORDER — TAMSULOSIN HYDROCHLORIDE 0.4 MG/1
0.4 CAPSULE ORAL
Status: DISCONTINUED | OUTPATIENT
Start: 2020-05-11 | End: 2020-05-14 | Stop reason: HOSPADM

## 2020-05-11 RX ORDER — POLYETHYLENE GLYCOL 3350 17 G/17G
17 POWDER, FOR SOLUTION ORAL DAILY PRN
Status: DISCONTINUED | OUTPATIENT
Start: 2020-05-11 | End: 2020-05-14 | Stop reason: HOSPADM

## 2020-05-11 RX ADMIN — AZITHROMYCIN 500 MG: 500 TABLET, FILM COATED ORAL at 15:03

## 2020-05-11 RX ADMIN — MAGNESIUM GLUCONATE 500 MG ORAL TABLET 400 MG: 500 TABLET ORAL at 18:16

## 2020-05-11 RX ADMIN — METHYLPREDNISOLONE SODIUM SUCCINATE 125 MG: 125 INJECTION, POWDER, FOR SOLUTION INTRAMUSCULAR; INTRAVENOUS at 15:03

## 2020-05-11 RX ADMIN — LISINOPRIL 20 MG: 20 TABLET ORAL at 15:03

## 2020-05-11 RX ADMIN — METOPROLOL TARTRATE 25 MG: 25 TABLET, FILM COATED ORAL at 22:26

## 2020-05-11 RX ADMIN — ISODIUM CHLORIDE 3 ML: 0.03 SOLUTION RESPIRATORY (INHALATION) at 19:28

## 2020-05-11 RX ADMIN — ALBUTEROL SULFATE 2 PUFF: 90 AEROSOL, METERED RESPIRATORY (INHALATION) at 11:11

## 2020-05-11 RX ADMIN — LEVALBUTEROL HYDROCHLORIDE 1.25 MG: 1.25 SOLUTION, CONCENTRATE RESPIRATORY (INHALATION) at 19:28

## 2020-05-11 RX ADMIN — METOPROLOL TARTRATE 25 MG: 25 TABLET, FILM COATED ORAL at 15:03

## 2020-05-11 RX ADMIN — ATORVASTATIN CALCIUM 40 MG: 40 TABLET, FILM COATED ORAL at 18:16

## 2020-05-11 RX ADMIN — INSULIN LISPRO 3 UNITS: 100 INJECTION, SOLUTION INTRAVENOUS; SUBCUTANEOUS at 22:26

## 2020-05-11 RX ADMIN — HEPARIN SODIUM 5000 UNITS: 5000 INJECTION INTRAVENOUS; SUBCUTANEOUS at 15:03

## 2020-05-11 RX ADMIN — METHYLPREDNISOLONE SODIUM SUCCINATE 40 MG: 40 INJECTION, POWDER, FOR SOLUTION INTRAMUSCULAR; INTRAVENOUS at 22:26

## 2020-05-11 RX ADMIN — TAMSULOSIN HYDROCHLORIDE 0.4 MG: 0.4 CAPSULE ORAL at 18:16

## 2020-05-11 RX ADMIN — HEPARIN SODIUM 5000 UNITS: 5000 INJECTION INTRAVENOUS; SUBCUTANEOUS at 22:26

## 2020-05-12 ENCOUNTER — APPOINTMENT (INPATIENT)
Dept: NON INVASIVE DIAGNOSTICS | Facility: HOSPITAL | Age: 70
DRG: 191 | End: 2020-05-12
Payer: COMMERCIAL

## 2020-05-12 ENCOUNTER — APPOINTMENT (INPATIENT)
Dept: NUCLEAR MEDICINE | Facility: HOSPITAL | Age: 70
DRG: 191 | End: 2020-05-12
Payer: COMMERCIAL

## 2020-05-12 LAB
ANION GAP SERPL CALCULATED.3IONS-SCNC: 13 MMOL/L (ref 4–13)
BUN SERPL-MCNC: 18 MG/DL (ref 5–25)
CALCIUM SERPL-MCNC: 8.9 MG/DL (ref 8.3–10.1)
CHEST PAIN STATEMENT: NORMAL
CHLORIDE SERPL-SCNC: 101 MMOL/L (ref 100–108)
CO2 SERPL-SCNC: 23 MMOL/L (ref 21–32)
CREAT SERPL-MCNC: 1.32 MG/DL (ref 0.6–1.3)
ERYTHROCYTE [DISTWIDTH] IN BLOOD BY AUTOMATED COUNT: 14 % (ref 11.6–15.1)
GFR SERPL CREATININE-BSD FRML MDRD: 62 ML/MIN/1.73SQ M
GLUCOSE SERPL-MCNC: 178 MG/DL (ref 65–140)
GLUCOSE SERPL-MCNC: 186 MG/DL (ref 65–140)
GLUCOSE SERPL-MCNC: 186 MG/DL (ref 65–140)
GLUCOSE SERPL-MCNC: 189 MG/DL (ref 65–140)
GLUCOSE SERPL-MCNC: 195 MG/DL (ref 65–140)
HCT VFR BLD AUTO: 37.1 % (ref 36.5–49.3)
HGB BLD-MCNC: 11.5 G/DL (ref 12–17)
MAGNESIUM SERPL-MCNC: 1.6 MG/DL (ref 1.6–2.6)
MAX DIASTOLIC BP: 66 MMHG
MAX HEART RATE: 100 BPM
MAX PREDICTED HEART RATE: 149 BPM
MAX. SYSTOLIC BP: 135 MMHG
MCH RBC QN AUTO: 24.3 PG (ref 26.8–34.3)
MCHC RBC AUTO-ENTMCNC: 31 G/DL (ref 31.4–37.4)
MCV RBC AUTO: 78 FL (ref 82–98)
PLATELET # BLD AUTO: 167 THOUSANDS/UL (ref 149–390)
PMV BLD AUTO: 13.2 FL (ref 8.9–12.7)
POTASSIUM SERPL-SCNC: 4.1 MMOL/L (ref 3.5–5.3)
PROTOCOL NAME: NORMAL
RBC # BLD AUTO: 4.74 MILLION/UL (ref 3.88–5.62)
REASON FOR TERMINATION: NORMAL
SODIUM SERPL-SCNC: 137 MMOL/L (ref 136–145)
TARGET HR FORMULA: NORMAL
TEST INDICATION: NORMAL
TIME IN EXERCISE PHASE: NORMAL
WBC # BLD AUTO: 10.03 THOUSAND/UL (ref 4.31–10.16)

## 2020-05-12 PROCEDURE — 85027 COMPLETE CBC AUTOMATED: CPT | Performed by: INTERNAL MEDICINE

## 2020-05-12 PROCEDURE — 78452 HT MUSCLE IMAGE SPECT MULT: CPT | Performed by: INTERNAL MEDICINE

## 2020-05-12 PROCEDURE — 93018 CV STRESS TEST I&R ONLY: CPT | Performed by: INTERNAL MEDICINE

## 2020-05-12 PROCEDURE — 80048 BASIC METABOLIC PNL TOTAL CA: CPT | Performed by: INTERNAL MEDICINE

## 2020-05-12 PROCEDURE — 78452 HT MUSCLE IMAGE SPECT MULT: CPT

## 2020-05-12 PROCEDURE — 93017 CV STRESS TEST TRACING ONLY: CPT

## 2020-05-12 PROCEDURE — 94760 N-INVAS EAR/PLS OXIMETRY 1: CPT

## 2020-05-12 PROCEDURE — 94640 AIRWAY INHALATION TREATMENT: CPT

## 2020-05-12 PROCEDURE — 82948 REAGENT STRIP/BLOOD GLUCOSE: CPT

## 2020-05-12 PROCEDURE — 99233 SBSQ HOSP IP/OBS HIGH 50: CPT | Performed by: NURSE PRACTITIONER

## 2020-05-12 PROCEDURE — 93016 CV STRESS TEST SUPVJ ONLY: CPT | Performed by: INTERNAL MEDICINE

## 2020-05-12 PROCEDURE — A9502 TC99M TETROFOSMIN: HCPCS

## 2020-05-12 PROCEDURE — 83735 ASSAY OF MAGNESIUM: CPT | Performed by: INTERNAL MEDICINE

## 2020-05-12 RX ORDER — LANOLIN ALCOHOL/MO/W.PET/CERES
3 CREAM (GRAM) TOPICAL
Status: DISCONTINUED | OUTPATIENT
Start: 2020-05-12 | End: 2020-05-14 | Stop reason: HOSPADM

## 2020-05-12 RX ADMIN — INSULIN LISPRO 2 UNITS: 100 INJECTION, SOLUTION INTRAVENOUS; SUBCUTANEOUS at 08:27

## 2020-05-12 RX ADMIN — ATORVASTATIN CALCIUM 40 MG: 40 TABLET, FILM COATED ORAL at 18:33

## 2020-05-12 RX ADMIN — LEVALBUTEROL HYDROCHLORIDE 1.25 MG: 1.25 SOLUTION, CONCENTRATE RESPIRATORY (INHALATION) at 13:42

## 2020-05-12 RX ADMIN — HEPARIN SODIUM 5000 UNITS: 5000 INJECTION INTRAVENOUS; SUBCUTANEOUS at 21:58

## 2020-05-12 RX ADMIN — INSULIN LISPRO 2 UNITS: 100 INJECTION, SOLUTION INTRAVENOUS; SUBCUTANEOUS at 12:30

## 2020-05-12 RX ADMIN — ARIPIPRAZOLE 20 MG: 5 TABLET ORAL at 08:27

## 2020-05-12 RX ADMIN — MAGNESIUM GLUCONATE 500 MG ORAL TABLET 400 MG: 500 TABLET ORAL at 18:33

## 2020-05-12 RX ADMIN — MAGNESIUM GLUCONATE 500 MG ORAL TABLET 400 MG: 500 TABLET ORAL at 08:27

## 2020-05-12 RX ADMIN — INSULIN LISPRO 2 UNITS: 100 INJECTION, SOLUTION INTRAVENOUS; SUBCUTANEOUS at 16:33

## 2020-05-12 RX ADMIN — LISINOPRIL 20 MG: 20 TABLET ORAL at 08:27

## 2020-05-12 RX ADMIN — LEVALBUTEROL HYDROCHLORIDE 1.25 MG: 1.25 SOLUTION, CONCENTRATE RESPIRATORY (INHALATION) at 21:26

## 2020-05-12 RX ADMIN — LEVALBUTEROL HYDROCHLORIDE 1.25 MG: 1.25 SOLUTION, CONCENTRATE RESPIRATORY (INHALATION) at 07:46

## 2020-05-12 RX ADMIN — ACETAMINOPHEN 650 MG: 325 TABLET ORAL at 20:36

## 2020-05-12 RX ADMIN — METOPROLOL TARTRATE 25 MG: 25 TABLET, FILM COATED ORAL at 20:36

## 2020-05-12 RX ADMIN — ISODIUM CHLORIDE 3 ML: 0.03 SOLUTION RESPIRATORY (INHALATION) at 13:42

## 2020-05-12 RX ADMIN — METHYLPREDNISOLONE SODIUM SUCCINATE 40 MG: 40 INJECTION, POWDER, FOR SOLUTION INTRAMUSCULAR; INTRAVENOUS at 20:36

## 2020-05-12 RX ADMIN — HEPARIN SODIUM 5000 UNITS: 5000 INJECTION INTRAVENOUS; SUBCUTANEOUS at 15:04

## 2020-05-12 RX ADMIN — ISODIUM CHLORIDE 3 ML: 0.03 SOLUTION RESPIRATORY (INHALATION) at 07:46

## 2020-05-12 RX ADMIN — ACETAMINOPHEN 650 MG: 325 TABLET ORAL at 06:18

## 2020-05-12 RX ADMIN — METOPROLOL TARTRATE 25 MG: 25 TABLET, FILM COATED ORAL at 08:27

## 2020-05-12 RX ADMIN — AZITHROMYCIN 500 MG: 500 TABLET, FILM COATED ORAL at 15:04

## 2020-05-12 RX ADMIN — REGADENOSON 0.4 MG: 0.08 INJECTION, SOLUTION INTRAVENOUS at 10:03

## 2020-05-12 RX ADMIN — MELATONIN 3 MG: 3 TAB ORAL at 21:58

## 2020-05-12 RX ADMIN — TAMSULOSIN HYDROCHLORIDE 0.4 MG: 0.4 CAPSULE ORAL at 18:33

## 2020-05-12 RX ADMIN — ASPIRIN 81 MG 81 MG: 81 TABLET ORAL at 08:27

## 2020-05-12 RX ADMIN — METHYLPREDNISOLONE SODIUM SUCCINATE 40 MG: 40 INJECTION, POWDER, FOR SOLUTION INTRAMUSCULAR; INTRAVENOUS at 08:28

## 2020-05-12 RX ADMIN — HEPARIN SODIUM 5000 UNITS: 5000 INJECTION INTRAVENOUS; SUBCUTANEOUS at 06:17

## 2020-05-12 RX ADMIN — ISODIUM CHLORIDE 3 ML: 0.03 SOLUTION RESPIRATORY (INHALATION) at 21:26

## 2020-05-12 RX ADMIN — INSULIN LISPRO 1 UNITS: 100 INJECTION, SOLUTION INTRAVENOUS; SUBCUTANEOUS at 21:58

## 2020-05-13 LAB
GLUCOSE SERPL-MCNC: 172 MG/DL (ref 65–140)
GLUCOSE SERPL-MCNC: 199 MG/DL (ref 65–140)
GLUCOSE SERPL-MCNC: 223 MG/DL (ref 65–140)
GLUCOSE SERPL-MCNC: 224 MG/DL (ref 65–140)

## 2020-05-13 PROCEDURE — 99232 SBSQ HOSP IP/OBS MODERATE 35: CPT | Performed by: PHYSICIAN ASSISTANT

## 2020-05-13 PROCEDURE — 94760 N-INVAS EAR/PLS OXIMETRY 1: CPT

## 2020-05-13 PROCEDURE — 94640 AIRWAY INHALATION TREATMENT: CPT

## 2020-05-13 PROCEDURE — 82948 REAGENT STRIP/BLOOD GLUCOSE: CPT

## 2020-05-13 RX ADMIN — TAMSULOSIN HYDROCHLORIDE 0.4 MG: 0.4 CAPSULE ORAL at 17:18

## 2020-05-13 RX ADMIN — ISODIUM CHLORIDE 3 ML: 0.03 SOLUTION RESPIRATORY (INHALATION) at 20:27

## 2020-05-13 RX ADMIN — ATORVASTATIN CALCIUM 40 MG: 40 TABLET, FILM COATED ORAL at 17:18

## 2020-05-13 RX ADMIN — ACETAMINOPHEN 650 MG: 325 TABLET ORAL at 03:22

## 2020-05-13 RX ADMIN — LISINOPRIL 20 MG: 20 TABLET ORAL at 08:31

## 2020-05-13 RX ADMIN — LEVALBUTEROL HYDROCHLORIDE 1.25 MG: 1.25 SOLUTION, CONCENTRATE RESPIRATORY (INHALATION) at 20:27

## 2020-05-13 RX ADMIN — ACETAMINOPHEN 650 MG: 325 TABLET ORAL at 12:47

## 2020-05-13 RX ADMIN — INSULIN LISPRO 4 UNITS: 100 INJECTION, SOLUTION INTRAVENOUS; SUBCUTANEOUS at 12:28

## 2020-05-13 RX ADMIN — ISODIUM CHLORIDE 3 ML: 0.03 SOLUTION RESPIRATORY (INHALATION) at 07:35

## 2020-05-13 RX ADMIN — INSULIN LISPRO 2 UNITS: 100 INJECTION, SOLUTION INTRAVENOUS; SUBCUTANEOUS at 17:18

## 2020-05-13 RX ADMIN — ARIPIPRAZOLE 20 MG: 5 TABLET ORAL at 08:31

## 2020-05-13 RX ADMIN — AZITHROMYCIN 500 MG: 500 TABLET, FILM COATED ORAL at 13:34

## 2020-05-13 RX ADMIN — MELATONIN 3 MG: 3 TAB ORAL at 21:24

## 2020-05-13 RX ADMIN — MAGNESIUM GLUCONATE 500 MG ORAL TABLET 400 MG: 500 TABLET ORAL at 08:31

## 2020-05-13 RX ADMIN — ASPIRIN 81 MG 81 MG: 81 TABLET ORAL at 08:31

## 2020-05-13 RX ADMIN — INSULIN LISPRO 2 UNITS: 100 INJECTION, SOLUTION INTRAVENOUS; SUBCUTANEOUS at 21:24

## 2020-05-13 RX ADMIN — MAGNESIUM GLUCONATE 500 MG ORAL TABLET 400 MG: 500 TABLET ORAL at 17:18

## 2020-05-13 RX ADMIN — METOPROLOL TARTRATE 25 MG: 25 TABLET, FILM COATED ORAL at 21:25

## 2020-05-13 RX ADMIN — METOPROLOL TARTRATE 25 MG: 25 TABLET, FILM COATED ORAL at 08:31

## 2020-05-13 RX ADMIN — INSULIN LISPRO 4 UNITS: 100 INJECTION, SOLUTION INTRAVENOUS; SUBCUTANEOUS at 08:32

## 2020-05-13 RX ADMIN — LEVALBUTEROL HYDROCHLORIDE 1.25 MG: 1.25 SOLUTION, CONCENTRATE RESPIRATORY (INHALATION) at 13:42

## 2020-05-13 RX ADMIN — LEVALBUTEROL HYDROCHLORIDE 1.25 MG: 1.25 SOLUTION, CONCENTRATE RESPIRATORY (INHALATION) at 07:35

## 2020-05-13 RX ADMIN — ISODIUM CHLORIDE 3 ML: 0.03 SOLUTION RESPIRATORY (INHALATION) at 13:42

## 2020-05-13 RX ADMIN — METHYLPREDNISOLONE SODIUM SUCCINATE 40 MG: 40 INJECTION, POWDER, FOR SOLUTION INTRAMUSCULAR; INTRAVENOUS at 21:24

## 2020-05-13 RX ADMIN — HEPARIN SODIUM 5000 UNITS: 5000 INJECTION INTRAVENOUS; SUBCUTANEOUS at 05:26

## 2020-05-13 RX ADMIN — METHYLPREDNISOLONE SODIUM SUCCINATE 40 MG: 40 INJECTION, POWDER, FOR SOLUTION INTRAMUSCULAR; INTRAVENOUS at 08:31

## 2020-05-13 RX ADMIN — HEPARIN SODIUM 5000 UNITS: 5000 INJECTION INTRAVENOUS; SUBCUTANEOUS at 13:34

## 2020-05-13 RX ADMIN — HEPARIN SODIUM 5000 UNITS: 5000 INJECTION INTRAVENOUS; SUBCUTANEOUS at 21:25

## 2020-05-14 VITALS
TEMPERATURE: 98.6 F | HEIGHT: 71 IN | RESPIRATION RATE: 20 BRPM | DIASTOLIC BLOOD PRESSURE: 86 MMHG | SYSTOLIC BLOOD PRESSURE: 155 MMHG | OXYGEN SATURATION: 94 % | WEIGHT: 280.43 LBS | BODY MASS INDEX: 39.26 KG/M2 | HEART RATE: 81 BPM

## 2020-05-14 LAB
ANION GAP SERPL CALCULATED.3IONS-SCNC: 8 MMOL/L (ref 4–13)
BUN SERPL-MCNC: 20 MG/DL (ref 5–25)
CALCIUM SERPL-MCNC: 8.9 MG/DL (ref 8.3–10.1)
CHLORIDE SERPL-SCNC: 101 MMOL/L (ref 100–108)
CO2 SERPL-SCNC: 27 MMOL/L (ref 21–32)
CREAT SERPL-MCNC: 1.19 MG/DL (ref 0.6–1.3)
ERYTHROCYTE [DISTWIDTH] IN BLOOD BY AUTOMATED COUNT: 14 % (ref 11.6–15.1)
GFR SERPL CREATININE-BSD FRML MDRD: 71 ML/MIN/1.73SQ M
GLUCOSE SERPL-MCNC: 192 MG/DL (ref 65–140)
GLUCOSE SERPL-MCNC: 207 MG/DL (ref 65–140)
HCT VFR BLD AUTO: 36.3 % (ref 36.5–49.3)
HGB BLD-MCNC: 11.3 G/DL (ref 12–17)
MCH RBC QN AUTO: 24.4 PG (ref 26.8–34.3)
MCHC RBC AUTO-ENTMCNC: 31.1 G/DL (ref 31.4–37.4)
MCV RBC AUTO: 78 FL (ref 82–98)
PLATELET # BLD AUTO: 179 THOUSANDS/UL (ref 149–390)
PMV BLD AUTO: 12.8 FL (ref 8.9–12.7)
POTASSIUM SERPL-SCNC: 4.5 MMOL/L (ref 3.5–5.3)
RBC # BLD AUTO: 4.63 MILLION/UL (ref 3.88–5.62)
SODIUM SERPL-SCNC: 136 MMOL/L (ref 136–145)
WBC # BLD AUTO: 12.55 THOUSAND/UL (ref 4.31–10.16)

## 2020-05-14 PROCEDURE — 94640 AIRWAY INHALATION TREATMENT: CPT

## 2020-05-14 PROCEDURE — 85027 COMPLETE CBC AUTOMATED: CPT | Performed by: PHYSICIAN ASSISTANT

## 2020-05-14 PROCEDURE — 94760 N-INVAS EAR/PLS OXIMETRY 1: CPT

## 2020-05-14 PROCEDURE — 82948 REAGENT STRIP/BLOOD GLUCOSE: CPT

## 2020-05-14 PROCEDURE — 99239 HOSP IP/OBS DSCHRG MGMT >30: CPT | Performed by: NURSE PRACTITIONER

## 2020-05-14 PROCEDURE — 80048 BASIC METABOLIC PNL TOTAL CA: CPT | Performed by: PHYSICIAN ASSISTANT

## 2020-05-14 RX ORDER — PREDNISONE 10 MG/1
TABLET ORAL
Qty: 30 TABLET | Refills: 0 | Status: SHIPPED | OUTPATIENT
Start: 2020-05-14 | End: 2020-05-14 | Stop reason: SDUPTHER

## 2020-05-14 RX ORDER — AZITHROMYCIN 500 MG/1
500 TABLET, FILM COATED ORAL EVERY 24 HOURS
Status: DISCONTINUED | OUTPATIENT
Start: 2020-05-14 | End: 2020-05-14 | Stop reason: HOSPADM

## 2020-05-14 RX ORDER — IPRATROPIUM BROMIDE AND ALBUTEROL SULFATE 2.5; .5 MG/3ML; MG/3ML
3 SOLUTION RESPIRATORY (INHALATION) 2 TIMES DAILY
Qty: 180 ML | Refills: 0 | Status: SHIPPED | OUTPATIENT
Start: 2020-05-14 | End: 2020-05-14 | Stop reason: SDUPTHER

## 2020-05-14 RX ORDER — IPRATROPIUM BROMIDE AND ALBUTEROL SULFATE 2.5; .5 MG/3ML; MG/3ML
3 SOLUTION RESPIRATORY (INHALATION) 2 TIMES DAILY
Qty: 180 ML | Refills: 0 | Status: SHIPPED | OUTPATIENT
Start: 2020-05-14

## 2020-05-14 RX ORDER — PREDNISONE 10 MG/1
TABLET ORAL
Qty: 30 TABLET | Refills: 0 | Status: SHIPPED | OUTPATIENT
Start: 2020-05-14

## 2020-05-14 RX ADMIN — LISINOPRIL 20 MG: 20 TABLET ORAL at 08:38

## 2020-05-14 RX ADMIN — ARIPIPRAZOLE 20 MG: 5 TABLET ORAL at 08:38

## 2020-05-14 RX ADMIN — ASPIRIN 81 MG 81 MG: 81 TABLET ORAL at 08:38

## 2020-05-14 RX ADMIN — LEVALBUTEROL HYDROCHLORIDE 1.25 MG: 1.25 SOLUTION, CONCENTRATE RESPIRATORY (INHALATION) at 07:34

## 2020-05-14 RX ADMIN — METOPROLOL TARTRATE 25 MG: 25 TABLET, FILM COATED ORAL at 08:38

## 2020-05-14 RX ADMIN — INSULIN LISPRO 4 UNITS: 100 INJECTION, SOLUTION INTRAVENOUS; SUBCUTANEOUS at 08:38

## 2020-05-14 RX ADMIN — METHYLPREDNISOLONE SODIUM SUCCINATE 40 MG: 40 INJECTION, POWDER, FOR SOLUTION INTRAMUSCULAR; INTRAVENOUS at 08:38

## 2020-05-14 RX ADMIN — HEPARIN SODIUM 5000 UNITS: 5000 INJECTION INTRAVENOUS; SUBCUTANEOUS at 05:12

## 2020-05-14 RX ADMIN — ISODIUM CHLORIDE 3 ML: 0.03 SOLUTION RESPIRATORY (INHALATION) at 07:34

## 2020-05-14 RX ADMIN — MAGNESIUM GLUCONATE 500 MG ORAL TABLET 400 MG: 500 TABLET ORAL at 08:38

## 2020-05-16 LAB
BACTERIA BLD CULT: NORMAL
BACTERIA BLD CULT: NORMAL

## 2020-10-07 ENCOUNTER — APPOINTMENT (EMERGENCY)
Dept: CT IMAGING | Facility: HOSPITAL | Age: 70
DRG: 191 | End: 2020-10-07
Payer: COMMERCIAL

## 2020-10-07 ENCOUNTER — HOSPITAL ENCOUNTER (INPATIENT)
Facility: HOSPITAL | Age: 70
LOS: 7 days | Discharge: NURSING, REHAB OR LONG-TERM CARE | DRG: 191 | End: 2020-10-14
Attending: EMERGENCY MEDICINE | Admitting: INTERNAL MEDICINE
Payer: COMMERCIAL

## 2020-10-07 ENCOUNTER — APPOINTMENT (EMERGENCY)
Dept: RADIOLOGY | Facility: HOSPITAL | Age: 70
DRG: 191 | End: 2020-10-07
Payer: COMMERCIAL

## 2020-10-07 DIAGNOSIS — R07.9 INTERMITTENT CHEST PAIN: ICD-10-CM

## 2020-10-07 DIAGNOSIS — K59.00 CONSTIPATION: ICD-10-CM

## 2020-10-07 DIAGNOSIS — R06.00 DYSPNEA: ICD-10-CM

## 2020-10-07 DIAGNOSIS — K30 INDIGESTION: ICD-10-CM

## 2020-10-07 DIAGNOSIS — J44.1 COPD WITH ACUTE EXACERBATION (HCC): Primary | ICD-10-CM

## 2020-10-07 PROBLEM — I10 ESSENTIAL HYPERTENSION: Status: ACTIVE | Noted: 2020-10-07

## 2020-10-07 PROBLEM — R06.02 SOB (SHORTNESS OF BREATH): Status: ACTIVE | Noted: 2020-10-07

## 2020-10-07 PROBLEM — Z86.79 HISTORY OF CHF (CONGESTIVE HEART FAILURE): Status: ACTIVE | Noted: 2020-10-07

## 2020-10-07 LAB
ALBUMIN SERPL BCP-MCNC: 3.7 G/DL (ref 3.5–5)
ALP SERPL-CCNC: 61 U/L (ref 46–116)
ALT SERPL W P-5'-P-CCNC: 28 U/L (ref 12–78)
ANION GAP SERPL CALCULATED.3IONS-SCNC: 13 MMOL/L (ref 4–13)
APTT PPP: 29 SECONDS (ref 23–37)
AST SERPL W P-5'-P-CCNC: 17 U/L (ref 5–45)
ATRIAL RATE: 83 BPM
BASOPHILS # BLD AUTO: 0.05 THOUSANDS/ΜL (ref 0–0.1)
BASOPHILS NFR BLD AUTO: 1 % (ref 0–1)
BILIRUB DIRECT SERPL-MCNC: 0.09 MG/DL (ref 0–0.2)
BILIRUB SERPL-MCNC: 0.3 MG/DL (ref 0.2–1)
BUN SERPL-MCNC: 9 MG/DL (ref 5–25)
CALCIUM SERPL-MCNC: 8.3 MG/DL (ref 8.3–10.1)
CHLORIDE SERPL-SCNC: 103 MMOL/L (ref 100–108)
CO2 SERPL-SCNC: 26 MMOL/L (ref 21–32)
CREAT SERPL-MCNC: 1.16 MG/DL (ref 0.6–1.3)
D DIMER PPP FEU-MCNC: <0.27 UG/ML FEU
EOSINOPHIL # BLD AUTO: 0.29 THOUSAND/ΜL (ref 0–0.61)
EOSINOPHIL NFR BLD AUTO: 4 % (ref 0–6)
ERYTHROCYTE [DISTWIDTH] IN BLOOD BY AUTOMATED COUNT: 13.8 % (ref 11.6–15.1)
FLUAV RNA NPH QL NAA+PROBE: NORMAL
FLUBV RNA NPH QL NAA+PROBE: NORMAL
GFR SERPL CREATININE-BSD FRML MDRD: 72 ML/MIN/1.73SQ M
GLUCOSE SERPL-MCNC: 141 MG/DL (ref 65–140)
GLUCOSE SERPL-MCNC: 181 MG/DL (ref 65–140)
HCT VFR BLD AUTO: 37.6 % (ref 36.5–49.3)
HGB BLD-MCNC: 11.4 G/DL (ref 12–17)
IMM GRANULOCYTES # BLD AUTO: 0.02 THOUSAND/UL (ref 0–0.2)
IMM GRANULOCYTES NFR BLD AUTO: 0 % (ref 0–2)
INR PPP: 1.1 (ref 0.84–1.19)
LACTATE SERPL-SCNC: 1.9 MMOL/L (ref 0.5–2)
LACTATE SERPL-SCNC: 2.7 MMOL/L (ref 0.5–2)
LIPASE SERPL-CCNC: 46 U/L (ref 73–393)
LYMPHOCYTES # BLD AUTO: 2.64 THOUSANDS/ΜL (ref 0.6–4.47)
LYMPHOCYTES NFR BLD AUTO: 38 % (ref 14–44)
MAGNESIUM SERPL-MCNC: 1.7 MG/DL (ref 1.6–2.6)
MCH RBC QN AUTO: 23.7 PG (ref 26.8–34.3)
MCHC RBC AUTO-ENTMCNC: 30.3 G/DL (ref 31.4–37.4)
MCV RBC AUTO: 78 FL (ref 82–98)
MONOCYTES # BLD AUTO: 0.49 THOUSAND/ΜL (ref 0.17–1.22)
MONOCYTES NFR BLD AUTO: 7 % (ref 4–12)
NEUTROPHILS # BLD AUTO: 3.42 THOUSANDS/ΜL (ref 1.85–7.62)
NEUTS SEG NFR BLD AUTO: 50 % (ref 43–75)
NRBC BLD AUTO-RTO: 0 /100 WBCS
NT-PROBNP SERPL-MCNC: 84 PG/ML
P AXIS: 61 DEGREES
PLATELET # BLD AUTO: 168 THOUSANDS/UL (ref 149–390)
PLATELET # BLD AUTO: 172 THOUSANDS/UL (ref 149–390)
PMV BLD AUTO: 13.4 FL (ref 8.9–12.7)
POTASSIUM SERPL-SCNC: 3.6 MMOL/L (ref 3.5–5.3)
PR INTERVAL: 166 MS
PROT SERPL-MCNC: 7.7 G/DL (ref 6.4–8.2)
PROTHROMBIN TIME: 13.7 SECONDS (ref 11.6–14.5)
QRS AXIS: 32 DEGREES
QRSD INTERVAL: 80 MS
QT INTERVAL: 368 MS
QTC INTERVAL: 432 MS
RBC # BLD AUTO: 4.82 MILLION/UL (ref 3.88–5.62)
RSV RNA NPH QL NAA+PROBE: NORMAL
SARS-COV-2 RNA RESP QL NAA+PROBE: NEGATIVE
SODIUM SERPL-SCNC: 142 MMOL/L (ref 136–145)
T WAVE AXIS: 16 DEGREES
TROPONIN I SERPL-MCNC: 0.03 NG/ML
VENTRICULAR RATE: 83 BPM
WBC # BLD AUTO: 6.91 THOUSAND/UL (ref 4.31–10.16)

## 2020-10-07 PROCEDURE — 93010 ELECTROCARDIOGRAM REPORT: CPT | Performed by: INTERNAL MEDICINE

## 2020-10-07 PROCEDURE — 99285 EMERGENCY DEPT VISIT HI MDM: CPT

## 2020-10-07 PROCEDURE — 85730 THROMBOPLASTIN TIME PARTIAL: CPT | Performed by: EMERGENCY MEDICINE

## 2020-10-07 PROCEDURE — 83735 ASSAY OF MAGNESIUM: CPT | Performed by: EMERGENCY MEDICINE

## 2020-10-07 PROCEDURE — 96365 THER/PROPH/DIAG IV INF INIT: CPT

## 2020-10-07 PROCEDURE — 36415 COLL VENOUS BLD VENIPUNCTURE: CPT | Performed by: EMERGENCY MEDICINE

## 2020-10-07 PROCEDURE — G1004 CDSM NDSC: HCPCS

## 2020-10-07 PROCEDURE — 82948 REAGENT STRIP/BLOOD GLUCOSE: CPT

## 2020-10-07 PROCEDURE — 85610 PROTHROMBIN TIME: CPT | Performed by: EMERGENCY MEDICINE

## 2020-10-07 PROCEDURE — 83690 ASSAY OF LIPASE: CPT | Performed by: EMERGENCY MEDICINE

## 2020-10-07 PROCEDURE — 80076 HEPATIC FUNCTION PANEL: CPT | Performed by: EMERGENCY MEDICINE

## 2020-10-07 PROCEDURE — 71045 X-RAY EXAM CHEST 1 VIEW: CPT

## 2020-10-07 PROCEDURE — 71260 CT THORAX DX C+: CPT

## 2020-10-07 PROCEDURE — 85049 AUTOMATED PLATELET COUNT: CPT | Performed by: NURSE PRACTITIONER

## 2020-10-07 PROCEDURE — 94640 AIRWAY INHALATION TREATMENT: CPT

## 2020-10-07 PROCEDURE — 96375 TX/PRO/DX INJ NEW DRUG ADDON: CPT

## 2020-10-07 PROCEDURE — 85025 COMPLETE CBC W/AUTO DIFF WBC: CPT | Performed by: EMERGENCY MEDICINE

## 2020-10-07 PROCEDURE — 99223 1ST HOSP IP/OBS HIGH 75: CPT | Performed by: INTERNAL MEDICINE

## 2020-10-07 PROCEDURE — 94644 CONT INHLJ TX 1ST HOUR: CPT

## 2020-10-07 PROCEDURE — 87631 RESP VIRUS 3-5 TARGETS: CPT | Performed by: NURSE PRACTITIONER

## 2020-10-07 PROCEDURE — 84484 ASSAY OF TROPONIN QUANT: CPT | Performed by: EMERGENCY MEDICINE

## 2020-10-07 PROCEDURE — 94760 N-INVAS EAR/PLS OXIMETRY 1: CPT

## 2020-10-07 PROCEDURE — 80048 BASIC METABOLIC PNL TOTAL CA: CPT | Performed by: EMERGENCY MEDICINE

## 2020-10-07 PROCEDURE — 74177 CT ABD & PELVIS W/CONTRAST: CPT

## 2020-10-07 PROCEDURE — 85379 FIBRIN DEGRADATION QUANT: CPT | Performed by: EMERGENCY MEDICINE

## 2020-10-07 PROCEDURE — 99284 EMERGENCY DEPT VISIT MOD MDM: CPT | Performed by: EMERGENCY MEDICINE

## 2020-10-07 PROCEDURE — 84145 PROCALCITONIN (PCT): CPT | Performed by: NURSE PRACTITIONER

## 2020-10-07 PROCEDURE — 87635 SARS-COV-2 COVID-19 AMP PRB: CPT | Performed by: NURSE PRACTITIONER

## 2020-10-07 PROCEDURE — 83605 ASSAY OF LACTIC ACID: CPT | Performed by: EMERGENCY MEDICINE

## 2020-10-07 PROCEDURE — 83880 ASSAY OF NATRIURETIC PEPTIDE: CPT | Performed by: EMERGENCY MEDICINE

## 2020-10-07 PROCEDURE — 93005 ELECTROCARDIOGRAM TRACING: CPT

## 2020-10-07 PROCEDURE — 83036 HEMOGLOBIN GLYCOSYLATED A1C: CPT | Performed by: NURSE PRACTITIONER

## 2020-10-07 PROCEDURE — 96361 HYDRATE IV INFUSION ADD-ON: CPT

## 2020-10-07 RX ORDER — FUROSEMIDE 40 MG/1
40 TABLET ORAL DAILY
Status: DISCONTINUED | OUTPATIENT
Start: 2020-10-08 | End: 2020-10-14 | Stop reason: HOSPADM

## 2020-10-07 RX ORDER — TRAZODONE HYDROCHLORIDE 100 MG/1
100 TABLET ORAL
COMMUNITY

## 2020-10-07 RX ORDER — ALBUTEROL SULFATE 2.5 MG/3ML
5 SOLUTION RESPIRATORY (INHALATION) ONCE
Status: COMPLETED | OUTPATIENT
Start: 2020-10-07 | End: 2020-10-07

## 2020-10-07 RX ORDER — BUDESONIDE AND FORMOTEROL FUMARATE DIHYDRATE 160; 4.5 UG/1; UG/1
AEROSOL RESPIRATORY (INHALATION)
COMMUNITY
Start: 2020-07-01

## 2020-10-07 RX ORDER — ALBUTEROL SULFATE 90 UG/1
2 AEROSOL, METERED RESPIRATORY (INHALATION) EVERY 4 HOURS PRN
Status: DISCONTINUED | OUTPATIENT
Start: 2020-10-07 | End: 2020-10-13

## 2020-10-07 RX ORDER — IPRATROPIUM BROMIDE AND ALBUTEROL SULFATE 2.5; .5 MG/3ML; MG/3ML
3 SOLUTION RESPIRATORY (INHALATION) 2 TIMES DAILY
Status: DISCONTINUED | OUTPATIENT
Start: 2020-10-07 | End: 2020-10-07

## 2020-10-07 RX ORDER — LEVALBUTEROL 1.25 MG/.5ML
1.25 SOLUTION, CONCENTRATE RESPIRATORY (INHALATION)
Status: DISCONTINUED | OUTPATIENT
Start: 2020-10-08 | End: 2020-10-11

## 2020-10-07 RX ORDER — LISINOPRIL 20 MG/1
20 TABLET ORAL DAILY
Status: DISCONTINUED | OUTPATIENT
Start: 2020-10-08 | End: 2020-10-14 | Stop reason: HOSPADM

## 2020-10-07 RX ORDER — BUDESONIDE AND FORMOTEROL FUMARATE DIHYDRATE 160; 4.5 UG/1; UG/1
2 AEROSOL RESPIRATORY (INHALATION) 2 TIMES DAILY
Status: DISCONTINUED | OUTPATIENT
Start: 2020-10-07 | End: 2020-10-14 | Stop reason: HOSPADM

## 2020-10-07 RX ORDER — LOSARTAN POTASSIUM 50 MG/1
100 TABLET ORAL DAILY
Status: DISCONTINUED | OUTPATIENT
Start: 2020-10-08 | End: 2020-10-07

## 2020-10-07 RX ORDER — METHYLPREDNISOLONE SODIUM SUCCINATE 125 MG/2ML
80 INJECTION, POWDER, LYOPHILIZED, FOR SOLUTION INTRAMUSCULAR; INTRAVENOUS ONCE
Status: COMPLETED | OUTPATIENT
Start: 2020-10-07 | End: 2020-10-07

## 2020-10-07 RX ORDER — RISPERIDONE 1 MG/1
TABLET, FILM COATED ORAL
COMMUNITY
Start: 2020-04-22

## 2020-10-07 RX ORDER — SODIUM CHLORIDE FOR INHALATION 0.9 %
12 VIAL, NEBULIZER (ML) INHALATION ONCE
Status: COMPLETED | OUTPATIENT
Start: 2020-10-07 | End: 2020-10-07

## 2020-10-07 RX ORDER — SENNOSIDES 8.6 MG
1 TABLET ORAL 2 TIMES DAILY
Status: DISCONTINUED | OUTPATIENT
Start: 2020-10-07 | End: 2020-10-14 | Stop reason: HOSPADM

## 2020-10-07 RX ORDER — FUROSEMIDE 40 MG/1
TABLET ORAL
COMMUNITY
Start: 2020-07-01

## 2020-10-07 RX ORDER — METHYLPREDNISOLONE SODIUM SUCCINATE 40 MG/ML
40 INJECTION, POWDER, LYOPHILIZED, FOR SOLUTION INTRAMUSCULAR; INTRAVENOUS EVERY 8 HOURS
Status: DISCONTINUED | OUTPATIENT
Start: 2020-10-07 | End: 2020-10-08

## 2020-10-07 RX ORDER — LABETALOL 20 MG/4 ML (5 MG/ML) INTRAVENOUS SYRINGE
10 EVERY 4 HOURS PRN
Status: DISCONTINUED | OUTPATIENT
Start: 2020-10-07 | End: 2020-10-14 | Stop reason: HOSPADM

## 2020-10-07 RX ORDER — ARIPIPRAZOLE 5 MG/1
20 TABLET ORAL DAILY
Status: DISCONTINUED | OUTPATIENT
Start: 2020-10-08 | End: 2020-10-14 | Stop reason: HOSPADM

## 2020-10-07 RX ORDER — DOCUSATE SODIUM 100 MG/1
100 CAPSULE, LIQUID FILLED ORAL 2 TIMES DAILY
Status: DISCONTINUED | OUTPATIENT
Start: 2020-10-07 | End: 2020-10-14 | Stop reason: HOSPADM

## 2020-10-07 RX ORDER — LOSARTAN POTASSIUM 100 MG/1
100 TABLET ORAL
COMMUNITY

## 2020-10-07 RX ORDER — ASPIRIN 81 MG/1
81 TABLET, CHEWABLE ORAL DAILY
Status: DISCONTINUED | OUTPATIENT
Start: 2020-10-08 | End: 2020-10-14 | Stop reason: HOSPADM

## 2020-10-07 RX ORDER — ATORVASTATIN CALCIUM 40 MG/1
40 TABLET, FILM COATED ORAL
Status: DISCONTINUED | OUTPATIENT
Start: 2020-10-07 | End: 2020-10-14 | Stop reason: HOSPADM

## 2020-10-07 RX ORDER — MAGNESIUM SULFATE HEPTAHYDRATE 40 MG/ML
2 INJECTION, SOLUTION INTRAVENOUS ONCE
Status: COMPLETED | OUTPATIENT
Start: 2020-10-07 | End: 2020-10-07

## 2020-10-07 RX ORDER — ACETAMINOPHEN 325 MG/1
650 TABLET ORAL EVERY 6 HOURS PRN
Status: DISCONTINUED | OUTPATIENT
Start: 2020-10-07 | End: 2020-10-14 | Stop reason: HOSPADM

## 2020-10-07 RX ORDER — MAGNESIUM HYDROXIDE/ALUMINUM HYDROXICE/SIMETHICONE 120; 1200; 1200 MG/30ML; MG/30ML; MG/30ML
30 SUSPENSION ORAL EVERY 6 HOURS PRN
Status: DISCONTINUED | OUTPATIENT
Start: 2020-10-07 | End: 2020-10-14 | Stop reason: HOSPADM

## 2020-10-07 RX ORDER — ONDANSETRON 2 MG/ML
4 INJECTION INTRAMUSCULAR; INTRAVENOUS EVERY 6 HOURS PRN
Status: DISCONTINUED | OUTPATIENT
Start: 2020-10-07 | End: 2020-10-14 | Stop reason: HOSPADM

## 2020-10-07 RX ORDER — TRAZODONE HYDROCHLORIDE 100 MG/1
100 TABLET ORAL
Status: DISCONTINUED | OUTPATIENT
Start: 2020-10-07 | End: 2020-10-14 | Stop reason: HOSPADM

## 2020-10-07 RX ORDER — GUAIFENESIN 600 MG
600 TABLET, EXTENDED RELEASE 12 HR ORAL 2 TIMES DAILY
Status: DISCONTINUED | OUTPATIENT
Start: 2020-10-07 | End: 2020-10-14 | Stop reason: HOSPADM

## 2020-10-07 RX ORDER — POLYETHYLENE GLYCOL 3350 17 G/17G
17 POWDER, FOR SOLUTION ORAL DAILY
Status: DISCONTINUED | OUTPATIENT
Start: 2020-10-08 | End: 2020-10-14 | Stop reason: HOSPADM

## 2020-10-07 RX ORDER — ZIPRASIDONE HYDROCHLORIDE 20 MG/1
20 CAPSULE ORAL
COMMUNITY
End: 2020-10-07

## 2020-10-07 RX ORDER — RISPERIDONE 1 MG/1
1 TABLET, FILM COATED ORAL DAILY
Status: DISCONTINUED | OUTPATIENT
Start: 2020-10-08 | End: 2020-10-14 | Stop reason: HOSPADM

## 2020-10-07 RX ORDER — PRAVASTATIN SODIUM 40 MG
40 TABLET ORAL
Status: DISCONTINUED | OUTPATIENT
Start: 2020-10-07 | End: 2020-10-07

## 2020-10-07 RX ADMIN — ATORVASTATIN CALCIUM 40 MG: 40 TABLET, FILM COATED ORAL at 20:57

## 2020-10-07 RX ADMIN — IPRATROPIUM BROMIDE 1 MG: 0.5 SOLUTION RESPIRATORY (INHALATION) at 14:18

## 2020-10-07 RX ADMIN — SENNOSIDES 8.6 MG: 8.6 TABLET, FILM COATED ORAL at 20:56

## 2020-10-07 RX ADMIN — METHYLPREDNISOLONE SODIUM SUCCINATE 80 MG: 125 INJECTION, POWDER, FOR SOLUTION INTRAMUSCULAR; INTRAVENOUS at 14:09

## 2020-10-07 RX ADMIN — ISODIUM CHLORIDE 12 ML: 0.03 SOLUTION RESPIRATORY (INHALATION) at 14:18

## 2020-10-07 RX ADMIN — IPRATROPIUM BROMIDE AND ALBUTEROL SULFATE 3 ML: 2.5; .5 SOLUTION RESPIRATORY (INHALATION) at 21:29

## 2020-10-07 RX ADMIN — MAGNESIUM SULFATE IN WATER 2 G: 40 INJECTION, SOLUTION INTRAVENOUS at 17:01

## 2020-10-07 RX ADMIN — ALBUTEROL SULFATE 5 MG: 2.5 SOLUTION RESPIRATORY (INHALATION) at 17:04

## 2020-10-07 RX ADMIN — METHYLPREDNISOLONE SODIUM SUCCINATE 40 MG: 40 INJECTION, POWDER, FOR SOLUTION INTRAMUSCULAR; INTRAVENOUS at 20:56

## 2020-10-07 RX ADMIN — ALBUTEROL SULFATE 10 MG: 2.5 SOLUTION RESPIRATORY (INHALATION) at 14:18

## 2020-10-07 RX ADMIN — ONDANSETRON 4 MG: 2 INJECTION INTRAMUSCULAR; INTRAVENOUS at 22:39

## 2020-10-07 RX ADMIN — ACETAMINOPHEN 650 MG: 325 TABLET, FILM COATED ORAL at 20:57

## 2020-10-07 RX ADMIN — TRAZODONE HYDROCHLORIDE 100 MG: 100 TABLET, FILM COATED ORAL at 21:40

## 2020-10-07 RX ADMIN — SODIUM CHLORIDE 1000 ML: 0.9 INJECTION, SOLUTION INTRAVENOUS at 14:51

## 2020-10-07 RX ADMIN — BUDESONIDE AND FORMOTEROL FUMARATE DIHYDRATE 2 PUFF: 160; 4.5 AEROSOL RESPIRATORY (INHALATION) at 20:57

## 2020-10-07 RX ADMIN — DOCUSATE SODIUM 100 MG: 100 CAPSULE, LIQUID FILLED ORAL at 20:57

## 2020-10-07 RX ADMIN — METOPROLOL TARTRATE 25 MG: 25 TABLET, FILM COATED ORAL at 19:57

## 2020-10-07 RX ADMIN — IOHEXOL 100 ML: 350 INJECTION, SOLUTION INTRAVENOUS at 16:10

## 2020-10-07 RX ADMIN — GUAIFENESIN 600 MG: 600 TABLET, EXTENDED RELEASE ORAL at 21:01

## 2020-10-07 RX ADMIN — MAGNESIUM GLUCONATE 500 MG ORAL TABLET 400 MG: 500 TABLET ORAL at 20:57

## 2020-10-08 LAB
ALBUMIN SERPL BCP-MCNC: 3.6 G/DL (ref 3.5–5)
ALP SERPL-CCNC: 55 U/L (ref 46–116)
ALT SERPL W P-5'-P-CCNC: 28 U/L (ref 12–78)
ANION GAP SERPL CALCULATED.3IONS-SCNC: 11 MMOL/L (ref 4–13)
AST SERPL W P-5'-P-CCNC: 21 U/L (ref 5–45)
BILIRUB SERPL-MCNC: 0.3 MG/DL (ref 0.2–1)
BUN SERPL-MCNC: 10 MG/DL (ref 5–25)
CALCIUM SERPL-MCNC: 8.5 MG/DL (ref 8.3–10.1)
CHLORIDE SERPL-SCNC: 102 MMOL/L (ref 100–108)
CO2 SERPL-SCNC: 26 MMOL/L (ref 21–32)
CREAT SERPL-MCNC: 1.12 MG/DL (ref 0.6–1.3)
ERYTHROCYTE [DISTWIDTH] IN BLOOD BY AUTOMATED COUNT: 13.8 % (ref 11.6–15.1)
EST. AVERAGE GLUCOSE BLD GHB EST-MCNC: 134 MG/DL
GFR SERPL CREATININE-BSD FRML MDRD: 75 ML/MIN/1.73SQ M
GLUCOSE SERPL-MCNC: 163 MG/DL (ref 65–140)
GLUCOSE SERPL-MCNC: 168 MG/DL (ref 65–140)
GLUCOSE SERPL-MCNC: 168 MG/DL (ref 65–140)
GLUCOSE SERPL-MCNC: 190 MG/DL (ref 65–140)
GLUCOSE SERPL-MCNC: 191 MG/DL (ref 65–140)
HBA1C MFR BLD: 6.3 %
HCT VFR BLD AUTO: 37.2 % (ref 36.5–49.3)
HGB BLD-MCNC: 11.4 G/DL (ref 12–17)
MAGNESIUM SERPL-MCNC: 2.1 MG/DL (ref 1.6–2.6)
MCH RBC QN AUTO: 23.8 PG (ref 26.8–34.3)
MCHC RBC AUTO-ENTMCNC: 30.6 G/DL (ref 31.4–37.4)
MCV RBC AUTO: 78 FL (ref 82–98)
PHOSPHATE SERPL-MCNC: 2.8 MG/DL (ref 2.3–4.1)
PLATELET # BLD AUTO: 185 THOUSANDS/UL (ref 149–390)
PMV BLD AUTO: 13.1 FL (ref 8.9–12.7)
POTASSIUM SERPL-SCNC: 4.5 MMOL/L (ref 3.5–5.3)
PROCALCITONIN SERPL-MCNC: 0.13 NG/ML
PROT SERPL-MCNC: 8 G/DL (ref 6.4–8.2)
RBC # BLD AUTO: 4.8 MILLION/UL (ref 3.88–5.62)
SODIUM SERPL-SCNC: 139 MMOL/L (ref 136–145)
WBC # BLD AUTO: 8.4 THOUSAND/UL (ref 4.31–10.16)

## 2020-10-08 PROCEDURE — 85027 COMPLETE CBC AUTOMATED: CPT | Performed by: NURSE PRACTITIONER

## 2020-10-08 PROCEDURE — 94640 AIRWAY INHALATION TREATMENT: CPT

## 2020-10-08 PROCEDURE — 97163 PT EVAL HIGH COMPLEX 45 MIN: CPT

## 2020-10-08 PROCEDURE — 97167 OT EVAL HIGH COMPLEX 60 MIN: CPT

## 2020-10-08 PROCEDURE — 84100 ASSAY OF PHOSPHORUS: CPT | Performed by: NURSE PRACTITIONER

## 2020-10-08 PROCEDURE — 82948 REAGENT STRIP/BLOOD GLUCOSE: CPT

## 2020-10-08 PROCEDURE — 99232 SBSQ HOSP IP/OBS MODERATE 35: CPT | Performed by: NURSE PRACTITIONER

## 2020-10-08 PROCEDURE — 94760 N-INVAS EAR/PLS OXIMETRY 1: CPT

## 2020-10-08 PROCEDURE — 97110 THERAPEUTIC EXERCISES: CPT

## 2020-10-08 PROCEDURE — 80053 COMPREHEN METABOLIC PANEL: CPT | Performed by: NURSE PRACTITIONER

## 2020-10-08 PROCEDURE — 83735 ASSAY OF MAGNESIUM: CPT | Performed by: NURSE PRACTITIONER

## 2020-10-08 RX ORDER — METHYLPREDNISOLONE SODIUM SUCCINATE 40 MG/ML
40 INJECTION, POWDER, LYOPHILIZED, FOR SOLUTION INTRAMUSCULAR; INTRAVENOUS EVERY 12 HOURS SCHEDULED
Status: DISCONTINUED | OUTPATIENT
Start: 2020-10-08 | End: 2020-10-09

## 2020-10-08 RX ADMIN — METHYLPREDNISOLONE SODIUM SUCCINATE 40 MG: 40 INJECTION, POWDER, FOR SOLUTION INTRAMUSCULAR; INTRAVENOUS at 20:32

## 2020-10-08 RX ADMIN — DOCUSATE SODIUM 100 MG: 100 CAPSULE, LIQUID FILLED ORAL at 20:27

## 2020-10-08 RX ADMIN — FUROSEMIDE 40 MG: 40 TABLET ORAL at 08:46

## 2020-10-08 RX ADMIN — IPRATROPIUM BROMIDE 0.5 MG: 0.5 SOLUTION RESPIRATORY (INHALATION) at 19:15

## 2020-10-08 RX ADMIN — MAGNESIUM GLUCONATE 500 MG ORAL TABLET 400 MG: 500 TABLET ORAL at 17:25

## 2020-10-08 RX ADMIN — ENOXAPARIN SODIUM 40 MG: 40 INJECTION SUBCUTANEOUS at 08:48

## 2020-10-08 RX ADMIN — IPRATROPIUM BROMIDE 0.5 MG: 0.5 SOLUTION RESPIRATORY (INHALATION) at 07:35

## 2020-10-08 RX ADMIN — IPRATROPIUM BROMIDE 0.5 MG: 0.5 SOLUTION RESPIRATORY (INHALATION) at 13:32

## 2020-10-08 RX ADMIN — DOCUSATE SODIUM 100 MG: 100 CAPSULE, LIQUID FILLED ORAL at 08:47

## 2020-10-08 RX ADMIN — MAGNESIUM GLUCONATE 500 MG ORAL TABLET 400 MG: 500 TABLET ORAL at 08:46

## 2020-10-08 RX ADMIN — METHYLPREDNISOLONE SODIUM SUCCINATE 40 MG: 40 INJECTION, POWDER, FOR SOLUTION INTRAMUSCULAR; INTRAVENOUS at 02:48

## 2020-10-08 RX ADMIN — GUAIFENESIN 600 MG: 600 TABLET, EXTENDED RELEASE ORAL at 08:43

## 2020-10-08 RX ADMIN — METOPROLOL TARTRATE 25 MG: 25 TABLET, FILM COATED ORAL at 20:27

## 2020-10-08 RX ADMIN — GUAIFENESIN 600 MG: 600 TABLET, EXTENDED RELEASE ORAL at 17:25

## 2020-10-08 RX ADMIN — BUDESONIDE AND FORMOTEROL FUMARATE DIHYDRATE 2 PUFF: 160; 4.5 AEROSOL RESPIRATORY (INHALATION) at 08:49

## 2020-10-08 RX ADMIN — INSULIN LISPRO 2 UNITS: 100 INJECTION, SOLUTION INTRAVENOUS; SUBCUTANEOUS at 17:26

## 2020-10-08 RX ADMIN — POLYETHYLENE GLYCOL 3350 17 G: 17 POWDER, FOR SOLUTION ORAL at 08:48

## 2020-10-08 RX ADMIN — LEVALBUTEROL HYDROCHLORIDE 1.25 MG: 1.25 SOLUTION, CONCENTRATE RESPIRATORY (INHALATION) at 19:15

## 2020-10-08 RX ADMIN — ACETAMINOPHEN 650 MG: 325 TABLET, FILM COATED ORAL at 08:59

## 2020-10-08 RX ADMIN — ARIPIPRAZOLE 20 MG: 5 TABLET ORAL at 08:43

## 2020-10-08 RX ADMIN — LEVALBUTEROL HYDROCHLORIDE 1.25 MG: 1.25 SOLUTION, CONCENTRATE RESPIRATORY (INHALATION) at 07:35

## 2020-10-08 RX ADMIN — RISPERIDONE 1 MG: 1 TABLET ORAL at 08:43

## 2020-10-08 RX ADMIN — LEVALBUTEROL HYDROCHLORIDE 1.25 MG: 1.25 SOLUTION, CONCENTRATE RESPIRATORY (INHALATION) at 13:32

## 2020-10-08 RX ADMIN — ASPIRIN 81 MG 81 MG: 81 TABLET ORAL at 08:46

## 2020-10-08 RX ADMIN — SENNOSIDES 8.6 MG: 8.6 TABLET, FILM COATED ORAL at 17:25

## 2020-10-08 RX ADMIN — INSULIN LISPRO 2 UNITS: 100 INJECTION, SOLUTION INTRAVENOUS; SUBCUTANEOUS at 07:56

## 2020-10-08 RX ADMIN — SENNOSIDES 8.6 MG: 8.6 TABLET, FILM COATED ORAL at 08:43

## 2020-10-08 RX ADMIN — METHYLPREDNISOLONE SODIUM SUCCINATE 40 MG: 40 INJECTION, POWDER, FOR SOLUTION INTRAMUSCULAR; INTRAVENOUS at 11:47

## 2020-10-08 RX ADMIN — LISINOPRIL 20 MG: 20 TABLET ORAL at 08:43

## 2020-10-08 RX ADMIN — INSULIN LISPRO 2 UNITS: 100 INJECTION, SOLUTION INTRAVENOUS; SUBCUTANEOUS at 11:49

## 2020-10-08 RX ADMIN — METOPROLOL TARTRATE 25 MG: 25 TABLET, FILM COATED ORAL at 08:46

## 2020-10-08 RX ADMIN — TRAZODONE HYDROCHLORIDE 100 MG: 100 TABLET, FILM COATED ORAL at 20:34

## 2020-10-08 RX ADMIN — ATORVASTATIN CALCIUM 40 MG: 40 TABLET, FILM COATED ORAL at 17:25

## 2020-10-08 RX ADMIN — BUDESONIDE AND FORMOTEROL FUMARATE DIHYDRATE 2 PUFF: 160; 4.5 AEROSOL RESPIRATORY (INHALATION) at 17:25

## 2020-10-09 PROBLEM — E66.01 MORBID OBESITY DUE TO EXCESS CALORIES (HCC): Status: ACTIVE | Noted: 2020-10-09

## 2020-10-09 LAB
GLUCOSE SERPL-MCNC: 145 MG/DL (ref 65–140)
GLUCOSE SERPL-MCNC: 166 MG/DL (ref 65–140)
GLUCOSE SERPL-MCNC: 167 MG/DL (ref 65–140)
GLUCOSE SERPL-MCNC: 242 MG/DL (ref 65–140)
PROCALCITONIN SERPL-MCNC: 0.07 NG/ML
SARS-COV-2 RNA RESP QL NAA+PROBE: NEGATIVE

## 2020-10-09 PROCEDURE — 94760 N-INVAS EAR/PLS OXIMETRY 1: CPT

## 2020-10-09 PROCEDURE — 94640 AIRWAY INHALATION TREATMENT: CPT

## 2020-10-09 PROCEDURE — 82948 REAGENT STRIP/BLOOD GLUCOSE: CPT

## 2020-10-09 PROCEDURE — 84145 PROCALCITONIN (PCT): CPT | Performed by: NURSE PRACTITIONER

## 2020-10-09 PROCEDURE — 87635 SARS-COV-2 COVID-19 AMP PRB: CPT | Performed by: PHYSICIAN ASSISTANT

## 2020-10-09 PROCEDURE — 99232 SBSQ HOSP IP/OBS MODERATE 35: CPT | Performed by: PHYSICIAN ASSISTANT

## 2020-10-09 RX ORDER — METHYLPREDNISOLONE SODIUM SUCCINATE 40 MG/ML
40 INJECTION, POWDER, LYOPHILIZED, FOR SOLUTION INTRAMUSCULAR; INTRAVENOUS EVERY 12 HOURS SCHEDULED
Status: COMPLETED | OUTPATIENT
Start: 2020-10-09 | End: 2020-10-10

## 2020-10-09 RX ADMIN — ATORVASTATIN CALCIUM 40 MG: 40 TABLET, FILM COATED ORAL at 17:05

## 2020-10-09 RX ADMIN — RISPERIDONE 1 MG: 1 TABLET ORAL at 08:24

## 2020-10-09 RX ADMIN — LISINOPRIL 20 MG: 20 TABLET ORAL at 08:24

## 2020-10-09 RX ADMIN — MAGNESIUM GLUCONATE 500 MG ORAL TABLET 400 MG: 500 TABLET ORAL at 17:05

## 2020-10-09 RX ADMIN — GUAIFENESIN 600 MG: 600 TABLET, EXTENDED RELEASE ORAL at 08:23

## 2020-10-09 RX ADMIN — POLYETHYLENE GLYCOL 3350 17 G: 17 POWDER, FOR SOLUTION ORAL at 08:26

## 2020-10-09 RX ADMIN — INSULIN LISPRO 2 UNITS: 100 INJECTION, SOLUTION INTRAVENOUS; SUBCUTANEOUS at 17:06

## 2020-10-09 RX ADMIN — ASPIRIN 81 MG 81 MG: 81 TABLET ORAL at 08:24

## 2020-10-09 RX ADMIN — BUDESONIDE AND FORMOTEROL FUMARATE DIHYDRATE 2 PUFF: 160; 4.5 AEROSOL RESPIRATORY (INHALATION) at 17:05

## 2020-10-09 RX ADMIN — BUDESONIDE AND FORMOTEROL FUMARATE DIHYDRATE 2 PUFF: 160; 4.5 AEROSOL RESPIRATORY (INHALATION) at 08:22

## 2020-10-09 RX ADMIN — METOPROLOL TARTRATE 25 MG: 25 TABLET, FILM COATED ORAL at 08:24

## 2020-10-09 RX ADMIN — METHYLPREDNISOLONE SODIUM SUCCINATE 40 MG: 40 INJECTION, POWDER, FOR SOLUTION INTRAMUSCULAR; INTRAVENOUS at 08:27

## 2020-10-09 RX ADMIN — INSULIN LISPRO 4 UNITS: 100 INJECTION, SOLUTION INTRAVENOUS; SUBCUTANEOUS at 11:42

## 2020-10-09 RX ADMIN — ARIPIPRAZOLE 20 MG: 5 TABLET ORAL at 08:24

## 2020-10-09 RX ADMIN — LEVALBUTEROL HYDROCHLORIDE 1.25 MG: 1.25 SOLUTION, CONCENTRATE RESPIRATORY (INHALATION) at 07:38

## 2020-10-09 RX ADMIN — DOCUSATE SODIUM 100 MG: 100 CAPSULE, LIQUID FILLED ORAL at 22:28

## 2020-10-09 RX ADMIN — MAGNESIUM GLUCONATE 500 MG ORAL TABLET 400 MG: 500 TABLET ORAL at 08:24

## 2020-10-09 RX ADMIN — IPRATROPIUM BROMIDE 0.5 MG: 0.5 SOLUTION RESPIRATORY (INHALATION) at 19:06

## 2020-10-09 RX ADMIN — FUROSEMIDE 40 MG: 40 TABLET ORAL at 08:23

## 2020-10-09 RX ADMIN — LEVALBUTEROL HYDROCHLORIDE 1.25 MG: 1.25 SOLUTION, CONCENTRATE RESPIRATORY (INHALATION) at 19:06

## 2020-10-09 RX ADMIN — LEVALBUTEROL HYDROCHLORIDE 1.25 MG: 1.25 SOLUTION, CONCENTRATE RESPIRATORY (INHALATION) at 13:27

## 2020-10-09 RX ADMIN — ENOXAPARIN SODIUM 40 MG: 40 INJECTION SUBCUTANEOUS at 08:22

## 2020-10-09 RX ADMIN — DOCUSATE SODIUM 100 MG: 100 CAPSULE, LIQUID FILLED ORAL at 08:24

## 2020-10-09 RX ADMIN — GUAIFENESIN 600 MG: 600 TABLET, EXTENDED RELEASE ORAL at 17:05

## 2020-10-09 RX ADMIN — METOPROLOL TARTRATE 25 MG: 25 TABLET, FILM COATED ORAL at 22:28

## 2020-10-09 RX ADMIN — IPRATROPIUM BROMIDE 0.5 MG: 0.5 SOLUTION RESPIRATORY (INHALATION) at 07:37

## 2020-10-09 RX ADMIN — METHYLPREDNISOLONE SODIUM SUCCINATE 40 MG: 40 INJECTION, POWDER, FOR SOLUTION INTRAMUSCULAR; INTRAVENOUS at 22:28

## 2020-10-09 RX ADMIN — SENNOSIDES 8.6 MG: 8.6 TABLET, FILM COATED ORAL at 08:24

## 2020-10-09 RX ADMIN — IPRATROPIUM BROMIDE 0.5 MG: 0.5 SOLUTION RESPIRATORY (INHALATION) at 13:27

## 2020-10-09 RX ADMIN — TRAZODONE HYDROCHLORIDE 100 MG: 100 TABLET, FILM COATED ORAL at 22:28

## 2020-10-09 RX ADMIN — SENNOSIDES 8.6 MG: 8.6 TABLET, FILM COATED ORAL at 17:05

## 2020-10-10 LAB
GLUCOSE SERPL-MCNC: 153 MG/DL (ref 65–140)
GLUCOSE SERPL-MCNC: 166 MG/DL (ref 65–140)
GLUCOSE SERPL-MCNC: 204 MG/DL (ref 65–140)

## 2020-10-10 PROCEDURE — 99232 SBSQ HOSP IP/OBS MODERATE 35: CPT | Performed by: NURSE PRACTITIONER

## 2020-10-10 PROCEDURE — 82948 REAGENT STRIP/BLOOD GLUCOSE: CPT

## 2020-10-10 PROCEDURE — 94640 AIRWAY INHALATION TREATMENT: CPT

## 2020-10-10 PROCEDURE — 94760 N-INVAS EAR/PLS OXIMETRY 1: CPT

## 2020-10-10 RX ADMIN — MAGNESIUM GLUCONATE 500 MG ORAL TABLET 400 MG: 500 TABLET ORAL at 09:55

## 2020-10-10 RX ADMIN — DOCUSATE SODIUM 100 MG: 100 CAPSULE, LIQUID FILLED ORAL at 09:55

## 2020-10-10 RX ADMIN — ARIPIPRAZOLE 20 MG: 5 TABLET ORAL at 09:55

## 2020-10-10 RX ADMIN — GUAIFENESIN 600 MG: 600 TABLET, EXTENDED RELEASE ORAL at 17:47

## 2020-10-10 RX ADMIN — POLYETHYLENE GLYCOL 3350 17 G: 17 POWDER, FOR SOLUTION ORAL at 09:56

## 2020-10-10 RX ADMIN — IPRATROPIUM BROMIDE 0.5 MG: 0.5 SOLUTION RESPIRATORY (INHALATION) at 19:35

## 2020-10-10 RX ADMIN — DOCUSATE SODIUM 100 MG: 100 CAPSULE, LIQUID FILLED ORAL at 21:45

## 2020-10-10 RX ADMIN — BUDESONIDE AND FORMOTEROL FUMARATE DIHYDRATE 2 PUFF: 160; 4.5 AEROSOL RESPIRATORY (INHALATION) at 17:48

## 2020-10-10 RX ADMIN — FUROSEMIDE 40 MG: 40 TABLET ORAL at 09:55

## 2020-10-10 RX ADMIN — TRAZODONE HYDROCHLORIDE 100 MG: 100 TABLET, FILM COATED ORAL at 21:45

## 2020-10-10 RX ADMIN — METOPROLOL TARTRATE 25 MG: 25 TABLET, FILM COATED ORAL at 21:47

## 2020-10-10 RX ADMIN — RISPERIDONE 1 MG: 1 TABLET ORAL at 09:55

## 2020-10-10 RX ADMIN — INSULIN LISPRO 2 UNITS: 100 INJECTION, SOLUTION INTRAVENOUS; SUBCUTANEOUS at 13:45

## 2020-10-10 RX ADMIN — ENOXAPARIN SODIUM 40 MG: 40 INJECTION SUBCUTANEOUS at 09:56

## 2020-10-10 RX ADMIN — METOPROLOL TARTRATE 25 MG: 25 TABLET, FILM COATED ORAL at 09:55

## 2020-10-10 RX ADMIN — GUAIFENESIN 600 MG: 600 TABLET, EXTENDED RELEASE ORAL at 09:55

## 2020-10-10 RX ADMIN — INSULIN LISPRO 2 UNITS: 100 INJECTION, SOLUTION INTRAVENOUS; SUBCUTANEOUS at 17:47

## 2020-10-10 RX ADMIN — LISINOPRIL 20 MG: 20 TABLET ORAL at 09:55

## 2020-10-10 RX ADMIN — LEVALBUTEROL HYDROCHLORIDE 1.25 MG: 1.25 SOLUTION, CONCENTRATE RESPIRATORY (INHALATION) at 07:24

## 2020-10-10 RX ADMIN — IPRATROPIUM BROMIDE 0.5 MG: 0.5 SOLUTION RESPIRATORY (INHALATION) at 14:09

## 2020-10-10 RX ADMIN — LEVALBUTEROL HYDROCHLORIDE 1.25 MG: 1.25 SOLUTION, CONCENTRATE RESPIRATORY (INHALATION) at 14:10

## 2020-10-10 RX ADMIN — LEVALBUTEROL HYDROCHLORIDE 1.25 MG: 1.25 SOLUTION, CONCENTRATE RESPIRATORY (INHALATION) at 19:35

## 2020-10-10 RX ADMIN — ATORVASTATIN CALCIUM 40 MG: 40 TABLET, FILM COATED ORAL at 17:47

## 2020-10-10 RX ADMIN — MAGNESIUM GLUCONATE 500 MG ORAL TABLET 400 MG: 500 TABLET ORAL at 17:47

## 2020-10-10 RX ADMIN — BUDESONIDE AND FORMOTEROL FUMARATE DIHYDRATE 2 PUFF: 160; 4.5 AEROSOL RESPIRATORY (INHALATION) at 09:56

## 2020-10-10 RX ADMIN — IPRATROPIUM BROMIDE 0.5 MG: 0.5 SOLUTION RESPIRATORY (INHALATION) at 07:24

## 2020-10-10 RX ADMIN — ASPIRIN 81 MG 81 MG: 81 TABLET ORAL at 09:55

## 2020-10-10 RX ADMIN — METHYLPREDNISOLONE SODIUM SUCCINATE 40 MG: 40 INJECTION, POWDER, FOR SOLUTION INTRAMUSCULAR; INTRAVENOUS at 09:55

## 2020-10-11 LAB
GLUCOSE SERPL-MCNC: 139 MG/DL (ref 65–140)
GLUCOSE SERPL-MCNC: 151 MG/DL (ref 65–140)
GLUCOSE SERPL-MCNC: 188 MG/DL (ref 65–140)
GLUCOSE SERPL-MCNC: 200 MG/DL (ref 65–140)

## 2020-10-11 PROCEDURE — 99232 SBSQ HOSP IP/OBS MODERATE 35: CPT | Performed by: NURSE PRACTITIONER

## 2020-10-11 PROCEDURE — 94760 N-INVAS EAR/PLS OXIMETRY 1: CPT

## 2020-10-11 PROCEDURE — 82948 REAGENT STRIP/BLOOD GLUCOSE: CPT

## 2020-10-11 PROCEDURE — 94640 AIRWAY INHALATION TREATMENT: CPT

## 2020-10-11 RX ORDER — LEVALBUTEROL 1.25 MG/.5ML
1.25 SOLUTION, CONCENTRATE RESPIRATORY (INHALATION) EVERY 6 HOURS PRN
Status: DISCONTINUED | OUTPATIENT
Start: 2020-10-11 | End: 2020-10-13

## 2020-10-11 RX ADMIN — LEVALBUTEROL HYDROCHLORIDE 1.25 MG: 1.25 SOLUTION, CONCENTRATE RESPIRATORY (INHALATION) at 19:20

## 2020-10-11 RX ADMIN — MAGNESIUM GLUCONATE 500 MG ORAL TABLET 400 MG: 500 TABLET ORAL at 16:29

## 2020-10-11 RX ADMIN — DOCUSATE SODIUM 100 MG: 100 CAPSULE, LIQUID FILLED ORAL at 20:22

## 2020-10-11 RX ADMIN — INSULIN LISPRO 2 UNITS: 100 INJECTION, SOLUTION INTRAVENOUS; SUBCUTANEOUS at 12:18

## 2020-10-11 RX ADMIN — IPRATROPIUM BROMIDE 0.5 MG: 0.5 SOLUTION RESPIRATORY (INHALATION) at 13:19

## 2020-10-11 RX ADMIN — ARIPIPRAZOLE 20 MG: 5 TABLET ORAL at 09:41

## 2020-10-11 RX ADMIN — METOPROLOL TARTRATE 25 MG: 25 TABLET, FILM COATED ORAL at 09:41

## 2020-10-11 RX ADMIN — IPRATROPIUM BROMIDE 0.5 MG: 0.5 SOLUTION RESPIRATORY (INHALATION) at 19:20

## 2020-10-11 RX ADMIN — LEVALBUTEROL HYDROCHLORIDE 1.25 MG: 1.25 SOLUTION, CONCENTRATE RESPIRATORY (INHALATION) at 07:31

## 2020-10-11 RX ADMIN — DOCUSATE SODIUM 100 MG: 100 CAPSULE, LIQUID FILLED ORAL at 09:42

## 2020-10-11 RX ADMIN — FUROSEMIDE 40 MG: 40 TABLET ORAL at 09:42

## 2020-10-11 RX ADMIN — GUAIFENESIN 600 MG: 600 TABLET, EXTENDED RELEASE ORAL at 16:30

## 2020-10-11 RX ADMIN — ENOXAPARIN SODIUM 40 MG: 40 INJECTION SUBCUTANEOUS at 09:40

## 2020-10-11 RX ADMIN — LEVALBUTEROL HYDROCHLORIDE 1.25 MG: 1.25 SOLUTION, CONCENTRATE RESPIRATORY (INHALATION) at 13:19

## 2020-10-11 RX ADMIN — ACETAMINOPHEN 650 MG: 325 TABLET, FILM COATED ORAL at 20:33

## 2020-10-11 RX ADMIN — LISINOPRIL 20 MG: 20 TABLET ORAL at 09:40

## 2020-10-11 RX ADMIN — SENNOSIDES 8.6 MG: 8.6 TABLET, FILM COATED ORAL at 09:42

## 2020-10-11 RX ADMIN — POLYETHYLENE GLYCOL 3350 17 G: 17 POWDER, FOR SOLUTION ORAL at 09:40

## 2020-10-11 RX ADMIN — RISPERIDONE 1 MG: 1 TABLET ORAL at 09:41

## 2020-10-11 RX ADMIN — GUAIFENESIN 600 MG: 600 TABLET, EXTENDED RELEASE ORAL at 09:42

## 2020-10-11 RX ADMIN — TRAZODONE HYDROCHLORIDE 100 MG: 100 TABLET, FILM COATED ORAL at 20:23

## 2020-10-11 RX ADMIN — IPRATROPIUM BROMIDE 0.5 MG: 0.5 SOLUTION RESPIRATORY (INHALATION) at 07:31

## 2020-10-11 RX ADMIN — ATORVASTATIN CALCIUM 40 MG: 40 TABLET, FILM COATED ORAL at 16:29

## 2020-10-11 RX ADMIN — SENNOSIDES 8.6 MG: 8.6 TABLET, FILM COATED ORAL at 16:29

## 2020-10-11 RX ADMIN — BUDESONIDE AND FORMOTEROL FUMARATE DIHYDRATE 2 PUFF: 160; 4.5 AEROSOL RESPIRATORY (INHALATION) at 16:30

## 2020-10-11 RX ADMIN — MAGNESIUM GLUCONATE 500 MG ORAL TABLET 400 MG: 500 TABLET ORAL at 09:42

## 2020-10-11 RX ADMIN — METOPROLOL TARTRATE 25 MG: 25 TABLET, FILM COATED ORAL at 20:22

## 2020-10-11 RX ADMIN — INSULIN LISPRO 2 UNITS: 100 INJECTION, SOLUTION INTRAVENOUS; SUBCUTANEOUS at 16:53

## 2020-10-11 RX ADMIN — BUDESONIDE AND FORMOTEROL FUMARATE DIHYDRATE 2 PUFF: 160; 4.5 AEROSOL RESPIRATORY (INHALATION) at 09:40

## 2020-10-11 RX ADMIN — ASPIRIN 81 MG 81 MG: 81 TABLET ORAL at 09:43

## 2020-10-12 LAB
GLUCOSE SERPL-MCNC: 136 MG/DL (ref 65–140)
GLUCOSE SERPL-MCNC: 139 MG/DL (ref 65–140)
GLUCOSE SERPL-MCNC: 156 MG/DL (ref 65–140)
GLUCOSE SERPL-MCNC: 208 MG/DL (ref 65–140)

## 2020-10-12 PROCEDURE — 97116 GAIT TRAINING THERAPY: CPT

## 2020-10-12 PROCEDURE — 82948 REAGENT STRIP/BLOOD GLUCOSE: CPT

## 2020-10-12 PROCEDURE — 97110 THERAPEUTIC EXERCISES: CPT

## 2020-10-12 PROCEDURE — 99232 SBSQ HOSP IP/OBS MODERATE 35: CPT | Performed by: NURSE PRACTITIONER

## 2020-10-12 RX ADMIN — GUAIFENESIN 600 MG: 600 TABLET, EXTENDED RELEASE ORAL at 18:44

## 2020-10-12 RX ADMIN — GUAIFENESIN 600 MG: 600 TABLET, EXTENDED RELEASE ORAL at 08:46

## 2020-10-12 RX ADMIN — DOCUSATE SODIUM 100 MG: 100 CAPSULE, LIQUID FILLED ORAL at 22:29

## 2020-10-12 RX ADMIN — FUROSEMIDE 40 MG: 40 TABLET ORAL at 08:45

## 2020-10-12 RX ADMIN — ENOXAPARIN SODIUM 40 MG: 40 INJECTION SUBCUTANEOUS at 08:45

## 2020-10-12 RX ADMIN — BUDESONIDE AND FORMOTEROL FUMARATE DIHYDRATE 2 PUFF: 160; 4.5 AEROSOL RESPIRATORY (INHALATION) at 08:46

## 2020-10-12 RX ADMIN — METOPROLOL TARTRATE 25 MG: 25 TABLET, FILM COATED ORAL at 22:30

## 2020-10-12 RX ADMIN — LISINOPRIL 20 MG: 20 TABLET ORAL at 08:45

## 2020-10-12 RX ADMIN — ARIPIPRAZOLE 20 MG: 5 TABLET ORAL at 08:45

## 2020-10-12 RX ADMIN — SENNOSIDES 8.6 MG: 8.6 TABLET, FILM COATED ORAL at 08:45

## 2020-10-12 RX ADMIN — POLYETHYLENE GLYCOL 3350 17 G: 17 POWDER, FOR SOLUTION ORAL at 08:45

## 2020-10-12 RX ADMIN — INSULIN LISPRO 2 UNITS: 100 INJECTION, SOLUTION INTRAVENOUS; SUBCUTANEOUS at 14:32

## 2020-10-12 RX ADMIN — TRAZODONE HYDROCHLORIDE 100 MG: 100 TABLET, FILM COATED ORAL at 22:29

## 2020-10-12 RX ADMIN — ATORVASTATIN CALCIUM 40 MG: 40 TABLET, FILM COATED ORAL at 18:44

## 2020-10-12 RX ADMIN — ASPIRIN 81 MG 81 MG: 81 TABLET ORAL at 08:45

## 2020-10-12 RX ADMIN — MAGNESIUM GLUCONATE 500 MG ORAL TABLET 400 MG: 500 TABLET ORAL at 08:45

## 2020-10-12 RX ADMIN — RISPERIDONE 1 MG: 1 TABLET ORAL at 08:45

## 2020-10-12 RX ADMIN — BUDESONIDE AND FORMOTEROL FUMARATE DIHYDRATE 2 PUFF: 160; 4.5 AEROSOL RESPIRATORY (INHALATION) at 18:44

## 2020-10-12 RX ADMIN — DOCUSATE SODIUM 100 MG: 100 CAPSULE, LIQUID FILLED ORAL at 08:45

## 2020-10-12 RX ADMIN — MAGNESIUM GLUCONATE 500 MG ORAL TABLET 400 MG: 500 TABLET ORAL at 18:44

## 2020-10-12 RX ADMIN — METOPROLOL TARTRATE 25 MG: 25 TABLET, FILM COATED ORAL at 08:46

## 2020-10-13 LAB
GLUCOSE SERPL-MCNC: 130 MG/DL (ref 65–140)
GLUCOSE SERPL-MCNC: 145 MG/DL (ref 65–140)
GLUCOSE SERPL-MCNC: 168 MG/DL (ref 65–140)
GLUCOSE SERPL-MCNC: 214 MG/DL (ref 65–140)

## 2020-10-13 PROCEDURE — 99232 SBSQ HOSP IP/OBS MODERATE 35: CPT | Performed by: NURSE PRACTITIONER

## 2020-10-13 PROCEDURE — 97535 SELF CARE MNGMENT TRAINING: CPT

## 2020-10-13 PROCEDURE — 82948 REAGENT STRIP/BLOOD GLUCOSE: CPT

## 2020-10-13 PROCEDURE — 97116 GAIT TRAINING THERAPY: CPT

## 2020-10-13 PROCEDURE — 97110 THERAPEUTIC EXERCISES: CPT

## 2020-10-13 RX ADMIN — ARIPIPRAZOLE 20 MG: 5 TABLET ORAL at 08:00

## 2020-10-13 RX ADMIN — BUDESONIDE AND FORMOTEROL FUMARATE DIHYDRATE 2 PUFF: 160; 4.5 AEROSOL RESPIRATORY (INHALATION) at 08:02

## 2020-10-13 RX ADMIN — ASPIRIN 81 MG 81 MG: 81 TABLET ORAL at 08:00

## 2020-10-13 RX ADMIN — LISINOPRIL 20 MG: 20 TABLET ORAL at 08:01

## 2020-10-13 RX ADMIN — GUAIFENESIN 600 MG: 600 TABLET, EXTENDED RELEASE ORAL at 17:09

## 2020-10-13 RX ADMIN — POLYETHYLENE GLYCOL 3350 17 G: 17 POWDER, FOR SOLUTION ORAL at 17:10

## 2020-10-13 RX ADMIN — FUROSEMIDE 40 MG: 40 TABLET ORAL at 08:00

## 2020-10-13 RX ADMIN — INSULIN LISPRO 2 UNITS: 100 INJECTION, SOLUTION INTRAVENOUS; SUBCUTANEOUS at 17:12

## 2020-10-13 RX ADMIN — RISPERIDONE 1 MG: 1 TABLET ORAL at 08:01

## 2020-10-13 RX ADMIN — DOCUSATE SODIUM 100 MG: 100 CAPSULE, LIQUID FILLED ORAL at 21:44

## 2020-10-13 RX ADMIN — DOCUSATE SODIUM 100 MG: 100 CAPSULE, LIQUID FILLED ORAL at 08:01

## 2020-10-13 RX ADMIN — ENOXAPARIN SODIUM 40 MG: 40 INJECTION SUBCUTANEOUS at 08:02

## 2020-10-13 RX ADMIN — BUDESONIDE AND FORMOTEROL FUMARATE DIHYDRATE 2 PUFF: 160; 4.5 AEROSOL RESPIRATORY (INHALATION) at 17:10

## 2020-10-13 RX ADMIN — TRAZODONE HYDROCHLORIDE 100 MG: 100 TABLET, FILM COATED ORAL at 21:43

## 2020-10-13 RX ADMIN — ATORVASTATIN CALCIUM 40 MG: 40 TABLET, FILM COATED ORAL at 17:09

## 2020-10-13 RX ADMIN — MAGNESIUM GLUCONATE 500 MG ORAL TABLET 400 MG: 500 TABLET ORAL at 08:00

## 2020-10-13 RX ADMIN — METOPROLOL TARTRATE 25 MG: 25 TABLET, FILM COATED ORAL at 08:02

## 2020-10-13 RX ADMIN — GUAIFENESIN 600 MG: 600 TABLET, EXTENDED RELEASE ORAL at 08:01

## 2020-10-13 RX ADMIN — ACETAMINOPHEN 650 MG: 325 TABLET, FILM COATED ORAL at 08:09

## 2020-10-13 RX ADMIN — MAGNESIUM GLUCONATE 500 MG ORAL TABLET 400 MG: 500 TABLET ORAL at 17:09

## 2020-10-13 RX ADMIN — METOPROLOL TARTRATE 25 MG: 25 TABLET, FILM COATED ORAL at 21:43

## 2020-10-14 VITALS
SYSTOLIC BLOOD PRESSURE: 114 MMHG | BODY MASS INDEX: 38.58 KG/M2 | RESPIRATION RATE: 18 BRPM | WEIGHT: 275.57 LBS | TEMPERATURE: 98.6 F | HEART RATE: 86 BPM | OXYGEN SATURATION: 98 % | HEIGHT: 71 IN | DIASTOLIC BLOOD PRESSURE: 72 MMHG

## 2020-10-14 LAB
GLUCOSE SERPL-MCNC: 125 MG/DL (ref 65–140)
GLUCOSE SERPL-MCNC: 143 MG/DL (ref 65–140)
GLUCOSE SERPL-MCNC: 215 MG/DL (ref 65–140)
SARS-COV-2 RNA RESP QL NAA+PROBE: NEGATIVE

## 2020-10-14 PROCEDURE — 99239 HOSP IP/OBS DSCHRG MGMT >30: CPT | Performed by: NURSE PRACTITIONER

## 2020-10-14 PROCEDURE — 82948 REAGENT STRIP/BLOOD GLUCOSE: CPT

## 2020-10-14 PROCEDURE — 87635 SARS-COV-2 COVID-19 AMP PRB: CPT | Performed by: NURSE PRACTITIONER

## 2020-10-14 RX ORDER — SENNOSIDES 8.6 MG
8.6 TABLET ORAL 2 TIMES DAILY
Refills: 0
Start: 2020-10-14

## 2020-10-14 RX ORDER — MAGNESIUM HYDROXIDE/ALUMINUM HYDROXICE/SIMETHICONE 120; 1200; 1200 MG/30ML; MG/30ML; MG/30ML
30 SUSPENSION ORAL EVERY 6 HOURS PRN
Qty: 355 ML | Refills: 0
Start: 2020-10-14

## 2020-10-14 RX ADMIN — RISPERIDONE 1 MG: 1 TABLET ORAL at 08:32

## 2020-10-14 RX ADMIN — MAGNESIUM GLUCONATE 500 MG ORAL TABLET 400 MG: 500 TABLET ORAL at 08:32

## 2020-10-14 RX ADMIN — LISINOPRIL 20 MG: 20 TABLET ORAL at 08:33

## 2020-10-14 RX ADMIN — FUROSEMIDE 40 MG: 40 TABLET ORAL at 08:32

## 2020-10-14 RX ADMIN — ASPIRIN 81 MG 81 MG: 81 TABLET ORAL at 08:35

## 2020-10-14 RX ADMIN — POLYETHYLENE GLYCOL 3350 17 G: 17 POWDER, FOR SOLUTION ORAL at 08:36

## 2020-10-14 RX ADMIN — ENOXAPARIN SODIUM 40 MG: 40 INJECTION SUBCUTANEOUS at 08:35

## 2020-10-14 RX ADMIN — GUAIFENESIN 600 MG: 600 TABLET, EXTENDED RELEASE ORAL at 08:35

## 2020-10-14 RX ADMIN — ARIPIPRAZOLE 20 MG: 5 TABLET ORAL at 08:31

## 2020-10-14 RX ADMIN — INSULIN LISPRO 4 UNITS: 100 INJECTION, SOLUTION INTRAVENOUS; SUBCUTANEOUS at 15:54

## 2020-10-14 RX ADMIN — METOPROLOL TARTRATE 25 MG: 25 TABLET, FILM COATED ORAL at 08:37

## 2020-10-14 RX ADMIN — SENNOSIDES 8.6 MG: 8.6 TABLET, FILM COATED ORAL at 08:37

## 2020-10-14 RX ADMIN — DOCUSATE SODIUM 100 MG: 100 CAPSULE, LIQUID FILLED ORAL at 08:31

## 2020-10-14 RX ADMIN — ATORVASTATIN CALCIUM 40 MG: 40 TABLET, FILM COATED ORAL at 15:55

## 2020-10-14 RX ADMIN — BUDESONIDE AND FORMOTEROL FUMARATE DIHYDRATE 2 PUFF: 160; 4.5 AEROSOL RESPIRATORY (INHALATION) at 08:38

## 2021-08-20 NOTE — ED NOTES
Provider at bedside       Jim Fitzpatrick RN  79/51/90 7364 Doxepin Counseling:  Patient advised that the medication is sedating and not to drive a car after taking this medication. Patient informed of potential adverse effects including but not limited to dry mouth, urinary retention, and blurry vision.  The patient verbalized understanding of the proper use and possible adverse effects of doxepin.  All of the patient's questions and concerns were addressed.

## 2021-08-24 ENCOUNTER — HOSPITAL ENCOUNTER (EMERGENCY)
Facility: HOSPITAL | Age: 71
Discharge: HOME | End: 2021-08-24
Attending: EMERGENCY MEDICINE
Payer: COMMERCIAL

## 2021-08-24 ENCOUNTER — APPOINTMENT (EMERGENCY)
Dept: RADIOLOGY | Facility: HOSPITAL | Age: 71
End: 2021-08-24
Payer: COMMERCIAL

## 2021-08-24 VITALS
BODY MASS INDEX: 38.92 KG/M2 | HEIGHT: 71 IN | WEIGHT: 278 LBS | HEART RATE: 72 BPM | DIASTOLIC BLOOD PRESSURE: 117 MMHG | SYSTOLIC BLOOD PRESSURE: 184 MMHG | TEMPERATURE: 99 F | OXYGEN SATURATION: 96 % | RESPIRATION RATE: 15 BRPM

## 2021-08-24 DIAGNOSIS — M54.10 RADICULOPATHY: Primary | ICD-10-CM

## 2021-08-24 LAB
ALBUMIN SERPL BCP-MCNC: 4 G/DL (ref 3.5–5)
ALP SERPL-CCNC: 55 U/L (ref 46–116)
ALT SERPL W P-5'-P-CCNC: 36 U/L (ref 12–78)
ANION GAP SERPL CALCULATED.3IONS-SCNC: 7 MMOL/L (ref 4–13)
AST SERPL W P-5'-P-CCNC: 18 U/L (ref 5–45)
BASOPHILS # BLD MANUAL: 0.07 THOUSAND/UL (ref 0–0.1)
BASOPHILS NFR MAR MANUAL: 1 % (ref 0–1)
BILIRUB SERPL-MCNC: 0.34 MG/DL (ref 0.2–1)
BUN SERPL-MCNC: 10 MG/DL (ref 5–25)
CALCIUM SERPL-MCNC: 9.2 MG/DL (ref 8.3–10.1)
CHLORIDE SERPL-SCNC: 105 MMOL/L (ref 100–108)
CO2 SERPL-SCNC: 28 MMOL/L (ref 21–32)
CREAT SERPL-MCNC: 1.18 MG/DL (ref 0.6–1.3)
EOSINOPHIL # BLD MANUAL: 0.07 THOUSAND/UL (ref 0–0.4)
EOSINOPHIL NFR BLD MANUAL: 1 % (ref 0–6)
ERYTHROCYTE [DISTWIDTH] IN BLOOD BY AUTOMATED COUNT: 13.7 % (ref 11.6–15.1)
GFR SERPL CREATININE-BSD FRML MDRD: 71 ML/MIN/1.73SQ M
GLUCOSE SERPL-MCNC: 134 MG/DL (ref 65–140)
HCT VFR BLD AUTO: 37.9 % (ref 36.5–49.3)
HGB BLD-MCNC: 11.5 G/DL (ref 12–17)
LYMPHOCYTES # BLD AUTO: 3.27 THOUSAND/UL (ref 0.6–4.47)
LYMPHOCYTES # BLD AUTO: 50 % (ref 14–44)
MCH RBC QN AUTO: 23.9 PG (ref 26.8–34.3)
MCHC RBC AUTO-ENTMCNC: 30.3 G/DL (ref 31.4–37.4)
MCV RBC AUTO: 79 FL (ref 82–98)
MONOCYTES # BLD AUTO: 0.39 THOUSAND/UL (ref 0–1.22)
MONOCYTES NFR BLD: 6 % (ref 4–12)
NEUTROPHILS # BLD MANUAL: 2.75 THOUSAND/UL (ref 1.85–7.62)
NEUTS BAND NFR BLD MANUAL: 2 % (ref 0–8)
NEUTS SEG NFR BLD AUTO: 40 % (ref 43–75)
PLATELET # BLD AUTO: 175 THOUSANDS/UL (ref 149–390)
PLATELET BLD QL SMEAR: ADEQUATE
PMV BLD AUTO: 13.1 FL (ref 8.9–12.7)
POTASSIUM SERPL-SCNC: 4.3 MMOL/L (ref 3.5–5.3)
PROT SERPL-MCNC: 8 G/DL (ref 6.4–8.2)
RBC # BLD AUTO: 4.82 MILLION/UL (ref 3.88–5.62)
RBC MORPH BLD: NORMAL
SODIUM SERPL-SCNC: 140 MMOL/L (ref 136–145)
TROPONIN I SERPL-MCNC: <0.02 NG/ML
URATE SERPL-MCNC: 7.1 MG/DL (ref 4.2–8)
WBC # BLD AUTO: 6.54 THOUSAND/UL (ref 4.31–10.16)

## 2021-08-24 PROCEDURE — 73080 X-RAY EXAM OF ELBOW: CPT

## 2021-08-24 PROCEDURE — 96374 THER/PROPH/DIAG INJ IV PUSH: CPT

## 2021-08-24 PROCEDURE — 36415 COLL VENOUS BLD VENIPUNCTURE: CPT | Performed by: EMERGENCY MEDICINE

## 2021-08-24 PROCEDURE — 99285 EMERGENCY DEPT VISIT HI MDM: CPT | Performed by: EMERGENCY MEDICINE

## 2021-08-24 PROCEDURE — 84550 ASSAY OF BLOOD/URIC ACID: CPT | Performed by: EMERGENCY MEDICINE

## 2021-08-24 PROCEDURE — 71045 X-RAY EXAM CHEST 1 VIEW: CPT

## 2021-08-24 PROCEDURE — 73030 X-RAY EXAM OF SHOULDER: CPT

## 2021-08-24 PROCEDURE — 80053 COMPREHEN METABOLIC PANEL: CPT | Performed by: EMERGENCY MEDICINE

## 2021-08-24 PROCEDURE — 85027 COMPLETE CBC AUTOMATED: CPT | Performed by: EMERGENCY MEDICINE

## 2021-08-24 PROCEDURE — 84484 ASSAY OF TROPONIN QUANT: CPT | Performed by: EMERGENCY MEDICINE

## 2021-08-24 PROCEDURE — 93005 ELECTROCARDIOGRAM TRACING: CPT

## 2021-08-24 PROCEDURE — 85007 BL SMEAR W/DIFF WBC COUNT: CPT | Performed by: EMERGENCY MEDICINE

## 2021-08-24 PROCEDURE — 99284 EMERGENCY DEPT VISIT MOD MDM: CPT

## 2021-08-24 RX ORDER — MELOXICAM 15 MG/1
15 TABLET ORAL DAILY
Qty: 14 TABLET | Refills: 0 | Status: SHIPPED | OUTPATIENT
Start: 2021-08-24

## 2021-08-24 RX ORDER — KETOROLAC TROMETHAMINE 30 MG/ML
15 INJECTION, SOLUTION INTRAMUSCULAR; INTRAVENOUS ONCE
Status: COMPLETED | OUTPATIENT
Start: 2021-08-24 | End: 2021-08-24

## 2021-08-24 RX ADMIN — KETOROLAC TROMETHAMINE 15 MG: 30 INJECTION, SOLUTION INTRAMUSCULAR; INTRAVENOUS at 11:16

## 2021-08-24 NOTE — ED PROVIDER NOTES
History  Chief Complaint   Patient presents with    Arm Pain     Pt reports left arm pain and swelling for two days   Arm Swelling     Patient is a 64-year-old male  He presents complaining with arm pain which is been present for about 1 week  He denies any injury  Patient reports he cannot use the arm because it hurts so bad  Patient reports that it even hurts if something just touches the arm  He denies weakness or paralysis  No numbness or paresthesias  Denies neck pain  Patient reports most of the pain is in the left shoulder and left elbow  Clearly worse with any movement  Patient does have a history of diabetes, hypertension and hyperlipidemia  There is obesity  He is a former smoker  He does have a history of angina and does report having chest pressure  This is not gotten any worse recently  No shortness of breath  No fever or chills  No nausea or diaphoresis  No exertional complaints  Patient's coronaries were last evaluated back in May of 2020  This study was normal   Patient might have had a cardiac catheterization in the past   I did not see any record of this  Patient denies any stents  He does have a history of schizophrenia with tardive dyskinesia  He has chronic posttraumatic stress disorder and anxiety  There is a history of congestive heart failure  He denies any history of gout  There is a history of COPD  Symptoms are moderately severe  Worse with movement of the arm  Better with keeping the arm still  Prior to Admission Medications   Prescriptions Last Dose Informant Patient Reported? Taking?    ARIPiprazole (ABILIFY) 20 MG tablet   No No   Sig: Take 1 tablet (20 mg total) by mouth daily   Multiple Vitamins-Iron (MULTIVITAMIN/IRON PO)   Yes No   Sig: TAKE ONE CAP/TAB BY MOUTH EVERY MORNING   aluminum-magnesium hydroxide-simethicone (MYLANTA) 200-200-20 mg/5 mL suspension   No No   Sig: Take 30 mL by mouth every 6 (six) hours as needed for indigestion or heartburn   aspirin 81 mg chewable tablet   No No   Sig: Chew 1 tablet (81 mg total) daily   atorvastatin (LIPITOR) 40 mg tablet   No No   Sig: Take 1 tablet (40 mg total) by mouth daily with dinner And stop Pravastatin when starting this medication   budesonide-formoterol (SYMBICORT) 160-4 5 mcg/act inhaler   Yes No   Sig: INHALE 2 PUFFS BY MOUTH TWICE A DAY *RINSE MOUTH WITH WATER AND SPIT*   docusate sodium (COLACE) 100 mg capsule   No No   Sig: Take 1 capsule (100 mg total) by mouth 2 (two) times a day as needed for constipation   furosemide (Lasix) 40 mg tablet   Yes No   Sig: TAKE ONE TABLET BY MOUTH EVERY MORNING FOR FLUID/SWELLING, HEART, OR BLOOD PRESSURE   ipratropium-albuterol (DUO-NEB) 0 5-2 5 mg/3 mL nebulizer solution   No No   Sig: Take 1 vial (3 mL total) by nebulization 2 (two) times a day   lisinopril (ZESTRIL) 20 mg tablet   No No   Sig: Take 1 tablet (20 mg total) by mouth daily   losartan (COZAAR) 100 MG tablet   Yes No   Sig: Take 100 mg by mouth   magnesium oxide (MAG-OX) 400 mg   No No   Sig: Take 1 tablet (400 mg total) by mouth 2 (two) times a day   metFORMIN (GLUCOPHAGE) 500 mg tablet   No No   Sig: Take 1 tablet (500 mg total) by mouth 2 (two) times a day with meals   metoprolol tartrate (LOPRESSOR) 25 mg tablet   No No   Sig: Take 1 tablet (25 mg total) by mouth every 12 (twelve) hours   polyethylene glycol (MIRALAX) 17 g packet   No No   Sig: Take 17 g by mouth daily as needed (constipation)   pravastatin (PRAVACHOL) 40 mg tablet   No No   Sig: Take 1 tablet (40 mg total) by mouth daily with dinner   predniSONE 10 mg tablet   No No   Sig: Take 4 tablets for 2 days, then 3 tablets for 2 days, then 2 tablets for 4 days, then 1 tablet for 4 days then stop   risperiDONE (RisperDAL) 1 mg tablet   Yes No   Sig: TAKE ONE TABLET BY MOUTH TWICE A DAY FOR MOOD   senna (SENOKOT) 8 6 mg   No No   Sig: Take 1 tablet (8 6 mg total) by mouth 2 (two) times a day   traZODone (DESYREL) 100 mg tablet Yes No   Sig: Take 100 mg by mouth      Facility-Administered Medications: None       Past Medical History:   Diagnosis Date    Anxiety     [facility]     [facility]chronic obstructive [facility] disease[facility]     Depression     Diabetes mellitus [facility]     Enlarged prostate     Hallucination     Hyperlipidemia     Hypertension     Memory loss     Panic attack     Panic disorder     Psychiatric disorder     Psychiatric illness     Psychosis ([place] [address] )     PTSD (post-traumatic stress disorder)     Schizoaffective disorder ([place] [address] )     Schizophrenia ([place] [address] )        History reviewed  No pertinent surgical history  History reviewed  No pertinent family history  I have reviewed and agree with the history as documented  E-Cigarette/Vaping    E-Cigarette Use Never User      E-Cigarette/Vaping Substances     Social History     Tobacco Use    Smoking status: Former Smoker     Packs/day: 2 00     Years: 0 50     Pack years: 1 00     Types: Cigarettes     Quit date:      Years since quittin 6    Smokeless tobacco: Never Used    Tobacco comment: quit in    Vaping Use    Vaping Use: Never used   Substance Use Topics    Alcohol use: Not Currently    Drug use: Not Currently       Review of Systems   Constitutional: Negative for chills and fever  HENT: Negative for rhinorrhea and sore throat  Eyes: Negative for pain, redness and visual disturbance  Respiratory: Negative for cough and shortness of breath  Cardiovascular: Positive for chest pain and leg swelling  Gastrointestinal: Positive for constipation  Negative for abdominal pain, diarrhea and vomiting  Endocrine: Negative for polydipsia and polyuria  Genitourinary: Negative for dysuria, frequency and hematuria  Musculoskeletal: Positive for neck pain  Negative for back pain  Skin: Negative for rash and wound  Allergic/Immunologic: Negative for immunocompromised state     Neurological: Negative for weakness, numbness and headaches  Psychiatric/Behavioral: Negative for hallucinations and suicidal ideas  The patient is nervous/anxious  All other systems reviewed and are negative  Physical Exam  Physical Exam  Vitals reviewed  Constitutional:       General: He is not in acute distress  Appearance: He is obese  He is not toxic-appearing  HENT:      Head: Normocephalic and atraumatic  Nose: Nose normal       Mouth/Throat:      Mouth: Mucous membranes are moist    Eyes:      General:         Right eye: No discharge  Left eye: No discharge  Conjunctiva/sclera: Conjunctivae normal    Cardiovascular:      Rate and Rhythm: Normal rate and regular rhythm  Pulses: Normal pulses  Heart sounds: Normal heart sounds  No murmur heard  No friction rub  No gallop  Pulmonary:      Effort: Pulmonary effort is normal  No respiratory distress  Breath sounds: Normal breath sounds  No stridor  No wheezing, rhonchi or rales  Abdominal:      General: Bowel sounds are normal  There is no distension  Palpations: Abdomen is soft  Tenderness: There is no abdominal tenderness  There is no right CVA tenderness, left CVA tenderness, guarding or rebound  Musculoskeletal:         General: No swelling, tenderness, deformity or signs of injury  Cervical back: Normal range of motion and neck supple  No rigidity or tenderness  Right lower leg: No edema  Left lower leg: No edema  Comments: No calf pain or unilateral leg swelling  There is moderately severe pain with any attempts to move the left arm  There is no localizing tenderness  Worse to the pain seems to be with movement of the left shoulder and left elbow  Neurovascular exam in the left upper extremity is normal    Skin:     General: Skin is warm and dry  Coloration: Skin is not jaundiced or pale  Findings: No bruising, erythema or rash     Neurological:      General: No focal deficit present  Mental Status: He is alert and oriented to person, place, and time  Cranial Nerves: No facial asymmetry  Sensory: No sensory deficit  Motor: Motor function is intact     Psychiatric:         Mood and Affect: Mood normal          Behavior: Behavior normal          Vital Signs  ED Triage Vitals [08/24/21 0957]   Temperature Pulse Respirations Blood Pressure SpO2   99 °F (37 2 °C) 75 20 169/81 98 %      Temp Source Heart Rate Source Patient Position - Orthostatic VS BP Location FiO2 (%)   Oral Monitor Lying Left arm --      Pain Score       Worst Possible Pain           Vitals:    08/24/21 0957   BP: 169/81   Pulse: 75   Patient Position - Orthostatic VS: Lying         Visual Acuity      ED Medications  Medications   ketorolac (TORADOL) injection 15 mg (15 mg Intravenous Given 8/24/21 1116)       Diagnostic Studies  Results Reviewed     Procedure Component Value Units Date/Time    CBC and differential [[id number]]  (Abnormal) Collected: 08/24/21 1116    Lab Status: Final result Specimen: Blood from Arm, Right Updated: 08/24/21 1149     WBC 6 54 Thousand/uL      RBC 4 82 Million/uL      Hemoglobin 11 5 g/dL      Hematocrit 37 9 %      MCV 79 fL      MCH 23 9 pg      MCHC 30 3 g/dL      RDW 13 7 %      MPV 13 1 fL      Platelets 235 Thousands/uL     Manual Differential(PHLEBS Do Not Order) [[id number]]  (Abnormal) Collected: 08/24/21 1116    Lab Status: Final result Specimen: Blood from Arm, Right Updated: 08/24/21 1149     Segmented % 40 %      Bands % 2 %      Lymphocytes % 50 %      Monocytes % 6 %      Eosinophils, % 1 %      Basophils % 1 %      Absolute Neutrophils 2 75 Thousand/uL      Lymphocytes Absolute 3 27 Thousand/uL      Monocytes Absolute 0 39 Thousand/uL      Eosinophils Absolute 0 07 Thousand/uL      Basophils Absolute 0 07 Thousand/uL      Total Counted --     RBC Morphology Normal     Platelet Estimate Adequate    Troponin I [[id number]]  (Normal) Collected: 08/24/21 1116 Lab Status: Final result Specimen: Blood from Arm, Right Updated: 08/24/21 1147     Troponin I <0 02 ng/mL     Uric acid [[id number]]  (Normal) Collected: 08/24/21 1116    Lab Status: Final result Specimen: Blood from Arm, Right Updated: 08/24/21 1144     Uric Acid 7 1 mg/dL     Comprehensive metabolic panel [[id number]] Collected: 08/24/21 1116    Lab Status: Final result Specimen: Blood from Arm, Right Updated: 08/24/21 1144     Sodium 140 mmol/L      Potassium 4 3 mmol/L      Chloride 105 mmol/L      CO2 28 mmol/L      ANION GAP 7 mmol/L      BUN 10 mg/dL      Creatinine 1 18 mg/dL      Glucose 134 mg/dL      Calcium 9 2 mg/dL      AST 18 U/L      ALT 36 U/L      Alkaline Phosphatase 55 U/L      Total Protein 8 0 g/dL      Albumin 4 0 g/dL      Total Bilirubin 0 34 mg/dL      eGFR 71 ml/min/1 73sq m     Narrative:      Vick guidelines for Chronic Kidney Disease (CKD):     Stage 1 with normal or high GFR (GFR > 90 mL/min/1 73 square meters)    Stage 2 Mild CKD (GFR = 60-89 mL/min/1 73 square meters)    Stage 3A Moderate CKD (GFR = 45-59 mL/min/1 73 square meters)    Stage 3B Moderate CKD (GFR = 30-44 mL/min/1 73 square meters)    Stage 4 Severe CKD (GFR = 15-29 mL/min/1 73 square meters)    Stage 5 End Stage CKD (GFR <15 mL/min/1 73 square meters)  Note: GFR calculation is accurate only with a steady state creatinine                 XR chest 1 view portable   ED Interpretation by Hilda Wang MD (08/24 7260)   No infiltrates  No CHF        XR shoulder 2+ views LEFT   ED Interpretation by Hilda Wang MD (08/24 1236)   No fracture or dislocation      XR elbow 3+ views LEFT   ED Interpretation by Hilda Wang MD (08/24 4342)   No fracture or dislocation                 Procedures  ECG 12 Lead Documentation Only    Date/Time: 8/24/2021 11:52 AM  Performed by: Hilda Wang MD  Authorized by: Hilda Wang MD     ECG reviewed by me, the ED Provider: yes    Patient location:  ED  Interpretation:     Interpretation: normal    Rate:     ECG rate assessment: normal    Rhythm:     Rhythm: sinus rhythm    Ectopy:     Ectopy: none    QRS:     QRS axis:  Normal  Conduction:     Conduction: normal    ST segments:     ST segments:  Normal  T waves:     T waves: normal               ED Course                             SBIRT 22yo+      Most Recent Value   SBIRT (22 yo +)   In order to provide better care to our patients, we are screening all of our patients for alcohol and drug use  Would it be okay to ask you these screening questions? Yes Filed at: 08/24/2021 1038   Initial Alcohol Screen: US AUDIT-C    1  How often do you have a drink containing alcohol?  0 Filed at: 08/24/2021 1038   2  How many drinks containing alcohol do you have on a typical day you are drinking? 0 Filed at: 08/24/2021 1038   3a  Male UNDER 65: How often do you have five or more drinks on one occasion? 0 Filed at: 08/24/2021 1038   3b  FEMALE Any Age, or MALE 65+: How often do you have 4 or more drinks on one occassion? 0 Filed at: 08/24/2021 1038   Audit-C Score  0 Filed at: 08/24/2021 1038   COMFORT: How many times in the past year have you    Used an illegal drug or used a prescription medication for non-medical reasons? Never Filed at: 08/24/2021 1038                    MDM  Number of Diagnoses or Management Options  Diagnosis management comments: This is not cardiac  This is clearly musculoskeletal   EKG is without ischemia  Troponin is negative  This is not dissection  No migration or bad thoracic pain  This is not a stroke  Neurologic exam is normal   Gout was considered but is less likely  I suspect that this is more of a cervical radiculopathy  Imaging of shoulder and elbow were negative for fracture dislocation  Will treat with an anti-inflammatory for now and refer back to his primary MD for further evaluation and treatment         Amount and/or Complexity of Data Reviewed  Clinical lab tests: ordered and reviewed  Tests in the radiology section of CPT®: ordered and reviewed  Independent visualization of images, tracings, or specimens: yes        Disposition  Final diagnoses:   Radiculopathy     Time reflects when diagnosis was documented in both MDM as applicable and the Disposition within this note     Time User Action Codes Description Comment    8/24/2021 12:54 PM Jesse Crocker Add [D84 40] Radiculopathy       ED Disposition     ED Disposition Condition Date/Time Comment    Discharge Stable Tue Aug 24, 2021 12:54 PM Pura Castellanos discharge to home/self care  Follow-up Information     Follow up With Specialties Details Why Contact Info    Follow-up with your family doctor in 1 week for re-evaluation              Patient's Medications   Discharge Prescriptions    MELOXICAM (MOBIC) 15 MG TABLET    Take 1 tablet (15 mg total) by mouth daily       Start Date: 8/24/2021 End Date: --       Order Dose: 15 mg       Quantity: 14 tablet    Refills: 0     No discharge procedures on file      PDMP Review     None          ED Provider  Electronically Signed by           Donna Martinez MD  08/24/21 7650

## 2021-08-25 LAB
ATRIAL RATE: 73 BPM
P AXIS: 50 DEGREES
PR INTERVAL: 162 MS
QRS AXIS: 35 DEGREES
QRSD INTERVAL: 84 MS
QT INTERVAL: 374 MS
QTC INTERVAL: 412 MS
T WAVE AXIS: 35 DEGREES
VENTRICULAR RATE: 73 BPM

## 2021-08-25 PROCEDURE — 93010 ELECTROCARDIOGRAM REPORT: CPT | Performed by: INTERNAL MEDICINE

## 2022-04-28 ENCOUNTER — APPOINTMENT (EMERGENCY)
Dept: CT IMAGING | Facility: HOSPITAL | Age: 72
End: 2022-04-28
Payer: COMMERCIAL

## 2022-04-28 ENCOUNTER — HOSPITAL ENCOUNTER (EMERGENCY)
Facility: HOSPITAL | Age: 72
Discharge: HOME | End: 2022-04-29
Attending: EMERGENCY MEDICINE | Admitting: EMERGENCY MEDICINE
Payer: COMMERCIAL

## 2022-04-28 DIAGNOSIS — R10.30 LOWER ABDOMINAL PAIN: Primary | ICD-10-CM

## 2022-04-28 DIAGNOSIS — M54.50 ACUTE BILATERAL LOW BACK PAIN WITHOUT SCIATICA: ICD-10-CM

## 2022-04-28 LAB
ALBUMIN SERPL BCP-MCNC: 3.9 G/DL (ref 3.5–5)
ALP SERPL-CCNC: 48 U/L (ref 46–116)
ALT SERPL W P-5'-P-CCNC: 32 U/L (ref 12–78)
ANION GAP SERPL CALCULATED.3IONS-SCNC: 6 MMOL/L (ref 4–13)
AST SERPL W P-5'-P-CCNC: 17 U/L (ref 5–45)
BASOPHILS # BLD AUTO: 0.05 THOUSANDS/ΜL (ref 0–0.1)
BASOPHILS NFR BLD AUTO: 1 % (ref 0–1)
BILIRUB SERPL-MCNC: 0.35 MG/DL (ref 0.2–1)
BUN SERPL-MCNC: 12 MG/DL (ref 5–25)
CALCIUM SERPL-MCNC: 9.1 MG/DL (ref 8.3–10.1)
CHLORIDE SERPL-SCNC: 104 MMOL/L (ref 100–108)
CO2 SERPL-SCNC: 28 MMOL/L (ref 21–32)
CREAT SERPL-MCNC: 1.15 MG/DL (ref 0.6–1.3)
EOSINOPHIL # BLD AUTO: 0.24 THOUSAND/ΜL (ref 0–0.61)
EOSINOPHIL NFR BLD AUTO: 3 % (ref 0–6)
ERYTHROCYTE [DISTWIDTH] IN BLOOD BY AUTOMATED COUNT: 13.7 % (ref 11.6–15.1)
GFR SERPL CREATININE-BSD FRML MDRD: 62 ML/MIN/1.73SQ M
GLUCOSE SERPL-MCNC: 163 MG/DL (ref 65–140)
HCT VFR BLD AUTO: 37.2 % (ref 36.5–49.3)
HGB BLD-MCNC: 11.6 G/DL (ref 12–17)
IMM GRANULOCYTES # BLD AUTO: 0.02 THOUSAND/UL (ref 0–0.2)
IMM GRANULOCYTES NFR BLD AUTO: 0 % (ref 0–2)
LIPASE SERPL-CCNC: 41 U/L (ref 73–393)
LYMPHOCYTES # BLD AUTO: 3.66 THOUSANDS/ΜL (ref 0.6–4.47)
LYMPHOCYTES NFR BLD AUTO: 43 % (ref 14–44)
MCH RBC QN AUTO: 24.2 PG (ref 26.8–34.3)
MCHC RBC AUTO-ENTMCNC: 31.2 G/DL (ref 31.4–37.4)
MCV RBC AUTO: 78 FL (ref 82–98)
MONOCYTES # BLD AUTO: 0.55 THOUSAND/ΜL (ref 0.17–1.22)
MONOCYTES NFR BLD AUTO: 6 % (ref 4–12)
NEUTROPHILS # BLD AUTO: 4.07 THOUSANDS/ΜL (ref 1.85–7.62)
NEUTS SEG NFR BLD AUTO: 47 % (ref 43–75)
NRBC BLD AUTO-RTO: 0 /100 WBCS
PLATELET # BLD AUTO: 175 THOUSANDS/UL (ref 149–390)
PMV BLD AUTO: 13.4 FL (ref 8.9–12.7)
POTASSIUM SERPL-SCNC: 4.2 MMOL/L (ref 3.5–5.3)
PROT SERPL-MCNC: 7.8 G/DL (ref 6.4–8.2)
RBC # BLD AUTO: 4.79 MILLION/UL (ref 3.88–5.62)
SODIUM SERPL-SCNC: 138 MMOL/L (ref 136–145)
WBC # BLD AUTO: 8.59 THOUSAND/UL (ref 4.31–10.16)

## 2022-04-28 PROCEDURE — 99284 EMERGENCY DEPT VISIT MOD MDM: CPT | Performed by: PHYSICIAN ASSISTANT

## 2022-04-28 PROCEDURE — 99284 EMERGENCY DEPT VISIT MOD MDM: CPT

## 2022-04-28 PROCEDURE — 83690 ASSAY OF LIPASE: CPT | Performed by: PHYSICIAN ASSISTANT

## 2022-04-28 PROCEDURE — 36415 COLL VENOUS BLD VENIPUNCTURE: CPT | Performed by: PHYSICIAN ASSISTANT

## 2022-04-28 PROCEDURE — 74177 CT ABD & PELVIS W/CONTRAST: CPT

## 2022-04-28 PROCEDURE — 85025 COMPLETE CBC W/AUTO DIFF WBC: CPT | Performed by: PHYSICIAN ASSISTANT

## 2022-04-28 PROCEDURE — 96374 THER/PROPH/DIAG INJ IV PUSH: CPT

## 2022-04-28 PROCEDURE — 80053 COMPREHEN METABOLIC PANEL: CPT | Performed by: PHYSICIAN ASSISTANT

## 2022-04-28 PROCEDURE — G1004 CDSM NDSC: HCPCS

## 2022-04-28 RX ORDER — KETOROLAC TROMETHAMINE 30 MG/ML
15 INJECTION, SOLUTION INTRAMUSCULAR; INTRAVENOUS ONCE
Status: COMPLETED | OUTPATIENT
Start: 2022-04-28 | End: 2022-04-28

## 2022-04-28 RX ADMIN — IOHEXOL 100 ML: 350 INJECTION, SOLUTION INTRAVENOUS at 21:51

## 2022-04-28 RX ADMIN — KETOROLAC TROMETHAMINE 15 MG: 30 INJECTION, SOLUTION INTRAMUSCULAR at 23:06

## 2022-04-29 VITALS
WEIGHT: 275 LBS | HEIGHT: 70 IN | OXYGEN SATURATION: 97 % | BODY MASS INDEX: 39.37 KG/M2 | SYSTOLIC BLOOD PRESSURE: 131 MMHG | TEMPERATURE: 98.5 F | RESPIRATION RATE: 20 BRPM | HEART RATE: 72 BPM | DIASTOLIC BLOOD PRESSURE: 79 MMHG

## 2022-04-29 LAB
BACTERIA UR QL AUTO: ABNORMAL /HPF
BILIRUB UR QL STRIP: NEGATIVE
CLARITY UR: ABNORMAL
COLOR UR: YELLOW
GLUCOSE UR STRIP-MCNC: NEGATIVE MG/DL
HGB UR QL STRIP.AUTO: ABNORMAL
KETONES UR STRIP-MCNC: NEGATIVE MG/DL
LEUKOCYTE ESTERASE UR QL STRIP: NEGATIVE
NITRITE UR QL STRIP: NEGATIVE
NON-SQ EPI CELLS URNS QL MICRO: ABNORMAL /HPF
PH UR STRIP.AUTO: 6.5 [PH]
PROT UR STRIP-MCNC: ABNORMAL MG/DL
RBC #/AREA URNS AUTO: ABNORMAL /HPF
SP GR UR STRIP.AUTO: 1.02 (ref 1–1.03)
UROBILINOGEN UR QL STRIP.AUTO: 0.2 E.U./DL
WBC #/AREA URNS AUTO: ABNORMAL /HPF

## 2022-04-29 PROCEDURE — 81001 URINALYSIS AUTO W/SCOPE: CPT | Performed by: PHYSICIAN ASSISTANT

## 2022-04-29 NOTE — ED NOTES
Wife answered phone, reports she is home   [facility] made aware, transport is being arranged     Sj Kraft, [facility]  04/29/22 [id number]

## 2022-04-29 NOTE — ED NOTES
Call placed to wife phone again, no answer, voicemail full     N[place] Ruthie [facility]  04/29/22 7847

## 2022-04-29 NOTE — ED PROVIDER NOTES
History  Chief Complaint   Patient presents with    Abdominal Pain     Patient reports back, abdominal, hip pain, hematuria and pain after urination after voiding  All since Monday    Back Pain    Blood in Urine     Patient is a 70-year-old male with a past medical history significant for CHF, COPD, depression, anxiety, diabetes, hypertension, hyperlipidemia, schizophrenia presenting to the emergency department for evaluation of multiple complaints that began on Monday  Patient is reporting lower back pain that radiates to the lower part of his abdomen  He denies any injury or trauma to the area  He is denying any saddle anesthesia, urinary incontinence, urinary retention, bowel incontinence  No signs of cauda equina  He is also reporting some hematuria and pain after urinating  The symptoms began on Monday as well  He is denying any fevers, chills, chest pain, difficulty breathing, nausea, vomiting, diarrhea  No other complaints at this time  Prior to Admission Medications   Prescriptions Last Dose Informant Patient Reported? Taking?    ARIPiprazole (ABILIFY) 20 MG tablet   No No   Sig: Take 1 tablet (20 mg total) by mouth daily   Multiple Vitamins-Iron (MULTIVITAMIN/IRON PO)   Yes No   Sig: TAKE ONE CAP/TAB BY MOUTH EVERY MORNING   aluminum-magnesium hydroxide-simethicone (MYLANTA) 200-200-20 mg/5 mL suspension   No No   Sig: Take 30 mL by mouth every 6 (six) hours as needed for indigestion or heartburn   aspirin 81 mg chewable tablet   No No   Sig: Chew 1 tablet (81 mg total) daily   atorvastatin (LIPITOR) 40 mg tablet   No No   Sig: Take 1 tablet (40 mg total) by mouth daily with dinner And stop Pravastatin when starting this medication   budesonide-formoterol (SYMBICORT) 160-4 5 mcg/act inhaler   Yes No   Sig: INHALE 2 PUFFS BY MOUTH TWICE A DAY *RINSE MOUTH WITH WATER AND SPIT*   docusate sodium (COLACE) 100 mg capsule   No No   Sig: Take 1 capsule (100 mg total) by mouth 2 (two) times a day as needed for constipation   furosemide (Lasix) 40 mg tablet   Yes No   Sig: TAKE ONE TABLET BY MOUTH EVERY MORNING FOR FLUID/SWELLING, HEART, OR BLOOD PRESSURE   ipratropium-albuterol (DUO-NEB) 0 5-2 5 mg/3 mL nebulizer solution   No No   Sig: Take 1 vial (3 mL total) by nebulization 2 (two) times a day   lisinopril (ZESTRIL) 20 mg tablet   No No   Sig: Take 1 tablet (20 mg total) by mouth daily   losartan (COZAAR) 100 MG tablet   Yes No   Sig: Take 100 mg by mouth   magnesium oxide (MAG-OX) 400 mg   No No   Sig: Take 1 tablet (400 mg total) by mouth 2 (two) times a day   meloxicam (MOBIC) 15 mg tablet   No No   Sig: Take 1 tablet (15 mg total) by mouth daily   metFORMIN (GLUCOPHAGE) 500 mg tablet   No No   Sig: Take 1 tablet (500 mg total) by mouth 2 (two) times a day with meals   metoprolol tartrate (LOPRESSOR) 25 mg tablet   No No   Sig: Take 1 tablet (25 mg total) by mouth every 12 (twelve) hours   polyethylene glycol (MIRALAX) 17 g packet   No No   Sig: Take 17 g by mouth daily as needed (constipation)   pravastatin (PRAVACHOL) 40 mg tablet   No No   Sig: Take 1 tablet (40 mg total) by mouth daily with dinner   predniSONE 10 mg tablet   No No   Sig: Take 4 tablets for 2 days, then 3 tablets for 2 days, then 2 tablets for 4 days, then 1 tablet for 4 days then stop   risperiDONE (RisperDAL) 1 mg tablet   Yes No   Sig: TAKE ONE TABLET BY MOUTH TWICE A DAY FOR MOOD   senna (SENOKOT) 8 6 mg   No No   Sig: Take 1 tablet (8 6 mg total) by mouth 2 (two) times a day   traZODone (DESYREL) 100 mg tablet   Yes No   Sig: Take 100 mg by mouth      Facility-Administered Medications: None       Past Medical History:   Diagnosis Date    Anxiety     [facility]     COPD (chronic obstructive pulmonary disease) ([facility])     Depression     Diabetes mellitus ([facility])     Enlarged prostate     Hallucination     Hyperlipidemia     Hypertension     Memory loss     Panic attack     Panic disorder     Psychiatric disorder     Psychiatric illness     Psychosis ([place] [address] )     PTSD (post-traumatic stress disorder)     Schizoaffective disorder ([place] [address] )     Schizophrenia ([place] [address] )        History reviewed  No pertinent surgical history  History reviewed  No pertinent family history  I have reviewed and agree with the history as documented  E-Cigarette/Vaping    E-Cigarette Use Never User      E-Cigarette/Vaping Substances     Social History     Tobacco Use    Smoking status: Former Smoker     Packs/day: 2 00     Years: 0 50     Pack years: 1 00     Types: Cigarettes     Quit date:      Years since quittin 3    Smokeless tobacco: Never Used    Tobacco comment: quit in    Vaping Use    Vaping Use: Never used   Substance Use Topics    Alcohol use: Not Currently    Drug use: Not Currently       Review of Systems   Constitutional: Negative for chills, diaphoresis and fever  HENT: Negative for congestion, drooling, facial swelling, nosebleeds, sore throat and voice change  Eyes: Negative for discharge and redness  Respiratory: Negative for cough, choking, chest tightness, shortness of breath and stridor  Cardiovascular: Negative for chest pain and palpitations  Gastrointestinal: Positive for abdominal pain  Negative for diarrhea, nausea and vomiting  Genitourinary: Positive for dysuria and hematuria  Negative for decreased urine volume, difficulty urinating, flank pain, frequency and urgency  Musculoskeletal: Positive for back pain  Negative for arthralgias, neck pain and neck stiffness  Skin: Negative for color change, rash and wound  Neurological: Negative for dizziness, syncope, facial asymmetry, weakness, light-headedness, numbness and headaches  Psychiatric/Behavioral: Negative for confusion and suicidal ideas  The patient is not nervous/anxious  All other systems reviewed and are negative  Physical Exam  Physical Exam  Vitals reviewed     Constitutional:       General: He is not in acute distress  Appearance: Normal appearance  He is normal weight  He is not ill-appearing, toxic-appearing or diaphoretic  HENT:      Head: Normocephalic and atraumatic  Right Ear: External ear normal       Left Ear: External ear normal       Mouth/Throat:      Mouth: Mucous membranes are moist       Pharynx: Oropharynx is clear  No oropharyngeal exudate or posterior oropharyngeal erythema  Eyes:      General: No scleral icterus  Right eye: No discharge  Left eye: No discharge  Extraocular Movements: Extraocular movements intact  Conjunctiva/sclera: Conjunctivae normal       Pupils: Pupils are equal, round, and reactive to light  Cardiovascular:      Rate and Rhythm: Normal rate and regular rhythm  Pulses: Normal pulses  Heart sounds: Normal heart sounds  No murmur heard  No friction rub  No gallop  Pulmonary:      Effort: Pulmonary effort is normal  No respiratory distress  Breath sounds: Normal breath sounds  No stridor  No wheezing, rhonchi or rales  Abdominal:      General: Abdomen is flat  Palpations: Abdomen is soft  Tenderness: There is generalized abdominal tenderness  There is no right CVA tenderness, left CVA tenderness, guarding or rebound  Musculoskeletal:         General: Normal range of motion  Cervical back: Normal range of motion and neck supple  Lumbar back: Tenderness (bilateral paraspinal muscles) present  Negative right straight leg raise test and negative left straight leg raise test       Right lower leg: No edema  Left lower leg: No edema  Skin:     General: Skin is warm and dry  Capillary Refill: Capillary refill takes less than 2 seconds  Neurological:      General: No focal deficit present  Mental Status: He is alert and oriented to person, place, and time     Psychiatric:         Mood and Affect: Mood normal          Behavior: Behavior normal          Vital Signs  ED Triage Vitals   Temperature Pulse Respirations Blood Pressure SpO2   04/28/22 2025 04/28/22 2025 04/28/22 2025 04/28/22 2025 04/28/22 2025   98 5 °F (36 9 °C) 75 20 134/76 97 %      Temp Source Heart Rate Source Patient Position - Orthostatic VS BP Location FiO2 (%)   04/28/22 2025 04/28/22 2025 04/28/22 2025 04/28/22 2025 --   Oral Monitor Lying Right arm       Pain Score       04/28/22 2022       10 - Worst Possible Pain           Vitals:    04/28/22 2025 04/29/22 0115   BP: 134/76 131/79   Pulse: 75 72   Patient Position - Orthostatic VS: Lying Sitting         Visual Acuity      ED Medications  Medications   iohexol (OMNIPAQUE) 350 MG/ML injection (SINGLE-DOSE) 100 mL (100 mL Intravenous Given 4/28/22 2151)   ketorolac (TORADOL) injection 15 mg (15 mg Intravenous Given 4/28/22 2306)       Diagnostic Studies  Results Reviewed     Procedure Component Value Units Date/Time    Urine Microscopic [[id number]]  (Abnormal) Collected: 04/29/22 0011    Lab Status: Final result Specimen: Urine, Other Updated: 04/29/22 0021     RBC, UA 20-30 /hpf      WBC, UA 4-10 /hpf      Epithelial Cells None Seen /hpf      Bacteria, UA None Seen /hpf     Narrative:      Due to low volume microscopic done on unspun urine  UA w Reflex to Microscopic w Reflex to Culture [[id number]]  (Abnormal) Collected: 04/29/22 0011    Lab Status: Final result Specimen: Urine, Other Updated: 04/29/22 0019     Color, UA Yellow     Clarity, UA Slightly Cloudy     Specific Nathalie, UA 1 020     pH, UA 6 5     Leukocytes, UA Negative     Nitrite, UA Negative     Protein, UA Trace mg/dl      Glucose, UA Negative mg/dl      Ketones, UA Negative mg/dl      Urobilinogen, UA 0 2 E U /dl      Bilirubin, UA Negative     Blood, UA Large    Narrative:      Testing done on low volume urine      Comprehensive metabolic panel [[id number]]  (Abnormal) Collected: 04/28/22 2105    Lab Status: Final result Specimen: Blood from Arm, Right Updated: 04/28/22 2136     Sodium 138 mmol/L Potassium 4 2 mmol/L      Chloride 104 mmol/L      CO2 28 mmol/L      ANION GAP 6 mmol/L      BUN 12 mg/dL      Creatinine 1 15 mg/dL      Glucose 163 mg/dL      Calcium 9 1 mg/dL      AST 17 U/L      ALT 32 U/L      Alkaline Phosphatase 48 U/L      Total Protein 7 8 g/dL      Albumin 3 9 g/dL      Total Bilirubin 0 35 mg/dL      eGFR 62 ml/min/1 73sq m     Narrative:      [place] guidelines for Chronic Kidney Disease (CKD):     Stage 1 with normal or high GFR (GFR > 90 mL/min/1 73 square meters)    Stage 2 Mild CKD (GFR = 60-89 mL/min/1 73 square meters)    Stage 3A Moderate CKD (GFR = 45-59 mL/min/1 73 square meters)    Stage 3B Moderate CKD (GFR = 30-44 mL/min/1 73 square meters)    Stage 4 Severe CKD (GFR = 15-29 mL/min/1 73 square meters)    Stage 5 End Stage CKD (GFR <15 mL/min/1 73 square meters)  Note: GFR calculation is accurate only with a steady state creatinine    Lipase [[id number]]  (Abnormal) Collected: 04/28/22 2105    Lab Status: Final result Specimen: Blood from Arm, Right Updated: 04/28/22 2136     Lipase 41 u/L     CBC and differential [[id number]]  (Abnormal) Collected: 04/28/22 2105    Lab Status: Final result Specimen: Blood from Arm, Right Updated: 04/28/22 2117     WBC 8 59 Thousand/uL      RBC 4 79 Million/uL      Hemoglobin 11 6 g/dL      Hematocrit 37 2 %      MCV 78 fL      MCH 24 2 pg      MCHC 31 2 g/dL      RDW 13 7 %      MPV 13 4 fL      Platelets 212 Thousands/uL      nRBC 0 /100 WBCs      Neutrophils Relative 47 %      Immat GRANS % 0 %      Lymphocytes Relative 43 %      Monocytes Relative 6 %      Eosinophils Relative 3 %      Basophils Relative 1 %      Neutrophils Absolute 4 07 Thousands/µL      Immature Grans Absolute 0 02 Thousand/uL      Lymphocytes Absolute 3 66 Thousands/µL      Monocytes Absolute 0 55 Thousand/µL      Eosinophils Absolute 0 24 Thousand/µL      Basophils Absolute 0 05 Thousands/µL                  CT abdomen pelvis with contrast   Final Result by Mike Gallego MD (04/28 2244)      A few calculi are seen within the bladder measuring up to 3 mm  No significant renal or ureteral calculi  No hydronephrosis  Workstation performed: [id number]         CT recon only thoracolumbar   Final Result by Mike Gallego MD (04/28 2244)      No acute fracture or traumatic subluxation  Workstation performed: [id number]                    Procedures  Procedures         ED Course                                             MDM  Number of Diagnoses or Management Options  Acute bilateral low back pain without sciatica  Lower abdominal pain  Diagnosis management comments: Patient presenting for evaluation of abdominal pain, back pain, hematuria, dysuria since Monday  No signs of cauda equina  He is not any acute distress upon arrival   Vital signs unremarkable  He has generalized abdominal tenderness and bilateral lumbar paraspinal muscle tenderness on exam   There is no rebound or guarding  Lab work reassuring, urinalysis without bacteria  CT of the abdomen and pelvis reveals calculi in the bladder up to 3 mm in size  Patient's pain possibly secondary to recently passed renal stones  He felt significant improvement with Toradol  He was discharged home with instructions to follow-up with Urology  Strict return precautions were discussed  Patient is stable condition at time of discharge         Amount and/or Complexity of Data Reviewed  Clinical lab tests: reviewed and ordered  Tests in the radiology section of CPT®: ordered and reviewed    Patient Progress  Patient progress: stable      Disposition  Final diagnoses:   Lower abdominal pain   Acute bilateral low back pain without sciatica     Time reflects when diagnosis was documented in both MDM as applicable and the Disposition within this note     Time User Action Codes Description Comment    4/29/2022 12:43 AM Scotty Monae Add [R10 30] Lower abdominal pain 4/29/2022 12:43 AM Gogoyamile Bowie Alistair [M54 50] Acute bilateral low back pain without sciatica       ED Disposition     ED Disposition   Discharge    Condition   Stable    Date/Time   Fri Apr 29, 2022 12:43 AM    Rick Pacheco discharge to home/self care                 Follow-up Information     Follow up With Specialties Details Why Contact Info Additional [address] [facility] Emergency Department Emergency Medicine Go to  If symptoms worsen [address] Emergency Department, [address] [place] [address], [phone]          Discharge Medication List as of 4/29/2022 12:44 AM      CONTINUE these medications which have NOT CHANGED    Details   aluminum-magnesium hydroxide-simethicone (MYLANTA) 200-200-20 mg/5 mL suspension Take 30 mL by mouth every 6 (six) hours as needed for indigestion or heartburn, Starting Wed 10/14/2020, No Print      ARIPiprazole (ABILIFY) 20 MG tablet Take 1 tablet (20 mg total) by mouth daily, Starting Fri 3/30/2018, Print      aspirin 81 mg chewable tablet Chew 1 tablet (81 mg total) daily, Starting Sat 3/24/2018, No Print      atorvastatin (LIPITOR) 40 mg tablet Take 1 tablet (40 mg total) by mouth daily with dinner And stop Pravastatin when starting this medication, Starting Mon 4/29/2019, Print      budesonide-formoterol (SYMBICORT) 160-4 5 mcg/act inhaler INHALE 2 PUFFS BY MOUTH TWICE A DAY *RINSE MOUTH WITH WATER AND SPIT*, Historical Med      docusate sodium (COLACE) 100 mg capsule Take 1 capsule (100 mg total) by mouth 2 (two) times a day as needed for constipation, Starting Fri 3/23/2018, No Print      furosemide (Lasix) 40 mg tablet TAKE ONE TABLET BY MOUTH EVERY MORNING FOR FLUID/SWELLING, HEART, OR BLOOD PRESSURE, Historical Med      ipratropium-albuterol (DUO-NEB) 0 5-2 5 mg/3 mL nebulizer solution Take 1 vial (3 mL total) by nebulization 2 (two) times a day, Starting Thu 5/14/2020, Normal      lisinopril (ZESTRIL) 20 mg tablet Take 1 tablet (20 mg total) by mouth daily, Starting Fri 5/3/2019, No Print      losartan (COZAAR) 100 MG tablet Take 100 mg by mouth, Historical Med      magnesium oxide (MAG-OX) 400 mg Take 1 tablet (400 mg total) by mouth 2 (two) times a day, Starting Mon 4/29/2019, Print      meloxicam (MOBIC) 15 mg tablet Take 1 tablet (15 mg total) by mouth daily, Starting Tue 8/24/2021, Print      metFORMIN (GLUCOPHAGE) 500 mg tablet Take 1 tablet (500 mg total) by mouth 2 (two) times a day with meals, Starting Thu 3/29/2018, Print      metoprolol tartrate (LOPRESSOR) 25 mg tablet Take 1 tablet (25 mg total) by mouth every 12 (twelve) hours, Starting Thu 3/29/2018, Print      Multiple Vitamins-Iron (MULTIVITAMIN/IRON PO) TAKE ONE CAP/TAB BY MOUTH EVERY MORNING, Historical Med      polyethylene glycol (MIRALAX) 17 g packet Take 17 g by mouth daily as needed (constipation), Starting Fri 3/23/2018, No Print      pravastatin (PRAVACHOL) 40 mg tablet Take 1 tablet (40 mg total) by mouth daily with dinner, Starting Mon 4/29/2019, No Print      predniSONE 10 mg tablet Take 4 tablets for 2 days, then 3 tablets for 2 days, then 2 tablets for 4 days, then 1 tablet for 4 days then stop, Normal      risperiDONE (RisperDAL) 1 mg tablet TAKE ONE TABLET BY MOUTH TWICE A DAY FOR MOOD, Historical Med      senna (SENOKOT) 8 6 mg Take 1 tablet (8 6 mg total) by mouth 2 (two) times a day, Starting Wed 10/14/2020, No Print      traZODone (DESYREL) 100 mg tablet Take 100 mg by mouth, Historical Med             No discharge procedures on file      PDMP Review     None          ED Provider  Electronically Signed by           Makenzie Belle PA-C  05/12/22 2011

## 2022-04-29 NOTE — ED NOTES
Patient presents with back, abdominal, hip pain since Monday   Also reports hematuria     Sugey Sharp RN  04/28/22 2030

## 2022-04-29 NOTE — ED NOTES
Several calls to wife, left message   Patient unsure if wife is home, also does not have keys to get in home     Miguelito Abarca, [address] [facility]  04/29/22 5170

## 2022-05-04 ENCOUNTER — APPOINTMENT (EMERGENCY)
Dept: CT IMAGING | Facility: HOSPITAL | Age: 72
End: 2022-05-04
Payer: COMMERCIAL

## 2022-05-04 ENCOUNTER — HOSPITAL ENCOUNTER (EMERGENCY)
Facility: HOSPITAL | Age: 72
Discharge: HOME | End: 2022-05-04
Attending: EMERGENCY MEDICINE | Admitting: EMERGENCY MEDICINE
Payer: COMMERCIAL

## 2022-05-04 VITALS
TEMPERATURE: 98.4 F | RESPIRATION RATE: 20 BRPM | OXYGEN SATURATION: 94 % | HEART RATE: 73 BPM | WEIGHT: 288.14 LBS | BODY MASS INDEX: 41.34 KG/M2 | SYSTOLIC BLOOD PRESSURE: 144 MMHG | DIASTOLIC BLOOD PRESSURE: 84 MMHG

## 2022-05-04 DIAGNOSIS — M54.50 ACUTE LOW BACK PAIN: Primary | ICD-10-CM

## 2022-05-04 DIAGNOSIS — R10.9 ABDOMINAL PAIN: ICD-10-CM

## 2022-05-04 LAB
ANION GAP SERPL CALCULATED.3IONS-SCNC: 9 MMOL/L (ref 4–13)
BASOPHILS # BLD AUTO: 0.04 THOUSANDS/ΜL (ref 0–0.1)
BASOPHILS NFR BLD AUTO: 1 % (ref 0–1)
BILIRUB UR QL STRIP: NEGATIVE
BUN SERPL-MCNC: 12 MG/DL (ref 5–25)
CALCIUM SERPL-MCNC: 8.9 MG/DL (ref 8.3–10.1)
CHLORIDE SERPL-SCNC: 104 MMOL/L (ref 100–108)
CLARITY UR: CLEAR
CO2 SERPL-SCNC: 27 MMOL/L (ref 21–32)
COLOR UR: YELLOW
CREAT SERPL-MCNC: 1.13 MG/DL (ref 0.6–1.3)
EOSINOPHIL # BLD AUTO: 0.2 THOUSAND/ΜL (ref 0–0.61)
EOSINOPHIL NFR BLD AUTO: 3 % (ref 0–6)
ERYTHROCYTE [DISTWIDTH] IN BLOOD BY AUTOMATED COUNT: 14 % (ref 11.6–15.1)
GFR SERPL CREATININE-BSD FRML MDRD: 64 ML/MIN/1.73SQ M
GLUCOSE SERPL-MCNC: 141 MG/DL (ref 65–140)
GLUCOSE UR STRIP-MCNC: NEGATIVE MG/DL
HCT VFR BLD AUTO: 36.4 % (ref 36.5–49.3)
HGB BLD-MCNC: 11.1 G/DL (ref 12–17)
HGB UR QL STRIP.AUTO: NEGATIVE
IMM GRANULOCYTES # BLD AUTO: 0.02 THOUSAND/UL (ref 0–0.2)
IMM GRANULOCYTES NFR BLD AUTO: 0 % (ref 0–2)
KETONES UR STRIP-MCNC: NEGATIVE MG/DL
LACTATE SERPL-SCNC: 1.6 MMOL/L (ref 0.5–2)
LEUKOCYTE ESTERASE UR QL STRIP: NEGATIVE
LIPASE SERPL-CCNC: 38 U/L (ref 73–393)
LYMPHOCYTES # BLD AUTO: 3.07 THOUSANDS/ΜL (ref 0.6–4.47)
LYMPHOCYTES NFR BLD AUTO: 49 % (ref 14–44)
MCH RBC QN AUTO: 24 PG (ref 26.8–34.3)
MCHC RBC AUTO-ENTMCNC: 30.5 G/DL (ref 31.4–37.4)
MCV RBC AUTO: 79 FL (ref 82–98)
MONOCYTES # BLD AUTO: 0.48 THOUSAND/ΜL (ref 0.17–1.22)
MONOCYTES NFR BLD AUTO: 8 % (ref 4–12)
NEUTROPHILS # BLD AUTO: 2.43 THOUSANDS/ΜL (ref 1.85–7.62)
NEUTS SEG NFR BLD AUTO: 39 % (ref 43–75)
NITRITE UR QL STRIP: NEGATIVE
NRBC BLD AUTO-RTO: 0 /100 WBCS
PH UR STRIP.AUTO: 5 [PH]
PLATELET # BLD AUTO: 180 THOUSANDS/UL (ref 149–390)
PMV BLD AUTO: 12.9 FL (ref 8.9–12.7)
POTASSIUM SERPL-SCNC: 4.2 MMOL/L (ref 3.5–5.3)
PROT UR STRIP-MCNC: NEGATIVE MG/DL
RBC # BLD AUTO: 4.63 MILLION/UL (ref 3.88–5.62)
SODIUM SERPL-SCNC: 140 MMOL/L (ref 136–145)
SP GR UR STRIP.AUTO: 1.01 (ref 1–1.03)
UROBILINOGEN UR QL STRIP.AUTO: 0.2 E.U./DL
WBC # BLD AUTO: 6.24 THOUSAND/UL (ref 4.31–10.16)

## 2022-05-04 PROCEDURE — 83690 ASSAY OF LIPASE: CPT | Performed by: PHYSICIAN ASSISTANT

## 2022-05-04 PROCEDURE — 85025 COMPLETE CBC W/AUTO DIFF WBC: CPT | Performed by: PHYSICIAN ASSISTANT

## 2022-05-04 PROCEDURE — G1004 CDSM NDSC: HCPCS

## 2022-05-04 PROCEDURE — 99284 EMERGENCY DEPT VISIT MOD MDM: CPT

## 2022-05-04 PROCEDURE — 96374 THER/PROPH/DIAG INJ IV PUSH: CPT

## 2022-05-04 PROCEDURE — 83605 ASSAY OF LACTIC ACID: CPT | Performed by: PHYSICIAN ASSISTANT

## 2022-05-04 PROCEDURE — 81003 URINALYSIS AUTO W/O SCOPE: CPT | Performed by: PHYSICIAN ASSISTANT

## 2022-05-04 PROCEDURE — 96361 HYDRATE IV INFUSION ADD-ON: CPT

## 2022-05-04 PROCEDURE — 36415 COLL VENOUS BLD VENIPUNCTURE: CPT | Performed by: PHYSICIAN ASSISTANT

## 2022-05-04 PROCEDURE — 74177 CT ABD & PELVIS W/CONTRAST: CPT

## 2022-05-04 PROCEDURE — 96375 TX/PRO/DX INJ NEW DRUG ADDON: CPT

## 2022-05-04 PROCEDURE — 80048 BASIC METABOLIC PNL TOTAL CA: CPT | Performed by: PHYSICIAN ASSISTANT

## 2022-05-04 PROCEDURE — 99285 EMERGENCY DEPT VISIT HI MDM: CPT | Performed by: PHYSICIAN ASSISTANT

## 2022-05-04 RX ORDER — ONDANSETRON 2 MG/ML
4 INJECTION INTRAMUSCULAR; INTRAVENOUS ONCE
Status: COMPLETED | OUTPATIENT
Start: 2022-05-04 | End: 2022-05-04

## 2022-05-04 RX ORDER — METHOCARBAMOL 750 MG/1
750 TABLET, FILM COATED ORAL EVERY 6 HOURS PRN
Qty: 40 TABLET | Refills: 0 | Status: SHIPPED | OUTPATIENT
Start: 2022-05-04 | End: 2022-05-09

## 2022-05-04 RX ORDER — MORPHINE SULFATE 4 MG/ML
4 INJECTION, SOLUTION INTRAMUSCULAR; INTRAVENOUS ONCE
Status: COMPLETED | OUTPATIENT
Start: 2022-05-04 | End: 2022-05-04

## 2022-05-04 RX ORDER — KETOROLAC TROMETHAMINE 30 MG/ML
30 INJECTION, SOLUTION INTRAMUSCULAR; INTRAVENOUS ONCE
Status: COMPLETED | OUTPATIENT
Start: 2022-05-04 | End: 2022-05-04

## 2022-05-04 RX ORDER — OXYCODONE HYDROCHLORIDE AND ACETAMINOPHEN 5; 325 MG/1; MG/1
1 TABLET ORAL EVERY 6 HOURS PRN
Qty: 20 TABLET | Refills: 0 | Status: SHIPPED | OUTPATIENT
Start: 2022-05-04

## 2022-05-04 RX ORDER — DICYCLOMINE HCL 20 MG
20 TABLET ORAL ONCE
Status: COMPLETED | OUTPATIENT
Start: 2022-05-04 | End: 2022-05-04

## 2022-05-04 RX ADMIN — IOHEXOL 100 ML: 350 INJECTION, SOLUTION INTRAVENOUS at 07:48

## 2022-05-04 RX ADMIN — DICYCLOMINE HYDROCHLORIDE 20 MG: 20 TABLET ORAL at 07:30

## 2022-05-04 RX ADMIN — ONDANSETRON 4 MG: 2 INJECTION INTRAMUSCULAR; INTRAVENOUS at 10:41

## 2022-05-04 RX ADMIN — MORPHINE SULFATE 4 MG: 4 INJECTION INTRAVENOUS at 10:42

## 2022-05-04 RX ADMIN — SODIUM CHLORIDE 1000 ML: 0.9 INJECTION, SOLUTION INTRAVENOUS at 07:24

## 2022-05-04 RX ADMIN — KETOROLAC TROMETHAMINE 30 MG: 30 INJECTION, SOLUTION INTRAMUSCULAR at 07:29

## 2022-05-04 NOTE — ED NOTES
Discharge instructions reviewed, patients wife has no new complaints or concerns at time of discharge  Patient taken out of department in wheelchair and accompanied by his wife        Perla Ibrahim RN  05/04/22 7729

## 2022-05-04 NOTE — ED PROVIDER NOTES
History  Chief Complaint   Patient presents with    Back Pain     pt reporting lower back pain x1 week    Constipation     pt states he has only had 2 BM since saturday which he reports to be dark in color     64-year-old male with a past medical history significant for CHF, COPD, depression, anxiety, diabetes, hypertension, hyperlipidemia, schizophrenia presenting to the emergency department for with chief complaint of low back pain and lower abdominal pain  Onset: back pain reported as 1 week ago  Abdominal cramping reported as 5 days ago  Location is reported as lower back and lower abdomen  Quality: abdominal pain is sharp, cramping in nature  Back pain is intermittent sharp pain that radiates to the lower part of his abdomen  Severity is reported as moderate  Modifiers: patient reports he is taking tylenol at home without relief  Context:    He denies any injury or trauma to the area  Was seen in ED on 4/28/22 for back pain and abdominal pain - had CT abdomen and lumbar spine- remarkable for degenerative changes L2-L5 with mild canal stenosis and bladder stones without obstructive uropathy  He is denying any saddle anesthesia, urinary incontinence, urinary retention, bowel incontinence  He does endorse constipation  Denies urinary retention  He does report somehematuria and pain after urinating  He denies weight loss, night sweats, fevers, chills, chest pain, difficulty breathing, nausea, vomiting, diarrhea  Last oral intake was last night  Old charts reviewed - patient last seen in ed on 4/28/22 for back and abdominal pain         History provided by:  Patient   used: No    Back Pain  Associated symptoms: abdominal pain    Associated symptoms: no chest pain, no fever, no headaches and no weakness    Constipation  Associated symptoms: abdominal pain and back pain    Associated symptoms: no diarrhea, no fever, no nausea and no vomiting        Prior to Admission Medications   Prescriptions Last Dose Informant Patient Reported? Taking?    ARIPiprazole (ABILIFY) 20 MG tablet   No No   Sig: Take 1 tablet (20 mg total) by mouth daily   Multiple Vitamins-Iron (MULTIVITAMIN/IRON PO)   Yes No   Sig: TAKE ONE CAP/TAB BY MOUTH EVERY MORNING   aluminum-magnesium hydroxide-simethicone (MYLANTA) 200-200-20 mg/5 mL suspension   No No   Sig: Take 30 mL by mouth every 6 (six) hours as needed for indigestion or heartburn   aspirin 81 mg chewable tablet   No No   Sig: Chew 1 tablet (81 mg total) daily   atorvastatin (LIPITOR) 40 mg tablet   No No   Sig: Take 1 tablet (40 mg total) by mouth daily with dinner And stop Pravastatin when starting this medication   budesonide-formoterol (SYMBICORT) 160-4 5 mcg/act inhaler   Yes No   Sig: INHALE 2 PUFFS BY MOUTH TWICE A DAY *RINSE MOUTH WITH WATER AND SPIT*   docusate sodium (COLACE) 100 mg capsule   No No   Sig: Take 1 capsule (100 mg total) by mouth 2 (two) times a day as needed for constipation   furosemide (Lasix) 40 mg tablet   Yes No   Sig: TAKE ONE TABLET BY MOUTH EVERY MORNING FOR FLUID/SWELLING, HEART, OR BLOOD PRESSURE   ipratropium-albuterol (DUO-NEB) 0 5-2 5 mg/3 mL nebulizer solution   No No   Sig: Take 1 vial (3 mL total) by nebulization 2 (two) times a day   lisinopril (ZESTRIL) 20 mg tablet   No No   Sig: Take 1 tablet (20 mg total) by mouth daily   losartan (COZAAR) 100 MG tablet   Yes No   Sig: Take 100 mg by mouth   magnesium oxide (MAG-OX) 400 mg   No No   Sig: Take 1 tablet (400 mg total) by mouth 2 (two) times a day   meloxicam (MOBIC) 15 mg tablet   No No   Sig: Take 1 tablet (15 mg total) by mouth daily   metFORMIN (GLUCOPHAGE) 500 mg tablet   No No   Sig: Take 1 tablet (500 mg total) by mouth 2 (two) times a day with meals   metoprolol tartrate (LOPRESSOR) 25 mg tablet   No No   Sig: Take 1 tablet (25 mg total) by mouth every 12 (twelve) hours   polyethylene glycol (MIRALAX) 17 g packet   No No   Sig: Take 17 g by mouth daily as needed (constipation)   pravastatin (PRAVACHOL) 40 mg tablet   No No   Sig: Take 1 tablet (40 mg total) by mouth daily with dinner   predniSONE 10 mg tablet   No No   Sig: Take 4 tablets for 2 days, then 3 tablets for 2 days, then 2 tablets for 4 days, then 1 tablet for 4 days then stop   risperiDONE (RisperDAL) 1 mg tablet   Yes No   Sig: TAKE ONE TABLET BY MOUTH TWICE A DAY FOR MOOD   senna (SENOKOT) 8 6 mg   No No   Sig: Take 1 tablet (8 6 mg total) by mouth 2 (two) times a day   traZODone (DESYREL) 100 mg tablet   Yes No   Sig: Take 100 mg by mouth      Facility-Administered Medications: None       Past Medical History:   Diagnosis Date    Anxiety     [facility])     COPD (chronic obstructive pulmonary disease) ([place] [address] )     Depression     Diabetes mellitus ([place] [address] )     Enlarged prostate     Hallucination     Hyperlipidemia     Hypertension     Memory loss     Panic attack     Panic disorder     Psychiatric disorder     Psychiatric illness     Psychosis ([place] [address] )     PTSD (post-traumatic stress disorder)     Schizoaffective disorder ([place] [address] )     Schizophrenia ([place] [address] )        History reviewed  No pertinent surgical history  History reviewed  No pertinent family history  I have reviewed and agree with the history as documented  E-Cigarette/Vaping    E-Cigarette Use Never User      E-Cigarette/Vaping Substances     Social History     Tobacco Use    Smoking status: Former Smoker     Packs/day: 2 00     Years: 0 50     Pack years: 1 00     Types: Cigarettes     Quit date:      Years since quittin 3    Smokeless tobacco: Never Used    Tobacco comment: quit in    Vaping Use    Vaping Use: Never used   Substance Use Topics    Alcohol use: Not Currently    Drug use: Not Currently       Review of Systems   Constitutional: Negative for diaphoresis, fever and unexpected weight change  Respiratory: Negative for chest tightness and shortness of breath  Cardiovascular: Negative for chest pain  Gastrointestinal: Positive for abdominal pain and constipation  Negative for diarrhea, nausea and vomiting  Genitourinary: Positive for hematuria  Negative for decreased urine volume, difficulty urinating, flank pain, frequency and urgency  Musculoskeletal: Positive for back pain  Negative for arthralgias, joint swelling, neck pain and neck stiffness  Skin: Negative for rash  Neurological: Positive for tremors  Negative for dizziness, syncope, weakness, light-headedness and headaches  Psychiatric/Behavioral: Negative for confusion, hallucinations and sleep disturbance  All other systems reviewed and are negative  Physical Exam  Physical Exam  Vitals and nursing note reviewed  Constitutional:       General: He is not in acute distress  Appearance: Normal appearance  He is obese  Comments: /87 (BP Location: Right arm)   Pulse 68   Temp 98 4 °F (36 9 °C) (Oral)   Resp 20   Wt 131 kg (288 lb 2 3 oz)   SpO2 98%   BMI 41 34 kg/m²      HENT:      Head: Normocephalic and atraumatic  Right Ear: External ear normal       Left Ear: External ear normal       Nose: Nose normal    Eyes:      General: No scleral icterus  Right eye: No discharge  Left eye: No discharge  Extraocular Movements: Extraocular movements intact  Conjunctiva/sclera: Conjunctivae normal    Cardiovascular:      Rate and Rhythm: Normal rate and regular rhythm  Pulses: Normal pulses  Pulmonary:      Effort: Pulmonary effort is normal  No respiratory distress  Breath sounds: No wheezing  Abdominal:      General: Bowel sounds are normal       Palpations: There is no mass  Tenderness: There is abdominal tenderness (LLQ and RLQ)  There is no guarding or rebound  Hernia: No hernia is present  Musculoskeletal:         General: Tenderness present  No swelling, deformity or signs of injury  Normal range of motion        Cervical back: Normal range of motion and neck supple  No rigidity or tenderness  Comments: There is no midline thoracic or lumbar spinal tenderness to palpation  No bony step offs or deformities on palpation  No saddle anesthesia  There is bilateral lumbar paraspinal muscle tenderness to palpation  Nontender over the costovertebral angle bilaterally  Bilateral lower extremities: The patient is neurovascularly intact in the superficial and deep peroneal, sural, tibial, and saphenous nerve distributions there is normal sensation and good capillary refill within the toes  Strength 5/5 normal to bilateral lower extremities  FROM throughout BLE  No posterior calf pain or palpable cords  Skin:     Capillary Refill: Capillary refill takes less than 2 seconds  Coloration: Skin is not jaundiced or pale  Findings: No erythema or rash  Neurological:      General: No focal deficit present  Mental Status: He is alert and oriented to person, place, and time  Mental status is at baseline  Cranial Nerves: No cranial nerve deficit  Sensory: No sensory deficit  Motor: No weakness  Comments: Frequent tongue thrusting c/w tardive dyskinesia  Psychiatric:         Mood and Affect: Mood normal          Behavior: Behavior normal          Thought Content:  Thought content normal          Judgment: Judgment normal          Vital Signs  ED Triage Vitals   Temperature Pulse Respirations Blood Pressure SpO2   05/04/22 0628 05/04/22 0627 05/04/22 0627 05/04/22 0627 05/04/22 0627   98 4 °F (36 9 °C) 68 20 135/87 98 %      Temp Source Heart Rate Source Patient Position - Orthostatic VS BP Location FiO2 (%)   05/04/22 0628 05/04/22 0627 05/04/22 0627 05/04/22 0627 --   Oral Monitor Lying Right arm       Pain Score       05/04/22 0729       6           Vitals:    05/04/22 0627 05/04/22 1030 05/04/22 1100   BP: 135/87 134/83 144/84   Pulse: 68 78 73   Patient Position - Orthostatic VS: Lying Lying Lying Visual Acuity      ED Medications  Medications   sodium chloride 0 9 % bolus 1,000 mL (0 mL Intravenous Stopped 5/4/22 0824)   ketorolac (TORADOL) injection 30 mg (30 mg Intravenous Given 5/4/22 0729)   dicyclomine (BENTYL) tablet 20 mg (20 mg Oral Given 5/4/22 0730)   iohexol (OMNIPAQUE) 350 MG/ML injection (SINGLE-DOSE) 100 mL (100 mL Intravenous Given 5/4/22 0748)   morphine (PF) 4 mg/mL injection 4 mg (4 mg Intravenous Given 5/4/22 1042)   ondansetron (ZOFRAN) injection 4 mg (4 mg Intravenous Given 5/4/22 1041)       Diagnostic Studies  Results Reviewed     Procedure Component Value Units Date/Time    UA w Reflex to Microscopic w Reflex to Culture [[id number]] Collected: 05/04/22 1044    Lab Status: Final result Specimen: Urine, Clean Catch Updated: 05/04/22 1051     Color, UA Yellow     Clarity, UA Clear     Specific Gravity, UA 1 015     pH, UA 5 0     Leukocytes, UA Negative     Nitrite, UA Negative     Protein, UA Negative mg/dl      Glucose, UA Negative mg/dl      Ketones, UA Negative mg/dl      Urobilinogen, UA 0 2 E U /dl      Bilirubin, UA Negative     Blood, UA Negative    Lactic acid [[id number]]  (Normal) Collected: 05/04/22 0702    Lab Status: Final result Specimen: Blood from Arm, Left Updated: 05/04/22 0745     LACTIC ACID 1 6 mmol/L     Narrative:      Result may be elevated if tourniquet was used during collection      Basic metabolic panel [[id number]]  (Abnormal) Collected: 05/04/22 0702    Lab Status: Final result Specimen: Blood from Arm, Left Updated: 05/04/22 0736     Sodium 140 mmol/L      Potassium 4 2 mmol/L      Chloride 104 mmol/L      CO2 27 mmol/L      ANION GAP 9 mmol/L      BUN 12 mg/dL      Creatinine 1 13 mg/dL      Glucose 141 mg/dL      Calcium 8 9 mg/dL      eGFR 64 ml/min/1 73sq m     Narrative:      [place] guidelines for Chronic Kidney Disease (CKD):     Stage 1 with normal or high GFR (GFR > 90 mL/min/1 73 square meters)    Stage 2 Mild CKD (GFR = 60-89 mL/min/1 73 square meters)    Stage 3A Moderate CKD (GFR = 45-59 mL/min/1 73 square meters)    Stage 3B Moderate CKD (GFR = 30-44 mL/min/1 73 square meters)    Stage 4 Severe CKD (GFR = 15-29 mL/min/1 73 square meters)    Stage 5 End Stage CKD (GFR <15 mL/min/1 73 square meters)  Note: GFR calculation is accurate only with a steady state creatinine    Lipase [[id number]]  (Abnormal) Collected: 05/04/22 0702    Lab Status: Final result Specimen: Blood from Arm, Left Updated: 05/04/22 0736     Lipase 38 u/L     CBC and differential [[id number]]  (Abnormal) Collected: 05/04/22 0702    Lab Status: Final result Specimen: Blood from Arm, Left Updated: 05/04/22 0708     WBC 6 24 Thousand/uL      RBC 4 63 Million/uL      Hemoglobin 11 1 g/dL      Hematocrit 36 4 %      MCV 79 fL      MCH 24 0 pg      MCHC 30 5 g/dL      RDW 14 0 %      MPV 12 9 fL      Platelets 303 Thousands/uL      nRBC 0 /100 WBCs      Neutrophils Relative 39 %      Immat GRANS % 0 %      Lymphocytes Relative 49 %      Monocytes Relative 8 %      Eosinophils Relative 3 %      Basophils Relative 1 %      Neutrophils Absolute 2 43 Thousands/µL      Immature Grans Absolute 0 02 Thousand/uL      Lymphocytes Absolute 3 07 Thousands/µL      Monocytes Absolute 0 48 Thousand/µL      Eosinophils Absolute 0 20 Thousand/µL      Basophils Absolute 0 04 Thousands/µL                  CT abdomen pelvis with contrast   Final Result by Jayne Peña MD (05/04 0723)      No acute intra-abdominal abnormality  Bladder calculi  Workstation performed: [id number]                    Procedures  Procedures         ED Course  ED Course as of 05/04/22 1537   Wed May 04, 2022   0745 Lab results reviewed  Lactic acid normal 1 6  Basic metabolic panel demonstrates BUN of 12 creatinine 1 1 which are normal   No renal failure  CBC demonstrates hemoglobin of 11 hematocrit 36 which are mildly low but consistent with baseline 6 days ago    Lipase low at 45  No pancreatitis  0813 Ctap: No acute intra-abdominal abnormality  Bladder calculi     [id number] Re-evaluation :  patient reports very minimal relief of back pain with toradol  Will add morphine and reassess  D/w patient no acute abnormality on ct ap  Prior ct lspine showed L2-L5 DJD - likely source of back pain  SBIRT 22yo+      Most Recent Value   SBIRT (22 yo +)    In order to provide better care to our patients, we are screening all of our patients for alcohol and drug use  Would it be okay to ask you these screening questions? Yes Filed at: 05/04/2022 6785   Initial Alcohol Screen: US AUDIT-C     1  How often do you have a drink containing alcohol? 0 Filed at: 05/04/2022 0636   2  How many drinks containing alcohol do you have on a typical day you are drinking? 0 Filed at: 05/04/2022 0636   3b  FEMALE Any Age, or MALE 65+: How often do you have 4 or more drinks on one occassion? 0 Filed at: 05/04/2022 0636   Audit-C Score 0 Filed at: 05/04/2022 0732   COMFORT: How many times in the past year have you    Used an illegal drug or used a prescription medication for non-medical reasons? Never Filed at: 05/04/2022 0636                    MDM  Number of Diagnoses or Management Options  Abdominal pain: established and worsening  Acute low back pain: established and worsening  Diagnosis management comments: MDM: Given the large differential diagnosis for this patient, the decision making in this case is of high complexity  ED Summary: 66year old male presents to eD with atraumatic low back pain  Has developed abdominal cramping and constipation since last ed visit  No fevers, no night sweats or weight loss  No IVDA history  Recent L spine ct showed degenerative changes lumbar spine       DDX:  ddx includes but is not limited to:  Myofascial pain, diskogenic pain, radicular pain, muscle spasm, vertebral compression fracture, spinal stenosis, spondylosis, cancer, osteoporosis, OA, RA, consider but doubt epidural abscess, cauda equina  Abdominal pain ddx includes but is not limited to appendicitis, diverticulitis, gastroenteritis, gastritis, cholecystitis, pancreatitis, mesenteric adenitis, kidney stone, pyelonephritis, constipation, UTI  Initial ED Plan:  Plan workup including labs, ct scan abd/pelvis      ED Results:  Reviewed at bedside:  UA negative for blood and nitrites  BUN 12, creatinine 1 1  No renal failure  Lactic acid normal 1 6  Lipase 38  No pancreatitis  CT abdomen pelvis no acute intra-abdominal abnormality  Bladder calculi  Remainder of results as listed above  ED Final Assessment 1  Acute low back pain with sciatica  2 Moderate multilevel degenerative disc disease  Moderate disc bulges at L2-L3, L3-L4, L4-L5 with associated facet arthropathy where there is mild spinal canal narrowing  No red flag back pain symptoms  No fevers or weight loss  No history IVDA  No night sweats  No bowel/bladder incontinence  3  nonsepcific abdominal pain - cta/p negative for acute intraabdominal abnormality  Patient's pain improved with morphine given in ed  Will d/c with percocet for pain  Standard narcotic precautions given  F/u pcp (through [place]) and orthopedics for further evaluation/treatment of back pain  I discussed diagnosis and treatment plan with patient at bedside  Extended discussion with patient regarding the diagnosis, pathophysiology, expectant coarse and treatment plan  Instructed to follow up with pcp and recommended specialist in 3-5 days  Reviewed reasons to return to ed  Patient verbalized understanding of diagnosis and agreement with discharge plan of care as well as understanding of reasons to return to ed                 Amount and/or Complexity of Data Reviewed  Clinical lab tests: ordered and reviewed  Tests in the radiology section of CPT®: ordered and reviewed  Discussion of test results with the performing providers: yes  Obtain history from someone other than the patient: yes (EMS)  Review and summarize past medical records: yes  Independent visualization of images, tracings, or specimens: yes    Risk of Complications, Morbidity, and/or Mortality  General comments: Disclaimers:    I have reasonably determine that electronically prescribing a controlled substance would be impractical for the patient to obtain the controlled substance prescribed by electronic prescription or would cause an untimely delay resulting in an adverse impact on the patient's medical condition        Patient was seen during the outbreak of the corona virus epidemic   Resources are limited due to the severity of patient illnesses associated with virus   Testing is also limited at this time   Discussed with patient at the time of this evaluation   Due to the fact that limited resources are available -treatment options are limited  Patient Progress  Patient progress: stable      Disposition  Final diagnoses:   Acute low back pain   Abdominal pain     Time reflects when diagnosis was documented in both MDM as applicable and the Disposition within this note     Time User Action Codes Description Comment    5/4/2022 11:31 AM Aldon Rising Add [M54 50] Acute low back pain     5/4/2022 11:31 AM Aldon Rising Add [R10 9] Abdominal pain       ED Disposition     ED Disposition Condition Date/Time Comment    Discharge Stable Wed May 4, 2022 11:31 AM María Sofia discharge to home/self care              Follow-up Information     Follow up With Specialties Details Why Contact Info Additional Information    Anny Cortes MD Family Medicine Call in 2 days for further evaluation of symptoms Dept of [address] Emergency Department Emergency Medicine Go to  If symptoms worsen [address] [facility] [address] [facility]  [facility] Emergency Department, [address] [facility], [place], [place], [address] MD [facility] Call in 1 week for further evaluation of symptoms [address] [facility]  [address]  [facility] [address] [facility]             Discharge Medication List as of 5/4/2022 11:33 AM      START taking these medications    Details   methocarbamol (ROBAXIN) 750 mg tablet Take 1 tablet (750 mg total) by mouth every 6 (six) hours as needed for muscle spasms (back pain/initial rx ) for up to 5 days, Starting Wed 5/4/2022, Until Mon 5/9/2022 at 2359, Print      oxyCODONE-acetaminophen (PERCOCET) 5-325 mg per tablet Take 1 tablet by mouth every 6 (six) hours as needed for severe pain (back pain/ongoing rx ) Label no driving no etoh  Initial rx    Dx: Max Daily Amount: 4 tablets, Starting Wed 5/4/2022, Print         CONTINUE these medications which have NOT CHANGED    Details   aluminum-magnesium hydroxide-simethicone (MYLANTA) 200-200-20 mg/5 mL suspension Take 30 mL by mouth every 6 (six) hours as needed for indigestion or heartburn, Starting Wed 10/14/2020, No Print      ARIPiprazole (ABILIFY) 20 MG tablet Take 1 tablet (20 mg total) by mouth daily, Starting Fri 3/30/2018, Print      aspirin 81 mg chewable tablet Chew 1 tablet (81 mg total) daily, Starting Sat 3/24/2018, No Print      atorvastatin (LIPITOR) 40 mg tablet Take 1 tablet (40 mg total) by mouth daily with dinner And stop Pravastatin when starting this medication, Starting Mon 4/29/2019, Print      budesonide-formoterol (SYMBICORT) 160-4 5 mcg/act inhaler INHALE 2 PUFFS BY MOUTH TWICE A DAY *RINSE MOUTH WITH WATER AND SPIT*, Historical Med      docusate sodium (COLACE) 100 mg capsule Take 1 capsule (100 mg total) by mouth 2 (two) times a day as needed for constipation, Starting Fri 3/23/2018, No Print      furosemide (Lasix) 40 mg tablet TAKE ONE TABLET BY MOUTH EVERY MORNING FOR FLUID/SWELLING, HEART, OR BLOOD PRESSURE, Historical Med      ipratropium-albuterol (DUO-NEB) 0 5-2 5 mg/3 mL nebulizer solution Take 1 vial (3 mL total) by nebulization 2 (two) times a day, Starting Thu 5/14/2020, Normal      lisinopril (ZESTRIL) 20 mg tablet Take 1 tablet (20 mg total) by mouth daily, Starting Fri 5/3/2019, No Print      losartan (COZAAR) 100 MG tablet Take 100 mg by mouth, Historical Med      magnesium oxide (MAG-OX) 400 mg Take 1 tablet (400 mg total) by mouth 2 (two) times a day, Starting Mon 4/29/2019, Print      meloxicam (MOBIC) 15 mg tablet Take 1 tablet (15 mg total) by mouth daily, Starting Tue 8/24/2021, Print      metFORMIN (GLUCOPHAGE) 500 mg tablet Take 1 tablet (500 mg total) by mouth 2 (two) times a day with meals, Starting Thu 3/29/2018, Print      metoprolol tartrate (LOPRESSOR) 25 mg tablet Take 1 tablet (25 mg total) by mouth every 12 (twelve) hours, Starting Thu 3/29/2018, Print      Multiple Vitamins-Iron (MULTIVITAMIN/IRON PO) TAKE ONE CAP/TAB BY MOUTH EVERY MORNING, Historical Med      polyethylene glycol (MIRALAX) 17 g packet Take 17 g by mouth daily as needed (constipation), Starting Fri 3/23/2018, No Print      pravastatin (PRAVACHOL) 40 mg tablet Take 1 tablet (40 mg total) by mouth daily with dinner, Starting Mon 4/29/2019, No Print      predniSONE 10 mg tablet Take 4 tablets for 2 days, then 3 tablets for 2 days, then 2 tablets for 4 days, then 1 tablet for 4 days then stop, Normal      risperiDONE (RisperDAL) 1 mg tablet TAKE ONE TABLET BY MOUTH TWICE A DAY FOR MOOD, Historical Med      senna (SENOKOT) 8 6 mg Take 1 tablet (8 6 mg total) by mouth 2 (two) times a day, Starting Wed 10/14/2020, No Print      traZODone (DESYREL) 100 mg tablet Take 100 mg by mouth, Historical Med             No discharge procedures on file      PDMP Review     None          ED Provider  Electronically Signed by           Justine Barnes PA-C  05/04/22 3314

## 2022-07-14 ENCOUNTER — EVALUATION (OUTPATIENT)
Dept: PHYSICAL THERAPY | Facility: CLINIC | Age: 72
End: 2022-07-14
Payer: COMMERCIAL

## 2022-07-14 DIAGNOSIS — M54.50 CHRONIC BILATERAL LOW BACK PAIN WITHOUT SCIATICA: Primary | ICD-10-CM

## 2022-07-14 DIAGNOSIS — M47.896 OTHER SPONDYLOSIS, LUMBAR REGION: ICD-10-CM

## 2022-07-14 DIAGNOSIS — G89.29 CHRONIC BILATERAL LOW BACK PAIN WITHOUT SCIATICA: Primary | ICD-10-CM

## 2022-07-14 PROCEDURE — 97110 THERAPEUTIC EXERCISES: CPT

## 2022-07-14 PROCEDURE — 97162 PT EVAL MOD COMPLEX 30 MIN: CPT

## 2022-07-14 NOTE — LETTER
July 15, 2022    Wendy Álvarez DO  [address]    Patient: Tito Williamson   YOB: 1950   Date of Visit: 2022     Encounter Diagnosis     ICD-10-CM    1  Chronic bilateral low back pain without sciatica  M54 50     G89 29    2  Other spondylosis, lumbar region  M47 896        Dear Dr Mary Reyna:    Thank you for your recent referral of Tito Williamson  Please review the attached evaluation summary from [facility] recent visit  Please verify that you agree with the plan of care by signing the attached order  If you have any questions or concerns, please do not hesitate to call  I sincerely appreciate the opportunity to share in the care of one of your patients and hope to have another opportunity to work with you in the near future  Sincerely,    Kamari Artis, PT      Referring Provider:      I certify that I have read the below Plan of Care and certify the need for these services furnished under this plan of treatment while under my care  Wendy Álvarez DO  [address] [address]  Via Fax: [phone]          PT Evaluation     Today's date: 2022  Patient name: Tito Williamson  : 1950  MRN: [id number]  Referring provider: Fredy Powell DO  Dx:   Encounter Diagnosis     ICD-10-CM    1  Chronic bilateral low back pain without sciatica  M54 50     G89 29    2  Other spondylosis, lumbar region  M47 896                   Assessment  Assessment details: Tito Williamson is a 66 y o  male who presents with signs and symptoms consistent of referring diagnosis  Patient presents with pain, decreased strength, decreased ROM, ambulatory dysfunction and postural dysfunction  Due to these impairments, Patient has difficulty performing a/iadls, recreational activities and engaging in social activities   Patient does not present with signs and sxs of lumbar radiculopathy although he does present with severe LBP which has resulted in a large inability to perform basic activities  Due to severity of pain, patient unable to tolerate many of the aspects of PT evaluation  Of note, patient is most limited with functional mobility which has led to a large decrease in activity tolerance  Patient would benefit from a comprehensive HEP focusing on improving LE strength, lumbar mobility, motor control, and functional mobility  Patient was provided with a basic HEP this date and educated on importance of completion  Patient verbalized understanding  Patient would benefit from skilled physical therapy to address the impairments, improve their level of function, and to improve their overall quality of life  PT POC 2x/wk for 8 weeks  Thank you for this referral      Impairments: abnormal gait, abnormal or restricted ROM, activity intolerance, impaired balance, impaired physical strength, lacks appropriate home exercise program, pain with function, safety issue, weight-bearing intolerance and poor posture     Symptom irritability: highUnderstanding of Dx/Px/POC: good   Prognosis: fair    Goals  Short Term Goals: to be achieved by 4 weeks  1) Patient to be independent with basic HEP  2) Decrease pain to 3/10 at its worst   3) Increase lumbar spine ROM by 25% in all deficient planes  4) Increase LE strength by 1/2 MMT grade in all deficient planes  Long Term Goals: to be achieved by discharge  1) FOTO equal to or greater than 26   2) Patient to be independent with comprehensive HEP  3) Lumbar spine ROM WNL all planes to improve a/iadls  4) Increase LE strength to 5/5 MMT grade in all planes to improve a/iadls  5) Patient to report no sleep interruption secondary to pain  6) Increase ambulatory tolerance to 20 min        Plan  Patient would benefit from: skilled physical therapy  Planned modality interventions: cryotherapy, thermotherapy: hydrocollator packs and TENS  Planned therapy interventions: abdominal trunk stabilization, activity modification, behavior modification, body mechanics training, home exercise program, therapeutic exercise, therapeutic activities, strengthening, stretching, patient education, neuromuscular re-education, manual therapy, joint mobilization and massage  Frequency: 2x week  Duration in weeks: 8  Plan of Care beginning date: 2022  Plan of Care expiration date: 2022  Treatment plan discussed with: patient        Subjective Evaluation    History of Present Illness  Mechanism of injury: History of Current Injury: Patient reports that his back pain has been present for a long period of time but has slowly gotten worse  He notes that it was getting better when taking medication but has not taken medication over the last week  Since not taking the medication, the patient's back pain has gotten worse and worse  He does not note a TOÑA, only that his back pain has continued to get worse  Patient notes that because of his pain, he has been sitting and laying down more frequently to get relief  He recently went to the [facility] where they sent him to trial OPPT  Pain location/Descriptors: Middle of the back into both sides of the back  Aggravating factors: Walking, sitting, extension, movement, laying on the side   Easing factors: Laying on the back, medications   Imaging: MRI  Special Questions: Susie Sheets denies a new onset of Bladder incontinence, Bowel dysfunction, Tingling, Numbness and Saddle anesthesia     Patient goals: "to get my back doing things I want to do"  Hobbies/Interest: Working in the yard, taking long drives   Occupation: Retired      Quality of life: fair    Pain  Current pain ratin  At best pain ratin  At worst pain rating: 10  Quality: sharp  Aggravating factors: standing and walking    Patient Goals  Patient goals for therapy: decreased pain, increased strength, independence with ADLs/IADLs and increased motion          Objective     Palpation   Left   No palpable tenderness to the erector spinae and quadratus lumborum  Right   No palpable tenderness to the erector spinae and quadratus lumborum  Tenderness     Additional Tenderness Details  Unable to test due to mobility     Neurological Testing     Reflexes   Left   Patellar (L4): trace (1+)  Achilles (S1): trace (1+)  Clonus sign: negative    Right   Patellar (L4): trace (1+)  Achilles (S1): trace (1+)  Clonus sign: negative    Active Range of Motion     Additional Active Range of Motion Details  Unable to test- in w/c this date (7/14)    Strength/Myotome Testing     Left Hip   Planes of Motion   Flexion: 3  Abduction: 4 (sitting)  Adduction: 4 (sitting)    Right Hip   Planes of Motion   Flexion: 3  Abduction: 4 (sitting)  Adduction: 4 (sitting)    Left Knee   Flexion: 5  Extension: 5    Right Knee   Flexion: 5  Extension: 5    Left Ankle/Foot   Dorsiflexion: 5  Plantar flexion: 5    Right Ankle/Foot   Dorsiflexion: 5  Plantar flexion: 5    Tests     Lumbar     Left   Negative passive SLR  Right   Negative passive SLR  Left Hip   Positive JOSUE and FADIR  Right Hip   Positive JOSUE and FADIR  Functional Assessment        Comments  Sit to stand from chair pain when standing but not when sitting                Diagnosis: LBP   Precautions: PD, uncontrolled type 2 DM, uses SPC/walker, fall risk, self reported angina (no nitro)    POC Expires: 9/8    Re-evaluation Date: 8/11   FOTO Scores/Date: 26 (7/14)   Visit Count 1       Manuals 7/14       LS STM        LS Mobs                                 Ther Ex        LTR 15x HEP       Glute bridge        Clamshell        TrA bracing 10x HEP       Posterior pelvic tilts         Ball flexion         Sit to stands        SAQ        Hip 3 way         Neuro Re-Ed        Hip isometrics                                                                                                               Ther Act              Step ups                            Modalities

## 2022-07-14 NOTE — PROGRESS NOTES
1. Did the patient bring a friend or family member with them into the room? Yes  2. If so, did the patient give explicit permission for the practitioner to discuss their healthcare in front of that friend/family member? Yes   PT Evaluation     Today's date: 2022  Patient name: Jane Rosales  : 1950  MRN: [id number]  Referring provider: Paulette Antunez DO  Dx:   Encounter Diagnosis     ICD-10-CM    1  Chronic bilateral low back pain without sciatica  M54 50     G89 29    2  Other spondylosis, lumbar region  M47 896                   Assessment  Assessment details: Jane Rosales is a 66 y o  male who presents with signs and symptoms consistent of referring diagnosis  Patient presents with pain, decreased strength, decreased ROM, ambulatory dysfunction and postural dysfunction  Due to these impairments, Patient has difficulty performing a/iadls, recreational activities and engaging in social activities  Patient does not present with signs and sxs of lumbar radiculopathy although he does present with severe LBP which has resulted in a large inability to perform basic activities  Due to severity of pain, patient unable to tolerate many of the aspects of PT evaluation  Of note, patient is most limited with functional mobility which has led to a large decrease in activity tolerance  Patient would benefit from a comprehensive HEP focusing on improving LE strength, lumbar mobility, motor control, and functional mobility  Patient was provided with a basic HEP this date and educated on importance of completion  Patient verbalized understanding  Patient would benefit from skilled physical therapy to address the impairments, improve their level of function, and to improve their overall quality of life  PT POC 2x/wk for 8 weeks   Thank you for this referral      Impairments: abnormal gait, abnormal or restricted ROM, activity intolerance, impaired balance, impaired physical strength, lacks appropriate home exercise program, pain with function, safety issue, weight-bearing intolerance and poor posture     Symptom irritability: highUnderstanding of Dx/Px/POC: good   Prognosis: fair    Goals  Short Term Goals: to be achieved by 4 weeks  1) Patient to be independent with basic HEP  2) Decrease pain to 3/10 at its worst   3) Increase lumbar spine ROM by 25% in all deficient planes  4) Increase LE strength by 1/2 MMT grade in all deficient planes  Long Term Goals: to be achieved by discharge  1) FOTO equal to or greater than 26   2) Patient to be independent with comprehensive HEP  3) Lumbar spine ROM WNL all planes to improve a/iadls  4) Increase LE strength to 5/5 MMT grade in all planes to improve a/iadls  5) Patient to report no sleep interruption secondary to pain  6) Increase ambulatory tolerance to 20 min  Plan  Patient would benefit from: skilled physical therapy  Planned modality interventions: cryotherapy, thermotherapy: hydrocollator packs and TENS  Planned therapy interventions: abdominal trunk stabilization, activity modification, behavior modification, body mechanics training, home exercise program, therapeutic exercise, therapeutic activities, strengthening, stretching, patient education, neuromuscular re-education, manual therapy, joint mobilization and massage  Frequency: 2x week  Duration in weeks: 8  Plan of Care beginning date: 7/14/2022  Plan of Care expiration date: 9/8/2022  Treatment plan discussed with: patient        Subjective Evaluation    History of Present Illness  Mechanism of injury: History of Current Injury: Patient reports that his back pain has been present for a long period of time but has slowly gotten worse  He notes that it was getting better when taking medication but has not taken medication over the last week  Since not taking the medication, the patient's back pain has gotten worse and worse  He does not note a TOÑA, only that his back pain has continued to get worse  Patient notes that because of his pain, he has been sitting and laying down more frequently to get relief  He recently went to the [place] where they sent him to trial OPPT     Pain location/Descriptors: Middle of the back into both sides of the back  Aggravating factors: Walking, sitting, extension, movement, laying on the side   Easing factors: Laying on the back, medications   Imaging: MRI  Special Questions: Pablo Manzanares denies a new onset of Bladder incontinence, Bowel dysfunction, Tingling, Numbness and Saddle anesthesia   Patient goals: "to get my back doing things I want to do"  Hobbies/Interest: Working in the yard, taking long drives   Occupation: Retired      Quality of life: fair    Pain  Current pain ratin  At best pain ratin  At worst pain rating: 10  Quality: sharp  Aggravating factors: standing and walking    Patient Goals  Patient goals for therapy: decreased pain, increased strength, independence with ADLs/IADLs and increased motion          Objective     Palpation   Left   No palpable tenderness to the erector spinae and quadratus lumborum  Right   No palpable tenderness to the erector spinae and quadratus lumborum  Tenderness     Additional Tenderness Details  Unable to test due to mobility     Neurological Testing     Reflexes   Left   Patellar (L4): trace (1+)  Achilles (S1): trace (1+)  Clonus sign: negative    Right   Patellar (L4): trace (1+)  Achilles (S1): trace (1+)  Clonus sign: negative    Active Range of Motion     Additional Active Range of Motion Details  Unable to test- in w/c this date ()    Strength/Myotome Testing     Left Hip   Planes of Motion   Flexion: 3  Abduction: 4 (sitting)  Adduction: 4 (sitting)    Right Hip   Planes of Motion   Flexion: 3  Abduction: 4 (sitting)  Adduction: 4 (sitting)    Left Knee   Flexion: 5  Extension: 5    Right Knee   Flexion: 5  Extension: 5    Left Ankle/Foot   Dorsiflexion: 5  Plantar flexion: 5    Right Ankle/Foot   Dorsiflexion: 5  Plantar flexion: 5    Tests     Lumbar     Left   Negative passive SLR  Right   Negative passive SLR  Left Hip   Positive JOSUE and FADIR  Right Hip   Positive JOSUE and FADIR       Functional Assessment        Comments  Sit to stand from chair pain when standing but not when sitting                Diagnosis: LBP   Precautions: PD, uncontrolled type 2 DM, uses SPC/walker, fall risk, self reported angina (no nitro)    POC Expires: 9/8    Re-evaluation Date: 8/11   FOTO Scores/Date: 26 (7/14)   Visit Count 1       Manuals 7/14       LS STM        LS Mobs                                 Ther Ex        LTR 15x HEP       Glute bridge        Clamshell        TrA bracing 10x HEP       Posterior pelvic tilts         Ball flexion         Sit to stands        SAQ        Hip 3 way         Neuro Re-Ed        Hip isometrics                                                                                                               Ther Act              Step ups                            Modalities

## 2022-07-20 ENCOUNTER — OFFICE VISIT (OUTPATIENT)
Dept: PHYSICAL THERAPY | Facility: CLINIC | Age: 72
End: 2022-07-20
Payer: COMMERCIAL

## 2022-07-20 DIAGNOSIS — M47.896 OTHER SPONDYLOSIS, LUMBAR REGION: ICD-10-CM

## 2022-07-20 DIAGNOSIS — G89.29 CHRONIC BILATERAL LOW BACK PAIN WITHOUT SCIATICA: Primary | ICD-10-CM

## 2022-07-20 DIAGNOSIS — M54.50 CHRONIC BILATERAL LOW BACK PAIN WITHOUT SCIATICA: Primary | ICD-10-CM

## 2022-07-20 PROCEDURE — 97112 NEUROMUSCULAR REEDUCATION: CPT

## 2022-07-20 PROCEDURE — 97110 THERAPEUTIC EXERCISES: CPT

## 2022-07-20 NOTE — PROGRESS NOTES
Daily Note     Today's date: 2022  Patient name: Lexy Burgos  : 1950  MRN: [id number]  Referring provider: Noemi Roman DO  Dx:   Encounter Diagnosis     ICD-10-CM    1  Chronic bilateral low back pain without sciatica  M54 50     G89 29    2  Other spondylosis, lumbar region  M47 896        Start Time: 1545          Subjective: pt reports his back is doing a little better than last session but still has pain  Stated his pain comes and goes  Unable to bring his walker due to it being to heavy for him to carry while maneuvering the w/c  Objective: See treatment diary below      Assessment: Tolerated treatment well  Patient would benefit from continued PT  Pt had minimal pain w/ supine exercises but once he sat up he felt more pain in back  Discussed fight or flight response to pain and the importance of strengthening  Pt was able to transfer w/ SBA w/ no walker  Plan: Continue per plan of care        Diagnosis: LBP   Precautions: PD, uncontrolled type 2 DM, uses SPC/walker, fall risk, self reported angina (no nitro)    POC Expires:     Re-evaluation Date:    FOTO Scores/Date: 26 ()   Visit Count 1 2      Manuals       LS STM        LS Mobs                                 Ther Ex        LTR 15x HEP 10" 10x      Glute bridge  10x      Clamshell  Modified supine 10x ea side      TrA bracing 10x HEP 10" 10x      Posterior pelvic tilts   10x      Ball flexion         Sit to stands        SAQ  LAQ x10      Hamstring stretch w/ strap        Hip 3 way         Neuro Re-Ed        Hip isometrics  5" 10x hip abd/add      Margart El w/ marches  10x ea                                                                                                     Ther Act              Step ups                            Modalities

## 2022-07-25 ENCOUNTER — OFFICE VISIT (OUTPATIENT)
Dept: PHYSICAL THERAPY | Facility: CLINIC | Age: 72
End: 2022-07-25
Payer: COMMERCIAL

## 2022-07-25 DIAGNOSIS — M54.50 CHRONIC BILATERAL LOW BACK PAIN WITHOUT SCIATICA: Primary | ICD-10-CM

## 2022-07-25 DIAGNOSIS — M47.896 OTHER SPONDYLOSIS, LUMBAR REGION: ICD-10-CM

## 2022-07-25 DIAGNOSIS — G89.29 CHRONIC BILATERAL LOW BACK PAIN WITHOUT SCIATICA: Primary | ICD-10-CM

## 2022-07-25 PROCEDURE — 97110 THERAPEUTIC EXERCISES: CPT

## 2022-07-25 NOTE — PROGRESS NOTES
Daily Note     Today's date: 2022  Patient name: Tani Lugo  : 1950  MRN: [id number]  Referring provider: Julius eBllamy DO  Dx:   Encounter Diagnosis     ICD-10-CM    1  Chronic bilateral low back pain without sciatica  M54 50     G89 29    2  Other spondylosis, lumbar region  M47 896                   Subjective: Patient reports that his low back has started to get better and that his pain has improved  Notes that he has been able to stand much more  Objective: See treatment diary below  BP: 138/82 LUE seated @ rest     Assessment: Tolerated treatment well  Due to patient subjective reports, patient was progressed through higher level standing and mobility to tolerance  Large amounts of fatigue noted with exercise requiring the need for increased rest breaks  Patient reported lightheadedness after repeated sit to stand activity this date  Therapist performed seated BP and was found to be as listed above  Rest of session completed in sitting  Lightheadedness most likely due to diminished activity tolerance as patient also noted to have large amounts of muscle fatigue after session  Progress as able  Patient would benefit from continued PT      Plan: Continue per plan of care        Diagnosis: LBP   Precautions: PD, uncontrolled type 2 DM, uses SPC/walker, fall risk, self reported angina (no nitro)    POC Expires:     Re-evaluation Date:    FOTO Scores/Date: 26 ()   Visit Count 1 2 3     Manuals      LS STM        LS Mobs                                 Ther Ex        LTR 15x HEP 10" 10x      Glute bridge  10x      Clamshell  Modified supine 10x ea side      TrA bracing 10x HEP 10" 10x      Posterior pelvic tilts   10x      Ball flexion    15x      Thoracic extensions    20x 3" hold      Standing marches         Sit to stands   2x10 gait belt      SAQ  LAQ x10 LAQ 2x10 4# ea leg     Trunk twists    20x ea side      Hamstring stretch w/ strap        Hip 3 way Neuro Re-Ed        Hip isometrics  5" 10x hip abd/add 15x 3" hold abd and arias      TrAbrace w/ arias  10x ea                                                                                                     Ther Act              Step ups                            Modalities

## 2022-07-27 ENCOUNTER — OFFICE VISIT (OUTPATIENT)
Dept: PHYSICAL THERAPY | Facility: CLINIC | Age: 72
End: 2022-07-27
Payer: COMMERCIAL

## 2022-07-27 DIAGNOSIS — M54.50 CHRONIC BILATERAL LOW BACK PAIN WITHOUT SCIATICA: Primary | ICD-10-CM

## 2022-07-27 DIAGNOSIS — G89.29 CHRONIC BILATERAL LOW BACK PAIN WITHOUT SCIATICA: Primary | ICD-10-CM

## 2022-07-27 DIAGNOSIS — M47.896 OTHER SPONDYLOSIS, LUMBAR REGION: ICD-10-CM

## 2022-07-27 PROCEDURE — 97110 THERAPEUTIC EXERCISES: CPT

## 2022-07-27 PROCEDURE — 97112 NEUROMUSCULAR REEDUCATION: CPT

## 2022-08-01 ENCOUNTER — OFFICE VISIT (OUTPATIENT)
Dept: PHYSICAL THERAPY | Facility: CLINIC | Age: 72
End: 2022-08-01
Payer: COMMERCIAL

## 2022-08-01 DIAGNOSIS — G89.29 CHRONIC BILATERAL LOW BACK PAIN WITHOUT SCIATICA: Primary | ICD-10-CM

## 2022-08-01 DIAGNOSIS — M47.896 OTHER SPONDYLOSIS, LUMBAR REGION: ICD-10-CM

## 2022-08-01 DIAGNOSIS — M54.50 CHRONIC BILATERAL LOW BACK PAIN WITHOUT SCIATICA: Primary | ICD-10-CM

## 2022-08-01 PROCEDURE — 97530 THERAPEUTIC ACTIVITIES: CPT

## 2022-08-01 PROCEDURE — 97112 NEUROMUSCULAR REEDUCATION: CPT

## 2022-08-01 PROCEDURE — 97110 THERAPEUTIC EXERCISES: CPT

## 2022-08-01 NOTE — PROGRESS NOTES
Daily Note     Today's date: 2022  Patient name: Cain Dickson  : 1950  MRN: [id number]  Referring provider: Moisés Moses DO  Dx:   Encounter Diagnosis     ICD-10-CM    1  Chronic bilateral low back pain without sciatica  M54 50     G89 29    2  Other spondylosis, lumbar region  M47 896                   Subjective: pt reports that his back has felt consistently better since initiating OPPT  Notes that he has been consistently able to do more and more activity with less pain  Patient noted increased LOB when standing upright and that his "left leg trembles " He will be seeing his family provider this week  Objective: See treatment diary below      Assessment: Tolerated treatment well  Challenged patient with higher level standing and supine activity this date  Patient able to tolerate all activity with appropriate rest breaks given as needed  Continues to show improvements in activity tolerance with minimal discomfort in the lumbar spine  Had lengthy discussion this date with patient and educated patient on importance of advocating for himself in regards to his health, with focus on his PD diagnosis and his sxs  Patient verbalized understanding  Progress as able  Patient would benefit from continued PT  Plan: Continue per plan of care        Diagnosis: LBP   Precautions: PD, uncontrolled type 2 DM, uses SPC/walker, fall risk, self reported angina (no nitro)    POC Expires:     Re-evaluation Date:    FOTO Scores/Date: 26 ()   Visit Count 1 2 3 4 5   Manuals    LS STM        LS Mobs                                 Ther Ex        Patient education     EB   LTR 15x HEP 10" 10x  10x3" ea side     Glute bridge  10x  2x10     Clamshell  Modified supine 10x ea side   2x15    TrA bracing 10x HEP 10" 10x      Posterior pelvic tilts   10x  20x 3" hold  20x    Ball flexion    15x   15x    Thoracic extensions    20x 3" hold      Standing marches     2x10 ea leg 2x10 @ rail    Sit to stands   2x10 gait belt  x10 gait belt    Sidelying IT band stretch         SLR    2x10 ea leg quad set    SAQ  LAQ x10 LAQ 2x10 4# ea leg     Trunk twists    20x ea side   15x    Hamstring stretch w/ strap        Hip 3 way     Extensions 2x10 ea leg     Neuro Re-Ed        Hip isometrics  5" 10x hip abd/add 15x 3" hold abd and marches      TrA brace w/ marches  10x ea  10x ea leg     TrA bracing     20x 3"  10x 5"    TrA brace SLR     2x10 ea leg    Quad set     x10 ea leg                                                                            Ther Act              Step ups           2x5 6" @ rail    Walking      @ rail 4 laps     side steps           4 laps @ rail    Modalities

## 2022-08-03 ENCOUNTER — OFFICE VISIT (OUTPATIENT)
Dept: PHYSICAL THERAPY | Facility: CLINIC | Age: 72
End: 2022-08-03
Payer: COMMERCIAL

## 2022-08-03 DIAGNOSIS — M47.896 OTHER SPONDYLOSIS, LUMBAR REGION: ICD-10-CM

## 2022-08-03 DIAGNOSIS — M54.50 CHRONIC BILATERAL LOW BACK PAIN WITHOUT SCIATICA: Primary | ICD-10-CM

## 2022-08-03 DIAGNOSIS — G89.29 CHRONIC BILATERAL LOW BACK PAIN WITHOUT SCIATICA: Primary | ICD-10-CM

## 2022-08-03 PROCEDURE — 97110 THERAPEUTIC EXERCISES: CPT

## 2022-08-03 PROCEDURE — 97116 GAIT TRAINING THERAPY: CPT

## 2022-08-03 PROCEDURE — 97112 NEUROMUSCULAR REEDUCATION: CPT

## 2022-08-03 NOTE — PROGRESS NOTES
Daily Note     Today's date: 8/3/2022  Patient name: Tito Ortez  : 1950  MRN: [id number]  Referring provider: Rosa Kiran DO  Dx:   Encounter Diagnosis     ICD-10-CM    1  Chronic bilateral low back pain without sciatica  M54 50     G89 29    2  Other spondylosis, lumbar region  M47 896                   Subjective: pt reports he doesn't have a lot of pain in the back today  Trouble w/ balance  Objective: See treatment diary below      Assessment: Tolerated treatment well  Patient would benefit from continued PT  Pt seemed unsteady w/ ambulating w/ quad cane  Pt demonstrated shuffle gait, cueing to lengthen stride length  Pt had improved stride length when ambulating at // bars  No LOB w/ ambulating, pt was just hesitant to take bigger steps due to fear of falling  Plan: Continue per plan of care        Diagnosis: LBP   Precautions: PD, uncontrolled type 2 DM, uses SPC/walker, fall risk, self reported angina (no nitro)    POC Expires:     Re-evaluation Date:    FOTO Scores/Date: 26 ()   Visit Count 6   4 5   Manuals 8/3   7/27 8/1   LS STM        LS Mobs                                 Ther Ex        Patient education     EB   LTR 10x 3" ea side   10x3" ea side     Glute bridge    2x10     Clamshell Supine 2x15    2x15    TrA bracing        Posterior pelvic tilts  20x   20x 3" hold  20x    Ball flexion  15x    15x    Thoracic extensions         Standing marches     2x10 ea leg  2x10 @ rail    Sit to stands    x10 gait belt    Sidelying IT band stretch         SLR    2x10 ea leg quad set    SAQ        Trunk twists  15x    15x    Hamstring stretch w/ strap        Hip 3 way     Extensions 2x10 ea leg     Neuro Re-Ed        Hip isometrics        TrA brace w/ marches 10x ea   10x ea leg     TrA bracing  10x 5"   20x 3"  10x 5"    TrA brace SLR 2x10 ea leg    2x10 ea leg    Quad set     x10 ea leg                                                                         Ther Act Step ups        2x5 6" @ rail    Walking  @ rail 3 laps; w/ quad cane 15 ft 1x    @ rail 4 laps     side steps        4 laps @ rail    Modalities

## 2022-08-08 ENCOUNTER — APPOINTMENT (OUTPATIENT)
Dept: PHYSICAL THERAPY | Facility: CLINIC | Age: 72
End: 2022-08-08
Payer: COMMERCIAL

## 2022-08-10 ENCOUNTER — OFFICE VISIT (OUTPATIENT)
Dept: PHYSICAL THERAPY | Facility: CLINIC | Age: 72
End: 2022-08-10
Payer: COMMERCIAL

## 2022-08-10 DIAGNOSIS — G89.29 CHRONIC BILATERAL LOW BACK PAIN WITHOUT SCIATICA: Primary | ICD-10-CM

## 2022-08-10 DIAGNOSIS — M54.50 CHRONIC BILATERAL LOW BACK PAIN WITHOUT SCIATICA: Primary | ICD-10-CM

## 2022-08-10 DIAGNOSIS — M47.896 OTHER SPONDYLOSIS, LUMBAR REGION: ICD-10-CM

## 2022-08-10 PROCEDURE — 97140 MANUAL THERAPY 1/> REGIONS: CPT

## 2022-08-10 PROCEDURE — 97110 THERAPEUTIC EXERCISES: CPT

## 2022-08-10 NOTE — PROGRESS NOTES
PT Discharge     Today's date: 8/10/2022  Patient name: Bri Latham  : 1950  MRN: [id number]  Referring provider: Remy Becerra DO  Dx:   Encounter Diagnosis     ICD-10-CM    1  Chronic bilateral low back pain without sciatica  M54 50     G89 29    2  Other spondylosis, lumbar region  M47 896                   Assessment  Assessment details: Bri Latham is a 66 y o  male who presented with signs and symptoms consistent of referring diagnosis  Patient has made excellent progress during his time at [facility]  Patient has made large functional gains in ROM, strength, motor control, and mobility with large decreases in pain which has led to increased overall activity tolerance  Patient has met most of his functional and rehabilitation goals with FOTO surpassing expected score  Patient still unable to ambulate with for long distances due to fear of falling, but he is able to ambulate around the home without pain, which is a large improvement since IE  Patient LE has also improved since IE although there is noted deficits which were most likely present prior to IE  Patient was provided with a comprehensive HEP and educated on importance of performing HEP to minimize future complaints  Patient was receptive to information provided by therapist  Patient would benefit from Adria Blanca focusing on improving gait and balance in order to safely navigate both home and community environment and will be obtaining a script in the future  At this time, patient is agreeable to and appropriate for d/c from therapy services focusing on his lumbar spine  Thank you for this referral  D/c POC  Goals  Short Term Goals: to be achieved by 4 weeks  1) Patient to be independent with basic HEP  - met   2) Decrease pain to 3/10 at its worst - met   3) Increase lumbar spine ROM by 25% in all deficient planes  - met    4) Increase LE strength by 1/2 MMT grade in all deficient planes  - met     Long Term Goals: to be achieved by discharge  1) FOTO equal to or greater than 26 - met   2) Patient to be independent with comprehensive HEP  - met  3) Lumbar spine ROM WNL all planes to improve a/iadls  - met   4) Increase LE strength to 5/5 MMT grade in all planes to improve a/iadls  - met   5) Patient to report no sleep interruption secondary to pain  - met  6) Increase ambulatory tolerance to 20 min  - not met      Plan  Planned therapy interventions: home exercise program  Duration in weeks: 4  Plan of Care beginning date: 2022  Plan of Care expiration date: 8/10/2022  Treatment plan discussed with: patient        Subjective Evaluation    History of Present Illness  Mechanism of injury: History of Current Injury: Patient reports that his back pain has been present for a long period of time but has slowly gotten worse  He notes that it was getting better when taking medication but has not taken medication over the last week  Since not taking the medication, the patient's back pain has gotten worse and worse  He does not note a TOÑA, only that his back pain has continued to get worse  Patient notes that because of his pain, he has been sitting and laying down more frequently to get relief  He recently went to the [facility] where they sent him to trial OPPT  Pain location/Descriptors: Middle of the back into both sides of the back  Aggravating factors: Walking, sitting, extension, movement, laying on the side   Easing factors: Laying on the back, medications   Imaging: MRI  Special Questions: Lucian Fagan denies a new onset of Bladder incontinence, Bowel dysfunction, Tingling, Numbness and Saddle anesthesia     Patient goals: "to get my back doing things I want to do"  Hobbies/Interest: Working in the yard, taking long drives   Occupation: Retired      Quality of life: fair    Pain  Current pain ratin  At best pain ratin  At worst pain ratin    Patient Goals  Patient goals for therapy: decreased pain, increased strength, independence with ADLs/IADLs and increased motion (all met)      Nadege Mccall reports a perceived improvement of 95%  Functional status measure is now 94  Patient notes pain has reduced to 0  He notes that he is able to get in and out of bed and go about his daily tasks without pain  He notes that his current biggest issue is walking but he is able to walk without pain  He also notes difficulty getting in and out of chairs without UE assistance  Overall, patient has made steady progress toward goals and would benefit from additional PT at this time  Objective     Palpation   Left   No palpable tenderness to the erector spinae and quadratus lumborum  Right   No palpable tenderness to the erector spinae and quadratus lumborum  Tenderness     Additional Tenderness Details  Unable to test due to mobility     Neurological Testing     Reflexes   Left   Patellar (L4): trace (1+)  Achilles (S1): trace (1+)  Clonus sign: negative    Right   Patellar (L4): trace (1+)  Achilles (S1): trace (1+)  Clonus sign: negative    Active Range of Motion     Additional Active Range of Motion Details  Unable to test- in w/c this date (8/10)    Strength/Myotome Testing     Left Hip   Planes of Motion   Flexion: 4-  Abduction: 5 (sitting)  Adduction: 5 (sitting)  External rotation: 5  Internal rotation: 5    Right Hip   Planes of Motion   Flexion: 4-  Abduction: 5 (sitting)  Adduction: 5 (sitting)  External rotation: 5  Internal rotation: 5    Left Knee   Flexion: 5  Extension: 5    Right Knee   Flexion: 5  Extension: 5    Left Ankle/Foot   Dorsiflexion: 5  Plantar flexion: 5    Right Ankle/Foot   Dorsiflexion: 5  Plantar flexion: 5    Tests     Lumbar     Left   Negative passive SLR  Right   Negative passive SLR  Left Hip   Positive JOSUE and FADIR  Right Hip   Positive JOSUE and FADIR       Functional Assessment        Comments  Sit to stand from chair pain when standing but not when sitting - resolved (8/10) Diagnosis: LBP   Precautions: PD, uncontrolled type 2 DM, uses SPC/walker, fall risk, self reported angina (no nitro)    POC Expires: 9/8    Re-evaluation Date: 8/11   FOTO Scores/Date: 26 (7/14)   Visit Count 6 7  4 5   Manuals 8/3 8/10  7/27 8/1   LS STM        LS Mobs         Re-eval   EB                      Ther Ex        Patient education  EB   EB   LTR 10x 3" ea side   10x3" ea side     Glute bridge    2x10     Clamshell Supine 2x15    2x15    TrA bracing        Posterior pelvic tilts  20x   20x 3" hold  20x    Ball flexion  15x    15x    Thoracic extensions         Standing marches     2x10 ea leg  2x10 @ rail    Sit to stands    x10 gait belt    Sidelying IT band stretch         SLR    2x10 ea leg quad set    SAQ        Trunk twists  15x    15x    Hamstring stretch w/ strap        Hip 3 way     Extensions 2x10 ea leg     Neuro Re-Ed        Hip isometrics        TrA brace w/ marches 10x ea   10x ea leg     TrA bracing  10x 5"   20x 3"  10x 5"    TrA brace SLR 2x10 ea leg    2x10 ea leg    Quad set     x10 ea leg                                                                         Ther Act           Step ups        2x5 6" @ rail    Walking  @ rail 3 laps; w/ quad cane 15 ft 1x    @ rail 4 laps     side steps        4 laps @ rail    Modalities

## 2022-08-10 NOTE — LETTER
August 10, 2022    Seda Cabrera DO  [address]    Patient: Benitez Vo   YOB: 1950   Date of Visit: 8/10/2022     Encounter Diagnosis     ICD-10-CM    1  Chronic bilateral low back pain without sciatica  M54 50     G89 29    2  Other spondylosis, lumbar region  M47 896        Dear Dr Chris Villanueva:    Thank you for your recent referral of Benitez Vo  Please review the attached evaluation summary from [facility] recent visit  Please verify that you agree with the plan of care by signing the attached order  If you have any questions or concerns, please do not hesitate to call  I sincerely appreciate the opportunity to share in the care of one of your patients and hope to have another opportunity to work with you in the near future  Sincerely,    Caitlin Hassan, PT      Referring Provider:      I certify that I have read the below Plan of Care and certify the need for these services furnished under this plan of treatment while under my care  Seda Cabrera DO  [address] [address]  Via Fax: [phone]          PT Discharge     Today's date: 8/10/2022  Patient name: Benitez Vo  : 1950  MRN: [id number]  Referring provider: Dara Isidro DO  Dx:   Encounter Diagnosis     ICD-10-CM    1  Chronic bilateral low back pain without sciatica  M54 50     G89 29    2  Other spondylosis, lumbar region  M47 896                   Assessment  Assessment details: Benitez oV is a 66 y o  male who presented with signs and symptoms consistent of referring diagnosis  Patient has made excellent progress during his time at [facility]  Patient has made large functional gains in ROM, strength, motor control, and mobility with large decreases in pain which has led to increased overall activity tolerance  Patient has met most of his functional and rehabilitation goals with FOTO surpassing expected score  Patient still unable to ambulate with for long distances due to fear of falling, but he is able to ambulate around the home without pain, which is a large improvement since IE  Patient LE has also improved since IE although there is noted deficits which were most likely present prior to IE  Patient was provided with a comprehensive HEP and educated on importance of performing HEP to minimize future complaints  Patient was receptive to information provided by therapist  Patient would benefit from Adria Rios focusing on improving gait and balance in order to safely navigate both home and community environment and will be obtaining a script in the future  At this time, patient is agreeable to and appropriate for d/c from therapy services focusing on his lumbar spine  Thank you for this referral  D/c POC  Goals  Short Term Goals: to be achieved by 4 weeks  1) Patient to be independent with basic HEP  - met   2) Decrease pain to 3/10 at its worst - met   3) Increase lumbar spine ROM by 25% in all deficient planes  - met    4) Increase LE strength by 1/2 MMT grade in all deficient planes  - met     Long Term Goals: to be achieved by discharge  1) FOTO equal to or greater than 26 - met   2) Patient to be independent with comprehensive HEP  - met  3) Lumbar spine ROM WNL all planes to improve a/iadls  - met   4) Increase LE strength to 5/5 MMT grade in all planes to improve a/iadls  - met   5) Patient to report no sleep interruption secondary to pain  - met  6) Increase ambulatory tolerance to 20 min  - not met      Plan  Planned therapy interventions: home exercise program  Duration in weeks: 4  Plan of Care beginning date: 7/14/2022  Plan of Care expiration date: 8/10/2022  Treatment plan discussed with: patient        Subjective Evaluation    History of Present Illness  Mechanism of injury: History of Current Injury: Patient reports that his back pain has been present for a long period of time but has slowly gotten worse   He notes that it was getting better when taking medication but has not taken medication over the last week  Since not taking the medication, the patient's back pain has gotten worse and worse  He does not note a TOÑA, only that his back pain has continued to get worse  Patient notes that because of his pain, he has been sitting and laying down more frequently to get relief  He recently went to the [place] where they sent him to trial OPPT  Pain location/Descriptors: Middle of the back into both sides of the back  Aggravating factors: Walking, sitting, extension, movement, laying on the side   Easing factors: Laying on the back, medications   Imaging: MRI  Special Questions: Travis Huitron denies a new onset of Bladder incontinence, Bowel dysfunction, Tingling, Numbness and Saddle anesthesia   Patient goals: "to get my back doing things I want to do"  Hobbies/Interest: Working in the yard, taking long drives   Occupation: Retired      Quality of life: fair    Pain  Current pain ratin  At best pain ratin  At worst pain ratin    Patient Goals  Patient goals for therapy: decreased pain, increased strength, independence with ADLs/IADLs and increased motion (all met)      Travis Huitron reports a perceived improvement of 95%  Functional status measure is now 94  Patient notes pain has reduced to 0  He notes that he is able to get in and out of bed and go about his daily tasks without pain  He notes that his current biggest issue is walking but he is able to walk without pain  He also notes difficulty getting in and out of chairs without UE assistance  Overall, patient has made steady progress toward goals and would benefit from additional PT at this time  Objective     Palpation   Left   No palpable tenderness to the erector spinae and quadratus lumborum  Right   No palpable tenderness to the erector spinae and quadratus lumborum       Tenderness     Additional Tenderness Details  Unable to test due to mobility     Neurological Testing     Reflexes   Left   Patellar (L4): trace (1+)  Achilles (S1): trace (1+)  Clonus sign: negative    Right   Patellar (L4): trace (1+)  Achilles (S1): trace (1+)  Clonus sign: negative    Active Range of Motion     Additional Active Range of Motion Details  Unable to test- in w/c this date (8/10)    Strength/Myotome Testing     Left Hip   Planes of Motion   Flexion: 4-  Abduction: 5 (sitting)  Adduction: 5 (sitting)  External rotation: 5  Internal rotation: 5    Right Hip   Planes of Motion   Flexion: 4-  Abduction: 5 (sitting)  Adduction: 5 (sitting)  External rotation: 5  Internal rotation: 5    Left Knee   Flexion: 5  Extension: 5    Right Knee   Flexion: 5  Extension: 5    Left Ankle/Foot   Dorsiflexion: 5  Plantar flexion: 5    Right Ankle/Foot   Dorsiflexion: 5  Plantar flexion: 5    Tests     Lumbar     Left   Negative passive SLR  Right   Negative passive SLR  Left Hip   Positive JOSUE and FADIR  Right Hip   Positive JOSUE and FADIR       Functional Assessment        Comments  Sit to stand from chair pain when standing but not when sitting - resolved (8/10)                 Diagnosis: LBP   Precautions: PD, uncontrolled type 2 DM, uses SPC/walker, fall risk, self reported angina (no nitro)    POC Expires: 9/8    Re-evaluation Date: 8/11   FOTO Scores/Date: 26 (7/14)   Visit Count 6 7  4 5   Manuals 8/3 8/10  7/27 8/1   LS STM        LS Mobs         Re-eval   EB                      Ther Ex        Patient education  EB   EB   LTR 10x 3" ea side   10x3" ea side     Glute bridge    2x10     Clamshell Supine 2x15    2x15    TrA bracing        Posterior pelvic tilts  20x   20x 3" hold  20x    Ball flexion  15x    15x    Thoracic extensions         Standing marches     2x10 ea leg  2x10 @ rail    Sit to stands    x10 gait belt    Sidelying IT band stretch         SLR    2x10 ea leg quad set    SAQ        Trunk twists  15x    15x    Hamstring stretch w/ strap        Hip 3 way     Extensions 2x10 ea leg     Neuro Re-Ed        Hip isometrics        TrA brace w/ marches 10x ea   10x ea leg     TrA bracing  10x 5"   20x 3"  10x 5"    TrA brace SLR 2x10 ea leg    2x10 ea leg    Quad set     x10 ea leg                                                                         Ther Act           Step ups        2x5 6" @ rail    Walking  @ rail 3 laps; w/ quad cane 15 ft 1x    @ rail 4 laps     side steps        4 laps @ rail    Modalities

## 2023-01-21 ENCOUNTER — APPOINTMENT (INPATIENT)
Dept: RADIOLOGY | Facility: HOSPITAL | Age: 73
End: 2023-01-21

## 2023-01-21 ENCOUNTER — HOSPITAL ENCOUNTER (INPATIENT)
Facility: HOSPITAL | Age: 73
LOS: 3 days | Discharge: HOME HEALTH | End: 2023-01-24
Attending: EMERGENCY MEDICINE | Admitting: INTERNAL MEDICINE

## 2023-01-21 ENCOUNTER — APPOINTMENT (EMERGENCY)
Dept: CT IMAGING | Facility: HOSPITAL | Age: 73
End: 2023-01-21

## 2023-01-21 ENCOUNTER — APPOINTMENT (EMERGENCY)
Dept: RADIOLOGY | Facility: HOSPITAL | Age: 73
End: 2023-01-21

## 2023-01-21 DIAGNOSIS — M54.50 CHRONIC LEFT-SIDED LOW BACK PAIN WITHOUT SCIATICA: ICD-10-CM

## 2023-01-21 DIAGNOSIS — G89.29 CHRONIC LEFT-SIDED LOW BACK PAIN WITH LEFT-SIDED SCIATICA: ICD-10-CM

## 2023-01-21 DIAGNOSIS — S89.92XA LEFT LEG INJURY, INITIAL ENCOUNTER: ICD-10-CM

## 2023-01-21 DIAGNOSIS — J96.91 RESPIRATORY FAILURE WITH HYPOXIA, UNSPECIFIED CHRONICITY (HCC): Primary | ICD-10-CM

## 2023-01-21 DIAGNOSIS — R09.02 HYPOXIA: ICD-10-CM

## 2023-01-21 DIAGNOSIS — Z86.79 HISTORY OF CHF (CONGESTIVE HEART FAILURE): ICD-10-CM

## 2023-01-21 DIAGNOSIS — G20 PARKINSON DISEASE (HCC): ICD-10-CM

## 2023-01-21 DIAGNOSIS — G89.29 CHRONIC LEFT-SIDED LOW BACK PAIN WITHOUT SCIATICA: ICD-10-CM

## 2023-01-21 DIAGNOSIS — M54.42 CHRONIC LEFT-SIDED LOW BACK PAIN WITH LEFT-SIDED SCIATICA: ICD-10-CM

## 2023-01-21 DIAGNOSIS — E78.2 MIXED HYPERLIPIDEMIA: ICD-10-CM

## 2023-01-21 PROBLEM — E11.9 TYPE 2 DIABETES MELLITUS WITHOUT COMPLICATION, WITHOUT LONG-TERM CURRENT USE OF INSULIN (HCC): Status: ACTIVE | Noted: 2023-01-21

## 2023-01-21 PROBLEM — J96.01 ACUTE RESPIRATORY FAILURE WITH HYPOXIA (HCC): Status: ACTIVE | Noted: 2023-01-21

## 2023-01-21 PROBLEM — M54.40 CHRONIC LEFT-SIDED LOW BACK PAIN WITH SCIATICA: Status: ACTIVE | Noted: 2023-01-21

## 2023-01-21 PROBLEM — I50.9 CHRONIC CHF (CONGESTIVE HEART FAILURE) (HCC): Status: ACTIVE | Noted: 2019-04-29

## 2023-01-21 LAB
2HR DELTA HS TROPONIN: 0 NG/L
ALBUMIN SERPL BCP-MCNC: 3.9 G/DL (ref 3.5–5)
ALP SERPL-CCNC: 64 U/L (ref 46–116)
ALT SERPL W P-5'-P-CCNC: 32 U/L (ref 12–78)
ANION GAP SERPL CALCULATED.3IONS-SCNC: 11 MMOL/L (ref 4–13)
APTT PPP: 31 SECONDS (ref 23–37)
AST SERPL W P-5'-P-CCNC: 12 U/L (ref 5–45)
ATRIAL RATE: 68 BPM
BASOPHILS # BLD AUTO: 0.04 THOUSANDS/ÂΜL (ref 0–0.1)
BASOPHILS NFR BLD AUTO: 1 % (ref 0–1)
BILIRUB SERPL-MCNC: 0.29 MG/DL (ref 0.2–1)
BUN SERPL-MCNC: 12 MG/DL (ref 5–25)
CALCIUM SERPL-MCNC: 8.9 MG/DL (ref 8.3–10.1)
CARDIAC TROPONIN I PNL SERPL HS: 3 NG/L
CARDIAC TROPONIN I PNL SERPL HS: 3 NG/L
CARDIAC TROPONIN I PNL SERPL HS: 3 NG/L (ref 8–18)
CHLORIDE SERPL-SCNC: 103 MMOL/L (ref 96–108)
CHOLEST SERPL-MCNC: 98 MG/DL
CO2 SERPL-SCNC: 26 MMOL/L (ref 21–32)
CREAT SERPL-MCNC: 1.08 MG/DL (ref 0.6–1.3)
EOSINOPHIL # BLD AUTO: 0.21 THOUSAND/ÂΜL (ref 0–0.61)
EOSINOPHIL NFR BLD AUTO: 3 % (ref 0–6)
ERYTHROCYTE [DISTWIDTH] IN BLOOD BY AUTOMATED COUNT: 13.2 % (ref 11.6–15.1)
EST. AVERAGE GLUCOSE BLD GHB EST-MCNC: 146 MG/DL
FLUAV RNA RESP QL NAA+PROBE: NEGATIVE
FLUBV RNA RESP QL NAA+PROBE: NEGATIVE
GFR SERPL CREATININE-BSD FRML MDRD: 67 ML/MIN/1.73SQ M
GLUCOSE SERPL-MCNC: 126 MG/DL (ref 65–140)
GLUCOSE SERPL-MCNC: 151 MG/DL (ref 65–140)
GLUCOSE SERPL-MCNC: 220 MG/DL (ref 65–140)
GLUCOSE SERPL-MCNC: 247 MG/DL (ref 65–140)
GLUCOSE SERPL-MCNC: 247 MG/DL (ref 65–140)
GLUCOSE SERPL-MCNC: 274 MG/DL (ref 65–140)
HBA1C MFR BLD: 6.7 %
HCT VFR BLD AUTO: 36.6 % (ref 36.5–49.3)
HDLC SERPL-MCNC: 32 MG/DL
HGB BLD-MCNC: 11.4 G/DL (ref 12–17)
IMM GRANULOCYTES # BLD AUTO: 0.01 THOUSAND/UL (ref 0–0.2)
IMM GRANULOCYTES NFR BLD AUTO: 0 % (ref 0–2)
INR PPP: 1.08 (ref 0.84–1.19)
LACTATE SERPL-SCNC: 1.7 MMOL/L (ref 0.5–2)
LDLC SERPL CALC-MCNC: 52 MG/DL (ref 0–100)
LIPASE SERPL-CCNC: 55 U/L (ref 73–393)
LYMPHOCYTES # BLD AUTO: 3.66 THOUSANDS/ÂΜL (ref 0.6–4.47)
LYMPHOCYTES NFR BLD AUTO: 53 % (ref 14–44)
MCH RBC QN AUTO: 24.3 PG (ref 26.8–34.3)
MCHC RBC AUTO-ENTMCNC: 31.1 G/DL (ref 31.4–37.4)
MCV RBC AUTO: 78 FL (ref 82–98)
MONOCYTES # BLD AUTO: 0.41 THOUSAND/ÂΜL (ref 0.17–1.22)
MONOCYTES NFR BLD AUTO: 6 % (ref 4–12)
NEUTROPHILS # BLD AUTO: 2.51 THOUSANDS/ÂΜL (ref 1.85–7.62)
NEUTS SEG NFR BLD AUTO: 37 % (ref 43–75)
NRBC BLD AUTO-RTO: 0 /100 WBCS
NT-PROBNP SERPL-MCNC: 78 PG/ML
P AXIS: 19 DEGREES
PLATELET # BLD AUTO: 163 THOUSANDS/UL (ref 149–390)
POTASSIUM SERPL-SCNC: 3.7 MMOL/L (ref 3.5–5.3)
PR INTERVAL: 176 MS
PROCALCITONIN SERPL-MCNC: <0.05 NG/ML
PROT SERPL-MCNC: 7.7 G/DL (ref 6.4–8.4)
PROTHROMBIN TIME: 13.8 SECONDS (ref 11.6–14.5)
QRS AXIS: 46 DEGREES
QRSD INTERVAL: 84 MS
QT INTERVAL: 382 MS
QTC INTERVAL: 406 MS
RBC # BLD AUTO: 4.69 MILLION/UL (ref 3.88–5.62)
RSV RNA RESP QL NAA+PROBE: NEGATIVE
SARS-COV-2 RNA RESP QL NAA+PROBE: NEGATIVE
SODIUM SERPL-SCNC: 140 MMOL/L (ref 135–147)
T WAVE AXIS: 49 DEGREES
TRIGL SERPL-MCNC: 71 MG/DL
VENTRICULAR RATE: 68 BPM
WBC # BLD AUTO: 6.84 THOUSAND/UL (ref 4.31–10.16)

## 2023-01-21 RX ORDER — ATORVASTATIN CALCIUM 40 MG/1
40 TABLET, FILM COATED ORAL
Status: DISCONTINUED | OUTPATIENT
Start: 2023-01-21 | End: 2023-01-24 | Stop reason: HOSPADM

## 2023-01-21 RX ORDER — LEVALBUTEROL 1.25 MG/.5ML
1.25 SOLUTION, CONCENTRATE RESPIRATORY (INHALATION)
Status: DISCONTINUED | OUTPATIENT
Start: 2023-01-21 | End: 2023-01-24 | Stop reason: HOSPADM

## 2023-01-21 RX ORDER — ACETAMINOPHEN 325 MG/1
650 TABLET ORAL EVERY 6 HOURS PRN
Status: DISCONTINUED | OUTPATIENT
Start: 2023-01-21 | End: 2023-01-24 | Stop reason: HOSPADM

## 2023-01-21 RX ORDER — OXYCODONE HYDROCHLORIDE AND ACETAMINOPHEN 5; 325 MG/1; MG/1
1 TABLET ORAL EVERY 6 HOURS PRN
Status: DISCONTINUED | OUTPATIENT
Start: 2023-01-21 | End: 2023-01-24 | Stop reason: HOSPADM

## 2023-01-21 RX ORDER — AZITHROMYCIN 500 MG/1
500 TABLET, FILM COATED ORAL EVERY 24 HOURS
Status: COMPLETED | OUTPATIENT
Start: 2023-01-21 | End: 2023-01-23

## 2023-01-21 RX ORDER — BENZONATATE 100 MG/1
100 CAPSULE ORAL 3 TIMES DAILY PRN
Status: DISCONTINUED | OUTPATIENT
Start: 2023-01-21 | End: 2023-01-22

## 2023-01-21 RX ORDER — INSULIN LISPRO 100 [IU]/ML
2-12 INJECTION, SOLUTION INTRAVENOUS; SUBCUTANEOUS
Status: DISCONTINUED | OUTPATIENT
Start: 2023-01-21 | End: 2023-01-24 | Stop reason: HOSPADM

## 2023-01-21 RX ORDER — GUAIFENESIN 600 MG/1
1200 TABLET, EXTENDED RELEASE ORAL EVERY 12 HOURS SCHEDULED
Status: DISCONTINUED | OUTPATIENT
Start: 2023-01-21 | End: 2023-01-22

## 2023-01-21 RX ORDER — METHYLPREDNISOLONE SODIUM SUCCINATE 40 MG/ML
40 INJECTION, POWDER, LYOPHILIZED, FOR SOLUTION INTRAMUSCULAR; INTRAVENOUS EVERY 8 HOURS SCHEDULED
Status: DISCONTINUED | OUTPATIENT
Start: 2023-01-21 | End: 2023-01-21

## 2023-01-21 RX ORDER — POLYETHYLENE GLYCOL 3350 17 G/17G
17 POWDER, FOR SOLUTION ORAL DAILY PRN
Status: DISCONTINUED | OUTPATIENT
Start: 2023-01-21 | End: 2023-01-24 | Stop reason: HOSPADM

## 2023-01-21 RX ORDER — ASPIRIN 81 MG/1
81 TABLET, CHEWABLE ORAL DAILY
Status: DISCONTINUED | OUTPATIENT
Start: 2023-01-21 | End: 2023-01-24 | Stop reason: HOSPADM

## 2023-01-21 RX ORDER — METHOCARBAMOL 500 MG/1
750 TABLET, FILM COATED ORAL EVERY 6 HOURS PRN
Status: DISCONTINUED | OUTPATIENT
Start: 2023-01-21 | End: 2023-01-24 | Stop reason: HOSPADM

## 2023-01-21 RX ORDER — ENOXAPARIN SODIUM 100 MG/ML
40 INJECTION SUBCUTANEOUS DAILY
Status: DISCONTINUED | OUTPATIENT
Start: 2023-01-21 | End: 2023-01-24 | Stop reason: HOSPADM

## 2023-01-21 RX ORDER — TRAZODONE HYDROCHLORIDE 100 MG/1
100 TABLET ORAL
Status: DISCONTINUED | OUTPATIENT
Start: 2023-01-21 | End: 2023-01-24 | Stop reason: HOSPADM

## 2023-01-21 RX ORDER — LISINOPRIL 10 MG/1
20 TABLET ORAL DAILY
Status: DISCONTINUED | OUTPATIENT
Start: 2023-01-21 | End: 2023-01-24 | Stop reason: HOSPADM

## 2023-01-21 RX ORDER — FUROSEMIDE 40 MG/1
40 TABLET ORAL DAILY
Status: DISCONTINUED | OUTPATIENT
Start: 2023-01-21 | End: 2023-01-24 | Stop reason: HOSPADM

## 2023-01-21 RX ORDER — MAGNESIUM HYDROXIDE/ALUMINUM HYDROXICE/SIMETHICONE 120; 1200; 1200 MG/30ML; MG/30ML; MG/30ML
30 SUSPENSION ORAL EVERY 6 HOURS PRN
Status: DISCONTINUED | OUTPATIENT
Start: 2023-01-21 | End: 2023-01-24 | Stop reason: HOSPADM

## 2023-01-21 RX ORDER — RISPERIDONE 1 MG/1
1 TABLET ORAL 2 TIMES DAILY
Status: DISCONTINUED | OUTPATIENT
Start: 2023-01-21 | End: 2023-01-24 | Stop reason: HOSPADM

## 2023-01-21 RX ORDER — KETOROLAC TROMETHAMINE 30 MG/ML
1 INJECTION, SOLUTION INTRAMUSCULAR; INTRAVENOUS ONCE
Status: COMPLETED | OUTPATIENT
Start: 2023-01-21 | End: 2023-01-21

## 2023-01-21 RX ORDER — METHYLPREDNISOLONE SODIUM SUCCINATE 40 MG/ML
40 INJECTION, POWDER, LYOPHILIZED, FOR SOLUTION INTRAMUSCULAR; INTRAVENOUS EVERY 8 HOURS
Status: DISCONTINUED | OUTPATIENT
Start: 2023-01-21 | End: 2023-01-21

## 2023-01-21 RX ORDER — IPRATROPIUM BROMIDE AND ALBUTEROL SULFATE .5; 3 MG/3ML; MG/3ML
1 SOLUTION RESPIRATORY (INHALATION) ONCE
Status: COMPLETED | OUTPATIENT
Start: 2023-01-21 | End: 2023-01-21

## 2023-01-21 RX ORDER — IPRATROPIUM BROMIDE AND ALBUTEROL SULFATE 2.5; .5 MG/3ML; MG/3ML
3 SOLUTION RESPIRATORY (INHALATION) 2 TIMES DAILY
Status: DISCONTINUED | OUTPATIENT
Start: 2023-01-21 | End: 2023-01-21

## 2023-01-21 RX ORDER — SODIUM CHLORIDE FOR INHALATION 0.9 %
VIAL, NEBULIZER (ML) INHALATION
Status: COMPLETED
Start: 2023-01-21 | End: 2023-01-22

## 2023-01-21 RX ORDER — FORMOTEROL FUMARATE 20 UG/2ML
20 SOLUTION RESPIRATORY (INHALATION)
Status: DISCONTINUED | OUTPATIENT
Start: 2023-01-21 | End: 2023-01-21

## 2023-01-21 RX ORDER — SODIUM CHLORIDE FOR INHALATION 3 %
4 VIAL, NEBULIZER (ML) INHALATION
Status: DISCONTINUED | OUTPATIENT
Start: 2023-01-21 | End: 2023-01-22

## 2023-01-21 RX ORDER — METHYLPREDNISOLONE SODIUM SUCCINATE 125 MG/2ML
125 INJECTION, POWDER, LYOPHILIZED, FOR SOLUTION INTRAMUSCULAR; INTRAVENOUS ONCE
Status: DISCONTINUED | OUTPATIENT
Start: 2023-01-21 | End: 2023-01-21

## 2023-01-21 RX ADMIN — AZITHROMYCIN 500 MG: 500 TABLET, FILM COATED ORAL at 08:58

## 2023-01-21 RX ADMIN — ASPIRIN 81 MG: 81 TABLET, CHEWABLE ORAL at 08:58

## 2023-01-21 RX ADMIN — GUAIFENESIN 1200 MG: 600 TABLET ORAL at 21:15

## 2023-01-21 RX ADMIN — ISODIUM CHLORIDE 3 ML: 0.03 SOLUTION RESPIRATORY (INHALATION) at 19:26

## 2023-01-21 RX ADMIN — INSULIN LISPRO 4 UNITS: 100 INJECTION, SOLUTION INTRAVENOUS; SUBCUTANEOUS at 12:53

## 2023-01-21 RX ADMIN — FUROSEMIDE 40 MG: 40 TABLET ORAL at 08:58

## 2023-01-21 RX ADMIN — TRAZODONE HYDROCHLORIDE 100 MG: 100 TABLET ORAL at 21:16

## 2023-01-21 RX ADMIN — RISPERIDONE 1 MG: 1 TABLET ORAL at 08:58

## 2023-01-21 RX ADMIN — ENOXAPARIN SODIUM 40 MG: 40 INJECTION SUBCUTANEOUS at 08:58

## 2023-01-21 RX ADMIN — INSULIN LISPRO 6 UNITS: 100 INJECTION, SOLUTION INTRAVENOUS; SUBCUTANEOUS at 16:44

## 2023-01-21 RX ADMIN — METHYLPREDNISOLONE SODIUM SUCCINATE 40 MG: 40 INJECTION, POWDER, FOR SOLUTION INTRAMUSCULAR; INTRAVENOUS at 08:58

## 2023-01-21 RX ADMIN — ACETAMINOPHEN 650 MG: 325 TABLET, FILM COATED ORAL at 21:15

## 2023-01-21 RX ADMIN — INSULIN LISPRO 4 UNITS: 100 INJECTION, SOLUTION INTRAVENOUS; SUBCUTANEOUS at 21:17

## 2023-01-21 RX ADMIN — MAGNESIUM OXIDE TAB 400 MG (241.3 MG ELEMENTAL MG) 400 MG: 400 (241.3 MG) TAB at 08:59

## 2023-01-21 RX ADMIN — LEVALBUTEROL HYDROCHLORIDE 1.25 MG: 1.25 SOLUTION, CONCENTRATE RESPIRATORY (INHALATION) at 19:26

## 2023-01-21 RX ADMIN — GUAIFENESIN 1200 MG: 600 TABLET ORAL at 08:58

## 2023-01-21 RX ADMIN — RISPERIDONE 1 MG: 1 TABLET ORAL at 16:44

## 2023-01-21 RX ADMIN — IOHEXOL 100 ML: 350 INJECTION, SOLUTION INTRAVENOUS at 05:10

## 2023-01-21 RX ADMIN — ATORVASTATIN CALCIUM 40 MG: 40 TABLET, FILM COATED ORAL at 16:44

## 2023-01-21 RX ADMIN — METOPROLOL TARTRATE 25 MG: 25 TABLET, FILM COATED ORAL at 21:15

## 2023-01-21 RX ADMIN — IPRATROPIUM BROMIDE AND ALBUTEROL SULFATE 3 ML: 2.5; .5 SOLUTION RESPIRATORY (INHALATION) at 08:15

## 2023-01-21 RX ADMIN — MAGNESIUM OXIDE TAB 400 MG (241.3 MG ELEMENTAL MG) 400 MG: 400 (241.3 MG) TAB at 16:45

## 2023-01-21 RX ADMIN — LISINOPRIL 20 MG: 10 TABLET ORAL at 08:58

## 2023-01-21 RX ADMIN — METOPROLOL TARTRATE 25 MG: 25 TABLET, FILM COATED ORAL at 08:58

## 2023-01-21 NOTE — ASSESSMENT & PLAN NOTE
Symptoms: acute dyspnea    · No fevers, not meeting SIRS Criteria on admission  · Flu/RSV/COVID negative  • Smoking Status: former 2 PY smoker quit in 1993  · CXR showing diffuse opacity, similar to prior CXR in 2020, pending official read    Plan:  • Procalcitonin, CBC w/ Diff, CMP  • Strep, Legionella, sputum culture  • IV Solu-Medrol: 40 q8h; taper as appropriate  • 2-3 L NC, Goal O2 sat 88-92%; wean as able  • Monitor respiratory status;  • Respiratory protocol, Xopenex/Atrovent, Airway clearance protocol, IS  • Mucinex, Tessalon Perles

## 2023-01-21 NOTE — ASSESSMENT & PLAN NOTE
CT recon only lumbar spine 1/21/23  No fracture or traumatic subluxation  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    If symptoms persist, an MRI of the lumbar spine could be performed for further evaluation    · Has been following up with outpatient PT for low back pain and is now complaining of worsening severity with radiation to LLE    No complaints of recent involuntary bowel or bladder voiding/saddle anesthesia    Monitor and follow up outpatient  PT/OT

## 2023-01-21 NOTE — CONSULTS
Orthopedics   Talha García 74 y o  male MRN: [id number]  Unit/Bed#: -02      Chief Complaint:   Low back pain    HPI:  74 y o  male with low back pain and radiation into Left lower extremity  Patinet reports pain started a few days ago without injury; patient then admits he "almost fell" but was able to walk from his bed to his chair  Patient was asked many times and he continued to deny history of recent fall  Patient admits he ambulates at baseline with walker, cane, and wheelchair  Per patients chart, he has attended formal outpatient PT secondary to low back and radicular pain in Left lower extremity  He does admit to pain in the front and back of his thigh "and all the way down his leg" with some "pins and needles"    Review Of Systems:   · Skin: Normal  · Neuro: See HPI  · Musculoskeletal: See HPI  · 14 point review of systems negative except as stated above     Past Medical History:   Past Medical History:   Diagnosis Date   • Anxiety    • CHF (congestive heart failure) ([facility])    • COPD (chronic obstructive pulmonary disease) ([place] [address] )    • Depression    • Diabetes mellitus ([place] [address] )    • Enlarged prostate    • Hallucination    • Hyperlipidemia    • Hypertension    • Memory loss    • Panic attack    • Panic disorder    • Psychiatric disorder    • Psychiatric illness    • Psychosis ([place] [address] )    • PTSD (post-traumatic stress disorder)    • Schizoaffective disorder ([place] [address] )    • Schizophrenia ([place] [address] )        Past Surgical History:   History reviewed  No pertinent surgical history  Family History:  Family history reviewed and non-contributory  History reviewed  No pertinent family history      Social History:  Social History     Socioeconomic History   • Marital status: /Civil Union     Spouse name: None   • Number of children: None   • Years of education: None   • Highest education level: None   Occupational History   • None   Tobacco Use   • Smoking status: Former     Packs/day: 2 00     Years: 0 50     Pack years:  1 00     Types: Cigarettes     Quit date: 44     Years since quittin 0   • Smokeless tobacco: Never   • Tobacco comments:     quit in    Vaping Use   • Vaping Use: Never used   Substance and Sexual Activity   • Alcohol use: Never   • Drug use: Never   • Sexual activity: Never   Other Topics Concern   • None   Social History Narrative   • None     Social Determinants of Health     Financial Resource Strain: Not on file   Food Insecurity: Not on file   Transportation Needs: Not on file   Physical Activity: Not on file   Stress: Not on file   Social Connections: Not on file   Intimate Partner Violence: Not on file   Housing Stability: Not on file       Allergies:   No Known Allergies        Labs:  0   Lab Value Date/Time    HCT 36 6 2023 0408    HCT 36 4 (L) 2022 0702    HCT 37 2 2022 2105    HCT 39 0 (L) 2018 0600    HGB 11 4 (L) 2023 0408    HGB 11 1 (L) 2022 0702    HGB 11 6 (L) 2022 2105    HGB 12 2 (L) 2018 0600    INR 1 08 2023 0408    WBC 6 84 2023 0408    WBC 6 24 2022 0702    WBC 8 59 2022 2105    WBC 7 9 2018 0600    ESR 4 2018 1141       Meds:    Current Facility-Administered Medications:   •  acetaminophen (TYLENOL) tablet 650 mg, 650 mg, Oral, Q6H PRN, Kenneth Pan DO  •  aluminum-magnesium hydroxide-simethicone (MYLANTA) oral suspension 30 mL, 30 mL, Oral, Q6H PRN, Kenneth Pan DO  •  aspirin chewable tablet 81 mg, 81 mg, Oral, Daily, Kenneth Pan DO, 81 mg at 23 0178  •  atorvastatin (LIPITOR) tablet 40 mg, 40 mg, Oral, Daily With Dinner, Kenneth Pan DO  •  azithromycin (ZITHROMAX) tablet 500 mg, 500 mg, Oral, Q24H, Kenneth Pan DO, 500 mg at 23 0858  •  benzonatate (TESSALON PERLES) capsule 100 mg, 100 mg, Oral, TID PRN, Kenneth Pan DO  •  enoxaparin (LOVENOX) subcutaneous injection 40 mg, 40 mg, Subcutaneous, Daily, Kenneth Pan DO, 40 mg at 01/21/23 0858  •  furosemide (LASIX) tablet 40 mg, 40 mg, Oral, Daily, Kenneth Pan DO, 40 mg at 01/21/23 0858  •  guaiFENesin (MUCINEX) 12 hr tablet 1,200 mg, 1,200 mg, Oral, Q12H [address], [facility], DO, 1,200 mg at 01/21/23 0858  •  insulin lispro (HumaLOG) 100 units/mL subcutaneous injection 2-12 Units, 2-12 Units, Subcutaneous, TID AC, 4 Units at 01/21/23 1253 **AND** Fingerstick Glucose (POCT), , , TID AC, Kenneth Pan DO  •  insulin lispro (HumaLOG) 100 units/mL subcutaneous injection 2-12 Units, 2-12 Units, Subcutaneous, HS, Kenneth Pan DO  •  levalbuterol (XOPENEX) inhalation solution 1 25 mg, 1 25 mg, Nebulization, BID, Kenneth Pan DO  •  lisinopril (ZESTRIL) tablet 20 mg, 20 mg, Oral, Daily, Kenneth Pan DO, 20 mg at 01/21/23 0858  •  magnesium oxide (MAG-OX) tablet 400 mg, 400 mg, Oral, BID, Kenneth Pan DO, 400 mg at 01/21/23 0859  •  methocarbamol (ROBAXIN) tablet 750 mg, 750 mg, Oral, Q6H PRN, Kenneth Pan DO  •  metoprolol tartrate (LOPRESSOR) tablet 25 mg, 25 mg, Oral, Q12H [address], Kenneth Maxim DO, 25 mg at 01/21/23 0858  •  oxyCODONE-acetaminophen (PERCOCET) 5-325 mg per tablet 1 tablet, 1 tablet, Oral, Q6H PRN, Kenneth Pan DO  •  polyethylene glycol (MIRALAX) packet 17 g, 17 g, Oral, Daily PRN, Kenneth Pan DO  •  risperiDONE (RisperDAL) tablet 1 mg, 1 mg, Oral, BID, Kenneth Pan DO, 1 mg at 01/21/23 0858  •  sodium chloride 3 % inhalation solution 4 mL, 4 mL, Nebulization, BID, Kenneth Pan DO  •  traZODone (DESYREL) tablet 100 mg, 100 mg, Oral, HS, Kenneth Pan DO    Blood Culture:   Lab Results   Component Value Date    BLOODCX No Growth After 5 Days  05/11/2020    BLOODCX No Growth After 5 Days  05/11/2020       Wound Culture:   No results found for: WOUNDCULT    Ins and Outs:  No intake/output data recorded            Physical Exam:   /87   Pulse 72   Temp 98 5 °F (36 9 °C) (Oral)   Resp 16    5' 10" (1 778 m)   Wt 125 kg (275 lb 9 2 oz)   SpO2 94%   BMI 39 54 kg/m²   Gen: No acute distress, resting comfortably in bed  HEENT: Eyes clear, moist mucus membranes, hearing intact  Respiratory: No audible wheezing or stridor  Cardiovascular: Well Perfused peripherally, 2+ distal pulse  Abdomen: nondistended, no peritoneal signs    Musculoskeletal:  Left lower extremity   · Skin without evidence of strike through  · TTP at lumbar paraspinal musculature, anterior and posterior aspects of thigh  · SILT s/s/sp/dp/t bilaterally   · Motor intact throughout lower extremity   · 4+/5 hip flexion/extension, 5/5 knee flexion/extension, 5/5 ankle dorsi/plantar flexion, 5/5 EHL/FHL  · 2+ DP/PT pulse  · Negative log roll, no micromotion tenderness  · Low back pain with Stinchfield  · No pain with passive ROM of hip, no groin pain  · Passive Left knee flexion to  degrees with low back, anterior thigh pain   · Musculature is soft and compressible, no pain with passive stretch  · Leg lengths equal    Tertiary: no tenderness over all other joints/long bones as except already stated  Radiology:   I personally reviewed the films  CT scan performed of lumbar spine demonstrates moderate to severe spondylosis of lumbar spine most noted at L2-3, L4-5, L5-S1    _*_*_*_*_*_*_*_*_*_*_*_*_*_*_*_*_*_*_*_*_*_*_*_*_*_*_*_*_*_*_*_*_*_*_*_*_*_*_*_*_*    Assessment:  72 y o male likely low back pain with radicular symptoms/nerve root irritation     Plan:   · Weightbearing as tolerated bilateral lower extremities  · PT/OT- up and out of bed, ambulation with walker and assistance  · Pain control per primary team  · DVT ppx per primary team  · Dispo: Will await x-rays of pelvis and Left hip  Patient was encouraged to get up and out of bed with therapy   If pain worsens, can consider additional imaging of lumbar spine, but would also recommend outpatient imaging and follow up in office with Spine surgery vs Pain management for evaluation           [facility]-MAVERICK AGUILAR PA-C

## 2023-01-21 NOTE — ASSESSMENT & PLAN NOTE
Presenting with worsening SOB and increasing O2 needs now needing 2-3LNC    At baseline, uses no supplemental O2  Likely 2/2 COPD exacerbation vs OHS vs less likely CHF    Plan:  • Treat underlying   • Wean O2 as able

## 2023-01-21 NOTE — ED PROVIDER NOTES
History  Chief Complaint   Patient presents with   • Back Pain     Multiple complaints - L lower back pain that radiates down hip/leg, dizziness, shortness of breath given toradol and duo neb prior to arrival, conversational dyspnea noted  Michelle Ring is a 74 y o  male PMHx Of COPD, hypertension, hyperlipidemia, CHF, supposed to be on Lasix but has not been taking medication secondary to his complaint of "frequent urination" presenting today with left-sided hip pain, abdominal pain and shortness of breath  Patient presented with multiple complaints, was said to be 88% on room air at home, he does not have oxygen at home for his COPD  Does not use oxygen at baseline  On arrival he is having shortness of breath for the past few days, complained of some tenderness palpation and reported that his left side hurt radiating down his left leg  Reports that leg pain began a few days ago and has been getting progressively worse prompting ED visit  Has had trouble with ambulation  On exam the patient did have some tenderness palpation to the area of left hip and pain with movement, there is no redness or signs of infection/septic joint  Labs are unremarkable, however the patient was having a new O2 requirement of 3 L via nasal cannula, this was titrated down and the patient was ambulated, the patient was unable to ambulate without assistance which is worse than his baseline and his oxygen saturation dropped to 88% on ambulatory pulse ox  Differential diagnosis includes but is not limited to COPD exacerbation, CHF exacerbation, medication non-compliance, pneumonia, pleural effusion, less likely pneumothorax/hemothorax  Hx obtained by patient  Discussed with [facility] who accepted the patient for admission  Prior to Admission Medications   Prescriptions Last Dose Informant Patient Reported? Taking?    ARIPiprazole (ABILIFY) 20 MG tablet Unknown  No No   Sig: Take 1 tablet (20 mg total) by mouth daily   Patient not taking: Reported on 1/21/2023   Multiple Vitamins-Iron (MULTIVITAMIN/IRON PO)   Yes No   Sig: TAKE ONE CAP/TAB BY MOUTH EVERY MORNING   aluminum-magnesium hydroxide-simethicone (MYLANTA) 200-200-20 mg/5 mL suspension   No No   Sig: Take 30 mL by mouth every 6 (six) hours as needed for indigestion or heartburn   aspirin 81 mg chewable tablet   No No   Sig: Chew 1 tablet (81 mg total) daily   atorvastatin (LIPITOR) 40 mg tablet   No No   Sig: Take 1 tablet (40 mg total) by mouth daily with dinner And stop Pravastatin when starting this medication   budesonide-formoterol (SYMBICORT) 160-4 5 mcg/act inhaler Not Taking  Yes No   Sig: INHALE 2 PUFFS BY MOUTH TWICE A DAY *RINSE MOUTH WITH WATER AND SPIT*   Patient not taking: Reported on 1/21/2023   docusate sodium (COLACE) 100 mg capsule   No No   Sig: Take 1 capsule (100 mg total) by mouth 2 (two) times a day as needed for constipation   furosemide (LASIX) 40 mg tablet Not Taking  Yes No   Sig: TAKE ONE TABLET BY MOUTH EVERY MORNING FOR FLUID/SWELLING, HEART, OR BLOOD PRESSURE   Patient not taking: Reported on 1/21/2023   ipratropium-albuterol (DUO-NEB) 0 5-2 5 mg/3 mL nebulizer solution   No No   Sig: Take 1 vial (3 mL total) by nebulization 2 (two) times a day   lisinopril (ZESTRIL) 20 mg tablet   No No   Sig: Take 1 tablet (20 mg total) by mouth daily   losartan (COZAAR) 100 MG tablet   Yes No   Sig: Take 100 mg by mouth   magnesium oxide (MAG-OX) 400 mg   No No   Sig: Take 1 tablet (400 mg total) by mouth 2 (two) times a day   meloxicam (MOBIC) 15 mg tablet   No No   Sig: Take 1 tablet (15 mg total) by mouth daily   metFORMIN (GLUCOPHAGE) 500 mg tablet   No No   Sig: Take 1 tablet (500 mg total) by mouth 2 (two) times a day with meals   methocarbamol (ROBAXIN) 750 mg tablet   No No   Sig: Take 1 tablet (750 mg total) by mouth every 6 (six) hours as needed for muscle spasms (back pain/initial rx ) for up to 5 days   metoprolol tartrate (LOPRESSOR) 25 mg tablet No No   Sig: Take 1 tablet (25 mg total) by mouth every 12 (twelve) hours   oxyCODONE-acetaminophen (PERCOCET) 5-325 mg per tablet   No No   Sig: Take 1 tablet by mouth every 6 (six) hours as needed for severe pain (back pain/ongoing rx ) Label no driving no etoh  Initial rx  Dx: Max Daily Amount: 4 tablets   polyethylene glycol (MIRALAX) 17 g packet Unknown  No No   Sig: Take 17 g by mouth daily as needed (constipation)   pravastatin (PRAVACHOL) 40 mg tablet   No No   Sig: Take 1 tablet (40 mg total) by mouth daily with dinner   predniSONE 10 mg tablet Unknown  No No   Sig: Take 4 tablets for 2 days, then 3 tablets for 2 days, then 2 tablets for 4 days, then 1 tablet for 4 days then stop   risperiDONE (RisperDAL) 1 mg tablet   Yes No   Sig: TAKE ONE TABLET BY MOUTH TWICE A DAY FOR MOOD   senna (SENOKOT) 8 6 mg   No No   Sig: Take 1 tablet (8 6 mg total) by mouth 2 (two) times a day   traZODone (DESYREL) 100 mg tablet Not Taking  Yes No   Sig: Take 100 mg by mouth   Patient not taking: Reported on 1/21/2023      Facility-Administered Medications: None       Past Medical History:   Diagnosis Date   • Anxiety    • CHF (congestive heart failure) ([facility])    • COPD (chronic obstructive pulmonary disease) ([facility])    • Depression    • Diabetes mellitus ([place] [address] )    • Enlarged prostate    • Hallucination    • Hyperlipidemia    • Hypertension    • Memory loss    • Panic attack    • Panic disorder    • Psychiatric disorder    • Psychiatric illness    • Psychosis ([place] [address] )    • PTSD (post-traumatic stress disorder)    • Schizoaffective disorder ([place] [address] )    • Schizophrenia ([place] [address] )        No past surgical history on file  No family history on file  I have reviewed and agree with the history as documented      E-Cigarette/Vaping   • E-Cigarette Use Never User      E-Cigarette/Vaping Substances     Social History     Tobacco Use   • Smoking status: Former     Packs/day: 2 00     Years: 0 50     Pack years: 1 00     Types: Cigarettes     Quit date: 44     Years since quittin 0   • Smokeless tobacco: Never   • Tobacco comments:     quit in    Vaping Use   • Vaping Use: Never used   Substance Use Topics   • Alcohol use: Not Currently   • Drug use: Not Currently       Review of Systems   Constitutional: Negative for chills and fever  HENT: Negative for ear pain and sore throat  Eyes: Negative for pain and visual disturbance  Respiratory: Positive for shortness of breath  Negative for cough  Cardiovascular: Negative for chest pain and palpitations  Gastrointestinal: Negative for abdominal pain and vomiting  Genitourinary: Negative for dysuria and hematuria  Musculoskeletal: Negative for arthralgias and back pain  Skin: Negative for color change and rash  Neurological: Negative for seizures and syncope  All other systems reviewed and are negative  Physical Exam  Physical Exam  Vitals and nursing note reviewed  Constitutional:       General: He is not in acute distress  Appearance: He is well-developed  HENT:      Head: Normocephalic and atraumatic  Eyes:      Conjunctiva/sclera: Conjunctivae normal    Cardiovascular:      Rate and Rhythm: Normal rate and regular rhythm  Heart sounds: No murmur heard  Pulmonary:      Effort: Pulmonary effort is normal  No respiratory distress  Breath sounds: Normal breath sounds  No wheezing  Abdominal:      Palpations: Abdomen is soft  Tenderness: There is no abdominal tenderness  Musculoskeletal:         General: No swelling or tenderness  Normal range of motion  Cervical back: Neck supple  Right lower leg: Edema present  Left lower leg: Edema present  Skin:     General: Skin is warm and dry  Capillary Refill: Capillary refill takes less than 2 seconds  Neurological:      Mental Status: He is alert     Psychiatric:         Mood and Affect: Mood normal          Vital Signs  ED Triage Vitals [23 0332]   Temperature Pulse Respirations Blood Pressure SpO2   98 5 °F (36 9 °C) 71 22 142/77 94 %      Temp Source Heart Rate Source Patient Position - Orthostatic VS BP Location FiO2 (%)   Oral Monitor Sitting Left arm --      Pain Score       8           Vitals:    01/21/23 0332 01/21/23 0446 01/21/23 0600 01/21/23 0630   BP: 142/77 163/85 157/83 149/87   Pulse: 71 64 67 67   Patient Position - Orthostatic VS: Sitting Lying Lying          Visual Acuity      ED Medications  Medications   ipratropium-albuterol (FOR EMS ONLY) (DUO-NEB) 0 5-2 5 mg/3 mL inhalation solution 3 mL (0 mL Does not apply Given to EMS 1/21/23 0349)   ketorolac (FOR EMS ONLY) (TORADOL) injection 30 mg (0 mg Does not apply Given to EMS 1/21/23 0349)   iohexol (OMNIPAQUE) 350 MG/ML injection (SINGLE-DOSE) 100 mL (100 mL Intravenous Given 1/21/23 0510)       Diagnostic Studies  Results Reviewed     Procedure Component Value Units Date/Time    HS Troponin I 2hr [[id number]]  (Normal) Collected: 01/21/23 0558    Lab Status: Final result Specimen: Blood from Arm, Right Updated: 01/21/23 0636     hs TnI 2hr 3 ng/L      Delta 2hr hsTnI 0 ng/L     HS Troponin I 4hr [[id number]]     Lab Status: No result Specimen: Blood     Protime-INR [[id number]]  (Normal) Collected: 01/21/23 0408    Lab Status: Final result Specimen: Blood from Arm, Right Updated: 01/21/23 0521     Protime 13 8 seconds      INR 1 08    APTT [[id number]]  (Normal) Collected: 01/21/23 0408    Lab Status: Final result Specimen: Blood from Arm, Right Updated: 01/21/23 0521     PTT 31 seconds     NT-BNP PRO-[facility],[place] campuses only [[id number]]  (Normal) Collected: 01/21/23 0408    Lab Status: Final result Specimen: Blood from Arm, Right Updated: 01/21/23 0521     NT-proBNP 78 pg/mL     FLU/RSV/COVID - if FLU/RSV clinically relevant [[id number]]  (Normal) Collected: 01/21/23 0408    Lab Status: Final result Specimen: Nasopharyngeal Swab Updated: 01/21/23 8722     SARS-CoV-2 Negative     INFLUENZA A PCR Negative INFLUENZA B PCR Negative     RSV PCR Negative    Narrative:      FOR PEDIATRIC PATIENTS - copy/paste COVID Guidelines URL to browser: [email or web address]    SARS-CoV-2 assay is a Nucleic Acid Amplification assay intended for the  qualitative detection of nucleic acid from SARS-CoV-2 in nasopharyngeal  swabs  Results are for the presumptive identification of SARS-CoV-2 RNA  Positive results are indicative of infection with SARS-CoV-2, the virus  causing COVID-19, but do not rule out bacterial infection or co-infection  with other viruses  Laboratories within the [place] and its  territories are required to report all positive results to the appropriate  public health authorities  Negative results do not preclude SARS-CoV-2  infection and should not be used as the sole basis for treatment or other  patient management decisions  Negative results must be combined with  clinical observations, patient history, and epidemiological information  This test has not been FDA cleared or approved  This test has been authorized by FDA under an Emergency Use Authorization  (EUA)  This test is only authorized for the duration of time the  declaration that circumstances exist justifying the authorization of the  emergency use of an in vitro diagnostic tests for detection of SARS-CoV-2  virus and/or diagnosis of COVID-19 infection under section 564(b)(1) of  the Act, 21 U  S C  646MPT-7(O)(7), unless the authorization is terminated  or revoked sooner  The test has been validated but independent review by FDA  and CLIA is pending  Test performed using [employer] GeneXpert: This RT-PCR assay targets N2,  a region unique to SARS-CoV-2  A conserved region in the E-gene was chosen  for pan-Sarbecovirus detection which includes SARS-CoV-2  According to CMS-2020-01-R, this platform meets the definition of high-throughput technology      HS Troponin 0hr (reflex protocol) [[id number]]  (Normal) Collected: 01/21/23 0408    Lab Status: Final result Specimen: Blood from Arm, Right Updated: 01/21/23 0456     hs TnI 0hr 3 ng/L     Lactic acid [[id number]]  (Normal) Collected: 01/21/23 0408    Lab Status: Final result Specimen: Blood from Arm, Right Updated: 01/21/23 0452     LACTIC ACID 1 7 mmol/L     Narrative:      Result may be elevated if tourniquet was used during collection      Comprehensive metabolic panel [[id number]]  (Abnormal) Collected: 01/21/23 0408    Lab Status: Final result Specimen: Blood from Arm, Right Updated: 01/21/23 0450     Sodium 140 mmol/L      Potassium 3 7 mmol/L      Chloride 103 mmol/L      CO2 26 mmol/L      ANION GAP 11 mmol/L      BUN 12 mg/dL      Creatinine 1 08 mg/dL      Glucose 151 mg/dL      Calcium 8 9 mg/dL      AST 12 U/L      ALT 32 U/L      Alkaline Phosphatase 64 U/L      Total Protein 7 7 g/dL      Albumin 3 9 g/dL      Total Bilirubin 0 29 mg/dL      eGFR 67 ml/min/1 73sq m     Narrative:      [place] guidelines for Chronic Kidney Disease (CKD):   •  Stage 1 with normal or high GFR (GFR > 90 mL/min/1 73 square meters)  •  Stage 2 Mild CKD (GFR = 60-89 mL/min/1 73 square meters)  •  Stage 3A Moderate CKD (GFR = 45-59 mL/min/1 73 square meters)  •  Stage 3B Moderate CKD (GFR = 30-44 mL/min/1 73 square meters)  •  Stage 4 Severe CKD (GFR = 15-29 mL/min/1 73 square meters)  •  Stage 5 End Stage CKD (GFR <15 mL/min/1 73 square meters)  Note: GFR calculation is accurate only with a steady state creatinine    Lipase [[id number]]  (Abnormal) Collected: 01/21/23 0408    Lab Status: Final result Specimen: Blood from Arm, Right Updated: 01/21/23 0450     Lipase 55 u/L     CBC and differential [[id number]]  (Abnormal) Collected: 01/21/23 0408    Lab Status: Final result Specimen: Blood from Arm, Right Updated: 01/21/23 0428     WBC 6 84 Thousand/uL      RBC 4 69 Million/uL      Hemoglobin 11 4 g/dL      Hematocrit 36 6 %      MCV 78 fL      MCH 24 3 pg      MCHC 31 1 g/dL      RDW 13 2 %      Platelets 084 Thousands/uL      nRBC 0 /100 WBCs      Neutrophils Relative 37 %      Immat GRANS % 0 %      Lymphocytes Relative 53 %      Monocytes Relative 6 %      Eosinophils Relative 3 %      Basophils Relative 1 %      Neutrophils Absolute 2 51 Thousands/µL      Immature Grans Absolute 0 01 Thousand/uL      Lymphocytes Absolute 3 66 Thousands/µL      Monocytes Absolute 0 41 Thousand/µL      Eosinophils Absolute 0 21 Thousand/µL      Basophils Absolute 0 04 Thousands/µL                  CT abdomen pelvis with contrast   Final Result by Elise Lindo MD (01/21 0528)      No acute intra-abdominal abnormality  No free air or free fluid  Punctate urinary bladder calculi  Prostatomegaly  Workstation performed: [id number]         CT recon only lumbar spine   Final Result by Elise Lindo MD (01/21 2201)      No fracture or traumatic subluxation  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  If symptoms persist, an MRI of the lumbar spine could be performed for further evaluation  Workstation performed: [id number]         XR chest 1 view portable    (Results Pending)              Procedures  Procedures         ED Course  ED Course as of 01/21/23 0658   Sat Jan 21, 2023   [address] [place] text to [facility] internal medicine for admission  Identification of Seniors at [address] [place] [address] Most Recent Value   (ISAR) Identification of Seniors at Risk    Before the illness or injury that brought you to the Emergency, did you need someone to help you on a regular basis? 0 Filed at: 01/21/2023 0435   In the last 24 hours, have you needed more help than usual? 1 Filed at: 01/21/2023 1163   Have you been hospitalized for one or more nights during the past 6 months?  0 Filed at: 01/21/2023 0435   In general, do you see well? 0 Filed at: 01/21/2023 0435   In general, do you have serious problems with your memory? 0 Filed at: 01/21/2023 0435   Do you take more than three different medications every day? 1 Filed at: 01/21/2023 0435   ISAR Score 2 Filed at: 01/21/2023 0435                                    Medical Decision Making  Hypoxia: complicated acute illness or injury  Respiratory failure with hypoxia, unspecified chronicity ([facility] ): complicated acute illness or injury  Amount and/or Complexity of Data Reviewed  Labs: ordered  Radiology: ordered  Risk  Prescription drug management  Decision regarding hospitalization  Disposition  Final diagnoses:   Hypoxia   Respiratory failure with hypoxia, unspecified chronicity ([facility] )     Time reflects when diagnosis was documented in both MDM as applicable and the Disposition within this note     Time User Action Codes Description Comment    1/21/2023  6:55 AM Matthew Danes Add [R09 02] Hypoxia     1/21/2023  6:55 AM Matthew Danes Add [J96 91] Respiratory failure with hypoxia, unspecified chronicity ([facility] )     1/21/2023  6:55 AM Matthew Danes Modify [R09 02] Hypoxia     1/21/2023  6:55 AM Matthew Danes Modify [J96 91] Respiratory failure with hypoxia, unspecified chronicity [facility])       ED Disposition     ED Disposition   Admit    Condition   Stable    Date/Time   Sat Jan 21, 2023  6:55 AM    Comment   Case was discussed with [facility] and the patient's admission status was agreed to be Admission Status: inpatient status to the service of Dr Stuart Kennedy   Follow-up Information    None         Patient's Medications   Discharge Prescriptions    No medications on file       No discharge procedures on file      PDMP Review     None          ED Provider  Electronically Signed by           Carole Cronin PA-C  01/21/23 0877

## 2023-01-21 NOTE — ASSESSMENT & PLAN NOTE
Wt Readings from Last 3 Encounters:   01/21/23 125 kg (275 lb 9 2 oz)   05/04/22 131 kg (288 lb 2 3 oz)   04/28/22 125 kg (275 lb)     Lab Results   Component Value Date    NTBNP 78 01/21/2023    NTBNP 84 10/07/2020       Most recent echocardiogram performed in 2019 showed LVEF 55% with G1 DD  Patient has not been compliant with diuretic regimen due to complaints of frequent urination  However, he does not appear to be in exacerbation at this time  Monitor volume status  Resume home meds

## 2023-01-21 NOTE — H&P
[address] [facility]  H&P- Bernadette Leavitting 1950, 74 y o  male MRN: [id number]  Unit/Bed#: -02 Encounter: [id number]  Primary Care Provider: Molly Campbell MD   Date and time admitted to hospital: 1/21/2023  3:25 AM    * Acute respiratory failure with hypoxia [facility])  Assessment & Plan  Presenting with worsening SOB and increasing O2 needs now needing 2-3LNC    At baseline, uses no supplemental O2  Likely 2/2 COPD exacerbation vs OHS vs less likely CHF    Plan:  • Treat underlying   • Wean O2 as able    COPD exacerbation ([facility] [address] )  Assessment & Plan  Symptoms: acute dyspnea    · No fevers, not meeting SIRS Criteria on admission  · Flu/RSV/COVID negative  • Smoking Status: former 2 PY smoker quit in 1993  · CXR showing diffuse opacity, similar to prior CXR in 2020, pending official read    Plan:  • Procalcitonin, CBC w/ Diff, CMP  • Strep, Legionella, sputum culture  • IV Solu-Medrol: 40 q8h; taper as appropriate  • 2-3 L NC, Goal O2 sat 88-92%; wean as able  • Monitor respiratory status;  • Respiratory protocol, Xopenex/Atrovent, Airway clearance protocol, IS  • Mucinex, Tessalon Perles      Left leg injury  Assessment & Plan  Inability to internally rotate at the hip since 1 day prior to admission  Denies any injury/trauma  Only orthopedic procedure performed was on the knee around 1960s as reported by patient  PT OT  Orthopedic surgery consult    Chronic left-sided low back pain without sciatica  Assessment & Plan  CT recon only lumbar spine 1/21/23  No fracture or traumatic subluxation  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    If symptoms persist, an MRI of the lumbar spine could be performed for further evaluation    · Has been following up with outpatient PT for low back pain and is now complaining of worsening severity with radiation to LLE    No complaints of recent involuntary bowel or bladder voiding/saddle anesthesia    Monitor and follow up outpatient  PT/OT    Type 2 diabetes mellitus without complication, without long-term current use of insulin ([address] )  Assessment & Plan  Lab Results   Component Value Date    HGBA1C 6 3 (H) 10/07/2020       No results for input(s): POCGLU in the last 72 hours  Blood Sugar Average: Last 72 hrs:   Home Regimen: metformin 500 mg BID    Plan:  • Hold oral antihyperglycemics  • Diet Consistent CHO Level 2 (5 CHO servings/75g CHO per meal)  • Insulin regimen  o Glucose checks and Insulin correction ACHS  • Goal -180 while admitted, adjusting insulin regimen as appropriate  • Monitor for hypoglycemia and treat per protocol      Morbid obesity due to excess calories (HCC)  Assessment & Plan  Body mass index is 39 54 kg/m²      • Recommend incorporating a more whole foods plant-predominant diet along with decreasing consumption of red meats and processed foods  • Per AHA guidelines, recommend moderate-vigorous intensity exercise for 30 minutes a day for 5 days a week or a total of 150 min/week      Essential hypertension  Assessment & Plan  Blood Pressure: 149/87      Plan:  • Continue lisinopril, lopressor; discontinue cozaar due to duplicate interaction  • Monitor blood pressure    Chronic CHF (congestive heart failure) (HCC)  Assessment & Plan  Wt Readings from Last 3 Encounters:   01/21/23 125 kg (275 lb 9 2 oz)   05/04/22 131 kg (288 lb 2 3 oz)   04/28/22 125 kg (275 lb)     Lab Results   Component Value Date    NTBNP 78 01/21/2023    NTBNP 84 10/07/2020       Most recent echocardiogram performed in 2019 showed LVEF 55% with G1 DD  Patient has not been compliant with diuretic regimen due to complaints of frequent urination  However, he does not appear to be in exacerbation at this time  Monitor volume status  Resume home meds    Chronic post-traumatic stress disorder (PTSD)  Assessment & Plan  Continue home trazodone and risperdol    Hyperlipidemia  Assessment & Plan  Lab Results   Component Value Date CHOLESTEROL 135 04/28/2019    TRIG 45 04/28/2019    HDL 31 (L) 04/28/2019    LDLCALC 95 04/28/2019       Statin     Schizophrenia (HCC)  Assessment & Plan  Continue risperdal and desyrel    VTE Pharmacologic Prophylaxis: VTE Score: 7 High Risk (Score >/= 5) - Pharmacological DVT Prophylaxis Ordered: enoxaparin (Lovenox)  Sequential Compression Devices Ordered  Code Status: Level 3 - DNAR and DNI   Discussion with Patient/Family: patient  Anticipated Length of Stay: Patient will be admitted on an inpatient basis with an anticipated length of stay of greater than 2 midnights secondary to COPD exacerbation and evaluation of Low back pain  Total Time for Visit, including Counseling / Coordination of Care: 90 minutes Greater than 50% of this total time spent on direct patient counseling and coordination of care  Chief Complaint:   Chief Complaint   Patient presents with   • Back Pain     Multiple complaints - L lower back pain that radiates down hip/leg, dizziness, shortness of breath given toradol and duo neb prior to arrival, conversational dyspnea noted  History of Present Illness:  Alannah Parson is a 74 y o  male who presents with Complaints of increased SOB along with lower back pain left-sided hip pain radiating down the leg      PMHx COPD (no supplemental O2 at baseline), HTN, HLD, CHF (2019 LVEF 55% with G1DD) not compliant with med regimen due to complaints of frequent urination  SOB on arrival and noted that this has been happening and worsening over the past few days  COVID Flu RSV Negative on arrival      Regarding the left leg pain, this began a days prior and had worsening in severity of pain, causing difficulty with ambulation and restriction of motion   He regularly follows with SLPT at [facility] for chronic low back pain but does not feel that the LLE pain is associated with the back pain      On ED evaluation, there was some tenderness palpation of the left hip noted but no signs of infection or septic joint  Also complain of inability to internally rotate      Pulse ox and ambulation showed desaturation to 88%, requiring 2 L nasal cannula  Admitted for concern for more likely COPD exacerbation versus less likely CHF exacerbation given negative NTBNP  Also plan to work up LLE restriction of motion  Review of Systems:  A 10-point review of systems was obtained  Pertinent positives and negatives are outlined in the HPI above  Remainder of review of systems are otherwise negative  Past Medical and Surgical History:   Past Medical History:   Diagnosis Date   • Anxiety    • CHF (congestive heart failure) ([place] [address] )    • COPD (chronic obstructive pulmonary disease) ([facility])    • Depression    • Diabetes mellitus ([place] [address] )    • Enlarged prostate    • Hallucination    • Hyperlipidemia    • Hypertension    • Memory loss    • Panic attack    • Panic disorder    • Psychiatric disorder    • Psychiatric illness    • Psychosis ([place] [address] )    • PTSD (post-traumatic stress disorder)    • Schizoaffective disorder ([place] [address] )    • Schizophrenia ([place] [address] )        No past surgical history on file  Meds/Allergies:  Prior to Admission medications    Medication Sig Start Date End Date Taking?  Authorizing Provider   aluminum-magnesium hydroxide-simethicone (MYLANTA) 200-200-20 mg/5 mL suspension Take 30 mL by mouth every 6 (six) hours as needed for indigestion or heartburn 10/14/20   MACKENZIE Trujillo   ARIPiprazole (ABILIFY) 20 MG tablet Take 1 tablet (20 mg total) by mouth daily  Patient not taking: Reported on 1/21/2023 3/30/18   Max Ibrahim PA-C   aspirin 81 mg chewable tablet Chew 1 tablet (81 mg total) daily 3/24/18   MACKENZIE Ahumada   atorvastatin (LIPITOR) 40 mg tablet Take 1 tablet (40 mg total) by mouth daily with dinner And stop Pravastatin when starting this medication 4/29/19   Merary Cutler MD   budesonide-formoterol (SYMBICORT) 160-4 5 mcg/act inhaler INHALE 2 PUFFS BY MOUTH TWICE A DAY *RINSE MOUTH WITH WATER AND SPIT*  Patient not taking: Reported on 1/21/2023 7/1/20   Historical Provider, MD   docusate sodium (COLACE) 100 mg capsule Take 1 capsule (100 mg total) by mouth 2 (two) times a day as needed for constipation 3/23/18   MACKENZIE Ahumada   furosemide (LASIX) 40 mg tablet TAKE ONE TABLET BY MOUTH EVERY MORNING FOR FLUID/SWELLING, HEART, OR BLOOD PRESSURE  Patient not taking: Reported on 1/21/2023 7/1/20   Historical Provider, MD   ipratropium-albuterol (DUO-NEB) 0 5-2 5 mg/3 mL nebulizer solution Take 1 vial (3 mL total) by nebulization 2 (two) times a day 5/14/20   MACKENZIE Lyn   lisinopril (ZESTRIL) 20 mg tablet Take 1 tablet (20 mg total) by mouth daily 5/3/19   Itzel Ndiaye MD   magnesium oxide (MAG-OX) 400 mg Take 1 tablet (400 mg total) by mouth 2 (two) times a day 4/29/19   Itzel Ndiaye MD   meloxicam (MOBIC) 15 mg tablet Take 1 tablet (15 mg total) by mouth daily 8/24/21   Marshall Armijo MD   metFORMIN (GLUCOPHAGE) 500 mg tablet Take 1 tablet (500 mg total) by mouth 2 (two) times a day with meals 3/29/18   Katerina Macdonald PA-C   methocarbamol (ROBAXIN) 750 mg tablet Take 1 tablet (750 mg total) by mouth every 6 (six) hours as needed for muscle spasms (back pain/initial rx ) for up to 5 days 5/4/22 5/9/22  Lauri Ragland PA-C   metoprolol tartrate (LOPRESSOR) 25 mg tablet Take 1 tablet (25 mg total) by mouth every 12 (twelve) hours 3/29/18   Katerina Macdonald PA-C   Multiple Vitamins-Iron (MULTIVITAMIN/IRON PO) TAKE ONE CAP/TAB BY MOUTH EVERY MORNING 6/8/20   Historical Provider, MD   oxyCODONE-acetaminophen (PERCOCET) 5-325 mg per tablet Take 1 tablet by mouth every 6 (six) hours as needed for severe pain (back pain/ongoing rx ) Label no driving no etoh  Initial rx    Dx: Max Daily Amount: 4 tablets 5/4/22   Lauri Ralgand PA-C   polyethylene glycol (MIRALAX) 17 g packet Take 17 g by mouth daily as needed (constipation) 3/23/18   Shanelle Belt MACKENZIE Delgadillo   risperiDONE (RisperDAL) 1 mg tablet TAKE ONE TABLET BY MOUTH TWICE A DAY FOR MOOD 20   Historical Provider, MD   senna (SENOKOT) 8 6 mg Take 1 tablet (8 6 mg total) by mouth 2 (two) times a day 10/14/20   MACKENZIE Henriquez   traZODone (DESYREL) 100 mg tablet Take 100 mg by mouth  Patient not taking: Reported on 2023    Historical Provider, MD   losartan (COZAAR) 100 MG tablet Take 100 mg by mouth  23  Historical Provider, MD   pravastatin (PRAVACHOL) 40 mg tablet Take 1 tablet (40 mg total) by mouth daily with dinner 19  Amanda Leiva MD   predniSONE 10 mg tablet Take 4 tablets for 2 days, then 3 tablets for 2 days, then 2 tablets for 4 days, then 1 tablet for 4 days then stop 20  MACKENZIE Moody     I have reviewed home medications with patient personally  Allergies: No Known Allergies    Social History:  Marital Status: /Civil Union   Occupation: retired  Patient Pre-hospital Living Situation: Home  Patient Pre-hospital Level of Mobility: walks with walker  Patient Pre-hospital Diet Restrictions: None  Substance Use History:   Social History     Substance and Sexual Activity   Alcohol Use Not Currently     Social History     Tobacco Use   Smoking Status Former   • Packs/day: 2 00   • Years: 0 50   • Pack years: 1 00   • Types: Cigarettes   • Quit date:    • Years since quittin 0   Smokeless Tobacco Never   Tobacco Comments    quit in      Social History     Substance and Sexual Activity   Drug Use Not Currently       Family History:  No family history on file      Physical Exam:     Vitals:   Blood Pressure: 149/87 (23 0630)  Pulse: 72 (23 0838)  Temperature: 98 5 °F (36 9 °C) (23)  Temp Source: Oral (23)  Respirations: 16 (2330)  Height: 5' 10" (177 8 cm) (23)  Weight - Scale: 125 kg (275 lb 9 2 oz) (23)  SpO2: 94 % (23 1163)    Physical Exam  Vitals and nursing note reviewed  Constitutional:       General: He is not in acute distress  Appearance: He is well-developed  He is morbidly obese  He is ill-appearing (chronically)  HENT:      Head: Normocephalic and atraumatic  Eyes:      Conjunctiva/sclera: Conjunctivae normal    Cardiovascular:      Rate and Rhythm: Normal rate and regular rhythm  Heart sounds: No murmur heard  Pulmonary:      Effort: Pulmonary effort is normal  No respiratory distress  Breath sounds: Normal breath sounds  Abdominal:      Palpations: Abdomen is soft  Tenderness: There is no abdominal tenderness  Musculoskeletal:         General: No swelling  Cervical back: Neck supple  Comments: Limited LLE range of motion; inability to flex, internally rotate at hip joint   Skin:     General: Skin is warm and dry  Capillary Refill: Capillary refill takes less than 2 seconds  Neurological:      Mental Status: He is alert  Psychiatric:         Mood and Affect: Mood normal          Behavior: Behavior is cooperative                  Additional Data:     Lab Results:  Results from last 7 days   Lab Units 01/21/23  0408   WBC Thousand/uL 6 84   HEMOGLOBIN g/dL 11 4*   HEMATOCRIT % 36 6   PLATELETS Thousands/uL 163   NEUTROS PCT % 37*   LYMPHS PCT % 53*   MONOS PCT % 6   EOS PCT % 3     Results from last 7 days   Lab Units 01/21/23  0408   SODIUM mmol/L 140   POTASSIUM mmol/L 3 7   CHLORIDE mmol/L 103   CO2 mmol/L 26   BUN mg/dL 12   CREATININE mg/dL 1 08   ANION GAP mmol/L 11   CALCIUM mg/dL 8 9   ALBUMIN g/dL 3 9   TOTAL BILIRUBIN mg/dL 0 29   ALK PHOS U/L 64   ALT U/L 32   AST U/L 12   GLUCOSE RANDOM mg/dL 151*     Results from last 7 days   Lab Units 01/21/23  0408   INR  1 08             Results from last 7 days   Lab Units 01/21/23  0408   LACTIC ACID mmol/L 1 7       Imaging Results Reviewed as noted below  CT abdomen pelvis with contrast   Final Result by Tristan Lopez MD (01/21 0544)      No acute intra-abdominal abnormality  No free air or free fluid  Punctate urinary bladder calculi  Prostatomegaly  Workstation performed: [id number]         CT recon only lumbar spine   Final Result by Karoline Harris MD (01/21 4451)      No fracture or traumatic subluxation  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  If symptoms persist, an MRI of the lumbar spine could be performed for further evaluation  Workstation performed: [id number]         XR chest 1 view portable    (Results Pending)   XR hip/pelv 2-3 vws left if performed    (Results Pending)       CT recon only lumbar spine    Result Date: 1/21/2023  Impression: No fracture or traumatic subluxation  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  If symptoms persist, an MRI of the lumbar spine could be performed for further evaluation  Workstation performed: [id number]     CT abdomen pelvis with contrast    Result Date: 1/21/2023  Impression: No acute intra-abdominal abnormality  No free air or free fluid  Punctate urinary bladder calculi  Prostatomegaly  Workstation performed: [id number]     No Chest XR results available for this patient  EKG and Other Studies Reviewed on Admission:   · EKG: NSR    Recent Labs     01/21/23  0332   VENTRATE 68         ** Please Note: This note has been constructed using a voice recognition system   **

## 2023-01-21 NOTE — ED NOTES
Attempted to ambulate patient, moderate assistance needed to get out of bed, uses a walker, was able to walk ~10 feet without O2 before SAO2 dropped to 88%       Alyse Stone RN  01/21/23 3414

## 2023-01-21 NOTE — ASSESSMENT & PLAN NOTE
Blood Pressure: 149/87      Plan:  • Continue lisinopril, lopressor; discontinue cozaar due to duplicate interaction  • Monitor blood pressure

## 2023-01-21 NOTE — PLAN OF CARE
Problem: Potential for Falls  Goal: Patient will remain free of falls  Description: INTERVENTIONS:  - Educate patient/family on patient safety including physical limitations  - Instruct patient to call for assistance with activity   - Consult OT/PT to assist with strengthening/mobility   - Keep Call bell within reach  - Keep bed low and locked with side rails adjusted as appropriate  - Keep care items and personal belongings within reach  - Initiate and maintain comfort rounds  - Make Fall Risk Sign visible to staff  - Offer Toileting every 2 Hours, in advance of need  - Initiate/Maintain bed alarm  - Obtain necessary fall risk management equipment: chair alarm  - Apply yellow socks and bracelet for high fall risk patients  - Consider moving patient to room near nurses station  Outcome: Progressing     Problem: PAIN - ADULT  Goal: Verbalizes/displays adequate comfort level or baseline comfort level  Description: Interventions:  - Encourage patient to monitor pain and request assistance  - Assess pain using appropriate pain scale  - Administer analgesics based on type and severity of pain and evaluate response  - Implement non-pharmacological measures as appropriate and evaluate response  - Consider cultural and social influences on pain and pain management  - Notify physician/advanced practitioner if interventions unsuccessful or patient reports new pain  Outcome: Progressing     Problem: INFECTION - ADULT  Goal: Absence or prevention of progression during hospitalization  Description: INTERVENTIONS:  - Assess and monitor for signs and symptoms of infection  - Monitor lab/diagnostic results  - Monitor all insertion sites, i e  indwelling lines, tubes, and drains  - Monitor endotracheal if appropriate and nasal secretions for changes in amount and color  - Rough [place] Ready appropriate cooling/warming therapies per order  - Administer medications as ordered  - Instruct and encourage patient and family to use good hand hygiene technique  - Identify and instruct in appropriate isolation precautions for identified infection/condition  Outcome: Progressing  Goal: Absence of fever/infection during neutropenic period  Description: INTERVENTIONS:  - Monitor WBC    Outcome: Progressing     Problem: SAFETY ADULT  Goal: Patient will remain free of falls  Description: INTERVENTIONS:  - Educate patient/family on patient safety including physical limitations  - Instruct patient to call for assistance with activity   - Consult OT/PT to assist with strengthening/mobility   - Keep Call bell within reach  - Keep bed low and locked with side rails adjusted as appropriate  - Keep care items and personal belongings within reach  - Initiate and maintain comfort rounds  - Make Fall Risk Sign visible to staff  - Offer Toileting every 2 Hours, in advance of need  - Initiate/Maintain bed alarm  - Obtain necessary fall risk management equipment: chair alarm  - Apply yellow socks and bracelet for high fall risk patients  - Consider moving patient to room near nurses station  Outcome: Progressing  Goal: Maintain or return to baseline ADL function  Description: INTERVENTIONS:  -  Assess patient's ability to carry out ADLs; assess patient's baseline for ADL function and identify physical deficits which impact ability to perform ADLs (bathing, care of mouth/teeth, toileting, grooming, dressing, etc )  - Assess/evaluate cause of self-care deficits   - Assess range of motion  - Assess patient's mobility; develop plan if impaired  - Assess patient's need for assistive devices and provide as appropriate  - Encourage maximum independence but intervene and supervise when necessary  - Involve family in performance of ADLs  - Assess for home care needs following discharge   - Consider OT consult to assist with ADL evaluation and planning for discharge  - Provide patient education as appropriate  Outcome: Progressing  Goal: Maintains/Returns to pre admission functional level  Description: INTERVENTIONS:  - Perform BMAT or MOVE assessment daily    - Set and communicate daily mobility goal to care team and patient/family/caregiver  - Collaborate with rehabilitation services on mobility goals if consulted  - Perform Range of Motion 3 times a day  - Reposition patient every 3 hours  - Dangle patient 3 times a day  - Stand patient 3 times a day  - Ambulate patient 3 times a day  - Out of bed to chair 3 times a day   - Out of bed for meals 3 times a day  - Out of bed for toileting  - Record patient progress and toleration of activity level   Outcome: Progressing     Problem: DISCHARGE PLANNING  Goal: Discharge to home or other facility with appropriate resources  Description: INTERVENTIONS:  - Identify barriers to discharge w/patient and caregiver  - Arrange for needed discharge resources and transportation as appropriate  - Identify discharge learning needs (meds, wound care, etc )  - Arrange for interpretive services to assist at discharge as needed  - Refer to Case Management Department for coordinating discharge planning if the patient needs post-hospital services based on physician/advanced practitioner order or complex needs related to functional status, cognitive ability, or social support system  Outcome: Progressing     Problem: Knowledge Deficit  Goal: Patient/family/caregiver demonstrates understanding of disease process, treatment plan, medications, and discharge instructions  Description: Complete learning assessment and assess knowledge base  Interventions:  - Provide teaching at level of understanding  - Provide teaching via preferred learning methods  Outcome: Progressing     Problem: NEUROSENSORY - ADULT  Goal: Achieves maximal functionality and self care  Description: INTERVENTIONS  - Monitor swallowing and airway patency with patient fatigue and changes in neurological status  - Encourage and assist patient to increase activity and self care     - Encourage visually impaired, hearing impaired and aphasic patients to use assistive/communication devices  Outcome: Progressing     Problem: METABOLIC, FLUID AND ELECTROLYTES - ADULT  Goal: Electrolytes maintained within normal limits  Description: INTERVENTIONS:  - Monitor labs and assess patient for signs and symptoms of electrolyte imbalances  - Administer electrolyte replacement as ordered  - Monitor response to electrolyte replacements, including repeat lab results as appropriate  - Instruct patient on fluid and nutrition as appropriate  Outcome: Progressing  Goal: Glucose maintained within target range  Description: INTERVENTIONS:  - Monitor Blood Glucose as ordered  - Assess for signs and symptoms of hyperglycemia and hypoglycemia  - Administer ordered medications to maintain glucose within target range  - Assess nutritional intake and initiate nutrition service referral as needed  Outcome: Progressing     Problem: MUSCULOSKELETAL - ADULT  Goal: Maintain or return mobility to safest level of function  Description: INTERVENTIONS:  - Assess patient's ability to carry out ADLs; assess patient's baseline for ADL function and identify physical deficits which impact ability to perform ADLs (bathing, care of mouth/teeth, toileting, grooming, dressing, etc )  - Assess/evaluate cause of self-care deficits   - Assess range of motion  - Assess patient's mobility  - Assess patient's need for assistive devices and provide as appropriate  - Encourage maximum independence but intervene and supervise when necessary  - Involve family in performance of ADLs  - Assess for home care needs following discharge   - Consider OT consult to assist with ADL evaluation and planning for discharge  - Provide patient education as appropriate  Outcome: Progressing  Goal: Maintain proper alignment of affected body part  Description: INTERVENTIONS:  - Support, maintain and protect limb and body alignment  - Provide patient/ family with appropriate education  Outcome: Progressing

## 2023-01-21 NOTE — ASSESSMENT & PLAN NOTE
Body mass index is 39 54 kg/m²      • Recommend incorporating a more whole foods plant-predominant diet along with decreasing consumption of red meats and processed foods  • Per AHA guidelines, recommend moderate-vigorous intensity exercise for 30 minutes a day for 5 days a week or a total of 150 min/week

## 2023-01-21 NOTE — ASSESSMENT & PLAN NOTE
Inability to internally rotate at the hip since 1 day prior to admission  Denies any injury/trauma  Only orthopedic procedure performed was on the knee around 1960s as reported by patient  PT OT  Orthopedic surgery consult

## 2023-01-21 NOTE — ASSESSMENT & PLAN NOTE
Lab Results   Component Value Date    CHOLESTEROL 135 04/28/2019    TRIG 45 04/28/2019    HDL 31 (L) 04/28/2019    LDLCALC 95 04/28/2019       Statin

## 2023-01-21 NOTE — RESPIRATORY THERAPY NOTE
RT Protocol Note  Kerry Maki 74 y o  male MRN: [id number]  Unit/Bed#: -02 Encounter: [id number]    Assessment    Principal Problem:    Acute respiratory failure with hypoxia ([place] [id number] )  Active Problems:    Schizophrenia ([facility])    Hyperlipidemia    Chronic post-traumatic stress disorder (PTSD)    Chronic CHF (congestive heart failure) ([facility])    COPD exacerbation ([facility])    Essential hypertension    Morbid obesity due to excess calories ([facility])    Type 2 diabetes mellitus without complication, without long-term current use of insulin ([facility])    Chronic left-sided low back pain with sciatica      Home Pulmonary Medications:  Albuterol nebulizer prn       Past Medical History:   Diagnosis Date   • Anxiety    • CHF (congestive heart failure) ([facility])    • COPD (chronic obstructive pulmonary disease) ([facility])    • Depression    • Diabetes mellitus ([place] [address] )    • Enlarged prostate    • Hallucination    • Hyperlipidemia    • Hypertension    • Memory loss    • Panic attack    • Panic disorder    • Psychiatric disorder    • Psychiatric illness    • Psychosis ([place] [address] )    • PTSD (post-traumatic stress disorder)    • Schizoaffective disorder ([facility])    • Schizophrenia ([place] [address] )      Social History     Socioeconomic History   • Marital status: /Civil Union     Spouse name: Not on file   • Number of children: Not on file   • Years of education: Not on file   • Highest education level: Not on file   Occupational History   • Not on file   Tobacco Use   • Smoking status: Former     Packs/day: 2 00     Years: 0 50     Pack years: 1 00     Types: Cigarettes     Quit date: 44     Years since quittin 0   • Smokeless tobacco: Never   • Tobacco comments:     quit in    Vaping Use   • Vaping Use: Never used   Substance and Sexual Activity   • Alcohol use: Not Currently   • Drug use: Not Currently   • Sexual activity: Never   Other Topics Concern   • Not on file   Social History Narrative   • Not on file     Social Determinants of Health Financial Resource Strain: Not on file   Food Insecurity: Not on file   Transportation Needs: Not on file   Physical Activity: Not on file   Stress: Not on file   Social Connections: Not on file   Intimate Partner Violence: Not on file   Housing Stability: Not on file       Subjective         Objective    Physical Exam:   Assessment Type: Pre-treatment  General Appearance: Alert, Awake  Respiratory Pattern: Dyspnea with exertion  Chest Assessment: Chest expansion symmetrical  Bilateral Breath Sounds: Diminished    Vitals:  Blood pressure 149/87, pulse 67, temperature 98 5 °F (36 9 °C), temperature source Oral, resp  rate 16, height 5' 10" (1 778 m), weight 125 kg (275 lb 9 2 oz), SpO2 97 %  Imaging and other studies: I have personally reviewed pertinent reports  Plan    Respiratory Plan: Mild Distress pathway     Pt admitted for resp failure  PMH of copd  Pt takes above mentioned regimen at home  bbs are clear, decreased  Pt has a strong, non-productive cough and can clear and protect his airway  No fuirther modalities at this time

## 2023-01-21 NOTE — ASSESSMENT & PLAN NOTE
Lab Results   Component Value Date    HGBA1C 6 3 (H) 10/07/2020       No results for input(s): POCGLU in the last 72 hours      Blood Sugar Average: Last 72 hrs:   Home Regimen: metformin 500 mg BID    Plan:  • Hold oral antihyperglycemics  • Diet Consistent CHO Level 2 (5 CHO servings/75g CHO per meal)  • Insulin regimen  o Glucose checks and Insulin correction ACHS  • Goal -180 while admitted, adjusting insulin regimen as appropriate  • Monitor for hypoglycemia and treat per protocol

## 2023-01-22 PROBLEM — I50.42 CHRONIC COMBINED SYSTOLIC AND DIASTOLIC CONGESTIVE HEART FAILURE (HCC): Status: ACTIVE | Noted: 2019-04-29

## 2023-01-22 PROBLEM — J43.8 OTHER EMPHYSEMA (HCC): Status: ACTIVE | Noted: 2020-10-07

## 2023-01-22 PROBLEM — J96.01 ACUTE RESPIRATORY FAILURE WITH HYPOXIA (HCC): Status: RESOLVED | Noted: 2023-01-21 | Resolved: 2023-01-22

## 2023-01-22 LAB
ALBUMIN SERPL BCP-MCNC: 3.9 G/DL (ref 3.5–5)
ALP SERPL-CCNC: 52 U/L (ref 46–116)
ALT SERPL W P-5'-P-CCNC: 32 U/L (ref 12–78)
ANION GAP SERPL CALCULATED.3IONS-SCNC: 9 MMOL/L (ref 4–13)
AST SERPL W P-5'-P-CCNC: 20 U/L (ref 5–45)
BASOPHILS # BLD AUTO: 0.03 THOUSANDS/ÂΜL (ref 0–0.1)
BASOPHILS NFR BLD AUTO: 0 % (ref 0–1)
BILIRUB SERPL-MCNC: 0.36 MG/DL (ref 0.2–1)
BUN SERPL-MCNC: 21 MG/DL (ref 5–25)
CALCIUM SERPL-MCNC: 9.3 MG/DL (ref 8.3–10.1)
CHLORIDE SERPL-SCNC: 102 MMOL/L (ref 96–108)
CO2 SERPL-SCNC: 27 MMOL/L (ref 21–32)
CREAT SERPL-MCNC: 1.19 MG/DL (ref 0.6–1.3)
EOSINOPHIL # BLD AUTO: 0.04 THOUSAND/ÂΜL (ref 0–0.61)
EOSINOPHIL NFR BLD AUTO: 0 % (ref 0–6)
ERYTHROCYTE [DISTWIDTH] IN BLOOD BY AUTOMATED COUNT: 13.4 % (ref 11.6–15.1)
GFR SERPL CREATININE-BSD FRML MDRD: 59 ML/MIN/1.73SQ M
GLUCOSE SERPL-MCNC: 138 MG/DL (ref 65–140)
GLUCOSE SERPL-MCNC: 144 MG/DL (ref 65–140)
GLUCOSE SERPL-MCNC: 153 MG/DL (ref 65–140)
GLUCOSE SERPL-MCNC: 178 MG/DL (ref 65–140)
GLUCOSE SERPL-MCNC: 203 MG/DL (ref 65–140)
HCT VFR BLD AUTO: 36.9 % (ref 36.5–49.3)
HGB BLD-MCNC: 11.5 G/DL (ref 12–17)
IMM GRANULOCYTES # BLD AUTO: 0.05 THOUSAND/UL (ref 0–0.2)
IMM GRANULOCYTES NFR BLD AUTO: 0 % (ref 0–2)
L PNEUMO1 AG UR QL IA.RAPID: NEGATIVE
LYMPHOCYTES # BLD AUTO: 3.36 THOUSANDS/ÂΜL (ref 0.6–4.47)
LYMPHOCYTES NFR BLD AUTO: 27 % (ref 14–44)
MCH RBC QN AUTO: 24.2 PG (ref 26.8–34.3)
MCHC RBC AUTO-ENTMCNC: 31.2 G/DL (ref 31.4–37.4)
MCV RBC AUTO: 78 FL (ref 82–98)
MONOCYTES # BLD AUTO: 0.76 THOUSAND/ÂΜL (ref 0.17–1.22)
MONOCYTES NFR BLD AUTO: 6 % (ref 4–12)
NEUTROPHILS # BLD AUTO: 8.33 THOUSANDS/ÂΜL (ref 1.85–7.62)
NEUTS SEG NFR BLD AUTO: 67 % (ref 43–75)
NRBC BLD AUTO-RTO: 0 /100 WBCS
PLATELET # BLD AUTO: 163 THOUSANDS/UL (ref 149–390)
PMV BLD AUTO: 13.1 FL (ref 8.9–12.7)
POTASSIUM SERPL-SCNC: 4.5 MMOL/L (ref 3.5–5.3)
PROCALCITONIN SERPL-MCNC: <0.05 NG/ML
PROT SERPL-MCNC: 7.9 G/DL (ref 6.4–8.4)
RBC # BLD AUTO: 4.76 MILLION/UL (ref 3.88–5.62)
S PNEUM AG UR QL: NEGATIVE
SODIUM SERPL-SCNC: 138 MMOL/L (ref 135–147)
WBC # BLD AUTO: 12.57 THOUSAND/UL (ref 4.31–10.16)

## 2023-01-22 RX ORDER — SODIUM CHLORIDE FOR INHALATION 0.9 %
3 VIAL, NEBULIZER (ML) INHALATION
Status: DISCONTINUED | OUTPATIENT
Start: 2023-01-22 | End: 2023-01-24 | Stop reason: HOSPADM

## 2023-01-22 RX ADMIN — METOPROLOL TARTRATE 25 MG: 25 TABLET, FILM COATED ORAL at 08:22

## 2023-01-22 RX ADMIN — ATORVASTATIN CALCIUM 40 MG: 40 TABLET, FILM COATED ORAL at 17:17

## 2023-01-22 RX ADMIN — LISINOPRIL 20 MG: 10 TABLET ORAL at 08:20

## 2023-01-22 RX ADMIN — ENOXAPARIN SODIUM 40 MG: 40 INJECTION SUBCUTANEOUS at 08:32

## 2023-01-22 RX ADMIN — RISPERIDONE 1 MG: 1 TABLET ORAL at 08:22

## 2023-01-22 RX ADMIN — GUAIFENESIN 1200 MG: 600 TABLET ORAL at 08:19

## 2023-01-22 RX ADMIN — LEVALBUTEROL HYDROCHLORIDE 1.25 MG: 1.25 SOLUTION, CONCENTRATE RESPIRATORY (INHALATION) at 19:58

## 2023-01-22 RX ADMIN — ISODIUM CHLORIDE 3 ML: 0.03 SOLUTION RESPIRATORY (INHALATION) at 19:57

## 2023-01-22 RX ADMIN — ACETAMINOPHEN 650 MG: 325 TABLET, FILM COATED ORAL at 13:35

## 2023-01-22 RX ADMIN — MAGNESIUM OXIDE TAB 400 MG (241.3 MG ELEMENTAL MG) 400 MG: 400 (241.3 MG) TAB at 17:18

## 2023-01-22 RX ADMIN — METOPROLOL TARTRATE 25 MG: 25 TABLET, FILM COATED ORAL at 20:25

## 2023-01-22 RX ADMIN — ACETAMINOPHEN 650 MG: 325 TABLET, FILM COATED ORAL at 20:25

## 2023-01-22 RX ADMIN — INSULIN LISPRO 4 UNITS: 100 INJECTION, SOLUTION INTRAVENOUS; SUBCUTANEOUS at 17:18

## 2023-01-22 RX ADMIN — AZITHROMYCIN 500 MG: 500 TABLET, FILM COATED ORAL at 08:18

## 2023-01-22 RX ADMIN — INSULIN LISPRO 2 UNITS: 100 INJECTION, SOLUTION INTRAVENOUS; SUBCUTANEOUS at 11:26

## 2023-01-22 RX ADMIN — ASPIRIN 81 MG: 81 TABLET, CHEWABLE ORAL at 08:17

## 2023-01-22 RX ADMIN — INSULIN LISPRO 2 UNITS: 100 INJECTION, SOLUTION INTRAVENOUS; SUBCUTANEOUS at 21:30

## 2023-01-22 RX ADMIN — FUROSEMIDE 40 MG: 40 TABLET ORAL at 08:19

## 2023-01-22 RX ADMIN — SODIUM CHLORIDE SOLN NEBU 3% 4 ML: 3 NEBU SOLN at 07:44

## 2023-01-22 RX ADMIN — TRAZODONE HYDROCHLORIDE 100 MG: 100 TABLET ORAL at 21:28

## 2023-01-22 RX ADMIN — RISPERIDONE 1 MG: 1 TABLET ORAL at 17:17

## 2023-01-22 RX ADMIN — LEVALBUTEROL HYDROCHLORIDE 1.25 MG: 1.25 SOLUTION, CONCENTRATE RESPIRATORY (INHALATION) at 07:43

## 2023-01-22 RX ADMIN — MAGNESIUM OXIDE TAB 400 MG (241.3 MG ELEMENTAL MG) 400 MG: 400 (241.3 MG) TAB at 08:28

## 2023-01-22 NOTE — PLAN OF CARE
Problem: Potential for Falls  Goal: Patient will remain free of falls  Description: INTERVENTIONS:  - Educate patient/family on patient safety including physical limitations  - Instruct patient to call for assistance with activity   - Consult OT/PT to assist with strengthening/mobility   - Keep Call bell within reach  - Keep bed low and locked with side rails adjusted as appropriate  - Keep care items and personal belongings within reach  - Initiate and maintain comfort rounds  - Make Fall Risk Sign visible to staff  - Offer Toileting every 2 Hours, in advance of need  - Initiate/Maintain bedalarm  - Obtain necessary fall risk management equipment:   - Apply yellow socks and bracelet for high fall risk patients  - Consider moving patient to room near nurses station  Outcome: Progressing     Problem: PAIN - ADULT  Goal: Verbalizes/displays adequate comfort level or baseline comfort level  Description: Interventions:  - Encourage patient to monitor pain and request assistance  - Assess pain using appropriate pain scale  - Administer analgesics based on type and severity of pain and evaluate response  - Implement non-pharmacological measures as appropriate and evaluate response  - Consider cultural and social influences on pain and pain management  - Notify physician/advanced practitioner if interventions unsuccessful or patient reports new pain  Outcome: Progressing     Problem: INFECTION - ADULT  Goal: Absence or prevention of progression during hospitalization  Description: INTERVENTIONS:  - Assess and monitor for signs and symptoms of infection  - Monitor lab/diagnostic results  - Monitor all insertion sites, i e  indwelling lines, tubes, and drains  - Monitor endotracheal if appropriate and nasal secretions for changes in amount and color  - [place] appropriate cooling/warming therapies per order  - Administer medications as ordered  - Instruct and encourage patient and family to use good hand hygiene technique  - Identify and instruct in appropriate isolation precautions for identified infection/condition  Outcome: Progressing  Goal: Absence of fever/infection during neutropenic period  Description: INTERVENTIONS:  - Monitor WBC    Outcome: Progressing     Problem: SAFETY ADULT  Goal: Patient will remain free of falls  Description: INTERVENTIONS:  - Educate patient/family on patient safety including physical limitations  - Instruct patient to call for assistance with activity   - Consult OT/PT to assist with strengthening/mobility   - Keep Call bell within reach  - Keep bed low and locked with side rails adjusted as appropriate  - Keep care items and personal belongings within reach  - Initiate and maintain comfort rounds  - Make Fall Risk Sign visible to staff  - Offer Toileting every 2 Hours, in advance of need  - Initiate/Maintain bedalarm  - Obtain necessary fall risk management equipment:   - Apply yellow socks and bracelet for high fall risk patients  - Consider moving patient to room near nurses station  Outcome: Progressing  Goal: Maintain or return to baseline ADL function  Description: INTERVENTIONS:  -  Assess patient's ability to carry out ADLs; assess patient's baseline for ADL function and identify physical deficits which impact ability to perform ADLs (bathing, care of mouth/teeth, toileting, grooming, dressing, etc )  - Assess/evaluate cause of self-care deficits   - Assess range of motion  - Assess patient's mobility; develop plan if impaired  - Assess patient's need for assistive devices and provide as appropriate  - Encourage maximum independence but intervene and supervise when necessary  - Involve family in performance of ADLs  - Assess for home care needs following discharge   - Consider OT consult to assist with ADL evaluation and planning for discharge  - Provide patient education as appropriate  Outcome: Progressing  Goal: Maintains/Returns to pre admission functional level  Description: INTERVENTIONS:  - Perform BMAT or MOVE assessment daily    - Set and communicate daily mobility goal to care team and patient/family/caregiver  - Collaborate with rehabilitation services on mobility goals if consulted  - Perform Range of Motion 3 times a day  - Reposition patient every 2 hours  - Dangle patient 3 times a day  - Stand patient 3 times a day  - Ambulate patient 3 times a day  - Out of bed to chair 3 times a day   - Out of bed for meals 3 times a day  - Out of bed for toileting  - Record patient progress and toleration of activity level   Outcome: Progressing     Problem: DISCHARGE PLANNING  Goal: Discharge to home or other facility with appropriate resources  Description: INTERVENTIONS:  - Identify barriers to discharge w/patient and caregiver  - Arrange for needed discharge resources and transportation as appropriate  - Identify discharge learning needs (meds, wound care, etc )  - Arrange for interpretive services to assist at discharge as needed  - Refer to Case Management Department for coordinating discharge planning if the patient needs post-hospital services based on physician/advanced practitioner order or complex needs related to functional status, cognitive ability, or social support system  Outcome: Progressing     Problem: Knowledge Deficit  Goal: Patient/family/caregiver demonstrates understanding of disease process, treatment plan, medications, and discharge instructions  Description: Complete learning assessment and assess knowledge base  Interventions:  - Provide teaching at level of understanding  - Provide teaching via preferred learning methods  Outcome: Progressing     Problem: NEUROSENSORY - ADULT  Goal: Achieves maximal functionality and self care  Description: INTERVENTIONS  - Monitor swallowing and airway patency with patient fatigue and changes in neurological status  - Encourage and assist patient to increase activity and self care     - Encourage visually impaired, hearing impaired and aphasic patients to use assistive/communication devices  Outcome: Progressing     Problem: METABOLIC, FLUID AND ELECTROLYTES - ADULT  Goal: Electrolytes maintained within normal limits  Description: INTERVENTIONS:  - Monitor labs and assess patient for signs and symptoms of electrolyte imbalances  - Administer electrolyte replacement as ordered  - Monitor response to electrolyte replacements, including repeat lab results as appropriate  - Instruct patient on fluid and nutrition as appropriate  Outcome: Progressing  Goal: Glucose maintained within target range  Description: INTERVENTIONS:  - Monitor Blood Glucose as ordered  - Assess for signs and symptoms of hyperglycemia and hypoglycemia  - Administer ordered medications to maintain glucose within target range  - Assess nutritional intake and initiate nutrition service referral as needed  Outcome: Progressing     Problem: MUSCULOSKELETAL - ADULT  Goal: Maintain or return mobility to safest level of function  Description: INTERVENTIONS:  - Assess patient's ability to carry out ADLs; assess patient's baseline for ADL function and identify physical deficits which impact ability to perform ADLs (bathing, care of mouth/teeth, toileting, grooming, dressing, etc )  - Assess/evaluate cause of self-care deficits   - Assess range of motion  - Assess patient's mobility  - Assess patient's need for assistive devices and provide as appropriate  - Encourage maximum independence but intervene and supervise when necessary  - Involve family in performance of ADLs  - Assess for home care needs following discharge   - Consider OT consult to assist with ADL evaluation and planning for discharge  - Provide patient education as appropriate  Outcome: Progressing  Goal: Maintain proper alignment of affected body part  Description: INTERVENTIONS:  - Support, maintain and protect limb and body alignment  - Provide patient/ family with appropriate education  Outcome: Progressing     Problem: MOBILITY - ADULT  Goal: Maintain or return to baseline ADL function  Description: INTERVENTIONS:  -  Assess patient's ability to carry out ADLs; assess patient's baseline for ADL function and identify physical deficits which impact ability to perform ADLs (bathing, care of mouth/teeth, toileting, grooming, dressing, etc )  - Assess/evaluate cause of self-care deficits   - Assess range of motion  - Assess patient's mobility; develop plan if impaired  - Assess patient's need for assistive devices and provide as appropriate  - Encourage maximum independence but intervene and supervise when necessary  - Involve family in performance of ADLs  - Assess for home care needs following discharge   - Consider OT consult to assist with ADL evaluation and planning for discharge  - Provide patient education as appropriate  Outcome: Progressing  Goal: Maintains/Returns to pre admission functional level  Description: INTERVENTIONS:  - Perform BMAT or MOVE assessment daily    - Set and communicate daily mobility goal to care team and patient/family/caregiver  - Collaborate with rehabilitation services on mobility goals if consulted  - Perform Range of Motion 3 times a day  - Reposition patient every 2 hours    - Dangle patient 3 times a day  - Stand patient 3 times a day  - Ambulate patient 3 times a day  - Out of bed to chair 3 times a day   - Out of bed for meals 3 times a day  - Out of bed for toileting  - Record patient progress and toleration of activity level   Outcome: Progressing

## 2023-01-22 NOTE — PROGRESS NOTES
[address] [facility]  Progress Note - Denis Kruger 1950, 74 y o  male MRN: [id number]  Unit/Bed#: -02 Encounter: [id number]  Primary Care Provider: Uday Maharaj MD   Date and time admitted to hospital: 1/21/2023  3:25 AM    * Acute respiratory failure with hypoxia (HCC)-resolved as of 1/22/2023  Assessment & Plan  Presenting with worsening SOB and increasing O2 needs now needing 2-3LNC    At baseline, uses no supplemental O2  Likely 2/2 COPD exacerbation vs OHS vs less likely CHF    Plan:  • Treat underlying   • Wean O2 as able    Left leg injury  Assessment & Plan  Inability to internally rotate at the hip since 1 day prior to admission  Denies any injury/trauma  Only orthopedic procedure performed was on the knee around 1960s as reported by patient  PT OT  Orthopedic surgery consult  · Concern for atraumatic low back pain and left lower extremity radicular pain worsened with standing  · Recommending weightbearing as tolerated with physical therapy  · If no improvement in symptoms or symptoms are worsening, may consider further imaging     Chronic left-sided low back pain without sciatica  Assessment & Plan  CT recon only lumbar spine 1/21/23  No fracture or traumatic subluxation  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    If symptoms persist, an MRI of the lumbar spine could be performed for further evaluation    · Has been following up with outpatient PT for low back pain and is now complaining of worsening severity with radiation to LLE    No complaints of recent involuntary bowel or bladder voiding/saddle anesthesia    · Monitor and follow up outpatient  · PT/OT    Type 2 diabetes mellitus without complication, without long-term current use of insulin [facility])  Assessment & Plan  Lab Results   Component Value Date    HGBA1C 6 7 (H) 01/21/2023       Recent Labs     01/21/23  2103 01/21/23  2114 01/22/23  0555 01/22/23  1106   POCGLU 247* 247* 144* 178*       Blood Sugar Average: Last 72 hrs:  (P) 302 [id number] Home Regimen: metformin 500 mg BID    Plan:  • Hold oral antihyperglycemics  • Diet Consistent CHO Level 2 (5 CHO servings/75g CHO per meal)  • Insulin regimen  o Glucose checks and Insulin correction ACHS  • Goal -180 while admitted, adjusting insulin regimen as appropriate  • Monitor for hypoglycemia and treat per protocol      Morbid obesity due to excess calories (HCC)  Assessment & Plan  Body mass index is 39 54 kg/m²  • Recommend incorporating a more whole foods plant-predominant diet along with decreasing consumption of red meats and processed foods  • Per AHA guidelines, recommend moderate-vigorous intensity exercise for 30 minutes a day for 5 days a week or a total of 150 min/week      Essential hypertension  Assessment & Plan  Blood Pressure: 150/78      Plan:  • Continue lisinopril, lopressor  • Monitor blood pressure    Chronic post-traumatic stress disorder (PTSD)  Assessment & Plan  Continue home trazodone and risperdol    Hyperlipidemia  Assessment & Plan  Lab Results   Component Value Date    CHOLESTEROL 98 01/21/2023    TRIG 71 01/21/2023    HDL 32 (L) 01/21/2023    LDLCALC 52 01/21/2023       Statin     Schizophrenia ([address] )  Assessment & Plan  Continue risperdal and desyrel          VTE Pharmacologic Prophylaxis: VTE Score: 7 High Risk (Score >/= 5) - Pharmacological DVT Prophylaxis Ordered: enoxaparin (Lovenox)  Sequential Compression Devices Ordered  Patient Centered Rounds: I performed bedside rounds with nursing staff today  Discussions with Specialists or Other Care Team Provider: none    Education and Discussions with Family / Patient: Patient, will call wife    Time Spent for Care: 45 minutes  More than 50% of total time spent on counseling and coordination of care as described above      Current Length of Stay: 1 day(s)  Current Patient Status: Inpatient   Certification Statement: The patient will continue to require additional inpatient hospital stay due to PT OT and ambulation  Discharge Plan: Anticipate discharge in 24-48 hrs to discharge location to be determined pending rehab evaluations  Code Status: Level 3 - DNAR and DNI    Subjective:   Patient states he still has not been able to ambulate on his own without assistance and has difficulty weightbearing  Pending read of x-ray of hip which is likely to not show any evidence of fractures as reported by orthopedic surgery  Encourage patient to trial weightbearing as tolerated  He is understanding of this  All questions answered  Objective:     Vitals:   Temp (24hrs), Av °F (36 7 °C), Min:97 7 °F (36 5 °C), Max:98 2 °F (36 8 °C)    Temp:  [97 7 °F (36 5 °C)-98 2 °F (36 8 °C)] 98 2 °F (36 8 °C)  HR:  [65-78] 78  Resp:  [18] 18  BP: (116-154)/() 150/78  SpO2:  [87 %-97 %] 94 %  Body mass index is 39 54 kg/m²  Input and Output Summary (last 24 hours): Intake/Output Summary (Last 24 hours) at 2023 1502  Last data filed at 2023 1300  Gross per 24 hour   Intake 320 ml   Output 1175 ml   Net -855 ml       Physical Exam:   Physical Exam  Vitals and nursing note reviewed  Constitutional:       General: He is not in acute distress  Appearance: He is well-developed  He is morbidly obese  He is ill-appearing (chronically)  HENT:      Head: Normocephalic and atraumatic  Eyes:      Conjunctiva/sclera: Conjunctivae normal    Cardiovascular:      Rate and Rhythm: Normal rate and regular rhythm  Heart sounds: No murmur heard  Pulmonary:      Effort: Pulmonary effort is normal  No respiratory distress  Breath sounds: Normal breath sounds  Abdominal:      Palpations: Abdomen is soft  Tenderness: There is no abdominal tenderness  Musculoskeletal:         General: No swelling  Cervical back: Neck supple        Comments: Improved range of motion today, able to internally rotate at the hip joint   Skin: General: Skin is warm and dry  Capillary Refill: Capillary refill takes less than 2 seconds  Neurological:      Mental Status: He is alert  Psychiatric:         Mood and Affect: Mood normal          Behavior: Behavior normal  Behavior is cooperative  Thought Content: Thought content normal          Judgment: Judgment normal           Additional Data:     Labs:  Results from last 7 days   Lab Units 01/22/23  0552   WBC Thousand/uL 12 57*   HEMOGLOBIN g/dL 11 5*   HEMATOCRIT % 36 9   PLATELETS Thousands/uL 163   NEUTROS PCT % 67   LYMPHS PCT % 27   MONOS PCT % 6   EOS PCT % 0     Results from last 7 days   Lab Units 01/22/23  0552   SODIUM mmol/L 138   POTASSIUM mmol/L 4 5   CHLORIDE mmol/L 102   CO2 mmol/L 27   BUN mg/dL 21   CREATININE mg/dL 1 19   ANION GAP mmol/L 9   CALCIUM mg/dL 9 3   ALBUMIN g/dL 3 9   TOTAL BILIRUBIN mg/dL 0 36   ALK PHOS U/L 52   ALT U/L 32   AST U/L 20   GLUCOSE RANDOM mg/dL 138     Results from last 7 days   Lab Units 01/21/23  0408   INR  1 08     Results from last 7 days   Lab Units 01/22/23  1106 01/22/23  0555 01/21/23  2114 01/21/23  2103 01/21/23  1536 01/21/23  1210 01/21/23  0908   POC GLUCOSE mg/dl 178* 144* 247* 247* 274* 220* 126     Results from last 7 days   Lab Units 01/21/23  0408   HEMOGLOBIN A1C % 6 7*     Results from last 7 days   Lab Units 01/22/23  0538 01/21/23  1329 01/21/23  0408   LACTIC ACID mmol/L  --   --  1 7   PROCALCITONIN ng/ml <0 05 <0 05  --        Lines/Drains:  Invasive Devices     Peripheral Intravenous Line  Duration           Peripheral IV 01/21/23 Right Antecubital 1 day                      Imaging: Reviewed radiology reports from this admission including: chest xray, abdominal/pelvic CT and xray(s)    XR chest 1 view portable    Result Date: 1/22/2023  Impression: No acute cardiopulmonary disease   Workstation performed: [id number]     CT recon only lumbar spine    Result Date: 1/21/2023  Impression: No fracture or traumatic subluxation  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  If symptoms persist, an MRI of the lumbar spine could be performed for further evaluation  Workstation performed: [id number]     CT abdomen pelvis with contrast    Result Date: 1/21/2023  Impression: No acute intra-abdominal abnormality  No free air or free fluid  Punctate urinary bladder calculi  Prostatomegaly  Workstation performed: [id number]       XR chest 1 view portable    Result Date: 1/22/2023  Impression No acute cardiopulmonary disease   Workstation performed: [id number]        Recent Cultures (last 7 days):   Results from last 7 days   Lab Units 01/21/23  1002   LEGIONELLA URINARY ANTIGEN  Negative       Last 24 Hours Medication List:   Current Facility-Administered Medications   Medication Dose Route Frequency Provider Last Rate   • acetaminophen  650 mg Oral Q6H PRN [facility] Maxim DO     • aluminum-magnesium hydroxide-simethicone  30 mL Oral Q6H PRN [facility], DO     • aspirin  81 mg Oral Daily [facility] Maxim, DO     • atorvastatin  40 mg Oral Daily [employer], DO     • azithromycin  500 mg Oral Q24H [facility], DO     • enoxaparin  40 mg Subcutaneous Daily [facility], DO     • furosemide  40 mg Oral Daily [facility], DO     • insulin lispro  2-12 Units Subcutaneous TID AC [facility] DO     • insulin lispro  2-12 Units Subcutaneous HS [facility], DO     • levalbuterol  1 25 mg Nebulization BID [facility], DO     • lisinopril  20 mg Oral Daily [facility] DO     • magnesium oxide  400 mg Oral BID [facility] DO     • methocarbamol  750 mg Oral Q6H PRN [facility] DO     • metoprolol tartrate  25 mg Oral Q12H [facility], DO     • oxyCODONE-acetaminophen  1 tablet Oral Q6H PRN [facility] DO     • polyethylene glycol  17 g Oral Daily PRN [facility], DO     • risperiDONE  1 mg Oral BID Kenneth Pan DO     • sodium chloride  3 mL Nebulization BID Kenneth Pan DO     • traZODone  100 mg Oral HS Kenneth Pan DO          Today, Patient Was Seen By: No Hidalgo DO    **Please Note: This note may have been constructed using a voice recognition system  **

## 2023-01-22 NOTE — ASSESSMENT & PLAN NOTE
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

## 2023-01-22 NOTE — ASSESSMENT & PLAN NOTE
Inability to internally rotate at the hip since 1 day prior to admission  Denies any injury/trauma  Only orthopedic procedure performed was on the knee around 1960s as reported by patient  PT OT  Orthopedic surgery consult  · Concern for atraumatic low back pain and left lower extremity radicular pain worsened with standing  · Recommending weightbearing as tolerated with physical therapy  · Xray showing moderate left hip osteoarthritis  · If no improvement in symptoms or symptoms are worsening, may consider further imaging

## 2023-01-22 NOTE — ASSESSMENT & PLAN NOTE
Inability to internally rotate at the hip since 1 day prior to admission  Denies any injury/trauma  Only orthopedic procedure performed was on the knee around 1960s as reported by patient  PT OT  Orthopedic surgery consult  · Concern for atraumatic low back pain and left lower extremity radicular pain worsened with standing  · Recommending weightbearing as tolerated with physical therapy  · If no improvement in symptoms or symptoms are worsening, may consider further imaging

## 2023-01-22 NOTE — PLAN OF CARE
Problem: Potential for Falls  Goal: Patient will remain free of falls  Description: INTERVENTIONS:  - Educate patient/family on patient safety including physical limitations  - Instruct patient to call for assistance with activity   - Consult OT/PT to assist with strengthening/mobility   - Keep Call bell within reach  - Keep bed low and locked with side rails adjusted as appropriate  - Keep care items and personal belongings within reach  - Initiate and maintain comfort rounds  - Make Fall Risk Sign visible to staff  - Offer Toileting every 2 Hours, in advance of need  - Initiate/Maintain bed alarm  - Obtain necessary fall risk management equipment: chair alarm  - Apply yellow socks and bracelet for high fall risk patients  - Consider moving patient to room near nurses station  Outcome: Progressing     Problem: PAIN - ADULT  Goal: Verbalizes/displays adequate comfort level or baseline comfort level  Description: Interventions:  - Encourage patient to monitor pain and request assistance  - Assess pain using appropriate pain scale  - Administer analgesics based on type and severity of pain and evaluate response  - Implement non-pharmacological measures as appropriate and evaluate response  - Consider cultural and social influences on pain and pain management  - Notify physician/advanced practitioner if interventions unsuccessful or patient reports new pain  Outcome: Progressing     Problem: INFECTION - ADULT  Goal: Absence or prevention of progression during hospitalization  Description: INTERVENTIONS:  - Assess and monitor for signs and symptoms of infection  - Monitor lab/diagnostic results  - Monitor all insertion sites, i e  indwelling lines, tubes, and drains  - Monitor endotracheal if appropriate and nasal secretions for changes in amount and color  - [place] appropriate cooling/warming therapies per order  - Administer medications as ordered  - Instruct and encourage patient and family to use good hand hygiene technique  - Identify and instruct in appropriate isolation precautions for identified infection/condition  Outcome: Progressing  Goal: Absence of fever/infection during neutropenic period  Description: INTERVENTIONS:  - Monitor WBC    Outcome: Progressing     Problem: SAFETY ADULT  Goal: Patient will remain free of falls  Description: INTERVENTIONS:  - Educate patient/family on patient safety including physical limitations  - Instruct patient to call for assistance with activity   - Consult OT/PT to assist with strengthening/mobility   - Keep Call bell within reach  - Keep bed low and locked with side rails adjusted as appropriate  - Keep care items and personal belongings within reach  - Initiate and maintain comfort rounds  - Make Fall Risk Sign visible to staff  - Offer Toileting every 2 Hours, in advance of need  - Initiate/Maintain bed alarm  - Obtain necessary fall risk management equipment: chair alarm  - Apply yellow socks and bracelet for high fall risk patients  - Consider moving patient to room near nurses station  Outcome: Progressing  Goal: Maintain or return to baseline ADL function  Description: INTERVENTIONS:  -  Assess patient's ability to carry out ADLs; assess patient's baseline for ADL function and identify physical deficits which impact ability to perform ADLs (bathing, care of mouth/teeth, toileting, grooming, dressing, etc )  - Assess/evaluate cause of self-care deficits   - Assess range of motion  - Assess patient's mobility; develop plan if impaired  - Assess patient's need for assistive devices and provide as appropriate  - Encourage maximum independence but intervene and supervise when necessary  - Involve family in performance of ADLs  - Assess for home care needs following discharge   - Consider OT consult to assist with ADL evaluation and planning for discharge  - Provide patient education as appropriate  Outcome: Progressing  Goal: Maintains/Returns to pre admission functional level  Description: INTERVENTIONS:  - Perform BMAT or MOVE assessment daily    - Set and communicate daily mobility goal to care team and patient/family/caregiver  - Collaborate with rehabilitation services on mobility goals if consulted  - Perform Range of Motion 3 times a day  - Reposition patient every 3 hours  - Dangle patient 3 times a day  - Stand patient 3 times a day  - Ambulate patient 3 times a day  - Out of bed to chair 3 times a day   - Out of bed for meals 3 times a day  - Out of bed for toileting  - Record patient progress and toleration of activity level   Outcome: Progressing     Problem: DISCHARGE PLANNING  Goal: Discharge to home or other facility with appropriate resources  Description: INTERVENTIONS:  - Identify barriers to discharge w/patient and caregiver  - Arrange for needed discharge resources and transportation as appropriate  - Identify discharge learning needs (meds, wound care, etc )  - Arrange for interpretive services to assist at discharge as needed  - Refer to Case Management Department for coordinating discharge planning if the patient needs post-hospital services based on physician/advanced practitioner order or complex needs related to functional status, cognitive ability, or social support system  Outcome: Progressing     Problem: Knowledge Deficit  Goal: Patient/family/caregiver demonstrates understanding of disease process, treatment plan, medications, and discharge instructions  Description: Complete learning assessment and assess knowledge base  Interventions:  - Provide teaching at level of understanding  - Provide teaching via preferred learning methods  Outcome: Progressing     Problem: NEUROSENSORY - ADULT  Goal: Achieves maximal functionality and self care  Description: INTERVENTIONS  - Monitor swallowing and airway patency with patient fatigue and changes in neurological status  - Encourage and assist patient to increase activity and self care     - Encourage visually impaired, hearing impaired and aphasic patients to use assistive/communication devices  Outcome: Progressing     Problem: METABOLIC, FLUID AND ELECTROLYTES - ADULT  Goal: Electrolytes maintained within normal limits  Description: INTERVENTIONS:  - Monitor labs and assess patient for signs and symptoms of electrolyte imbalances  - Administer electrolyte replacement as ordered  - Monitor response to electrolyte replacements, including repeat lab results as appropriate  - Instruct patient on fluid and nutrition as appropriate  Outcome: Progressing  Goal: Glucose maintained within target range  Description: INTERVENTIONS:  - Monitor Blood Glucose as ordered  - Assess for signs and symptoms of hyperglycemia and hypoglycemia  - Administer ordered medications to maintain glucose within target range  - Assess nutritional intake and initiate nutrition service referral as needed  Outcome: Progressing     Problem: MUSCULOSKELETAL - ADULT  Goal: Maintain or return mobility to safest level of function  Description: INTERVENTIONS:  - Assess patient's ability to carry out ADLs; assess patient's baseline for ADL function and identify physical deficits which impact ability to perform ADLs (bathing, care of mouth/teeth, toileting, grooming, dressing, etc )  - Assess/evaluate cause of self-care deficits   - Assess range of motion  - Assess patient's mobility  - Assess patient's need for assistive devices and provide as appropriate  - Encourage maximum independence but intervene and supervise when necessary  - Involve family in performance of ADLs  - Assess for home care needs following discharge   - Consider OT consult to assist with ADL evaluation and planning for discharge  - Provide patient education as appropriate  Outcome: Progressing  Goal: Maintain proper alignment of affected body part  Description: INTERVENTIONS:  - Support, maintain and protect limb and body alignment  - Provide patient/ family with appropriate education  Outcome: Progressing     Problem: MOBILITY - ADULT  Goal: Maintain or return to baseline ADL function  Description: INTERVENTIONS:  -  Assess patient's ability to carry out ADLs; assess patient's baseline for ADL function and identify physical deficits which impact ability to perform ADLs (bathing, care of mouth/teeth, toileting, grooming, dressing, etc )  - Assess/evaluate cause of self-care deficits   - Assess range of motion  - Assess patient's mobility; develop plan if impaired  - Assess patient's need for assistive devices and provide as appropriate  - Encourage maximum independence but intervene and supervise when necessary  - Involve family in performance of ADLs  - Assess for home care needs following discharge   - Consider OT consult to assist with ADL evaluation and planning for discharge  - Provide patient education as appropriate  Outcome: Progressing  Goal: Maintains/Returns to pre admission functional level  Description: INTERVENTIONS:  - Perform BMAT or MOVE assessment daily    - Set and communicate daily mobility goal to care team and patient/family/caregiver  - Collaborate with rehabilitation services on mobility goals if consulted  - Perform Range of Motion 3 times a day  - Reposition patient every 3 hours    - Dangle patient 3 times a day  - Stand patient 3 times a day  - Ambulate patient 3 times a day  - Out of bed to chair 3 times a day   - Out of bed for meals 3 times a day  - Out of bed for toileting  - Record patient progress and toleration of activity level   Outcome: Progressing   3

## 2023-01-22 NOTE — ASSESSMENT & PLAN NOTE
Lab Results   Component Value Date    CHOLESTEROL 98 01/21/2023    TRIG 71 01/21/2023    HDL 32 (L) 01/21/2023    LDLCALC 52 01/21/2023       Statin

## 2023-01-22 NOTE — ASSESSMENT & PLAN NOTE
Lab Results   Component Value Date    HGBA1C 6 7 (H) 01/21/2023       Recent Labs     01/22/23  1605 01/22/23  2129 01/23/23  0742 01/23/23  1102   POCGLU 203* 153* 154* 181*       Blood Sugar Average: Last 72 hrs:  (P) 922 [id number]     Plan:  • Hold oral antihyperglycemics  • Diet Consistent CHO Level 2 (5 CHO servings/75g CHO per meal)  • Insulin regimen  o Glucose checks and Insulin correction ACHS  • Goal -180 while admitted, adjusting insulin regimen as appropriate  • Monitor for hypoglycemia and treat per protocol

## 2023-01-22 NOTE — ASSESSMENT & PLAN NOTE
Dyspnea on admission now resolved    · No fevers, not meeting SIRS Criteria on admission  · Flu/RSV/COVID negative  • Smoking Status: former 2 PY smoker quit in 1993  · CXR no acute cardiopulmonary disease    • Not in exacerbation  • Monitor respiratory status;  • Respiratory protocol, Xopenex/Atrovent, Airway clearance protocol, IS

## 2023-01-22 NOTE — ASSESSMENT & PLAN NOTE
Lab Results   Component Value Date    HGBA1C 6 7 (H) 01/21/2023       Recent Labs     01/21/23  2103 01/21/23  2114 01/22/23  0555 01/22/23  1106   POCGLU 247* 247* 144* 178*       Blood Sugar Average: Last 72 hrs:  (P) 588 [id number] Home Regimen: metformin 500 mg BID    Plan:  • Hold oral antihyperglycemics  • Diet Consistent CHO Level 2 (5 CHO servings/75g CHO per meal)  • Insulin regimen  o Glucose checks and Insulin correction ACHS  • Goal -180 while admitted, adjusting insulin regimen as appropriate  • Monitor for hypoglycemia and treat per protocol

## 2023-01-23 PROBLEM — G20.A1 PARKINSON DISEASE: Status: ACTIVE | Noted: 2023-01-23

## 2023-01-23 PROBLEM — G20 PARKINSON DISEASE (HCC): Status: ACTIVE | Noted: 2023-01-23

## 2023-01-23 LAB
GLUCOSE SERPL-MCNC: 145 MG/DL (ref 65–140)
GLUCOSE SERPL-MCNC: 154 MG/DL (ref 65–140)
GLUCOSE SERPL-MCNC: 179 MG/DL (ref 65–140)
GLUCOSE SERPL-MCNC: 181 MG/DL (ref 65–140)

## 2023-01-23 RX ADMIN — ISODIUM CHLORIDE 3 ML: 0.03 SOLUTION RESPIRATORY (INHALATION) at 20:50

## 2023-01-23 RX ADMIN — RISPERIDONE 1 MG: 1 TABLET ORAL at 17:57

## 2023-01-23 RX ADMIN — MAGNESIUM OXIDE TAB 400 MG (241.3 MG ELEMENTAL MG) 400 MG: 400 (241.3 MG) TAB at 17:58

## 2023-01-23 RX ADMIN — ISODIUM CHLORIDE 3 ML: 0.03 SOLUTION RESPIRATORY (INHALATION) at 07:36

## 2023-01-23 RX ADMIN — INSULIN LISPRO 2 UNITS: 100 INJECTION, SOLUTION INTRAVENOUS; SUBCUTANEOUS at 22:29

## 2023-01-23 RX ADMIN — TRAZODONE HYDROCHLORIDE 100 MG: 100 TABLET ORAL at 22:28

## 2023-01-23 RX ADMIN — RISPERIDONE 1 MG: 1 TABLET ORAL at 10:04

## 2023-01-23 RX ADMIN — INSULIN LISPRO 2 UNITS: 100 INJECTION, SOLUTION INTRAVENOUS; SUBCUTANEOUS at 11:49

## 2023-01-23 RX ADMIN — LEVALBUTEROL HYDROCHLORIDE 1.25 MG: 1.25 SOLUTION, CONCENTRATE RESPIRATORY (INHALATION) at 20:50

## 2023-01-23 RX ADMIN — INSULIN LISPRO 2 UNITS: 100 INJECTION, SOLUTION INTRAVENOUS; SUBCUTANEOUS at 07:49

## 2023-01-23 RX ADMIN — FUROSEMIDE 40 MG: 40 TABLET ORAL at 10:05

## 2023-01-23 RX ADMIN — METOPROLOL TARTRATE 25 MG: 25 TABLET, FILM COATED ORAL at 10:04

## 2023-01-23 RX ADMIN — ENOXAPARIN SODIUM 40 MG: 40 INJECTION SUBCUTANEOUS at 10:04

## 2023-01-23 RX ADMIN — ACETAMINOPHEN 650 MG: 325 TABLET, FILM COATED ORAL at 10:15

## 2023-01-23 RX ADMIN — METOPROLOL TARTRATE 25 MG: 25 TABLET, FILM COATED ORAL at 20:11

## 2023-01-23 RX ADMIN — ATORVASTATIN CALCIUM 40 MG: 40 TABLET, FILM COATED ORAL at 16:25

## 2023-01-23 RX ADMIN — LEVALBUTEROL HYDROCHLORIDE 1.25 MG: 1.25 SOLUTION, CONCENTRATE RESPIRATORY (INHALATION) at 07:36

## 2023-01-23 RX ADMIN — AZITHROMYCIN 500 MG: 500 TABLET, FILM COATED ORAL at 07:49

## 2023-01-23 RX ADMIN — MAGNESIUM OXIDE TAB 400 MG (241.3 MG ELEMENTAL MG) 400 MG: 400 (241.3 MG) TAB at 10:05

## 2023-01-23 RX ADMIN — ASPIRIN 81 MG: 81 TABLET, CHEWABLE ORAL at 10:04

## 2023-01-23 RX ADMIN — LISINOPRIL 20 MG: 10 TABLET ORAL at 10:04

## 2023-01-23 NOTE — PLAN OF CARE
Problem: Potential for Falls  Goal: Patient will remain free of falls  Description: INTERVENTIONS:  - Educate patient/family on patient safety including physical limitations  - Instruct patient to call for assistance with activity   - Consult OT/PT to assist with strengthening/mobility   - Keep Call bell within reach  - Keep bed low and locked with side rails adjusted as appropriate  - Keep care items and personal belongings within reach  - Initiate and maintain comfort rounds  - Make Fall Risk Sign visible to staff  - Offer Toileting every 2 Hours, in advance of need  - Initiate/Maintain bedalarm  - Obtain necessary fall risk management equipment:   - Apply yellow socks and bracelet for high fall risk patients  - Consider moving patient to room near nurses station  Outcome: Progressing     Problem: PAIN - ADULT  Goal: Verbalizes/displays adequate comfort level or baseline comfort level  Description: Interventions:  - Encourage patient to monitor pain and request assistance  - Assess pain using appropriate pain scale  - Administer analgesics based on type and severity of pain and evaluate response  - Implement non-pharmacological measures as appropriate and evaluate response  - Consider cultural and social influences on pain and pain management  - Notify physician/advanced practitioner if interventions unsuccessful or patient reports new pain  Outcome: Progressing     Problem: INFECTION - ADULT  Goal: Absence or prevention of progression during hospitalization  Description: INTERVENTIONS:  - Assess and monitor for signs and symptoms of infection  - Monitor lab/diagnostic results  - Monitor all insertion sites, i e  indwelling lines, tubes, and drains  - Monitor endotracheal if appropriate and nasal secretions for changes in amount and color  - [place] appropriate cooling/warming therapies per order  - Administer medications as ordered  - Instruct and encourage patient and family to use good hand hygiene technique  - Identify and instruct in appropriate isolation precautions for identified infection/condition  Outcome: Progressing  Goal: Absence of fever/infection during neutropenic period  Description: INTERVENTIONS:  - Monitor WBC    Outcome: Progressing     Problem: SAFETY ADULT  Goal: Patient will remain free of falls  Description: INTERVENTIONS:  - Educate patient/family on patient safety including physical limitations  - Instruct patient to call for assistance with activity   - Consult OT/PT to assist with strengthening/mobility   - Keep Call bell within reach  - Keep bed low and locked with side rails adjusted as appropriate  - Keep care items and personal belongings within reach  - Initiate and maintain comfort rounds  - Make Fall Risk Sign visible to staff  - Offer Toileting every 2 Hours, in advance of need  - Initiate/Maintain bedalarm  - Obtain necessary fall risk management equipment:   - Apply yellow socks and bracelet for high fall risk patients  - Consider moving patient to room near nurses station  Outcome: Progressing  Goal: Maintain or return to baseline ADL function  Description: INTERVENTIONS:  -  Assess patient's ability to carry out ADLs; assess patient's baseline for ADL function and identify physical deficits which impact ability to perform ADLs (bathing, care of mouth/teeth, toileting, grooming, dressing, etc )  - Assess/evaluate cause of self-care deficits   - Assess range of motion  - Assess patient's mobility; develop plan if impaired  - Assess patient's need for assistive devices and provide as appropriate  - Encourage maximum independence but intervene and supervise when necessary  - Involve family in performance of ADLs  - Assess for home care needs following discharge   - Consider OT consult to assist with ADL evaluation and planning for discharge  - Provide patient education as appropriate  Outcome: Progressing  Goal: Maintains/Returns to pre admission functional level  Description: INTERVENTIONS:  - Perform BMAT or MOVE assessment daily    - Set and communicate daily mobility goal to care team and patient/family/caregiver  - Collaborate with rehabilitation services on mobility goals if consulted  - Perform Range of Motion 3 times a day  - Reposition patient every 2 hours  - Dangle patient 3 times a day  - Stand patient 3 times a day  - Ambulate patient 3 times a day  - Out of bed to chair 3 times a day   - Out of bed for meals 3 times a day  - Out of bed for toileting  - Record patient progress and toleration of activity level   Outcome: Progressing     Problem: DISCHARGE PLANNING  Goal: Discharge to home or other facility with appropriate resources  Description: INTERVENTIONS:  - Identify barriers to discharge w/patient and caregiver  - Arrange for needed discharge resources and transportation as appropriate  - Identify discharge learning needs (meds, wound care, etc )  - Arrange for interpretive services to assist at discharge as needed  - Refer to Case Management Department for coordinating discharge planning if the patient needs post-hospital services based on physician/advanced practitioner order or complex needs related to functional status, cognitive ability, or social support system  Outcome: Progressing     Problem: Knowledge Deficit  Goal: Patient/family/caregiver demonstrates understanding of disease process, treatment plan, medications, and discharge instructions  Description: Complete learning assessment and assess knowledge base  Interventions:  - Provide teaching at level of understanding  - Provide teaching via preferred learning methods  Outcome: Progressing     Problem: NEUROSENSORY - ADULT  Goal: Achieves maximal functionality and self care  Description: INTERVENTIONS  - Monitor swallowing and airway patency with patient fatigue and changes in neurological status  - Encourage and assist patient to increase activity and self care     - Encourage visually impaired, hearing impaired and aphasic patients to use assistive/communication devices  Outcome: Progressing     Problem: METABOLIC, FLUID AND ELECTROLYTES - ADULT  Goal: Electrolytes maintained within normal limits  Description: INTERVENTIONS:  - Monitor labs and assess patient for signs and symptoms of electrolyte imbalances  - Administer electrolyte replacement as ordered  - Monitor response to electrolyte replacements, including repeat lab results as appropriate  - Instruct patient on fluid and nutrition as appropriate  Outcome: Progressing  Goal: Glucose maintained within target range  Description: INTERVENTIONS:  - Monitor Blood Glucose as ordered  - Assess for signs and symptoms of hyperglycemia and hypoglycemia  - Administer ordered medications to maintain glucose within target range  - Assess nutritional intake and initiate nutrition service referral as needed  Outcome: Progressing     Problem: MUSCULOSKELETAL - ADULT  Goal: Maintain or return mobility to safest level of function  Description: INTERVENTIONS:  - Assess patient's ability to carry out ADLs; assess patient's baseline for ADL function and identify physical deficits which impact ability to perform ADLs (bathing, care of mouth/teeth, toileting, grooming, dressing, etc )  - Assess/evaluate cause of self-care deficits   - Assess range of motion  - Assess patient's mobility  - Assess patient's need for assistive devices and provide as appropriate  - Encourage maximum independence but intervene and supervise when necessary  - Involve family in performance of ADLs  - Assess for home care needs following discharge   - Consider OT consult to assist with ADL evaluation and planning for discharge  - Provide patient education as appropriate  Outcome: Progressing  Goal: Maintain proper alignment of affected body part  Description: INTERVENTIONS:  - Support, maintain and protect limb and body alignment  - Provide patient/ family with appropriate education  Outcome: Progressing     Problem: MOBILITY - ADULT  Goal: Maintain or return to baseline ADL function  Description: INTERVENTIONS:  -  Assess patient's ability to carry out ADLs; assess patient's baseline for ADL function and identify physical deficits which impact ability to perform ADLs (bathing, care of mouth/teeth, toileting, grooming, dressing, etc )  - Assess/evaluate cause of self-care deficits   - Assess range of motion  - Assess patient's mobility; develop plan if impaired  - Assess patient's need for assistive devices and provide as appropriate  - Encourage maximum independence but intervene and supervise when necessary  - Involve family in performance of ADLs  - Assess for home care needs following discharge   - Consider OT consult to assist with ADL evaluation and planning for discharge  - Provide patient education as appropriate  Outcome: Progressing  Goal: Maintains/Returns to pre admission functional level  Description: INTERVENTIONS:  - Perform BMAT or MOVE assessment daily    - Set and communicate daily mobility goal to care team and patient/family/caregiver  - Collaborate with rehabilitation services on mobility goals if consulted  - Perform Range of Motion 3 times a day  - Reposition patient every 2 hours    - Dangle patient 3 times a day  - Stand patient 3 times a day  - Ambulate patient 3 times a day  - Out of bed to chair 3 times a day   - Out of bed for meals 3 times a day  - Out of bed for toileting  - Record patient progress and toleration of activity level   Outcome: Progressing

## 2023-01-23 NOTE — PLAN OF CARE
Problem: OCCUPATIONAL THERAPY ADULT  Goal: Performs self-care activities at highest level of function for planned discharge setting  See evaluation for individualized goals  Description: Treatment Interventions: ADL retraining, Functional transfer training, Activityengagement, Cardiac education, Continued evaluation, Energy conservation, Compensatory technique education, UE strengthening/ROM, Endurance training, Patient/family training          See flowsheet documentation for full assessment, interventions and recommendations  1/23/2023 1413 by Bubba Castillo  Note: Limitation: Decreased ADL status, Decreased endurance, Decreased self-care trans, Decreased high-level ADLs, Decreased Safe judgement during ADL  Prognosis: Good  Assessment: Pt is a 74 y o  male seen for OT evaluation s/p admit to [facility] on 1/21/2023 w/ Left leg injury  Comorbidities affecting pt's functional performance at time of assessment include:anxiety, HTN, obesity, SOB, h/o CHF, acute pulm insufficience, OA jamilah knees, DM, and underlying psychiatric disorder: schizophrenia  Per Care Everywhere pt also has Parkinson's (2019)  Orders placed for OT evaluation and treatment  Performed at least two patient identifiers during session including name and wristband  Personal factors affecting pt at time of IE include:steps to enter environment, behavioral pattern, difficulty performing ADLS, difficulty performing IADLS, limited insight into deficits, decreased initiation and engagement, financial barriers, health management  and environment  Prior to admission, pt reports Ind with ADLs, A with IADLs, and (+) driving    Upon evaluation: Pt requires S with UB ADLs, min A with LB ADLs, min A with xfers and CG/min A with functional mobility 2* the following deficits impacting occupational performance: weakness, decreased strength, decreased dynamic sit/ stand balance, decreased activity tolerance, decreased standing tolerance time for self care and functional mobility, impaired interpersonal skills, environmental deficits, decreased coping skills, decreased mobilty and requiring external assistance to complete transitional movements  Pt to benefit from continued skilled OT tx while in the hospital to address deficits as defined above and maximize level of functional independence w ADL's and functional mobility  Occupational Performance areas to address include: bathing/shower, toilet hygiene, dressing, medication management, socialization, health maintenance, functional mobility, clothing management and household maintenance  From OT standpoint, recommendation at time of d/c would be home OT  OT Discharge Recommendation: Home with home health rehabilitation       1/23/2023 1412 by Haile Sharma  Note: Limitation: Decreased ADL status, Decreased endurance, Decreased self-care trans, Decreased high-level ADLs, Decreased Safe judgement during ADL  Prognosis: Good  Assessment: Pt is a 74 y o  male seen for OT evaluation s/p admit to [facility] on 1/21/2023 w/ Left leg injury  Comorbidities affecting pt's functional performance at time of assessment include:anxiety, HTN, obesity, SOB, h/o CHF, acute pulm insufficience, OA jamilah knees, DM, and underlying psychiatric disorder: schizophrenia  Per Care Everywhere pt also has Parkinson's (2019)  Orders placed for OT evaluation and treatment  Performed at least two patient identifiers during session including name and wristband  Personal factors affecting pt at time of IE include:steps to enter environment, behavioral pattern, difficulty performing ADLS, difficulty performing IADLS, limited insight into deficits, decreased initiation and engagement, financial barriers, health management  and environment  Prior to admission, pt reports Ind with ADLs, A with IADLs, and (+) driving    Upon evaluation: Pt requires S with UB ADLs, min A with LB ADLs, min A with xfers and CG/min A with functional mobility 2* the following deficits impacting occupational performance: weakness, decreased strength, decreased dynamic sit/ stand balance, decreased activity tolerance, decreased standing tolerance time for self care and functional mobility, impaired interpersonal skills, environmental deficits, decreased coping skills, decreased mobilty and requiring external assistance to complete transitional movements  Pt to benefit from continued skilled OT tx while in the hospital to address deficits as defined above and maximize level of functional independence w ADL's and functional mobility  Occupational Performance areas to address include: bathing/shower, toilet hygiene, dressing, medication management, socialization, health maintenance, functional mobility, clothing management and household maintenance  From OT standpoint, recommendation at time of d/c would be home OT       OT Discharge Recommendation: Home with home health rehabilitation

## 2023-01-23 NOTE — OCCUPATIONAL THERAPY NOTE
Occupational Therapy Evaluation      Kerry Maki    1/23/2023    Principal Problem:    Left leg injury  Active Problems:    Schizophrenia ([facility] )    Hyperlipidemia    Chronic post-traumatic stress disorder (PTSD)    Chronic combined systolic and diastolic congestive heart failure ([facility] )    Other emphysema ([facility] )    Essential hypertension    Morbid obesity due to excess calories ([facility] )    Type 2 diabetes mellitus without complication, without long-term current use of insulin ([facility])    Chronic left-sided low back pain without sciatica      Past Medical History:   Diagnosis Date   • Anxiety    • CHF (congestive heart failure) ([facility])    • COPD (chronic obstructive pulmonary disease) ([facility])    • Depression    • Diabetes mellitus ([place] [address] )    • Enlarged prostate    • Hallucination    • Hyperlipidemia    • Hypertension    • Memory loss    • Panic attack    • Panic disorder    • Psychiatric disorder    • Psychiatric illness    • Psychosis ([place] [address] )    • PTSD (post-traumatic stress disorder)    • Schizoaffective disorder ([place] [address] )    • Schizophrenia ([place] [address] )        History reviewed  No pertinent surgical history  01/23/23 1312   OT Last Visit   OT Visit Date 01/23/23   Note Type   Note type Evaluation   Pain Assessment   Pain Assessment Tool 0-10   Pain Score No Pain   Restrictions/Precautions   Weight Bearing Precautions Per Order Yes  (per ortho: "Weightbearing as tolerated bilateral lower extremities")   Other Precautions Chair Alarm; Bed Alarm; Fall Risk   Home Living   [address] Two level;Bed/bath upstairs;1/2 bath on main level  (1+1STE; stair lift to 2nd floor)   Bathroom Shower/Tub Tub/shower unit   Bathroom Toilet Standard   Bathroom Equipment Grab bars in shower;Hand-held shower   [address] Cane;Walker; Wheelchair-manual  (SPC/RW in home (day dependent) WC (outside of home))   Prior Function   Level of [place] Independent with ADLs; Independent with functional mobility; Needs assistance with IADLS   Lives With Spouse   Receives Help From Family   IADLs Family/Friend/Other provides meals; Independent with driving; Independent with medication management  (driving shared; cleaning and laundry done by spouse)   Falls in the last 6 months 0   Vocational Retired  (maintenance)   Lifestyle   Autonomy Pt reports PLOF was Ind with ADLS, A with IADLs, and (+) driving   Reciprocal Relationships spouse   ADL   Eating Assistance 6  Modified independent   Eating Deficit Increased time to complete   Grooming Assistance 5  Supervision/Setup   Grooming Deficit Setup;Supervision/safety;Verbal cueing   UB Bathing Assistance 5  Supervision/Setup   UB Bathing Deficit Setup;Verbal cueing;Supervision/safety   LB Bathing Assistance 5  Supervision/Setup   LB Bathing Deficit Setup;Supervision/safety;Verbal cueing   UB Dressing Assistance 5  Supervision/Setup   UB Dressing Deficit Increased time to complete;Supervision/safety;Setup   LB Dressing Assistance 4  Minimal Assistance   LB Dressing Deficit Increased time to complete;Supervision/safety;Verbal cueing;Steadying;Setup   Toileting Assistance  4  Minimal Assistance   Toileting Deficit Setup;Steadying;Verbal cueing;Supervison/safety; Increased time to complete   Functional Assistance 4  Minimal Assistance   Functional Deficit Increased time to complete;Supervision/safety;Verbal cueing;Steadying;Setup   Bed Mobility   Supine to Sit 5  Supervision   Additional items Assist x 1; Increased time required;Verbal cues;HOB elevated; Bedrails   Transfers   Sit to Stand 4  Minimal assistance  (CGA)   Additional items Assist x 1; Increased time required;Verbal cues;Armrests   Stand to Sit 4  Minimal assistance  (CGA)   Additional items Assist x 1; Increased time required;Verbal cues;Armrests   Functional Mobility   Functional Mobility 4  Minimal assistance  (CGA)   Additional items Rolling walker   Balance   Static Sitting Fair +   Dynamic Sitting Fair   Static Standing Fair   Dynamic Standing [address], [address] -   Activity Tolerance   Activity Tolerance Patient limited by fatigue   Medical Staff Made Aware CC with PT Carson David; Spoke with pt's attending MD, Dr Maureen Meeks, regarding pt's Parkinson's, medications, and possible neuro consult as pt stated he hasn't seen a neurologist in years  Nurse Made Aware Yes therapy spoke with RN Morteza Gasca who stated pt was appropriate for OT and made aware of outcomes   RUE Assessment   RUE Assessment WFL   LUE Assessment   LUE Assessment WFL   Hand Function   Gross Motor Coordination Functional   Fine Motor Coordination Functional   Sensation   Light Touch No apparent deficits   Sharp/Dull No apparent deficits   Stereognosis No apparent deficits   Vision - Complex Assessment   Ocular Range of Motion Intact   Psychosocial   Psychosocial (WDL) WDL   Perception   Inattention/Neglect Appears intact   Cognition   Overall Cognitive Status WFL   Arousal/Participation Cooperative; Alert   Attention Within functional limits   Orientation Level Oriented X4   Memory Within functional limits   Following Commands Follows all commands and directions without difficulty   Comments Pt was agreeable to OT eval   Assessment   Limitation Decreased ADL status; Decreased endurance;Decreased self-care trans;Decreased high-level ADLs; Decreased Safe judgement during ADL   Prognosis Good   Assessment Pt is a 74 y o  male seen for OT evaluation s/p admit to [facility] on 1/21/2023 w/ Left leg injury  Comorbidities affecting pt's functional performance at time of assessment include:anxiety, HTN, obesity, SOB, h/o CHF, acute pulm insufficience, OA jamilah knees, DM, and underlying psychiatric disorder: schizophrenia  Per Care Everywhere pt also has Parkinson's (2019)  Orders placed for OT evaluation and treatment  Performed at least two patient identifiers during session including name and wristband   Personal factors affecting pt at time of IE include:steps to enter environment, behavioral pattern, difficulty performing ADLS, difficulty performing IADLS, limited insight into deficits, decreased initiation and engagement, financial barriers, health management  and environment  Prior to admission, pt reports Ind with ADLs, A with IADLs, and (+) driving  Upon evaluation: Pt requires S with UB ADLs, min A with LB ADLs, min A with xfers and CG/min A with functional mobility 2* the following deficits impacting occupational performance: weakness, decreased strength, decreased dynamic sit/ stand balance, decreased activity tolerance, decreased standing tolerance time for self care and functional mobility, impaired interpersonal skills, environmental deficits, decreased coping skills, decreased mobilty and requiring external assistance to complete transitional movements  Pt to benefit from continued skilled OT tx while in the hospital to address deficits as defined above and maximize level of functional independence w ADL's and functional mobility  Occupational Performance areas to address include: bathing/shower, toilet hygiene, dressing, medication management, socialization, health maintenance, functional mobility, clothing management and household maintenance  From OT standpoint, recommendation at time of d/c would be home OT  Plan   Treatment Interventions ADL retraining;Functional transfer training; Activityengagement;Cardiac education;Continued evaluation; Energy conservation; Compensatory technique education;UE strengthening/ROM; Endurance training;Patient/family training   Goal Expiration Date 02/05/23   OT Frequency 3-5x/wk   Recommendation   OT Discharge Recommendation Home with home health rehabilitation   AM-PAC Daily Activity Inpatient   Lower Body Dressing 3   Bathing 3   Toileting 3   Upper Body Dressing 3   Grooming 4   Eating 4   Daily Activity Raw Score 20   Daily Activity Standardized Score (Calc for Raw Score >=11) 42 03   AM-PAC Applied Cognition Inpatient Following a Speech/Presentation 4   Understanding Ordinary Conversation 4   Taking Medications 4   Remembering Where Things Are Placed or Put Away 4   Remembering List of 4-5 Errands 4   Taking Care of Complicated Tasks 4   Applied Cognition Raw Score 24   Applied Cognition Standardized Score 62 21   Barthel Index   Feeding 10   Bathing 0   Grooming Score 5   Dressing Score 5   Bladder Score 5   Bowels Score 10   Toilet Use Score 5   Transfers (Bed/Chair) Score 10   Mobility (Level Surface) Score 0   Stairs Score 0   Barthel Index Score 50   Modified Bollinger Scale   Modified Bollinger Scale 3     Occupational therapy Goals: In 5-7 days    Patient will verbalize and demonstrate use of energy conservation/ deep breathing technique and work simplification skills during functional activity with no verbal cues  Patient will verbalize and demonstrate good body mechanics and joint protection techniques during ADLs/ IADLs with no verbal cues     Patient will increase OOB/ sitting tolerance to 4-6 hours per day for increased participation in self care and leisure tasks with no s/s of exertion  Patient will identify s/s of exertion during ADL and functional mobility with no verbal cues  Patient will verbalize/ demonstrate compensatory strategies to recover from exertion with no verbal cues  Patient will increase standing tolerance time to 13-15 minutes with No UE support to complete sink level ADLs at modified independent level  Patient will increase sitting tolerance at edge of bed to 30-45 minutes to complete UB ADLs at modified independent level  Patient will demonstrate ability to safely perform LB ADLS with MI with long handled adaptive dressing equipment      Patient/ Family will demonstrate competency with UE Home Exercise Program        Jackie Ordaz, MS OTR/L

## 2023-01-23 NOTE — PHYSICAL THERAPY NOTE
Physical Therapy Evaluation   Time in: 1312  Time out: 1333  Total evaluation time: 21 minutes    Patient's Name: Shaneka Johnson    Admitting Diagnosis  SOB (shortness of breath) [R06 02]  Hypoxia [R09 02]  Respiratory failure with hypoxia, unspecified chronicity ([facility] ) [J96 91]    Problem List  Patient Active Problem List   Diagnosis    Other chest pain    Schizophrenia ([facility] )    Hypertension    Hyperlipidemia    Diabetes mellitus type 2 in obese ([facility])    Constipated    Anxiety    Chronic post-traumatic stress disorder (PTSD)    Tardive dyskinesia    Encounter for examination and observation for other specified reasons    Near syncope    Elevated troponin    Chronic combined systolic and diastolic congestive heart failure ([facility] )    Osteoarthritis of both knees    Acute pulmonary insufficiency    SOB (shortness of breath)    Other emphysema ([facility] )    Essential hypertension    History of CHF (congestive heart failure)    Morbid obesity due to excess calories ([facility] )    Type 2 diabetes mellitus without complication, without long-term current use of insulin ([facility])    Chronic left-sided low back pain without sciatica    Left leg injury       Past Medical History  Past Medical History:   Diagnosis Date    Anxiety     CHF (congestive heart failure) ([facility])     COPD (chronic obstructive pulmonary disease) ([facility] )     Depression     Diabetes mellitus ([facility] )     Enlarged prostate     Hallucination     Hyperlipidemia     Hypertension     Memory loss     Panic attack     Panic disorder     Psychiatric disorder     Psychiatric illness     Psychosis ([place] [id number] )     PTSD (post-traumatic stress disorder)     Schizoaffective disorder ([facility] )     Schizophrenia ([facility] )        Past Surgical History  History reviewed  No pertinent surgical history  PT performed at least 2 patient identifiers during session: Name and wristband         01/23/23 1313   PT Last Visit   PT Visit Date 01/23/23   Note Type   Note type Evaluation   Pain Assessment   Pain Assessment Tool 0-10   Pain Score No Pain  (pt denying pain t/o session)   Restrictions/Precautions   Weight Bearing Precautions Per Order Yes  (per ortho: "Weightbearing as tolerated bilateral lower extremities")   RLE Weight Bearing Per Order WBAT   LLE Weight Bearing Per Order WBAT   Braces or Orthoses   (none reported)   Other Precautions Chair Alarm; Bed Alarm; Fall Risk;Pain; Impulsive   Home Living   Type of [facility] Two level;Bed/bath upstairs;1/2 bath on main level;Ramped entrance  (ramped entrance vs 1+1 GARRICK; stair lift to 2nd floor)   Bathroom Shower/Tub Tub/shower unit   Bathroom Toilet Standard   Bathroom Equipment Grab bars in shower;Hand-held shower   [address] Cane;Walker; Wheelchair-manual  (SPC/RW in home (day dependent) WC (outside of home))   Prior Function   Level of [place] Independent with ADLs; Independent with functional mobility; Needs assistance with IADLS   Lives With Spouse   Receives Help From Family   IADLs Family/Friend/Other provides meals; Independent with driving; Independent with medication management  (driving shared; cleaning and laundry done by spouse)   Falls in the last 6 months 0   Vocational Retired  (maintenance)   General   Family/Caregiver Present No   Cognition   Overall Cognitive Status WFL   Arousal/Participation Alert   Attention Within functional limits   Orientation Level Oriented X4   Memory Within functional limits   Following Commands Follows all commands and directions without difficulty   Comments pt agreeable to PT evaluation   RUE Assessment   RUE Assessment   (defer to OT eval for comments)   LUE Assessment   LUE Assessment   (defer to OT eval for comments)   RLE Assessment   RLE Assessment WFL  (grossly 4/5 observed with functional mobility)   LLE Assessment   LLE Assessment WFL  (grossly 4/5 observed with functional mobility)   Coordination   Movements are Fluid and Coordinated 0   Coordination and Movement Description bradykinesia   Sensation WFL   Bed Mobility   Supine to Sit 5  Supervision   Additional items Assist x 1; Increased time required;Verbal cues;HOB elevated; Bedrails; Impulsive   Transfers   Sit to Stand 4  Minimal assistance  (CGA)   Additional items Assist x 1; Increased time required;Verbal cues;Armrests   Stand to Sit 4  Minimal assistance  (CGA)   Additional items Assist x 1; Increased time required;Verbal cues;Armrests   Ambulation/Elevation   Gait pattern Decreased foot clearance; Festenating;Shuffling; Short stride; Step to  (no overt LOB)   Gait Assistance 4  Minimal assist  (CGA)   Additional items Assist x 1;Verbal cues   Assistive Device Rolling walker   Distance 15' x 2   Balance   Static Sitting Fair +   Dynamic Sitting Fair   Static Standing Fair   Dynamic Standing Fair -   Ambulatory Fair -   Endurance Deficit   Endurance Deficit Yes   Activity Tolerance   Activity Tolerance Patient limited by fatigue   Medical Staff Made Aware care coordination with TRINH SCHNEIDER attending Dr Shalonda Polanco re ? neuro follow up as pt stating he "hasn't seen a neurologist in years"   Nurse Made Aware RN Marck   Assessment   Prognosis Good   Problem List Decreased strength;Decreased endurance; Impaired balance;Decreased mobility   Assessment Pt is 74 y o  male seen for PT evaluation on 1/23/2023 s/p admit to [facility] on 1/21/2023 w/ Left leg injury  PT was consulted to assess pt's functional mobility and d/c needs  Order placed for PT eval and tx  PTA, pt resides with spouse in [facility] with ramped entrance/ stair glide to 2nd floor set up, ambulates with RW vs SPC indoors; WC in community  At time of eval, pt performing bed mobility at [facility], transfers and household distance gait trial at [facility] with use of RW  Upon evaluation, pt presenting with impaired functional mobility d/t decreased strength, decreased endurance, impaired balance, decreased mobility and activity intolerance  Pertinent PMHx and current co-morbidities affecting pt's physical performance at time of assessment include: L leg injury, schizophrenia, HLD, chronic post traumatic stress disorder, CHF, emphysema, HTN, morbid obesity, type 2 DM, HTN, DM type 2, anxiety, tardive dyskinesia, near syncope, OA, acute pulmonary insufficiency, SOB; per care everywhere: Parkinson's disease  Personal factors affecting pt at time of eval include: lives in 2 story house and inability to navigate community distances  The following objective measures performed on IE also reveal limitations: Barthel Index: 50/100, Modified [place]: 3 (moderate disability) and AM-PAC 6-Clicks: 92/67  Pt's clinical presentation is currently unstable/unpredictable seen in pt's presentation of abnormal lab value(s) and ongoing medical assessment  Overall, pt's rehab potential and prognosis to return to PLOF is good as impacted by objective findings, warranting pt to receive further skilled PT interventions to address identified impairments, activity limitation(s), and participation restriction(s)  Pt to benefit from continued PT tx to address deficits as defined above and maximize level of functional independent mobility  From PT/mobility standpoint, recommendation at time of d/c would be home with home health rehabilitation vs STR pending progress in order to facilitate return to PLOF     Barriers to Discharge Inaccessible home environment   Goals   Patient Goals to go home   STG Expiration Date 02/02/23   Short Term Goal #1 In 7-10 days: Increase bilateral LE strength 1/2 grade to facilitate independent mobility, Perform all bed mobility tasks modified independent to decrease caregiver burden, Perform all transfers modified independent to improve independence, Ambulate > 75 ft  with least restrictive assistive device modified independent w/o LOB and w/ normalized gait pattern 100% of the time, Increase all balance 1/2 grade to decrease risk for falls, Improve Barthel Index score to 70 or greater to facilitate independence and PT provider will perform functional balance assessment to determine fall risk   PT Treatment Day 0   Plan   Treatment/Interventions Functional transfer training;LE strengthening/ROM; Elevations; Therapeutic exercise; Endurance training;Patient/family training;Equipment eval/education; Bed mobility;Gait training;Spoke to nursing;OT   PT Frequency 3-5x/wk   Recommendation   PT Discharge Recommendation Home with home health rehabilitation  (vs STR Pending progress)   Equipment Recommended Walker  (RW)   AM-PAC Basic Mobility Inpatient   Turning in Flat Bed Without Bedrails 3   Lying on Back to Sitting on Edge of Flat Bed Without Bedrails 3   Moving Bed to Chair 3   Standing Up From Chair Using Arms 3   Walk in Room 3   Climb 3-5 Stairs With Railing 2   Basic Mobility Inpatient Raw Score 17   Basic Mobility Standardized Score 39 67   Highest Level Of Mobility   -HLM Goal 5: Stand one or more mins   -HLM Achieved 6: Walk 10 steps or more   Modified [place] Scale   Modified [place] Scale 3   Barthel Index   Feeding 10   Bathing 0   Grooming Score 5   Dressing Score 5   Bladder Score 5   Bowels Score 10   Toilet Use Score 5   Transfers (Bed/Chair) Score 10   Mobility (Level Surface) Score 0   Stairs Score 0   Barthel Index Score 50       Gill Martinez, PT, DPT

## 2023-01-23 NOTE — PLAN OF CARE
Problem: PHYSICAL THERAPY ADULT  Goal: Performs mobility at highest level of function for planned discharge setting  See evaluation for individualized goals  Description: Treatment/Interventions: Functional transfer training, LE strengthening/ROM, Elevations, Therapeutic exercise, Endurance training, Patient/family training, Equipment eval/education, Bed mobility, Gait training, Spoke to nursing, OT  Equipment Recommended: Mignon Perea (RW)       See flowsheet documentation for full assessment, interventions and recommendations  1/23/2023 1435 by Sandy Post, PT  Outcome: Progressing  Note: Prognosis: Good  Problem List: Decreased strength, Decreased endurance, Impaired balance, Decreased mobility  Assessment: Pt is 74 y o  male seen for PT evaluation on 1/23/2023 s/p admit to [facility] on 1/21/2023 w/ Left leg injury  PT was consulted to assess pt's functional mobility and d/c needs  Order placed for PT eval and tx  PTA, pt resides with spouse in [facility] with ramped entrance/ stair glide to 2nd floor set up, ambulates with RW vs SPC indoors; WC in community  At time of eval, pt performing bed mobility at A, transfers and household distance gait trial at [facility] with use of RW  Upon evaluation, pt presenting with impaired functional mobility d/t decreased strength, decreased endurance, impaired balance, decreased mobility and activity intolerance  Pertinent PMHx and current co-morbidities affecting pt's physical performance at time of assessment include: L leg injury, schizophrenia, HLD, chronic post traumatic stress disorder, CHF, emphysema, HTN, morbid obesity, type 2 DM, HTN, DM type 2, anxiety, tardive dyskinesia, near syncope, OA, acute pulmonary insufficiency, SOB; per care everywhere: Parkinson's disease  Personal factors affecting pt at time of eval include: lives in 2 story house and inability to navigate community distances   The following objective measures performed on IE also reveal limitations: Barthel Index: 50/100, Modified [place]: 3 (moderate disability) and AM-PAC 6-Clicks: 23/61  Pt's clinical presentation is currently unstable/unpredictable seen in pt's presentation of abnormal lab value(s) and ongoing medical assessment  Overall, pt's rehab potential and prognosis to return to PLOF is good as impacted by objective findings, warranting pt to receive further skilled PT interventions to address identified impairments, activity limitation(s), and participation restriction(s)  Pt to benefit from continued PT tx to address deficits as defined above and maximize level of functional independent mobility  From PT/mobility standpoint, recommendation at time of d/c would be home with home health rehabilitation vs STR pending progress in order to facilitate return to PLOF  Barriers to Discharge: Inaccessible home environment     PT Discharge Recommendation: Home with home health rehabilitation (vs STR Pending progress)    See flowsheet documentation for full assessment  1/23/2023 6958 by Tanisha La, PT  Note: Prognosis: Good  Problem List: Decreased strength, Decreased endurance, Impaired balance, Decreased mobility  Assessment: Pt is 74 y o  male seen for PT evaluation on 1/23/2023 s/p admit to [facility] on 1/21/2023 w/ Left leg injury  PT was consulted to assess pt's functional mobility and d/c needs  Order placed for PT eval and tx  PTA, pt resides with spouse in [facility] with ramped entrance/ stair glide to 2nd floor set up, ambulates with RW vs SPC indoors; WC in community  At time of eval, pt performing bed mobility at [facility], transfers and household distance gait trial at [place] [id number] with use of RW  Upon evaluation, pt presenting with impaired functional mobility d/t decreased strength, decreased endurance, impaired balance, decreased mobility and activity intolerance   Pertinent PMHx and current co-morbidities affecting pt's physical performance at time of assessment include: L leg injury, schizophrenia, HLD, chronic post traumatic stress disorder, CHF, emphysema, HTN, morbid obesity, type 2 DM, HTN, DM type 2, anxiety, tardive dyskinesia, near syncope, OA, acute pulmonary insufficiency, SOB; per care everywhere: Parkinson's disease  Personal factors affecting pt at time of eval include: lives in 2 story house and inability to navigate community distances  The following objective measures performed on IE also reveal limitations: Barthel Index: 50/100, Modified [place]: 3 (moderate disability) and AM-PAC 6-Clicks: 24/62  Pt's clinical presentation is currently unstable/unpredictable seen in pt's presentation of abnormal lab value(s) and ongoing medical assessment  Overall, pt's rehab potential and prognosis to return to PLOF is good as impacted by objective findings, warranting pt to receive further skilled PT interventions to address identified impairments, activity limitation(s), and participation restriction(s)  Pt to benefit from continued PT tx to address deficits as defined above and maximize level of functional independent mobility  From PT/mobility standpoint, recommendation at time of d/c would be home with home health rehabilitation vs STR pending progress in order to facilitate return to PLOF  Barriers to Discharge: Inaccessible home environment     PT Discharge Recommendation: Home with home health rehabilitation (vs STR Pending progress)    See flowsheet documentation for full assessment

## 2023-01-23 NOTE — PROGRESS NOTES
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  If symptoms persist, an MRI of the lumbar spine could be performed for further evaluation    · Has been following up with outpatient PT for low back pain and is now complaining of worsening severity with radiation to LLE    No complaints of recent involuntary bowel or bladder voiding/saddle anesthesia    · Monitor and follow up outpatient  · PT/OT    Type 2 diabetes mellitus without complication, without long-term current use of insulin [facility])  Assessment & Plan  Lab Results   Component Value Date    HGBA1C 6 7 (H) 01/21/2023       Recent Labs     01/22/23  1605 01/22/23  2129 01/23/23  0742 01/23/23  1102   POCGLU 203* 153* 154* 181*       Blood Sugar Average: Last 72 hrs:  (P) 620 [id number]     Plan:  • Hold oral antihyperglycemics  • Diet Consistent CHO Level 2 (5 CHO servings/75g CHO per meal)  • Insulin regimen  o Glucose checks and Insulin correction ACHS  • Goal -180 while admitted, adjusting insulin regimen as appropriate  • Monitor for hypoglycemia and treat per protocol      Morbid obesity due to excess calories (HCC)  Assessment & Plan  Body mass index is 39 54 kg/m²     educated on dietary and lifestyle modifications      Essential hypertension  Assessment & Plan  Blood Pressure: 131/84      Plan:  • Continue lisinopril, lopressor  • Monitor blood pressure    Other emphysema (HCC)  Assessment & Plan  Dyspnea on admission now resolved    · No fevers, not meeting SIRS Criteria on admission  · Flu/RSV/COVID negative  • Smoking Status: former 2 PY smoker quit in 1993  · CXR no acute cardiopulmonary disease    • Not in exacerbation  • Monitor respiratory status;  • Respiratory protocol, Xopenex/Atrovent, Airway clearance protocol, IS    Chronic combined systolic and diastolic congestive heart failure (HCC)  Assessment & Plan  Wt Readings from Last 3 Encounters:   01/21/23 125 kg (275 lb 9 2 oz)   05/04/22 131 kg (288 lb 2 3 oz)   04/28/22 125 kg (275 lb)     Lab Results   Component Value Date    NTBNP 78 01/21/2023    NTBNP 84 10/07/2020       Most recent echocardiogram performed in 2019 showed LVEF 55% with G1 DD  Patient has not been compliant with diuretic regimen due to complaints of frequent urination  However, he does not appear to be in exacerbation at this time  Monitor volume status  Resume home meds    Chronic post-traumatic stress disorder (PTSD)  Assessment & Plan  Continue home trazodone and risperdol    Hyperlipidemia  Assessment & Plan  Lab Results   Component Value Date    CHOLESTEROL 98 01/21/2023    TRIG 71 01/21/2023    HDL 32 (L) 01/21/2023    LDLCALC 52 01/21/2023       Statin     Schizophrenia ([facility] )  Assessment & Plan  Continue risperdal and desyrel    * Left leg injury  Assessment & Plan  Inability to internally rotate at the hip since 1 day prior to admission  Denies any injury/trauma  Only orthopedic procedure performed was on the knee around 1960s as reported by patient  PT OT  Orthopedic surgery consult  · Concern for atraumatic low back pain and left lower extremity radicular pain worsened with standing  · Recommending weightbearing as tolerated with physical therapy  · Xray showing moderate left hip osteoarthritis  · If no improvement in symptoms or symptoms are worsening, may consider further imaging           VTE Pharmacologic Prophylaxis: VTE Score: 7 Moderate Risk (Score 3-4) - Pharmacological DVT Prophylaxis Ordered: enoxaparin (Lovenox)      Patient Centered Rounds: I performed bedside rounds with nursing staff today  Discussions with Specialists or Other Care Team Provider: CM    Education and Discussions with Family / Patient: Updated  (wife) at bedside  Time Spent for Care: 60 minutes  More than 50% of total time spent on counseling and coordination of care as described above  Current Length of Stay: 2 day(s)  Current Patient Status: Inpatient   Certification Statement: The patient will continue to require additional inpatient hospital stay due to PT/OT evaluation  Discharge Plan: Anticipate discharge later today or tomorrow to home with home services  Code Status: Level 3 - DNAR and DNI    Subjective:   Pt had no acute events overnight  LE pain improving  Denies sob, chest pain, dizziness  All other ros negative at this time  Objective:     Vitals:   Temp (24hrs), Av 9 °F (36 6 °C), Min:97 8 °F (36 6 °C), Max:98 °F (36 7 °C)    Temp:  [97 8 °F (36 6 °C)-98 °F (36 7 °C)] 98 °F (36 7 °C)  HR:  [71-84] 71  Resp:  [18] 18  BP: (117-149)/(61-84) 131/84  SpO2:  [91 %-97 %] 91 %  Body mass index is 39 54 kg/m²  Input and Output Summary (last 24 hours): Intake/Output Summary (Last 24 hours) at 2023 1547  Last data filed at 2023 0815  Gross per 24 hour   Intake 420 ml   Output 2225 ml   Net -1805 ml       Physical Exam:   Physical Exam  Vitals reviewed  Constitutional:       General: He is not in acute distress  Appearance: He is well-developed  He is obese  He is not diaphoretic  HENT:      Head: Normocephalic and atraumatic  Mouth/Throat:      Pharynx: No oropharyngeal exudate  Eyes:      General: No scleral icterus  Extraocular Movements: Extraocular movements intact  Conjunctiva/sclera: Conjunctivae normal    Neck:      Vascular: No JVD  Trachea: No tracheal deviation  Cardiovascular:      Rate and Rhythm: Normal rate and regular rhythm  Heart sounds: No murmur heard  No friction rub  No gallop     Pulmonary: Effort: Pulmonary effort is normal  No respiratory distress  Breath sounds: No stridor  No wheezing  Abdominal:      General: There is no distension  Palpations: Abdomen is soft  There is no mass  Tenderness: There is no abdominal tenderness  There is no right CVA tenderness or left CVA tenderness  Musculoskeletal:         General: No tenderness  Normal range of motion  Right lower leg: No edema  Left lower leg: No edema  Skin:     General: Skin is warm and dry  Coloration: Skin is not pale  Findings: No erythema  Neurological:      Mental Status: He is alert and oriented to person, place, and time  Psychiatric:         Behavior: Behavior normal          Thought Content:  Thought content normal           Additional Data:     Labs:  Results from last 7 days   Lab Units 01/22/23  0552   WBC Thousand/uL 12 57*   HEMOGLOBIN g/dL 11 5*   HEMATOCRIT % 36 9   PLATELETS Thousands/uL 163   NEUTROS PCT % 67   LYMPHS PCT % 27   MONOS PCT % 6   EOS PCT % 0     Results from last 7 days   Lab Units 01/22/23  0552   SODIUM mmol/L 138   POTASSIUM mmol/L 4 5   CHLORIDE mmol/L 102   CO2 mmol/L 27   BUN mg/dL 21   CREATININE mg/dL 1 19   ANION GAP mmol/L 9   CALCIUM mg/dL 9 3   ALBUMIN g/dL 3 9   TOTAL BILIRUBIN mg/dL 0 36   ALK PHOS U/L 52   ALT U/L 32   AST U/L 20   GLUCOSE RANDOM mg/dL 138     Results from last 7 days   Lab Units 01/21/23  0408   INR  1 08     Results from last 7 days   Lab Units 01/23/23  1102 01/23/23  0742 01/22/23  2129 01/22/23  1605 01/22/23  1106 01/22/23  0555 01/21/23  2114 01/21/23  2103 01/21/23  1536 01/21/23  1210 01/21/23  0908   POC GLUCOSE mg/dl 181* 154* 153* 203* 178* 144* 247* 247* 274* 220* 126     Results from last 7 days   Lab Units 01/21/23  0408   HEMOGLOBIN A1C % 6 7*     Results from last 7 days   Lab Units 01/22/23  0538 01/21/23  1329 01/21/23  0408   LACTIC ACID mmol/L  --   --  1 7   PROCALCITONIN ng/ml <0 05 <0 05  -- Lines/Drains:  Invasive Devices     Peripheral Intravenous Line  Duration           Peripheral IV 01/22/23 Right;Ventral (anterior) Forearm <1 day                      Imaging: Reviewed radiology reports from this admission including: xray(s) xray hip    Recent Cultures (last 7 days):   Results from last 7 days   Lab Units 01/22/23  0600 01/21/23  1002   SPUTUM CULTURE  4+ Growth of  --    GRAM STAIN RESULT  2+ Epithelial cells per low power field*  1+ Polys*  2+ Gram positive cocci in pairs and chains*  2+ Gram negative rods*  2+ Gram positive rods*  --    LEGIONELLA URINARY ANTIGEN   --  Negative       Last 24 Hours Medication List:   Current Facility-Administered Medications   Medication Dose Route Frequency Provider Last Rate   • acetaminophen  650 mg Oral Q6H PRN [facility] Maxim, DO     • aluminum-magnesium hydroxide-simethicone  30 mL Oral Q6H PRN [facility]     • aspirin  81 mg Oral Daily [facility] Alexander Campusrandy, DO     • atorvastatin  40 mg Oral Daily [employer], DO     • enoxaparin  40 mg Subcutaneous Daily [facility]     • furosemide  40 mg Oral Daily [facility] Alexander Campusrandy, DO     • insulin lispro  2-12 Units Subcutaneous TID AC [facility] Alexander Campusrandy, DO     • insulin lispro  2-12 Units Subcutaneous HS [facility] Alexander Campusrandy, DO     • levalbuterol  1 25 mg Nebulization BID [facility] Alexander Campusrandy, DO     • lisinopril  20 mg Oral Daily [facility] Alexander Campusrandy, DO     • magnesium oxide  400 mg Oral BID [facility] Alexander Campusrandy, DO     • methocarbamol  750 mg Oral Q6H PRN [facility], DO     • metoprolol tartrate  25 mg Oral Q12H [address] [facility] Alexander Campusrandy, DO     • oxyCODONE-acetaminophen  1 tablet Oral Q6H PRN [facility], DO     • polyethylene glycol  17 g Oral Daily PRN [facility] DO     • risperiDONE  1 mg Oral BID [facility] Alexander Campusrandy, DO     • sodium chloride  3 mL Nebulization BID [facility] Alexander Campusrandy, DO     • traZODone  100 mg Oral HS [facility], DO Today, Patient Was Seen By: Ermelinda Oreilly DO    **Please Note: This note may have been constructed using a voice recognition system  **

## 2023-01-24 VITALS
DIASTOLIC BLOOD PRESSURE: 80 MMHG | BODY MASS INDEX: 39.45 KG/M2 | WEIGHT: 275.57 LBS | HEIGHT: 70 IN | SYSTOLIC BLOOD PRESSURE: 132 MMHG | RESPIRATION RATE: 15 BRPM | TEMPERATURE: 97.8 F | OXYGEN SATURATION: 97 % | HEART RATE: 81 BPM

## 2023-01-24 LAB
ANION GAP SERPL CALCULATED.3IONS-SCNC: 8 MMOL/L (ref 4–13)
BACTERIA SPT RESP CULT: ABNORMAL
BACTERIA SPT RESP CULT: ABNORMAL
BUN SERPL-MCNC: 14 MG/DL (ref 5–25)
CALCIUM SERPL-MCNC: 9.1 MG/DL (ref 8.3–10.1)
CHLORIDE SERPL-SCNC: 101 MMOL/L (ref 96–108)
CO2 SERPL-SCNC: 29 MMOL/L (ref 21–32)
CREAT SERPL-MCNC: 1.16 MG/DL (ref 0.6–1.3)
ERYTHROCYTE [DISTWIDTH] IN BLOOD BY AUTOMATED COUNT: 13.4 % (ref 11.6–15.1)
GFR SERPL CREATININE-BSD FRML MDRD: 61 ML/MIN/1.73SQ M
GLUCOSE SERPL-MCNC: 145 MG/DL (ref 65–140)
GLUCOSE SERPL-MCNC: 193 MG/DL (ref 65–140)
GLUCOSE SERPL-MCNC: 223 MG/DL (ref 65–140)
GRAM STN SPEC: ABNORMAL
HCT VFR BLD AUTO: 37.4 % (ref 36.5–49.3)
HGB BLD-MCNC: 11.8 G/DL (ref 12–17)
MCH RBC QN AUTO: 24.7 PG (ref 26.8–34.3)
MCHC RBC AUTO-ENTMCNC: 31.6 G/DL (ref 31.4–37.4)
MCV RBC AUTO: 78 FL (ref 82–98)
PLATELET # BLD AUTO: 167 THOUSANDS/UL (ref 149–390)
PMV BLD AUTO: 13 FL (ref 8.9–12.7)
POTASSIUM SERPL-SCNC: 4 MMOL/L (ref 3.5–5.3)
RBC # BLD AUTO: 4.77 MILLION/UL (ref 3.88–5.62)
SODIUM SERPL-SCNC: 138 MMOL/L (ref 135–147)
WBC # BLD AUTO: 6.67 THOUSAND/UL (ref 4.31–10.16)

## 2023-01-24 RX ORDER — ATORVASTATIN CALCIUM 40 MG/1
40 TABLET, FILM COATED ORAL
Qty: 30 TABLET | Refills: 0 | Status: SHIPPED | OUTPATIENT
Start: 2023-01-24

## 2023-01-24 RX ORDER — FUROSEMIDE 40 MG/1
40 TABLET ORAL DAILY
Qty: 30 TABLET | Refills: 0 | Status: SHIPPED | OUTPATIENT
Start: 2023-01-25

## 2023-01-24 RX ADMIN — ASPIRIN 81 MG: 81 TABLET, CHEWABLE ORAL at 09:38

## 2023-01-24 RX ADMIN — ISODIUM CHLORIDE 3 ML: 0.03 SOLUTION RESPIRATORY (INHALATION) at 07:18

## 2023-01-24 RX ADMIN — LISINOPRIL 20 MG: 10 TABLET ORAL at 09:38

## 2023-01-24 RX ADMIN — INSULIN LISPRO 4 UNITS: 100 INJECTION, SOLUTION INTRAVENOUS; SUBCUTANEOUS at 12:15

## 2023-01-24 RX ADMIN — LEVALBUTEROL HYDROCHLORIDE 1.25 MG: 1.25 SOLUTION, CONCENTRATE RESPIRATORY (INHALATION) at 07:18

## 2023-01-24 RX ADMIN — MAGNESIUM OXIDE TAB 400 MG (241.3 MG ELEMENTAL MG) 400 MG: 400 (241.3 MG) TAB at 09:39

## 2023-01-24 RX ADMIN — METOPROLOL TARTRATE 25 MG: 25 TABLET, FILM COATED ORAL at 09:38

## 2023-01-24 RX ADMIN — RISPERIDONE 1 MG: 1 TABLET ORAL at 09:38

## 2023-01-24 RX ADMIN — ENOXAPARIN SODIUM 40 MG: 40 INJECTION SUBCUTANEOUS at 09:38

## 2023-01-24 NOTE — CASE MANAGEMENT
Case Management Discharge Planning Note    Patient name Vic Jurado  Location [address] 421/-32 MRN [id number]  : 1950 Date 2023       Current Admission Date: 2023  Current Admission Diagnosis:Left leg injury   Patient Active Problem List    Diagnosis Date Noted   • Parkinson disease ([facility] ) 2023   • Type 2 diabetes mellitus without complication, without long-term current use of insulin ([place] [address] ) 2023   • Chronic left-sided low back pain without sciatica 2023   • Left leg injury 2023   • Morbid obesity due to excess calories ([facility] ) 10/09/2020   • SOB (shortness of breath) 10/07/2020   • Other emphysema ([place] [address] ) 10/07/2020   • Essential hypertension 10/07/2020   • History of CHF (congestive heart failure) 10/07/2020   • Acute pulmonary insufficiency 2020   • Chronic combined systolic and diastolic congestive heart failure ([place] [address] ) 2019   • Osteoarthritis of both knees 2019   • Near syncope 2019   • Elevated troponin 2019   • Anxiety 2018   • Chronic post-traumatic stress disorder (PTSD) 2018   • Tardive dyskinesia 2018   • Encounter for examination and observation for other specified reasons    • Constipated 2018   • Other chest pain 2018   • Schizophrenia ([place] [address] ) 2018   • Hypertension 2018   • Hyperlipidemia 2018   • Diabetes mellitus type 2 in obese ([facility] ) 2018      LOS (days): 3  Geometric Mean LOS (GMLOS) (days): 3 50  Days to GMLOS:0 4     OBJECTIVE:  Risk of Unplanned Readmission Score: 11 04         Current admission status: Inpatient   Preferred Pharmacy:   Trina Larry [id number] [place] [address]  Phone: [phone] Fax: [address], [address]  [address] [facility] [address]  Phone: [phone] Fax: [phone]    Primary Care Provider: Vivien Hoskins MD    Primary Insurance: [id number]  Secondary Insurance: MEDICARE    DISCHARGE DETAILS:      Other Referral/Resources/Interventions Provided:  Referral Comments: Pt presented with list from [address] [place] [address] Mariel Roy and has agreed [facility]

## 2023-01-24 NOTE — CASE MANAGEMENT
Case Management Assessment & Discharge Planning Note    Patient name Denis Kruger  Location [address] 421/-87 MRN [id number]  : 1950 Date 2023       Current Admission Date: 2023  Current Admission Diagnosis:Left leg injury   Patient Active Problem List    Diagnosis Date Noted   • Parkinson disease ([facility] ) 2023   • Type 2 diabetes mellitus without complication, without long-term current use of insulin ([place] [address] ) 2023   • Chronic left-sided low back pain without sciatica 2023   • Left leg injury 2023   • Morbid obesity due to excess calories ([place] [address] ) 10/09/2020   • SOB (shortness of breath) 10/07/2020   • Other emphysema ([place] [address] ) 10/07/2020   • Essential hypertension 10/07/2020   • History of CHF (congestive heart failure) 10/07/2020   • Acute pulmonary insufficiency 2020   • Chronic combined systolic and diastolic congestive heart failure ([place] [address] ) 2019   • Osteoarthritis of both knees 2019   • Near syncope 2019   • Elevated troponin 2019   • Anxiety 2018   • Chronic post-traumatic stress disorder (PTSD) 2018   • Tardive dyskinesia 2018   • Encounter for examination and observation for other specified reasons    • Constipated 2018   • Other chest pain 2018   • Schizophrenia ([place] [address] ) 2018   • Hypertension 2018   • Hyperlipidemia 2018   • Diabetes mellitus type 2 in obese ([facility] ) 2018      LOS (days): 2  Geometric Mean LOS (GMLOS) (days): 3 50  Days to GMLOS:1     OBJECTIVE:    Risk of Unplanned Readmission Score: 10 93         Current admission status: Inpatient       Preferred Pharmacy:   Trina Larry, [address] [address] [address]  Phone: [phone] Fax: Tab Alvarez #[id number] - [address], [address] [place] [address]  Phone: [phone] Fax: [phone]    Primary Care Provider: Marcelle Ravi MD    Primary Insurance: [facility]  Secondary Insurance: MEDICARE    ASSESSMENT:  [address], [address] Representative - Spouse   Primary Phone: [phone] [facility])               Patient Information  Admitted from[de-identified] Home  Mental Status: Alert  During Assessment patient was accompanied by: Not accompanied during assessment  Assessment information provided by[de-identified] Patient  Primary Caregiver: Self  Support Systems: Spouse/significant other  [place] of Residence: [place] [address] do you live in?: [address] entry access options   Select all that apply : Ramp  Type of Current Residence: 2 story home  Upon entering residence, is there a bedroom on the main floor (no further steps)?: No (Stairglide to second floor)  A bedroom is located on the following floor levels of residence (select all that apply):: 2nd Floor  Upon entering residence, is there a bathroom on the main floor (no further steps)?: Yes  Number of steps to 2nd floor from main floor: One Flight (Stair glide)  In the last 12 months, was there a time when you were not able to pay the mortgage or rent on time?: No  In the last 12 months, how many places have you lived?: 1  In the last 12 months, was there a time when you did not have a steady place to sleep or slept in a shelter (including now)?: No  Homeless/housing insecurity resource given?: N/A  Living Arrangements: Lives w/ Spouse/significant other  Is patient a ?:  (Connected to [address])    Activities of Daily Living Prior to Admission  Functional Status: Assistance  Completes ADLs independently?: No  Level of ADL dependence: Assistance  Ambulates independently?: No  Level of ambulatory dependence: Assistance  Does patient use assisted devices?: Yes  Assisted Devices (DME) used: Straight Adeola Arteaga, Wheelchair, Shower Chair, Stair Chair/[place]  Does patient currently own DME?: Yes  Does patient have a history of Outpatient Therapy (PT/OT)?: No  Does the patient have a history of Short-Term Rehab?: No  Does patient currently have [address]?: No         Patient Information Continued  Within the past 12 months, you worried that your food would run out before you got the money to buy more : Never true  Within the past 12 months, the food you bought just didn't last and you didn't have money to get more : Never true  Does patient receive dialysis treatments?: No  Does patient have a history of substance abuse?: No  Does patient have a history of Mental Health Diagnosis?: Yes (has a psychiatrist at the [place])         Adria Sharpanda of Transport to Appts[de-identified] Family transport  In the past 12 months, has lack of transportation kept you from medical appointments or from getting medications?: No  In the past 12 months, has lack of transportation kept you from meetings, work, or from getting things needed for daily living?: No  Was application for public transport provided?: N/A        DISCHARGE DETAILS:    Discharge planning discussed with[de-identified] patient  Freedom of Choice: Yes  Comments - Freedom of Choice: Freedom of Choice for  d/c planning support options discussed and offered to the patient  He is not interested in STR  He is connected with the [address] [facility] and has a [place]    He is receptive to the team recommendation of [address] and has no preference for an agency  CM contacted family/caregiver?: No- see comments (patient is A&Ox4 -states he will update his wife and stepchildren)             Contacts  Patient Contacts: patient  Reason/Outcome: Continuity of [address]         Is the patient interested in [address] at discharge?: Yes  [address] requested[de-identified] Physical Therapy, Occupational Therapy, [address] Name[de-identified] Other (blanket referral made in [address])  [facility] Provider[de-identified] PCP  [address] Needed[de-identified] Strengthening/Theraputic Exercises to Improve Function, Evaluate Functional Status and Safety, Gait/ADL Training  Homebound Criteria Met[de-identified] Uses an Assist Device (i e  cane, walker, etc), Requires the Assistance of Another Person for Safe Ambulation or to Leave the Home  Supporting Clincal Findings[de-identified] Fatigues Easliy in [employer], Limited Endurance         Other Referral/Resources/Interventions Provided:  Referral Comments: patient is declinging option of STR  claims [facility] for Parkinsons  He is interested in [place] [address] for home therapy    no preference for provider    Would you like to participate in our [address] service program?  : No - Declined    Treatment Team Recommendation: Home with [address] [place] [address], Short Term Rehab  Discharge Destination Plan[de-identified] Home with Matthew at Discharge : Family

## 2023-01-24 NOTE — CASE MANAGEMENT
Case Management Discharge Planning Note    Patient name Keira Stevenson  Location [address] 421/-81 MRN [id number]  : 1950 Date 2023       Current Admission Date: 2023  Current Admission Diagnosis:Left leg injury   Patient Active Problem List    Diagnosis Date Noted   • Parkinson disease ([place] [address] ) 2023   • Type 2 diabetes mellitus without complication, without long-term current use of insulin ([place] [address] ) 2023   • Chronic left-sided low back pain without sciatica 2023   • Left leg injury 2023   • Morbid obesity due to excess calories ([place] [address] ) 10/09/2020   • SOB (shortness of breath) 10/07/2020   • Other emphysema ([place] [address] ) 10/07/2020   • Essential hypertension 10/07/2020   • History of CHF (congestive heart failure) 10/07/2020   • Acute pulmonary insufficiency 2020   • Chronic combined systolic and diastolic congestive heart failure ([place] [address] ) 2019   • Osteoarthritis of both knees 2019   • Near syncope 2019   • Elevated troponin 2019   • Anxiety 2018   • Chronic post-traumatic stress disorder (PTSD) 2018   • Tardive dyskinesia 2018   • Encounter for examination and observation for other specified reasons    • Constipated 2018   • Other chest pain 2018   • Schizophrenia ([place] [address] ) 2018   • Hypertension 2018   • Hyperlipidemia 2018   • Diabetes mellitus type 2 in obese ([place] [address] ) 2018      LOS (days): 3  Geometric Mean LOS (GMLOS) (days): 3 50  Days to GMLOS:0 3     OBJECTIVE:  Risk of Unplanned Readmission Score: 11 2         Current admission status: Inpatient   Preferred Pharmacy:   Dani Booker [address] [place] [address] [address] [address]  Phone: [phone] Fax: Tab Alvarez #[id number] - [address], [address] [address]  Phone: [phone] Fax: [phone]    Primary Care Provider: Renetta Panchal MD    Primary Insurance: [address] [facility]  Secondary Insurance: MEDICARE    DISCHARGE DETAILS:      Other Referral/Resources/Interventions Provided:  Referral Comments: Lyft ride set up for pt to arrive at 2:30   Nurse made aware

## 2023-01-24 NOTE — PLAN OF CARE
Problem: PAIN - ADULT  Goal: Verbalizes/displays adequate comfort level or baseline comfort level  Description: Interventions:  - Encourage patient to monitor pain and request assistance  - Assess pain using appropriate pain scale  - Administer analgesics based on type and severity of pain and evaluate response  - Implement non-pharmacological measures as appropriate and evaluate response  - Consider cultural and social influences on pain and pain management  - Notify physician/advanced practitioner if interventions unsuccessful or patient reports new pain  Outcome: Progressing     Problem: INFECTION - ADULT  Goal: Absence or prevention of progression during hospitalization  Description: INTERVENTIONS:  - Assess and monitor for signs and symptoms of infection  - Monitor lab/diagnostic results  - Monitor all insertion sites, i e  indwelling lines, tubes, and drains  - Monitor endotracheal if appropriate and nasal secretions for changes in amount and color  - Dew[place] appropriate cooling/warming therapies per order  - Administer medications as ordered  - Instruct and encourage patient and family to use good hand hygiene technique  - Identify and instruct in appropriate isolation precautions for identified infection/condition  Outcome: Progressing

## 2023-01-24 NOTE — DISCHARGE SUMMARY
[address] [facility]  Discharge- Jackie Ruvalcaba 1950, 74 y o  male MRN: [id number]  Unit/Bed#: -02 Encounter: [id number]  Primary Care Provider: Enrrique Gamboa MD   Date and time admitted to hospital: 1/21/2023  3:25 AM    No new Assessment & Plan notes have been filed under this hospital service since the last note was generated  Service: Hospitalist      Medical Problems     Resolved Problems  Date Reviewed: 1/24/2023          Resolved    Acute respiratory failure with hypoxia ([address] ) 1/22/2023     Resolved by  Carly Viveros DO        Discharging Physician / Practitioner: Winnie Hunt MD  PCP: Enrrique Gamboa MD  Admission Date:   Admission Orders (From admission, onward)     Ordered        01/21/23 0656  INPATIENT ADMISSION  Once                      Discharge Date: 01/24/23    Consultations During Hospital Stay:  · ***    Procedures Performed:   · ***    Significant Findings / Test Results:   · ***    Incidental Findings:   · ***   · {SLIM Discharge Incidential Findings:16646}    Test Results Pending at Discharge (will require follow up):   · ***     Outpatient Tests Requested:  · ***    Complications:  ***    Reason for Admission: ***    Hospital Course:   Jackie Ruvalcaba is a 74 y o  male patient who originally presented to the hospital on 1/21/2023 due to ***    {Complete this smartphrase if the patient is an inpatient and being discharged earlier than 2 midnights  If this does not apply, please delete this line:23256}    Please see above list of diagnoses and related plan for additional information       Condition at Discharge: {Condition:42546}    Discharge Day Visit / Exam:   Subjective:  ***  Vitals: Blood Pressure: 132/80 (01/24/23 0657)  Pulse: 81 (01/24/23 0657)  Temperature: 97 8 °F (36 6 °C) (01/24/23 0657)  Temp Source: Oral (01/22/23 1100)  Respirations: 15 (01/24/23 0657)  Height: 5' 10" (177 8 cm) (01/21/23 0332)  Weight - Scale: 125 kg (275 lb 9 2 oz) (01/21/23 0332)  SpO2: 97 % (01/24/23 1001)  Exam:   Physical Exam ***    Discussion with Family: Patient declined call to   Discharge instructions/Information to patient and family:   See after visit summary for information provided to patient and family  Provisions for Follow-Up Care:  See after visit summary for information related to follow-up care and any pertinent home health orders  Disposition:   {Disposition on Discharge:74261}    Planned Readmission: ***     Discharge Statement:  I spent *** minutes discharging the patient  This time was spent on the day of discharge  I had direct contact with the patient on the day of discharge  Greater than 50% of the total time was spent examining patient, answering all patient questions, arranging and discussing plan of care with patient as well as directly providing post-discharge instructions  Additional time then spent on discharge activities  Discharge Medications:  See after visit summary for reconciled discharge medications provided to patient and/or family        **Please Note: This note may have been constructed using a voice recognition system** MD  PCP: Marcelle Ravi MD  Admission Date:   Admission Orders (From admission, onward)     Ordered        01/21/23 0656  INPATIENT ADMISSION  Once                      Discharge Date: 01/24/23    Consultations During Hospital Stay:  · Orthopedics    Reason for Admission: Back pain, hip pain, ambulatory dysfunction due to back pain    Hospital Course:     Francisco Vale is a 74 y o  male patient with past medical history of Parkinson's disease, schizophrenia, PTSD, diabetes, hypertension, CHF, morbid obesity, diabetes, who originally presented to the hospital on 1/21/2023 due to multiple complaints including low back pain, difficulty ambulating,  lower extremity pain, hip pain  Patient ambulates at baseline with walker and cane as well as wheelchair  Initially patient was noted hypoxic however chest x-ray negative, patient was empirically given 1 dose of IV Solu-Medrol 40 milligrams, 3 days of azithromycin with resolution of hypoxia  Procalcitonin negative x2  Currently patient is saturating 97% on room air and lung exam benign without wheezing or crackles  For his lower back pain patient was seen by orthopedics and recommended weightbearing as tolerated  His low back pain likely due to multilevel of degenerative changes as evident on CT report  Patient was eval by PT OT and recommended home with VNA services  Currently patient is hemodynamically stable for discharge home with VNA services  I have recommend close follow-up with primary care provider as well as orthopedics outpatient as needed  Patient is agreeable with above plan  No other events reported  Refer to earlier notes for further verification  Please see above list of diagnoses and related plan for additional information  Condition at Discharge: good    Discharge Day Visit / Exam:   Subjective: Patient reports overall feeling better and eager to go home  Denies any new complaint at this time    No other events reported  Vitals: Blood Pressure: 132/80 (01/24/23 0657)  Pulse: 81 (01/24/23 0657)  Temperature: 97 8 °F (36 6 °C) (01/24/23 0657)  Temp Source: Oral (01/22/23 1100)  Respirations: 15 (01/24/23 0657)  Height: 5' 10" (177 8 cm) (01/21/23 0332)  Weight - Scale: 125 kg (275 lb 9 2 oz) (01/21/23 0332)  SpO2: 97 % (01/24/23 1001)  Exam:   Physical Exam  Constitutional:       General: He is not in acute distress  Appearance: He is obese  He is not ill-appearing  Comments: Elderly male patient, clear nontoxic-appearing  HENT:      Head: Normocephalic and atraumatic  Eyes:      Pupils: Pupils are equal, round, and reactive to light  Cardiovascular:      Rate and Rhythm: Normal rate  Pulses: Normal pulses  Pulmonary:      Effort: Pulmonary effort is normal  No respiratory distress  Breath sounds: Normal breath sounds  No wheezing  Comments: Not in acute respite distress, O2 saturation 97% on room air  Abdominal:      General: Bowel sounds are normal  There is no distension  Palpations: Abdomen is soft  Tenderness: There is no abdominal tenderness  Musculoskeletal:      Cervical back: No rigidity  Neurological:      Mental Status: He is alert and oriented to person, place, and time  Mental status is at baseline  Psychiatric:         Mood and Affect: Mood normal          Behavior: Behavior normal          Discussion with Family: Patient declined call to   Discharge instructions/Information to patient and family:   See after visit summary for information provided to patient and family  Provisions for Follow-Up Care:  See after visit summary for information related to follow-up care and any pertinent home health orders  Disposition:   Home with VNA Services (Reminder: Complete face to face encounter)    Planned Readmission:      Discharge Statement:  I spent 35 minutes discharging the patient  This time was spent on the day of discharge   I had direct contact with the patient on the day of discharge  Greater than 50% of the total time was spent examining patient, answering all patient questions, arranging and discussing plan of care with patient as well as directly providing post-discharge instructions  Additional time then spent on discharge activities  Discharge Medications:  See after visit summary for reconciled discharge medications provided to patient and/or family        **Please Note: This note may have been constructed using a voice recognition system**

## 2023-01-24 NOTE — PLAN OF CARE
Problem: PAIN - ADULT  Goal: Verbalizes/displays adequate comfort level or baseline comfort level  Description: Interventions:  - Encourage patient to monitor pain and request assistance  - Assess pain using appropriate pain scale  - Administer analgesics based on type and severity of pain and evaluate response  - Implement non-pharmacological measures as appropriate and evaluate response  - Consider cultural and social influences on pain and pain management  - Notify physician/advanced practitioner if interventions unsuccessful or patient reports new pain  Outcome: Progressing     Problem: INFECTION - ADULT  Goal: Absence or prevention of progression during hospitalization  Description: INTERVENTIONS:  - Assess and monitor for signs and symptoms of infection  - Monitor lab/diagnostic results  - Monitor all insertion sites, i e  indwelling lines, tubes, and drains  - Monitor endotracheal if appropriate and nasal secretions for changes in amount and color  - [place] appropriate cooling/warming therapies per order  - Administer medications as ordered  - Instruct and encourage patient and family to use good hand hygiene technique  - Identify and instruct in appropriate isolation precautions for identified infection/condition  Outcome: Progressing     Problem: SAFETY ADULT  Goal: Maintain or return to baseline ADL function  Description: INTERVENTIONS:  -  Assess patient's ability to carry out ADLs; assess patient's baseline for ADL function and identify physical deficits which impact ability to perform ADLs (bathing, care of mouth/teeth, toileting, grooming, dressing, etc )  - Assess/evaluate cause of self-care deficits   - Assess range of motion  - Assess patient's mobility; develop plan if impaired  - Assess patient's need for assistive devices and provide as appropriate  - Encourage maximum independence but intervene and supervise when necessary  - Involve family in performance of ADLs  - Assess for home care needs following discharge   - Consider OT consult to assist with ADL evaluation and planning for discharge  - Provide patient education as appropriate  Outcome: Progressing

## 2023-01-24 NOTE — NURSING NOTE
This nurse completed ambulation with continuous pulse ox monitoring with Pt  Pt was able to walk from the chair by the window to the door and back, approx  50 ft   Pt's oxygen level ranged from 92% and increased to 97%  Pt tolerated ambulation well

## 2023-01-27 NOTE — ASSESSMENT & PLAN NOTE
Lab Results   Component Value Date    HGBA1C 6 7 (H) 01/21/2023       Recent Labs     01/24/23  0604 01/24/23  1048   POCGLU 145* 223*       Blood Sugar Average: Last 72 hrs:  (P) 858 [id number]     Plan:  • Hold oral antihyperglycemics  • Diet Consistent CHO Level 2 (5 CHO servings/75g CHO per meal)  • Insulin regimen  o Glucose checks and Insulin correction ACHS  • Goal -180 while admitted, adjusting insulin regimen as appropriate  • Monitor for hypoglycemia and treat per protocol

## 2023-04-26 ENCOUNTER — APPOINTMENT (EMERGENCY)
Dept: CT IMAGING | Facility: HOSPITAL | Age: 73
End: 2023-04-26

## 2023-04-26 ENCOUNTER — HOSPITAL ENCOUNTER (EMERGENCY)
Facility: HOSPITAL | Age: 73
Discharge: HOME | End: 2023-04-26
Attending: EMERGENCY MEDICINE

## 2023-04-26 VITALS
HEART RATE: 83 BPM | SYSTOLIC BLOOD PRESSURE: 153 MMHG | OXYGEN SATURATION: 96 % | DIASTOLIC BLOOD PRESSURE: 88 MMHG | RESPIRATION RATE: 20 BRPM | TEMPERATURE: 97 F

## 2023-04-26 DIAGNOSIS — T50.905A ADVERSE EFFECT OF DRUG, INITIAL ENCOUNTER: Primary | ICD-10-CM

## 2023-04-26 LAB
2HR DELTA HS TROPONIN: -1 NG/L
ALBUMIN SERPL BCP-MCNC: 4.5 G/DL (ref 3.5–5)
ALP SERPL-CCNC: 56 U/L (ref 34–104)
ALT SERPL W P-5'-P-CCNC: 18 U/L (ref 7–52)
ANION GAP SERPL CALCULATED.3IONS-SCNC: 9 MMOL/L (ref 4–13)
AST SERPL W P-5'-P-CCNC: 13 U/L (ref 13–39)
BASOPHILS # BLD AUTO: 0.04 THOUSANDS/ΜL (ref 0–0.1)
BASOPHILS NFR BLD AUTO: 0 % (ref 0–1)
BILIRUB DIRECT SERPL-MCNC: 0.16 MG/DL (ref 0–0.2)
BILIRUB SERPL-MCNC: 0.5 MG/DL (ref 0.2–1)
BUN SERPL-MCNC: 17 MG/DL (ref 5–25)
CALCIUM SERPL-MCNC: 10.2 MG/DL (ref 8.4–10.2)
CARDIAC TROPONIN I PNL SERPL HS: 3 NG/L
CARDIAC TROPONIN I PNL SERPL HS: 4 NG/L
CHLORIDE SERPL-SCNC: 104 MMOL/L (ref 96–108)
CO2 SERPL-SCNC: 25 MMOL/L (ref 21–32)
CREAT SERPL-MCNC: 1.08 MG/DL (ref 0.6–1.3)
EOSINOPHIL # BLD AUTO: 0.2 THOUSAND/ΜL (ref 0–0.61)
EOSINOPHIL NFR BLD AUTO: 2 % (ref 0–6)
ERYTHROCYTE [DISTWIDTH] IN BLOOD BY AUTOMATED COUNT: 13.2 % (ref 11.6–15.1)
GFR SERPL CREATININE-BSD FRML MDRD: 67 ML/MIN/1.73SQ M
GLUCOSE SERPL-MCNC: 182 MG/DL (ref 65–140)
HCT VFR BLD AUTO: 36.5 % (ref 36.5–49.3)
HGB BLD-MCNC: 11.5 G/DL (ref 12–17)
IMM GRANULOCYTES # BLD AUTO: 0.03 THOUSAND/UL (ref 0–0.2)
IMM GRANULOCYTES NFR BLD AUTO: 0 % (ref 0–2)
LYMPHOCYTES # BLD AUTO: 3.12 THOUSANDS/ΜL (ref 0.6–4.47)
LYMPHOCYTES NFR BLD AUTO: 34 % (ref 14–44)
MCH RBC QN AUTO: 24.4 PG (ref 26.8–34.3)
MCHC RBC AUTO-ENTMCNC: 31.5 G/DL (ref 31.4–37.4)
MCV RBC AUTO: 77 FL (ref 82–98)
MONOCYTES # BLD AUTO: 0.92 THOUSAND/ΜL (ref 0.17–1.22)
MONOCYTES NFR BLD AUTO: 10 % (ref 4–12)
NEUTROPHILS # BLD AUTO: 4.75 THOUSANDS/ΜL (ref 1.85–7.62)
NEUTS SEG NFR BLD AUTO: 54 % (ref 43–75)
NRBC BLD AUTO-RTO: 0 /100 WBCS
PLATELET # BLD AUTO: 129 THOUSANDS/UL (ref 149–390)
PMV BLD AUTO: 13.8 FL (ref 8.9–12.7)
POTASSIUM SERPL-SCNC: 4.3 MMOL/L (ref 3.5–5.3)
PROT SERPL-MCNC: 8.3 G/DL (ref 6.4–8.4)
RBC # BLD AUTO: 4.72 MILLION/UL (ref 3.88–5.62)
SODIUM SERPL-SCNC: 138 MMOL/L (ref 135–147)
WBC # BLD AUTO: 9.06 THOUSAND/UL (ref 4.31–10.16)

## 2023-04-26 RX ADMIN — IOHEXOL 100 ML: 350 INJECTION, SOLUTION INTRAVENOUS at 14:05

## 2023-04-26 NOTE — ED NOTES
Patient requested crackers and juice due to being hungry  Verified with provider and patient given ghramcrackers with peanutbutter and apple juice       Flor Pickens RN  04/26/23 0227

## 2023-04-26 NOTE — ED PROVIDER NOTES
History  Chief Complaint   Patient presents with   • Pain     Pt presents to the ED after being sent by PCP  They think hes having a reaction to carbidopa, recently started the medication two days ago  Reports pain all over  HPI patient is a 68-year-old male, patient reports that he started on carbidopa approximately 2 days ago  Patient reports that after starting the medication he developed diffuse pain all over  Patient reports that he has pain in his chest and his abdomen and feels like it goes to all over his body  He denies any sharp pain or stabbing pain  He denies any pressure sensation  He reports just a generalized soreness and feeling like he just cannot move  He denies any focal weakness  He apparently has difficulty with his gait secondary to Parkinson's disease according to the patient and his family  Patient reports he cannot really describe how the feeling is but reports he has not felt well since taking the medication  He apparently called his provider and was advised to come to the emergency department for this feeling  Denies any shortness of breath  Denies any difficulty breathing  Denies any rash  Denies difficulty moving or talking  Past medical history of psychiatric illness, depression, COPD, CHF,  Family history noncontributory  Social history, former smoker no history of drug abuse    Prior to Admission Medications   Prescriptions Last Dose Informant Patient Reported? Taking?    ARIPiprazole (ABILIFY) 20 MG tablet   No No   Sig: Take 1 tablet (20 mg total) by mouth daily   Patient not taking: Reported on 1/21/2023   Multiple Vitamins-Iron (MULTIVITAMIN/IRON PO)   Yes No   Sig: TAKE ONE CAP/TAB BY MOUTH EVERY MORNING   albuterol (ProAir HFA) 90 mcg/act inhaler   No No   Sig: Inhale 2 puffs every 4 (four) hours as needed for wheezing   aluminum-magnesium hydroxide-simethicone (MYLANTA) 200-200-20 mg/5 mL suspension   No No   Sig: Take 30 mL by mouth every 6 (six) hours as needed for indigestion or heartburn   aspirin 81 mg chewable tablet   No No   Sig: Chew 1 tablet (81 mg total) daily   atorvastatin (LIPITOR) 40 mg tablet   No No   Sig: Take 1 tablet (40 mg total) by mouth daily with dinner And stop Pravastatin when starting this medication   budesonide-formoterol (SYMBICORT) 160-4 5 mcg/act inhaler   Yes No   Sig: INHALE 2 PUFFS BY MOUTH TWICE A DAY *RINSE MOUTH WITH WATER AND SPIT*   Patient not taking: Reported on 1/21/2023   docusate sodium (COLACE) 100 mg capsule   No No   Sig: Take 1 capsule (100 mg total) by mouth 2 (two) times a day as needed for constipation   furosemide (LASIX) 40 mg tablet   No No   Sig: Take 1 tablet (40 mg total) by mouth daily Do not start before January 25, 2023  ipratropium-albuterol (DUO-NEB) 0 5-2 5 mg/3 mL nebulizer solution   No No   Sig: Take 1 vial (3 mL total) by nebulization 2 (two) times a day   lisinopril (ZESTRIL) 20 mg tablet   No No   Sig: Take 1 tablet (20 mg total) by mouth daily   magnesium oxide (MAG-OX) 400 mg   No No   Sig: Take 1 tablet (400 mg total) by mouth 2 (two) times a day   meloxicam (MOBIC) 15 mg tablet   No No   Sig: Take 1 tablet (15 mg total) by mouth daily   Patient not taking: Reported on 1/21/2023   metFORMIN (GLUCOPHAGE) 500 mg tablet   No No   Sig: Take 1 tablet (500 mg total) by mouth 2 (two) times a day with meals   methocarbamol (ROBAXIN) 750 mg tablet   No No   Sig: Take 1 tablet (750 mg total) by mouth every 6 (six) hours as needed for muscle spasms (back pain/initial rx ) for up to 5 days   metoprolol tartrate (LOPRESSOR) 25 mg tablet   No No   Sig: Take 1 tablet (25 mg total) by mouth every 12 (twelve) hours   oxyCODONE-acetaminophen (PERCOCET) 5-325 mg per tablet   No No   Sig: Take 1 tablet by mouth every 6 (six) hours as needed for severe pain (back pain/ongoing rx ) Label no driving no etoh  Initial rx    Dx: Max Daily Amount: 4 tablets   Patient not taking: Reported on 1/21/2023   polyethylene glycol (MIRALAX) 17 g packet   No No   Sig: Take 17 g by mouth daily as needed (constipation)   risperiDONE (RisperDAL) 1 mg tablet   Yes No   Sig: TAKE ONE TABLET BY MOUTH TWICE A DAY FOR MOOD   senna (SENOKOT) 8 6 mg   No No   Sig: Take 1 tablet (8 6 mg total) by mouth 2 (two) times a day   traZODone (DESYREL) 100 mg tablet   Yes No   Sig: Take 100 mg by mouth   Patient not taking: Reported on 2023      Facility-Administered Medications: None       Past Medical History:   Diagnosis Date   • Anxiety    • CHF (congestive heart failure) ([facility])    • COPD (chronic obstructive pulmonary disease) ([facility])    • Depression    • Diabetes mellitus ([place] [address] )    • Enlarged prostate    • Hallucination    • Hyperlipidemia    • Hypertension    • Memory loss    • Panic attack    • Panic disorder    • Psychiatric disorder    • Psychiatric illness    • Psychosis ([place] [address] )    • PTSD (post-traumatic stress disorder)    • Schizoaffective disorder ([place] [address] )    • Schizophrenia ([place] [address] )        History reviewed  No pertinent surgical history  History reviewed  No pertinent family history  I have reviewed and agree with the history as documented  E-Cigarette/Vaping   • E-Cigarette Use Never User      E-Cigarette/Vaping Substances     Social History     Tobacco Use   • Smoking status: Former     Packs/day: 2 00     Years: 0 50     Pack years: 1 00     Types: Cigarettes     Quit date:      Years since quittin 3   • Smokeless tobacco: Never   • Tobacco comments:     quit in    Vaping Use   • Vaping Use: Never used   Substance Use Topics   • Alcohol use: Never   • Drug use: Never       Review of Systems   Constitutional: Negative for diaphoresis, fatigue and fever  HENT: Negative for congestion, ear pain, nosebleeds and sore throat  Eyes: Negative for photophobia, pain, discharge and visual disturbance  Respiratory: Negative for cough, choking, chest tightness, shortness of breath and wheezing  Cardiovascular: Positive for chest pain  Negative for palpitations  Gastrointestinal: Positive for abdominal pain  Negative for abdominal distention, diarrhea and vomiting  Genitourinary: Negative for dysuria, flank pain and frequency  Musculoskeletal: Negative for back pain, gait problem and joint swelling  Skin: Negative for color change and rash  Neurological: Negative for dizziness, syncope and headaches  Psychiatric/Behavioral: Negative for behavioral problems and confusion  The patient is not nervous/anxious  All other systems reviewed and are negative  Physical Exam  Physical Exam  Vitals and nursing note reviewed  Constitutional:       Appearance: He is well-developed  HENT:      Head: Normocephalic  Right Ear: External ear normal       Left Ear: External ear normal       Nose: Nose normal    Eyes:      General: Lids are normal       Pupils: Pupils are equal, round, and reactive to light  Cardiovascular:      Rate and Rhythm: Normal rate and regular rhythm  Pulses: Normal pulses  Heart sounds: Normal heart sounds  Pulmonary:      Effort: Pulmonary effort is normal  No respiratory distress  Breath sounds: Normal breath sounds  Abdominal:      General: Abdomen is flat  Bowel sounds are normal       Tenderness: There is no abdominal tenderness  Musculoskeletal:         General: No deformity  Normal range of motion  Cervical back: Normal range of motion and neck supple  Skin:     General: Skin is warm and dry  Neurological:      General: No focal deficit present  Mental Status: He is alert and oriented to person, place, and time     Psychiatric:         Mood and Affect: Mood normal          Vital Signs  ED Triage Vitals [04/26/23 1225]   Temperature Pulse Respirations Blood Pressure SpO2   (!) 97 °F (36 1 °C) 90 18 144/90 95 %      Temp Source Heart Rate Source Patient Position - Orthostatic VS BP Location FiO2 (%)   Tympanic Monitor Sitting Left arm --      Pain Score       -- Vitals:    04/26/23 1300 04/26/23 1330 04/26/23 1430 04/26/23 1530   BP: 121/80 141/81 160/81 153/88   Pulse: 81 80 77 83   Patient Position - Orthostatic VS:  Lying           Visual Acuity      ED Medications  Medications   iohexol (OMNIPAQUE) 350 MG/ML injection (SINGLE-DOSE) 100 mL (100 mL Intravenous Given 4/26/23 1405)       Diagnostic Studies  Results Reviewed     Procedure Component Value Units Date/Time    HS Troponin I 2hr [[id number]]  (Normal) Collected: 04/26/23 1503    Lab Status: Final result Specimen: Blood from Arm, Right Updated: 04/26/23 1538     hs TnI 2hr 3 ng/L      Delta 2hr hsTnI -1 ng/L     HS Troponin I 4hr [[id number]]     Lab Status: No result Specimen: Blood     HS Troponin 0hr (reflex protocol) [[id number]]  (Normal) Collected: 04/26/23 1259    Lab Status: Final result Specimen: Blood from Arm, Right Updated: 04/26/23 1328     hs TnI 0hr 4 ng/L     Basic metabolic panel [[id number]]  (Abnormal) Collected: 04/26/23 1259    Lab Status: Final result Specimen: Blood from Arm, Right Updated: 04/26/23 1320     Sodium 138 mmol/L      Potassium 4 3 mmol/L      Chloride 104 mmol/L      CO2 25 mmol/L      ANION GAP 9 mmol/L      BUN 17 mg/dL      Creatinine 1 08 mg/dL      Glucose 182 mg/dL      Calcium 10 2 mg/dL      eGFR 67 ml/min/1 73sq m     Narrative:      [place] guidelines for Chronic Kidney Disease (CKD):   •  Stage 1 with normal or high GFR (GFR > 90 mL/min/1 73 square meters)  •  Stage 2 Mild CKD (GFR = 60-89 mL/min/1 73 square meters)  •  Stage 3A Moderate CKD (GFR = 45-59 mL/min/1 73 square meters)  •  Stage 3B Moderate CKD (GFR = 30-44 mL/min/1 73 square meters)  •  Stage 4 Severe CKD (GFR = 15-29 mL/min/1 73 square meters)  •  Stage 5 End Stage CKD (GFR <15 mL/min/1 73 square meters)  Note: GFR calculation is accurate only with a steady state creatinine    Hepatic function panel [[id number]]  (Normal) Collected: 04/26/23 1259    Lab Status: Final result Specimen: Blood from Arm, Right Updated: 04/26/23 1320     Total Bilirubin 0 50 mg/dL      Bilirubin, Direct 0 16 mg/dL      Alkaline Phosphatase 56 U/L      AST 13 U/L      ALT 18 U/L      Total Protein 8 3 g/dL      Albumin 4 5 g/dL     CBC and differential [[id number]]  (Abnormal) Collected: 04/26/23 1259    Lab Status: Final result Specimen: Blood from Arm, Right Updated: 04/26/23 1317     WBC 9 06 Thousand/uL      RBC 4 72 Million/uL      Hemoglobin 11 5 g/dL      Hematocrit 36 5 %      MCV 77 fL      MCH 24 4 pg      MCHC 31 5 g/dL      RDW 13 2 %      MPV 13 8 fL      Platelets 242 Thousands/uL      nRBC 0 /100 WBCs      Neutrophils Relative 54 %      Immat GRANS % 0 %      Lymphocytes Relative 34 %      Monocytes Relative 10 %      Eosinophils Relative 2 %      Basophils Relative 0 %      Neutrophils Absolute 4 75 Thousands/µL      Immature Grans Absolute 0 03 Thousand/uL      Lymphocytes Absolute 3 12 Thousands/µL      Monocytes Absolute 0 92 Thousand/µL      Eosinophils Absolute 0 20 Thousand/µL      Basophils Absolute 0 04 Thousands/µL                  CT chest abdomen pelvis w contrast   Final Result by Eloise Hopkins MD (04/26 0306)      1  No acute pulmonary disease   2  Markedly enlarged prostate gland, 7 0 x 5 7 cm    3  Bladder calculus     4  No acute inflammatory changes in the abdomen or pelvis               Workstation performed: [id number]                    Procedures  ECG 12 Lead Documentation Only    Date/Time: 4/26/2023 12:58 PM  Performed by: Anabelle Denis MD  Authorized by: Anabelle Denis MD     Indications / Diagnosis:  Diffuse body pain  ECG reviewed by me, the ED Provider: yes    Patient location:  ED  Previous ECG:     Previous ECG:  Compared to current    Comparison ECG info:  January 21, 2023  Interpretation:     Interpretation: normal    Rate:     ECG rate:  82    ECG rate assessment: normal    Rhythm:     Rhythm: sinus rhythm    Comments:      Normal sinus rhythm no acute ST-T wave changes             ED Course             HEART Risk Score    Flowsheet Row Most Recent Value   Heart Score Risk Calculator    History 0 Filed at: 04/26/2023 1601   ECG 0 Filed at: 04/26/2023 1601   Age 2 Filed at: 04/26/2023 1601   Risk Factors 2 Filed at: 04/26/2023 1601   Troponin 0 Filed at: 04/26/2023 1601   HEART Score 4 Filed at: 04/26/2023 1601                        SBIRT 22yo+    Flowsheet Row Most Recent Value   Initial Alcohol Screen: US AUDIT-C     1  How often do you have a drink containing alcohol? 0 Filed at: 04/26/2023 1242   2  How many drinks containing alcohol do you have on a typical day you are drinking? 0 Filed at: 04/26/2023 1242   3a  Male UNDER 65: How often do you have five or more drinks on one occasion? 0 Filed at: 04/26/2023 1242   3b  FEMALE Any Age, or MALE 65+: How often do you have 4 or more drinks on one occassion? 0 Filed at: 04/26/2023 1242   Audit-C Score 0 Filed at: 04/26/2023 1242   COMFORT: How many times in the past year have you    Used an illegal drug or used a prescription medication for non-medical reasons? Never Filed at: 04/26/2023 1242        Diagnostic testing patient complained of chest and abdominal pain, total body pain, work-up included cardiac troponin, initial cardiac troponin was 4, repeat cardiac troponin was 3, delta of -1  No sign of cardiac ischemia EKG showed no acute changes  Electrolytes within normal limits normal BUN/creatinine no sign of renal dysfunction, glucose was 182, discussed with the patient  Liver functions were normal no sign of hepatitis  White count was normal at 9000 no sign of inflammation  Hemoglobin was normal at 11 5 no sign of anemia  CT scan of chest abdomen pelvis was performed because The patient complained of both chest pain and abdominal pain, it showed no acute pulmonary disease, there was a enlarged prostate and there was a calculi in the bladder  Otherwise no acute pathology              Medical Decision Making  Crista decision making 20-year-old male referred to the emergency department after starting a new medication, or referred by his physician because The patient has pain all over, discussion with the patient somewhat of a difficult historian complains of chest and abdominal pain  CT scan showed no acute pathology EKG showed no cardiac ischemia  Cardiac markers high-sensitivity cardiac troponin was negative  Discussed with patient most consistent with a medication reaction as he recently started a new medication  Patient will discontinue this medication  We discussed outpatient  treatment and follow-up we discussed indications to return  Adverse effect of drug, initial encounter: acute illness or injury  Amount and/or Complexity of Data Reviewed  Labs: ordered  Radiology: ordered  Risk  Prescription drug management  Disposition  Final diagnoses: Adverse effect of drug, initial encounter     Time reflects when diagnosis was documented in both MDM as applicable and the Disposition within this note     Time User Action Codes Description Comment    4/26/2023  3:57 PM Gianluca Davila Add [T50 [id number]] Adverse effect of drug, initial encounter       ED Disposition     ED Disposition   Discharge    Condition   Stable    Date/Time   Wed Apr 26, 2023  3:56 PM    Comment   Gema Body discharge to home/self care                 Follow-up Information    None         Discharge Medication List as of 4/26/2023  3:57 PM      CONTINUE these medications which have NOT CHANGED    Details   albuterol (ProAir HFA) 90 mcg/act inhaler Inhale 2 puffs every 4 (four) hours as needed for wheezing, Starting Thu 4/6/2023, Print      aluminum-magnesium hydroxide-simethicone (MYLANTA) 200-200-20 mg/5 mL suspension Take 30 mL by mouth every 6 (six) hours as needed for indigestion or heartburn, Starting Wed 10/14/2020, No Print      ARIPiprazole (ABILIFY) 20 MG tablet Take 1 tablet (20 mg total) by mouth daily, Starting Fri 3/30/2018, Print      aspirin 81 mg chewable tablet Chew 1 tablet (81 mg total) daily, Starting Sat 3/24/2018, No Print      atorvastatin (LIPITOR) 40 mg tablet Take 1 tablet (40 mg total) by mouth daily with dinner And stop Pravastatin when starting this medication, Starting Tue 1/24/2023, Normal      budesonide-formoterol (SYMBICORT) 160-4 5 mcg/act inhaler INHALE 2 PUFFS BY MOUTH TWICE A DAY *RINSE MOUTH WITH WATER AND SPIT*, Historical Med      docusate sodium (COLACE) 100 mg capsule Take 1 capsule (100 mg total) by mouth 2 (two) times a day as needed for constipation, Starting Fri 3/23/2018, No Print      furosemide (LASIX) 40 mg tablet Take 1 tablet (40 mg total) by mouth daily Do not start before January 25, 2023 , Starting Wed 1/25/2023, Normal      ipratropium-albuterol (DUO-NEB) 0 5-2 5 mg/3 mL nebulizer solution Take 1 vial (3 mL total) by nebulization 2 (two) times a day, Starting Thu 5/14/2020, Normal      lisinopril (ZESTRIL) 20 mg tablet Take 1 tablet (20 mg total) by mouth daily, Starting Fri 5/3/2019, No Print      magnesium oxide (MAG-OX) 400 mg Take 1 tablet (400 mg total) by mouth 2 (two) times a day, Starting Mon 4/29/2019, Print      meloxicam (MOBIC) 15 mg tablet Take 1 tablet (15 mg total) by mouth daily, Starting Tue 8/24/2021, Print      metFORMIN (GLUCOPHAGE) 500 mg tablet Take 1 tablet (500 mg total) by mouth 2 (two) times a day with meals, Starting Thu 3/29/2018, Print      methocarbamol (ROBAXIN) 750 mg tablet Take 1 tablet (750 mg total) by mouth every 6 (six) hours as needed for muscle spasms (back pain/initial rx ) for up to 5 days, Starting Wed 5/4/2022, Until Mon 5/9/2022 at 2359, Print      metoprolol tartrate (LOPRESSOR) 25 mg tablet Take 1 tablet (25 mg total) by mouth every 12 (twelve) hours, Starting Thu 3/29/2018, Print      Multiple Vitamins-Iron (MULTIVITAMIN/IRON PO) TAKE ONE CAP/TAB BY MOUTH EVERY MORNING, Historical Med      oxyCODONE-acetaminophen (PERCOCET) 5-325 mg per tablet Take 1 tablet by mouth every 6 (six) hours as needed for severe pain (back pain/ongoing rx ) Label no driving no etoh  Initial rx  Dx: Max Daily Amount: 4 tablets, Starting Wed 5/4/2022, Print      polyethylene glycol (MIRALAX) 17 g packet Take 17 g by mouth daily as needed (constipation), Starting Fri 3/23/2018, No Print      risperiDONE (RisperDAL) 1 mg tablet TAKE ONE TABLET BY MOUTH TWICE A DAY FOR MOOD, Historical Med      senna (SENOKOT) 8 6 mg Take 1 tablet (8 6 mg total) by mouth 2 (two) times a day, Starting Wed 10/14/2020, No Print      traZODone (DESYREL) 100 mg tablet Take 100 mg by mouth, Historical Med             No discharge procedures on file      PDMP Review       Value Time User    PDMP Reviewed  Yes 1/24/2023 11:42 AM Cindie Schwab, MD          ED Provider  Electronically Signed by           Robert Cárdenas MD  04/26/23 4757

## 2023-04-29 LAB
ATRIAL RATE: 79 BPM
ATRIAL RATE: 82 BPM
P AXIS: 37 DEGREES
P AXIS: 53 DEGREES
PR INTERVAL: 152 MS
PR INTERVAL: 158 MS
QRS AXIS: 15 DEGREES
QRS AXIS: 21 DEGREES
QRSD INTERVAL: 84 MS
QRSD INTERVAL: 86 MS
QT INTERVAL: 354 MS
QT INTERVAL: 364 MS
QTC INTERVAL: 413 MS
QTC INTERVAL: 417 MS
T WAVE AXIS: -5 DEGREES
T WAVE AXIS: 14 DEGREES
VENTRICULAR RATE: 79 BPM
VENTRICULAR RATE: 82 BPM

## 2023-06-21 ENCOUNTER — APPOINTMENT (EMERGENCY)
Dept: CT IMAGING | Facility: HOSPITAL | Age: 73
End: 2023-06-21
Payer: COMMERCIAL

## 2023-06-21 ENCOUNTER — HOSPITAL ENCOUNTER (EMERGENCY)
Facility: HOSPITAL | Age: 73
Discharge: HOME | End: 2023-06-21
Attending: EMERGENCY MEDICINE
Payer: COMMERCIAL

## 2023-06-21 ENCOUNTER — APPOINTMENT (EMERGENCY)
Dept: RADIOLOGY | Facility: HOSPITAL | Age: 73
End: 2023-06-21
Payer: COMMERCIAL

## 2023-06-21 VITALS
RESPIRATION RATE: 20 BRPM | OXYGEN SATURATION: 96 % | SYSTOLIC BLOOD PRESSURE: 154 MMHG | DIASTOLIC BLOOD PRESSURE: 71 MMHG | TEMPERATURE: 98.6 F | HEART RATE: 83 BPM

## 2023-06-21 DIAGNOSIS — R51.9 HEADACHE: Primary | ICD-10-CM

## 2023-06-21 DIAGNOSIS — R07.9 CHEST PAIN: ICD-10-CM

## 2023-06-21 DIAGNOSIS — M25.569 KNEE PAIN: ICD-10-CM

## 2023-06-21 LAB
ALBUMIN SERPL BCP-MCNC: 4.6 G/DL (ref 3.5–5)
ALP SERPL-CCNC: 56 U/L (ref 34–104)
ALT SERPL W P-5'-P-CCNC: 16 U/L (ref 7–52)
ANION GAP SERPL CALCULATED.3IONS-SCNC: 11 MMOL/L
AST SERPL W P-5'-P-CCNC: 11 U/L (ref 13–39)
BASOPHILS # BLD MANUAL: 0 THOUSAND/UL (ref 0–0.1)
BASOPHILS NFR MAR MANUAL: 0 % (ref 0–1)
BILIRUB DIRECT SERPL-MCNC: 0.16 MG/DL (ref 0–0.2)
BILIRUB SERPL-MCNC: 0.62 MG/DL (ref 0.2–1)
BUN SERPL-MCNC: 14 MG/DL (ref 5–25)
CALCIUM SERPL-MCNC: 10 MG/DL (ref 8.4–10.2)
CARDIAC TROPONIN I PNL SERPL HS: 6 NG/L
CHLORIDE SERPL-SCNC: 102 MMOL/L (ref 96–108)
CO2 SERPL-SCNC: 23 MMOL/L (ref 21–32)
CREAT SERPL-MCNC: 1.19 MG/DL (ref 0.6–1.3)
DACRYOCYTES BLD QL SMEAR: PRESENT
EOSINOPHIL # BLD MANUAL: 0 THOUSAND/UL (ref 0–0.4)
EOSINOPHIL NFR BLD MANUAL: 0 % (ref 0–6)
ERYTHROCYTE [DISTWIDTH] IN BLOOD BY AUTOMATED COUNT: 13.4 % (ref 11.6–15.1)
GFR SERPL CREATININE-BSD FRML MDRD: 59 ML/MIN/1.73SQ M
GLUCOSE SERPL-MCNC: 236 MG/DL (ref 65–140)
HCT VFR BLD AUTO: 37.7 % (ref 36.5–49.3)
HGB BLD-MCNC: 11.8 G/DL (ref 12–17)
INR PPP: 1.13 (ref 0.84–1.19)
LYMPHOCYTES # BLD AUTO: 25 % (ref 14–44)
LYMPHOCYTES # BLD AUTO: 3.94 THOUSAND/UL (ref 0.6–4.47)
MCH RBC QN AUTO: 23.6 PG (ref 26.8–34.3)
MCHC RBC AUTO-ENTMCNC: 31.3 G/DL (ref 31.4–37.4)
MCV RBC AUTO: 76 FL (ref 82–98)
MONOCYTES # BLD AUTO: 0.95 THOUSAND/UL (ref 0–1.22)
MONOCYTES NFR BLD: 6 % (ref 4–12)
NEUTROPHILS # BLD MANUAL: 10.87 THOUSAND/UL (ref 1.85–7.62)
NEUTS BAND NFR BLD MANUAL: 2 % (ref 0–8)
NEUTS SEG NFR BLD AUTO: 67 % (ref 43–75)
OVALOCYTES BLD QL SMEAR: PRESENT
PLATELET # BLD AUTO: 171 THOUSANDS/UL (ref 149–390)
PLATELET BLD QL SMEAR: ADEQUATE
PMV BLD AUTO: 12.4 FL (ref 8.9–12.7)
POIKILOCYTOSIS BLD QL SMEAR: PRESENT
POLYCHROMASIA BLD QL SMEAR: PRESENT
POTASSIUM SERPL-SCNC: 3.7 MMOL/L (ref 3.5–5.3)
PROT SERPL-MCNC: 8.6 G/DL (ref 6.4–8.4)
PROTHROMBIN TIME: 14.3 SECONDS (ref 11.6–14.5)
RBC # BLD AUTO: 4.99 MILLION/UL (ref 3.88–5.62)
SODIUM SERPL-SCNC: 136 MMOL/L (ref 135–147)
WBC # BLD AUTO: 15.76 THOUSAND/UL (ref 4.31–10.16)

## 2023-06-21 PROCEDURE — 85007 BL SMEAR W/DIFF WBC COUNT: CPT | Performed by: EMERGENCY MEDICINE

## 2023-06-21 PROCEDURE — 36415 COLL VENOUS BLD VENIPUNCTURE: CPT | Performed by: EMERGENCY MEDICINE

## 2023-06-21 PROCEDURE — 93005 ELECTROCARDIOGRAM TRACING: CPT

## 2023-06-21 PROCEDURE — 80076 HEPATIC FUNCTION PANEL: CPT | Performed by: EMERGENCY MEDICINE

## 2023-06-21 PROCEDURE — 70450 CT HEAD/BRAIN W/O DYE: CPT

## 2023-06-21 PROCEDURE — 80048 BASIC METABOLIC PNL TOTAL CA: CPT | Performed by: EMERGENCY MEDICINE

## 2023-06-21 PROCEDURE — 84484 ASSAY OF TROPONIN QUANT: CPT | Performed by: EMERGENCY MEDICINE

## 2023-06-21 PROCEDURE — 85610 PROTHROMBIN TIME: CPT | Performed by: EMERGENCY MEDICINE

## 2023-06-21 PROCEDURE — 71046 X-RAY EXAM CHEST 2 VIEWS: CPT

## 2023-06-21 PROCEDURE — 85027 COMPLETE CBC AUTOMATED: CPT | Performed by: EMERGENCY MEDICINE

## 2023-06-21 RX ORDER — ACETAMINOPHEN 325 MG/1
975 TABLET ORAL ONCE
Status: COMPLETED | OUTPATIENT
Start: 2023-06-21 | End: 2023-06-21

## 2023-06-21 RX ADMIN — ACETAMINOPHEN 975 MG: 325 TABLET, FILM COATED ORAL at 12:03

## 2023-06-21 NOTE — DISCHARGE INSTRUCTIONS
Continue Tylenol as needed  Follow-up with your provider or return worsening symptoms or any problems

## 2023-06-21 NOTE — ED PROVIDER NOTES
History  Chief Complaint   Patient presents with   • Headache     Pt reports headache, chest pain, and knee pain for the last 3 days      HPI patient is a 66-year-old male, with multiple complaints, patient reports some left knee pain which is chronic apparently did not take any Tylenol for the pain which he normally does so he reports his knee is bothering him  Patient reports frontal headache over the last 2 days reports pain from the top of his head down to essentially his neck over the last 2 days with some associated feeling of the dizziness  He denies any trauma  He reports he almost fell but did not fall to the ground  Patient reports also intermittent chest pain which she describes as a soreness in his chest which seems to come and go he has no pain currently  He denies any shortness of breath  Denies any focal weakness  Patient was seen by me in April for similar nonspecific symptoms, patient related at that time to starting a new medication  Past medical history of COPD Parkinson's disease memory loss, psychiatric illness  Family history noncontributory  Social history, former smoker no history of drug abuse    Prior to Admission Medications   Prescriptions Last Dose Informant Patient Reported? Taking?    ARIPiprazole (ABILIFY) 20 MG tablet   No No   Sig: Take 1 tablet (20 mg total) by mouth daily   Patient not taking: Reported on 1/21/2023   Multiple Vitamins-Iron (MULTIVITAMIN/IRON PO)   Yes No   Sig: TAKE ONE CAP/TAB BY MOUTH EVERY MORNING   albuterol (ProAir HFA) 90 mcg/act inhaler   No No   Sig: Inhale 2 puffs every 4 (four) hours as needed for wheezing   aluminum-magnesium hydroxide-simethicone (MYLANTA) 200-200-20 mg/5 mL suspension   No No   Sig: Take 30 mL by mouth every 6 (six) hours as needed for indigestion or heartburn   aspirin 81 mg chewable tablet   No No   Sig: Chew 1 tablet (81 mg total) daily   atorvastatin (LIPITOR) 40 mg tablet   No No   Sig: Take 1 tablet (40 mg total) by mouth daily with dinner And stop Pravastatin when starting this medication   budesonide-formoterol (SYMBICORT) 160-4 5 mcg/act inhaler   Yes No   Sig: INHALE 2 PUFFS BY MOUTH TWICE A DAY *RINSE MOUTH WITH WATER AND SPIT*   Patient not taking: Reported on 1/21/2023   docusate sodium (COLACE) 100 mg capsule   No No   Sig: Take 1 capsule (100 mg total) by mouth 2 (two) times a day as needed for constipation   furosemide (LASIX) 40 mg tablet   No No   Sig: Take 1 tablet (40 mg total) by mouth daily Do not start before January 25, 2023  ipratropium-albuterol (DUO-NEB) 0 5-2 5 mg/3 mL nebulizer solution   No No   Sig: Take 1 vial (3 mL total) by nebulization 2 (two) times a day   lisinopril (ZESTRIL) 20 mg tablet   No No   Sig: Take 1 tablet (20 mg total) by mouth daily   magnesium oxide (MAG-OX) 400 mg   No No   Sig: Take 1 tablet (400 mg total) by mouth 2 (two) times a day   meloxicam (MOBIC) 15 mg tablet   No No   Sig: Take 1 tablet (15 mg total) by mouth daily   Patient not taking: Reported on 1/21/2023   metFORMIN (GLUCOPHAGE) 500 mg tablet   No No   Sig: Take 1 tablet (500 mg total) by mouth 2 (two) times a day with meals   methocarbamol (ROBAXIN) 750 mg tablet   No No   Sig: Take 1 tablet (750 mg total) by mouth every 6 (six) hours as needed for muscle spasms (back pain/initial rx ) for up to 5 days   metoprolol tartrate (LOPRESSOR) 25 mg tablet   No No   Sig: Take 1 tablet (25 mg total) by mouth every 12 (twelve) hours   oxyCODONE-acetaminophen (PERCOCET) 5-325 mg per tablet   No No   Sig: Take 1 tablet by mouth every 6 (six) hours as needed for severe pain (back pain/ongoing rx ) Label no driving no etoh  Initial rx    Dx: Max Daily Amount: 4 tablets   Patient not taking: Reported on 1/21/2023   polyethylene glycol (MIRALAX) 17 g packet   No No   Sig: Take 17 g by mouth daily as needed (constipation)   risperiDONE (RisperDAL) 1 mg tablet   Yes No   Sig: TAKE ONE TABLET BY MOUTH TWICE A DAY FOR MOOD   senna (SENOKOT) 8 6 mg   No No   Sig: Take 1 tablet (8 6 mg total) by mouth 2 (two) times a day   traZODone (DESYREL) 100 mg tablet   Yes No   Sig: Take 100 mg by mouth   Patient not taking: Reported on 2023      Facility-Administered Medications: None       Past Medical History:   Diagnosis Date   • Anxiety    • CHF (congestive heart failure) ([facility])    • COPD (chronic obstructive pulmonary disease) ([facility])    • Depression    • Diabetes mellitus ([place] [address] )    • Enlarged prostate    • Hallucination    • Hyperlipidemia    • Hypertension    • Memory loss    • Panic attack    • Panic disorder    • Psychiatric disorder    • Psychiatric illness    • Psychosis ([place] [address] )    • PTSD (post-traumatic stress disorder)    • Schizoaffective disorder ([place] [address] )    • Schizophrenia ([place] [address] )        History reviewed  No pertinent surgical history  History reviewed  No pertinent family history  I have reviewed and agree with the history as documented  E-Cigarette/Vaping   • E-Cigarette Use Never User      E-Cigarette/Vaping Substances     Social History     Tobacco Use   • Smoking status: Former     Packs/day: 2 00     Years: 0 50     Total pack years: 1 00     Types: Cigarettes     Quit date:      Years since quittin 4   • Smokeless tobacco: Never   • Tobacco comments:     quit in    Vaping Use   • Vaping Use: Never used   Substance Use Topics   • Alcohol use: Never   • Drug use: Never       Review of Systems   Constitutional: Negative for diaphoresis, fatigue and fever  HENT: Negative for congestion, ear pain, nosebleeds and sore throat  Eyes: Negative for photophobia, pain, discharge and visual disturbance  Respiratory: Negative for cough, choking, chest tightness, shortness of breath and wheezing  Cardiovascular: Positive for chest pain  Negative for palpitations  Gastrointestinal: Negative for abdominal distention, abdominal pain, diarrhea and vomiting  Genitourinary: Negative for dysuria, flank pain and frequency  Musculoskeletal: Negative for back pain, gait problem and joint swelling  Skin: Negative for color change and rash  Neurological: Positive for headaches  Negative for dizziness and syncope  Psychiatric/Behavioral: Negative for behavioral problems and confusion  The patient is not nervous/anxious  All other systems reviewed and are negative  left knee pain    Physical Exam  Physical Exam  Vitals and nursing note reviewed  Constitutional:       Appearance: Normal appearance  He is well-developed  HENT:      Head: Normocephalic  Right Ear: External ear normal       Left Ear: External ear normal       Nose: Nose normal    Eyes:      General: Lids are normal       Pupils: Pupils are equal, round, and reactive to light  Cardiovascular:      Rate and Rhythm: Normal rate and regular rhythm  Pulses: Normal pulses  Heart sounds: Normal heart sounds  Pulmonary:      Effort: Pulmonary effort is normal  No respiratory distress  Breath sounds: Normal breath sounds  Abdominal:      General: Abdomen is flat  Bowel sounds are normal       Tenderness: There is no abdominal tenderness  Musculoskeletal:         General: No deformity  Normal range of motion  Cervical back: Normal range of motion and neck supple  Comments: Chronic left knee pain with palpation and with movement, no redness no warmth no sign of infection   Skin:     General: Skin is warm and dry  Neurological:      General: No focal deficit present  Mental Status: He is alert and oriented to person, place, and time     Psychiatric:         Mood and Affect: Mood normal          Vital Signs  ED Triage Vitals   Temperature Pulse Respirations Blood Pressure SpO2   06/21/23 1015 06/21/23 1015 06/21/23 1015 06/21/23 1015 06/21/23 1015   98 6 °F (37 °C) 104 20 156/90 95 %      Temp Source Heart Rate Source Patient Position - Orthostatic VS BP Location FiO2 (%)   06/21/23 1015 06/21/23 1015 06/21/23 1015 06/21/23 1015 -- Temporal Monitor Sitting Right arm       Pain Score       06/21/23 1030       5           Vitals:    06/21/23 1015 06/21/23 1200   BP: 156/90 154/71   Pulse: 104 83   Patient Position - Orthostatic VS: Sitting          Visual Acuity  Visual Acuity    Flowsheet Row Most Recent Value   L Pupil Size (mm) 2   R Pupil Size (mm) 2          ED Medications  Medications   acetaminophen (TYLENOL) tablet 975 mg (975 mg Oral Given 6/21/23 1203)       Diagnostic Studies  Results Reviewed     Procedure Component Value Units Date/Time    CBC and differential [[id number]]  (Abnormal) Collected: 06/21/23 1025    Lab Status: Final result Specimen: Blood from Arm, Right Updated: 06/21/23 1111     WBC 15 76 Thousand/uL      RBC 4 99 Million/uL      Hemoglobin 11 8 g/dL      Hematocrit 37 7 %      MCV 76 fL      MCH 23 6 pg      MCHC 31 3 g/dL      RDW 13 4 %      MPV 12 4 fL      Platelets 982 Thousands/uL     Narrative: This is an appended report  These results have been appended to a previously verified report      Manual Differential(PHLEBS Do Not Order) [[id number]]  (Abnormal) Collected: 06/21/23 1025    Lab Status: Final result Specimen: Blood from Arm, Right Updated: 06/21/23 1111     Segmented % 67 %      Bands % 2 %      Lymphocytes % 25 %      Monocytes % 6 %      Eosinophils, % 0 %      Basophils % 0 %      Absolute Neutrophils 10 87 Thousand/uL      Lymphocytes Absolute 3 94 Thousand/uL      Monocytes Absolute 0 95 Thousand/uL      Eosinophils Absolute 0 00 Thousand/uL      Basophils Absolute 0 00 Thousand/uL      Total Counted --     Platelet Estimate Adequate     Ovalocytes Present     Poikilocytes Present     Polychromasia Present     Tear Drop Cells Present    HS Troponin 0hr (reflex protocol) [[id number]]  (Normal) Collected: 06/21/23 1025    Lab Status: Final result Specimen: Blood from Arm, Right Updated: 06/21/23 1105     hs TnI 0hr 6 ng/L     Protime-INR [[id number]]  (Normal) Collected: 06/21/23 1025    Lab Status: Final result Specimen: Blood from Arm, Right Updated: 06/21/23 1103     Protime 14 3 seconds      INR 9 09    Basic metabolic panel [[id number]]  (Abnormal) Collected: 06/21/23 1025    Lab Status: Final result Specimen: Blood from Arm, Right Updated: 06/21/23 1100     Sodium 136 mmol/L      Potassium 3 7 mmol/L      Chloride 102 mmol/L      CO2 23 mmol/L      ANION GAP 11 mmol/L      BUN 14 mg/dL      Creatinine 1 19 mg/dL      Glucose 236 mg/dL      Calcium 10 0 mg/dL      eGFR 59 ml/min/1 73sq m     Narrative:      [place] guidelines for Chronic Kidney Disease (CKD):   •  Stage 1 with normal or high GFR (GFR > 90 mL/min/1 73 square meters)  •  Stage 2 Mild CKD (GFR = 60-89 mL/min/1 73 square meters)  •  Stage 3A Moderate CKD (GFR = 45-59 mL/min/1 73 square meters)  •  Stage 3B Moderate CKD (GFR = 30-44 mL/min/1 73 square meters)  •  Stage 4 Severe CKD (GFR = 15-29 mL/min/1 73 square meters)  •  Stage 5 End Stage CKD (GFR <15 mL/min/1 73 square meters)  Note: GFR calculation is accurate only with a steady state creatinine    Hepatic function panel [[id number]]  (Abnormal) Collected: 06/21/23 1025    Lab Status: Final result Specimen: Blood from Arm, Right Updated: 06/21/23 1100     Total Bilirubin 0 62 mg/dL      Bilirubin, Direct 0 16 mg/dL      Alkaline Phosphatase 56 U/L      AST 11 U/L      ALT 16 U/L      Total Protein 8 6 g/dL      Albumin 4 6 g/dL                  XR chest 2 views   Final Result by Raghav Anthony MD (06/21 1213)      No acute cardiopulmonary disease  Workstation performed: [id number]         CT head without contrast   Final Result by Gustavo Umanzor MD (06/21 1118)      No acute intracranial abnormality                    Workstation performed: [id number]                    Procedures  ECG 12 Lead Documentation Only    Date/Time: 6/21/2023 10:28 AM    Performed by: Sandeep Zapata MD  Authorized by: Sandeep Zapata MD    Indications / Diagnosis: Multiple complaints, intermittent chest pain  ECG reviewed by me, the ED Provider: yes    Patient location:  ED  Previous ECG:     Previous ECG:  Compared to current    Comparison ECG info:  April 26 2023    Similarity:  No change  Interpretation:     Interpretation: non-specific    Rate:     ECG rate:  93    ECG rate assessment: normal    Comments:      Normal sinus rhythm, age-indeterminate inferior changes, no ST elevations,             ED Course       Discussed testing with patient no acute pathology at this time, discussed position and the patient is comfortable going home  Shared decision making  Patient will be discharged  Diagnostic testing White count was elevated 15 7 nonspecific finding hemoglobin was normal 11 8 no sign of anemia  Cardiac troponin was normal at 6 no sign of cardiac ischemia  Electrolytes were normal with no sign of dehydration normal BUN/creatinine no sign of renal dysfunction, glucose was elevated to 36, liver functions were normal   Chest x-ray showed no acute pathology, I read the interpretation by the radiologist,  CT scan of the brain was normal     HEART Risk Score    Flowsheet Row Most Recent Value   Heart Score Risk Calculator    History 0 Filed at: 06/21/2023 1810   ECG 0 Filed at: 06/21/2023 1810   Age 2 Filed at: 06/21/2023 1810   Risk Factors 2 Filed at: 06/21/2023 1810   Troponin 0 Filed at: 06/21/2023 1810   HEART Score 4 Filed at: 06/21/2023 1810                        SBIRT 22yo+    Flowsheet Row Most Recent Value   Initial Alcohol Screen: US AUDIT-C     1  How often do you have a drink containing alcohol? 0 Filed at: 06/21/2023 1032   2  How many drinks containing alcohol do you have on a typical day you are drinking? 0 Filed at: 06/21/2023 1032   3a  Male UNDER 65: How often do you have five or more drinks on one occasion? 0 Filed at: 06/21/2023 1032   3b  FEMALE Any Age, or MALE 65+: How often do you have 4 or more drinks on one occassion?  0 Filed at: 06/21/2023 1032   Audit-C Score 0 Filed at: 06/21/2023 1032   COMFORT: How many times in the past year have you    Used an illegal drug or used a prescription medication for non-medical reasons? Never Filed at: 06/21/2023 1032                    Medical Decision Making  Medical decision making 76-year-old male presents emergency department with multiple nonspecific complaints, reports some left knee pain secondary to arthritis which he has had for a prolonged period of time, patient reports he normally takes Tylenol but did not take any today  He reports some frontal headache and some soreness, primarily his face and forehead area  There is no redness there is no warmth normal neurological exam, reports intermittent chest pain but none now  Patient denies any shortness of breath there is no diaphoresis  Diagnostic work-up showed a normal cardiac work-up despite days of symptoms  Patient had a normal CT scan of the brain  Patient had otherwise normal diagnostic testing other than a slightly elevated white count  Discussed with patient nonspecific symptoms I believe he can go home he agrees  We discussed outpatient treatment and follow-up we discussed indications to return     Chest pain: acute illness or injury  Headache: acute illness or injury  Knee pain: acute illness or injury  Amount and/or Complexity of Data Reviewed  Labs: ordered  Radiology: ordered  Risk  OTC drugs            Disposition  Final diagnoses:   Headache   Chest pain   Knee pain     Time reflects when diagnosis was documented in both MDM as applicable and the Disposition within this note     Time User Action Codes Description Comment    6/21/2023 12:13 PM José Miguel Zuniga [R51 9] Headache     6/21/2023 12:13 PM José Miguel Zuniga [R07 9] Chest pain     6/21/2023 12:13 PM Viji Norris Knee pain       ED Disposition     ED Disposition   Discharge    Condition   Stable    Date/Time   Wed Jun 21, 2023 12:13 PM    Comment   Ángel Cast Sofia Cr discharge to home/self care                 Follow-up Information    None         Discharge Medication List as of 6/21/2023 12:16 PM      CONTINUE these medications which have NOT CHANGED    Details   albuterol (ProAir HFA) 90 mcg/act inhaler Inhale 2 puffs every 4 (four) hours as needed for wheezing, Starting Thu 4/6/2023, Print      aluminum-magnesium hydroxide-simethicone (MYLANTA) 200-200-20 mg/5 mL suspension Take 30 mL by mouth every 6 (six) hours as needed for indigestion or heartburn, Starting Wed 10/14/2020, No Print      ARIPiprazole (ABILIFY) 20 MG tablet Take 1 tablet (20 mg total) by mouth daily, Starting Fri 3/30/2018, Print      aspirin 81 mg chewable tablet Chew 1 tablet (81 mg total) daily, Starting Sat 3/24/2018, No Print      atorvastatin (LIPITOR) 40 mg tablet Take 1 tablet (40 mg total) by mouth daily with dinner And stop Pravastatin when starting this medication, Starting Tue 1/24/2023, Normal      budesonide-formoterol (SYMBICORT) 160-4 5 mcg/act inhaler INHALE 2 PUFFS BY MOUTH TWICE A DAY *RINSE MOUTH WITH WATER AND SPIT*, Historical Med      docusate sodium (COLACE) 100 mg capsule Take 1 capsule (100 mg total) by mouth 2 (two) times a day as needed for constipation, Starting Fri 3/23/2018, No Print      furosemide (LASIX) 40 mg tablet Take 1 tablet (40 mg total) by mouth daily Do not start before January 25, 2023 , Starting Wed 1/25/2023, Normal      ipratropium-albuterol (DUO-NEB) 0 5-2 5 mg/3 mL nebulizer solution Take 1 vial (3 mL total) by nebulization 2 (two) times a day, Starting Thu 5/14/2020, Normal      lisinopril (ZESTRIL) 20 mg tablet Take 1 tablet (20 mg total) by mouth daily, Starting Fri 5/3/2019, No Print      magnesium oxide (MAG-OX) 400 mg Take 1 tablet (400 mg total) by mouth 2 (two) times a day, Starting Mon 4/29/2019, Print      meloxicam (MOBIC) 15 mg tablet Take 1 tablet (15 mg total) by mouth daily, Starting Tue 8/24/2021, Print      metFORMIN (GLUCOPHAGE) 500 mg tablet Take 1 tablet (500 mg total) by mouth 2 (two) times a day with meals, Starting Thu 3/29/2018, Print      methocarbamol (ROBAXIN) 750 mg tablet Take 1 tablet (750 mg total) by mouth every 6 (six) hours as needed for muscle spasms (back pain/initial rx ) for up to 5 days, Starting Wed 5/4/2022, Until Mon 5/9/2022 at 2359, Print      metoprolol tartrate (LOPRESSOR) 25 mg tablet Take 1 tablet (25 mg total) by mouth every 12 (twelve) hours, Starting Thu 3/29/2018, Print      Multiple Vitamins-Iron (MULTIVITAMIN/IRON PO) TAKE ONE CAP/TAB BY MOUTH EVERY MORNING, Historical Med      oxyCODONE-acetaminophen (PERCOCET) 5-325 mg per tablet Take 1 tablet by mouth every 6 (six) hours as needed for severe pain (back pain/ongoing rx ) Label no driving no etoh  Initial rx  Dx: Max Daily Amount: 4 tablets, Starting Wed 5/4/2022, Print      polyethylene glycol (MIRALAX) 17 g packet Take 17 g by mouth daily as needed (constipation), Starting Fri 3/23/2018, No Print      risperiDONE (RisperDAL) 1 mg tablet TAKE ONE TABLET BY MOUTH TWICE A DAY FOR MOOD, Historical Med      senna (SENOKOT) 8 6 mg Take 1 tablet (8 6 mg total) by mouth 2 (two) times a day, Starting Wed 10/14/2020, No Print      traZODone (DESYREL) 100 mg tablet Take 100 mg by mouth, Historical Med             No discharge procedures on file      PDMP Review       Value Time User    PDMP Reviewed  Yes 1/24/2023 11:42 AM Doreen Bethea MD          ED Provider  Electronically Signed by           Justin Rose MD  06/21/23 4569

## 2023-06-23 LAB
ATRIAL RATE: 93 BPM
P AXIS: 52 DEGREES
PR INTERVAL: 148 MS
QRS AXIS: 18 DEGREES
QRSD INTERVAL: 94 MS
QT INTERVAL: 334 MS
QTC INTERVAL: 415 MS
T WAVE AXIS: 256 DEGREES
VENTRICULAR RATE: 93 BPM

## 2023-06-23 PROCEDURE — 93010 ELECTROCARDIOGRAM REPORT: CPT | Performed by: INTERNAL MEDICINE

## 2024-02-22 ENCOUNTER — HOSPITAL ENCOUNTER (INPATIENT)
Facility: HOSPITAL | Age: 74
LOS: 4 days | Discharge: NURSING, REHAB OR LONG-TERM CARE | DRG: 190 | End: 2024-02-26
Attending: EMERGENCY MEDICINE | Admitting: STUDENT IN AN ORGANIZED HEALTH CARE EDUCATION/TRAINING PROGRAM
Payer: COMMERCIAL

## 2024-02-22 ENCOUNTER — APPOINTMENT (EMERGENCY)
Dept: RADIOLOGY | Facility: HOSPITAL | Age: 74
DRG: 190 | End: 2024-02-22
Payer: COMMERCIAL

## 2024-02-22 ENCOUNTER — APPOINTMENT (INPATIENT)
Dept: CT IMAGING | Facility: HOSPITAL | Age: 74
DRG: 190 | End: 2024-02-22
Payer: COMMERCIAL

## 2024-02-22 DIAGNOSIS — J44.1 COPD WITH ACUTE EXACERBATION (HCC): ICD-10-CM

## 2024-02-22 DIAGNOSIS — R09.02 HYPOXIA: Primary | ICD-10-CM

## 2024-02-22 DIAGNOSIS — J18.9 PNEUMONIA: ICD-10-CM

## 2024-02-22 DIAGNOSIS — I50.9 CHF (CONGESTIVE HEART FAILURE) (HCC): ICD-10-CM
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
## 2024-02-22 PROBLEM — R06.00 DYSPNEA: Status: ACTIVE | Noted: 2020-10-07

## 2024-02-22 LAB
2HR DELTA HS TROPONIN: -2 NG/L
4HR DELTA HS TROPONIN: -4 NG/L
ALBUMIN SERPL BCP-MCNC: 4.3 G/DL (ref 3.5–5)
ALP SERPL-CCNC: 47 U/L (ref 34–104)
ALT SERPL W P-5'-P-CCNC: 25 U/L (ref 7–52)
ANION GAP SERPL CALCULATED.3IONS-SCNC: 11 MMOL/L
APTT PPP: 30 SECONDS (ref 23–37)
AST SERPL W P-5'-P-CCNC: 22 U/L (ref 13–39)
ATRIAL RATE: 101 BPM
ATRIAL RATE: 109 BPM
BASE EX.OXY STD BLDV CALC-SCNC: 94.4 % (ref 60–80)
BASE EXCESS BLDV CALC-SCNC: -1.6 MMOL/L
BASOPHILS # BLD AUTO: 0.03 THOUSANDS/ÂΜL (ref 0–0.1)
BASOPHILS NFR BLD AUTO: 0 % (ref 0–1)
BILIRUB DIRECT SERPL-MCNC: 0.14 MG/DL (ref 0–0.2)
BILIRUB SERPL-MCNC: 0.68 MG/DL (ref 0.2–1)
BNP SERPL-MCNC: 39 PG/ML (ref 0–100)
BUN SERPL-MCNC: 19 MG/DL (ref 5–25)
CALCIUM SERPL-MCNC: 9.6 MG/DL (ref 8.4–10.2)
CARDIAC TROPONIN I PNL SERPL HS: 13 NG/L
CARDIAC TROPONIN I PNL SERPL HS: 15 NG/L
CARDIAC TROPONIN I PNL SERPL HS: 17 NG/L
CHLORIDE SERPL-SCNC: 103 MMOL/L (ref 96–108)
CO2 SERPL-SCNC: 22 MMOL/L (ref 21–32)
CREAT SERPL-MCNC: 1.18 MG/DL (ref 0.6–1.3)
EOSINOPHIL # BLD AUTO: 0.02 THOUSAND/ÂΜL (ref 0–0.61)
EOSINOPHIL NFR BLD AUTO: 0 % (ref 0–6)
ERYTHROCYTE [DISTWIDTH] IN BLOOD BY AUTOMATED COUNT: 13.9 % (ref 11.6–15.1)
EST. AVERAGE GLUCOSE BLD GHB EST-MCNC: 203 MG/DL
FLUAV RNA RESP QL NAA+PROBE: NEGATIVE
FLUBV RNA RESP QL NAA+PROBE: NEGATIVE
GFR SERPL CREATININE-BSD FRML MDRD: 60 ML/MIN/1.73SQ M
GLUCOSE SERPL-MCNC: 226 MG/DL (ref 65–140)
GLUCOSE SERPL-MCNC: 275 MG/DL (ref 65–140)
GLUCOSE SERPL-MCNC: 351 MG/DL (ref 65–140)
GLUCOSE SERPL-MCNC: 397 MG/DL (ref 65–140)
HBA1C MFR BLD: 8.7 %
HCO3 BLDV-SCNC: 21.4 MMOL/L (ref 24–30)
HCT VFR BLD AUTO: 37.9 % (ref 36.5–49.3)
HGB BLD-MCNC: 12 G/DL (ref 12–17)
IMM GRANULOCYTES # BLD AUTO: 0.07 THOUSAND/UL (ref 0–0.2)
IMM GRANULOCYTES NFR BLD AUTO: 1 % (ref 0–2)
INR PPP: 1.06 (ref 0.84–1.19)
LACTATE SERPL-SCNC: 1.9 MMOL/L (ref 0.5–2)
LYMPHOCYTES # BLD AUTO: 2.24 THOUSANDS/ÂΜL (ref 0.6–4.47)
LYMPHOCYTES NFR BLD AUTO: 17 % (ref 14–44)
MCH RBC QN AUTO: 23.7 PG (ref 26.8–34.3)
MCHC RBC AUTO-ENTMCNC: 31.7 G/DL (ref 31.4–37.4)
MCV RBC AUTO: 75 FL (ref 82–98)
MONOCYTES # BLD AUTO: 0.8 THOUSAND/ÂΜL (ref 0.17–1.22)
MONOCYTES NFR BLD AUTO: 6 % (ref 4–12)
NEUTROPHILS # BLD AUTO: 10.39 THOUSANDS/ÂΜL (ref 1.85–7.62)
NEUTS SEG NFR BLD AUTO: 76 % (ref 43–75)
NRBC BLD AUTO-RTO: 0 /100 WBCS
O2 CT BLDV-SCNC: 17.6 ML/DL
P AXIS: 43 DEGREES
P AXIS: 60 DEGREES
PCO2 BLDV: 31.1 MM HG (ref 42–50)
PH BLDV: 7.46 [PH] (ref 7.3–7.4)
PLATELET # BLD AUTO: 132 THOUSANDS/UL (ref 149–390)
PO2 BLDV: 84.4 MM HG (ref 35–45)
POTASSIUM SERPL-SCNC: 3.8 MMOL/L (ref 3.5–5.3)
PR INTERVAL: 154 MS
PR INTERVAL: 160 MS
PROT SERPL-MCNC: 7.8 G/DL (ref 6.4–8.4)
PROTHROMBIN TIME: 14.4 SECONDS (ref 11.6–14.5)
QRS AXIS: 18 DEGREES
QRS AXIS: 2 DEGREES
QRSD INTERVAL: 86 MS
QRSD INTERVAL: 88 MS
QT INTERVAL: 342 MS
QT INTERVAL: 348 MS
QTC INTERVAL: 451 MS
QTC INTERVAL: 460 MS
RBC # BLD AUTO: 5.06 MILLION/UL (ref 3.88–5.62)
RSV RNA RESP QL NAA+PROBE: NEGATIVE
SARS-COV-2 RNA RESP QL NAA+PROBE: NEGATIVE
SODIUM SERPL-SCNC: 136 MMOL/L (ref 135–147)
T WAVE AXIS: -44 DEGREES
T WAVE AXIS: -57 DEGREES
VENTRICULAR RATE: 101 BPM
VENTRICULAR RATE: 109 BPM
WBC # BLD AUTO: 13.55 THOUSAND/UL (ref 4.31–10.16)

## 2024-02-22 PROCEDURE — 99285 EMERGENCY DEPT VISIT HI MDM: CPT

## 2024-02-22 PROCEDURE — 84484 ASSAY OF TROPONIN QUANT: CPT | Performed by: EMERGENCY MEDICINE

## 2024-02-22 PROCEDURE — 83880 ASSAY OF NATRIURETIC PEPTIDE: CPT | Performed by: EMERGENCY MEDICINE

## 2024-02-22 PROCEDURE — 83605 ASSAY OF LACTIC ACID: CPT | Performed by: EMERGENCY MEDICINE

## 2024-02-22 PROCEDURE — 71045 X-RAY EXAM CHEST 1 VIEW: CPT

## 2024-02-22 PROCEDURE — 70450 CT HEAD/BRAIN W/O DYE: CPT

## 2024-02-22 PROCEDURE — 85025 COMPLETE CBC W/AUTO DIFF WBC: CPT | Performed by: EMERGENCY MEDICINE

## 2024-02-22 PROCEDURE — 71250 CT THORAX DX C-: CPT

## 2024-02-22 PROCEDURE — 83036 HEMOGLOBIN GLYCOSYLATED A1C: CPT | Performed by: STUDENT IN AN ORGANIZED HEALTH CARE EDUCATION/TRAINING PROGRAM

## 2024-02-22 PROCEDURE — 85730 THROMBOPLASTIN TIME PARTIAL: CPT | Performed by: EMERGENCY MEDICINE

## 2024-02-22 PROCEDURE — 94664 DEMO&/EVAL PT USE INHALER: CPT

## 2024-02-22 PROCEDURE — 82948 REAGENT STRIP/BLOOD GLUCOSE: CPT

## 2024-02-22 PROCEDURE — 85610 PROTHROMBIN TIME: CPT | Performed by: EMERGENCY MEDICINE

## 2024-02-22 PROCEDURE — 99223 1ST HOSP IP/OBS HIGH 75: CPT | Performed by: STUDENT IN AN ORGANIZED HEALTH CARE EDUCATION/TRAINING PROGRAM

## 2024-02-22 PROCEDURE — 93005 ELECTROCARDIOGRAM TRACING: CPT

## 2024-02-22 PROCEDURE — 99285 EMERGENCY DEPT VISIT HI MDM: CPT | Performed by: EMERGENCY MEDICINE

## 2024-02-22 PROCEDURE — 0241U HB NFCT DS VIR RESP RNA 4 TRGT: CPT | Performed by: EMERGENCY MEDICINE

## 2024-02-22 PROCEDURE — 93010 ELECTROCARDIOGRAM REPORT: CPT | Performed by: INTERNAL MEDICINE

## 2024-02-22 PROCEDURE — 96365 THER/PROPH/DIAG IV INF INIT: CPT

## 2024-02-22 PROCEDURE — 87040 BLOOD CULTURE FOR BACTERIA: CPT | Performed by: EMERGENCY MEDICINE

## 2024-02-22 PROCEDURE — 94640 AIRWAY INHALATION TREATMENT: CPT

## 2024-02-22 PROCEDURE — 80048 BASIC METABOLIC PNL TOTAL CA: CPT | Performed by: EMERGENCY MEDICINE

## 2024-02-22 PROCEDURE — 36415 COLL VENOUS BLD VENIPUNCTURE: CPT | Performed by: EMERGENCY MEDICINE

## 2024-02-22 PROCEDURE — 94760 N-INVAS EAR/PLS OXIMETRY 1: CPT

## 2024-02-22 PROCEDURE — 74176 CT ABD & PELVIS W/O CONTRAST: CPT

## 2024-02-22 PROCEDURE — 80076 HEPATIC FUNCTION PANEL: CPT | Performed by: EMERGENCY MEDICINE

## 2024-02-22 PROCEDURE — 72125 CT NECK SPINE W/O DYE: CPT

## 2024-02-22 PROCEDURE — 82805 BLOOD GASES W/O2 SATURATION: CPT | Performed by: EMERGENCY MEDICINE

## 2024-02-22 RX ORDER — INSULIN LISPRO 100 [IU]/ML
3 INJECTION, SOLUTION INTRAVENOUS; SUBCUTANEOUS
Status: DISCONTINUED | OUTPATIENT
Start: 2024-02-22 | End: 2024-02-26 | Stop reason: HOSPADM

## 2024-02-22 RX ORDER — CEFTRIAXONE 2 G/50ML
2000 INJECTION, SOLUTION INTRAVENOUS ONCE
Status: COMPLETED | OUTPATIENT
Start: 2024-02-22 | End: 2024-02-22

## 2024-02-22 RX ORDER — INSULIN LISPRO 100 [IU]/ML
1-5 INJECTION, SOLUTION INTRAVENOUS; SUBCUTANEOUS
Status: DISCONTINUED | OUTPATIENT
Start: 2024-02-22 | End: 2024-02-26 | Stop reason: HOSPADM

## 2024-02-22 RX ORDER — ATORVASTATIN CALCIUM 40 MG/1
40 TABLET, FILM COATED ORAL
Status: DISCONTINUED | OUTPATIENT
Start: 2024-02-22 | End: 2024-02-26 | Stop reason: HOSPADM

## 2024-02-22 RX ORDER — METHOCARBAMOL 750 MG/1
750 TABLET, FILM COATED ORAL EVERY 6 HOURS PRN
Status: DISCONTINUED | OUTPATIENT
Start: 2024-02-22 | End: 2024-02-26 | Stop reason: HOSPADM

## 2024-02-22 RX ORDER — ARIPIPRAZOLE 5 MG/1
20 TABLET ORAL DAILY
Status: DISCONTINUED | OUTPATIENT
Start: 2024-02-23 | End: 2024-02-26 | Stop reason: HOSPADM

## 2024-02-22 RX ORDER — IPRATROPIUM BROMIDE AND ALBUTEROL SULFATE 2.5; .5 MG/3ML; MG/3ML
3 SOLUTION RESPIRATORY (INHALATION) ONCE
Status: COMPLETED | OUTPATIENT
Start: 2024-02-22 | End: 2024-02-22

## 2024-02-22 RX ORDER — RISPERIDONE 1 MG/1
1 TABLET ORAL 2 TIMES DAILY
Status: DISCONTINUED | OUTPATIENT
Start: 2024-02-22 | End: 2024-02-26 | Stop reason: HOSPADM

## 2024-02-22 RX ORDER — METHYLPREDNISOLONE SOD SUCC 125 MG
1 VIAL (EA) INJECTION ONCE
Status: COMPLETED | OUTPATIENT
Start: 2024-02-22 | End: 2024-02-22

## 2024-02-22 RX ORDER — FUROSEMIDE 40 MG/1
40 TABLET ORAL DAILY
Status: DISCONTINUED | OUTPATIENT
Start: 2024-02-23 | End: 2024-02-26 | Stop reason: HOSPADM

## 2024-02-22 RX ORDER — ASPIRIN 81 MG/1
81 TABLET, CHEWABLE ORAL DAILY
Status: DISCONTINUED | OUTPATIENT
Start: 2024-02-23 | End: 2024-02-26 | Stop reason: HOSPADM

## 2024-02-22 RX ORDER — LISINOPRIL 20 MG/1
20 TABLET ORAL DAILY
Status: DISCONTINUED | OUTPATIENT
Start: 2024-02-23 | End: 2024-02-26 | Stop reason: HOSPADM

## 2024-02-22 RX ORDER — LANOLIN ALCOHOL/MO/W.PET/CERES
400 CREAM (GRAM) TOPICAL 2 TIMES DAILY
Status: DISCONTINUED | OUTPATIENT
Start: 2024-02-22 | End: 2024-02-26 | Stop reason: HOSPADM

## 2024-02-22 RX ORDER — INSULIN LISPRO 100 [IU]/ML
1-6 INJECTION, SOLUTION INTRAVENOUS; SUBCUTANEOUS
Status: DISCONTINUED | OUTPATIENT
Start: 2024-02-22 | End: 2024-02-26 | Stop reason: HOSPADM

## 2024-02-22 RX ORDER — DOCUSATE SODIUM 100 MG/1
100 CAPSULE, LIQUID FILLED ORAL 2 TIMES DAILY PRN
Status: DISCONTINUED | OUTPATIENT
Start: 2024-02-22 | End: 2024-02-26 | Stop reason: HOSPADM

## 2024-02-22 RX ORDER — SENNOSIDES 8.6 MG
8.6 TABLET ORAL 2 TIMES DAILY
Status: DISCONTINUED | OUTPATIENT
Start: 2024-02-22 | End: 2024-02-26 | Stop reason: HOSPADM

## 2024-02-22 RX ORDER — IPRATROPIUM BROMIDE AND ALBUTEROL SULFATE 2.5; .5 MG/3ML; MG/3ML
3 SOLUTION RESPIRATORY (INHALATION) 2 TIMES DAILY
Status: DISCONTINUED | OUTPATIENT
Start: 2024-02-22 | End: 2024-02-22

## 2024-02-22 RX ORDER — IPRATROPIUM BROMIDE AND ALBUTEROL SULFATE .5; 3 MG/3ML; MG/3ML
2 SOLUTION RESPIRATORY (INHALATION) ONCE
Status: COMPLETED | OUTPATIENT
Start: 2024-02-22 | End: 2024-02-22

## 2024-02-22 RX ORDER — ALBUTEROL SULFATE 90 UG/1
2 AEROSOL, METERED RESPIRATORY (INHALATION) 4 TIMES DAILY
Status: DISCONTINUED | OUTPATIENT
Start: 2024-02-22 | End: 2024-02-26 | Stop reason: HOSPADM

## 2024-02-22 RX ORDER — ALBUTEROL SULFATE 90 UG/1
2 AEROSOL, METERED RESPIRATORY (INHALATION) EVERY 4 HOURS PRN
Status: DISCONTINUED | OUTPATIENT
Start: 2024-02-22 | End: 2024-02-22

## 2024-02-22 RX ORDER — MAGNESIUM HYDROXIDE/ALUMINUM HYDROXICE/SIMETHICONE 120; 1200; 1200 MG/30ML; MG/30ML; MG/30ML
30 SUSPENSION ORAL EVERY 6 HOURS PRN
Status: DISCONTINUED | OUTPATIENT
Start: 2024-02-22 | End: 2024-02-26 | Stop reason: HOSPADM

## 2024-02-22 RX ORDER — ENOXAPARIN SODIUM 100 MG/ML
40 INJECTION SUBCUTANEOUS DAILY
Status: DISCONTINUED | OUTPATIENT
Start: 2024-02-23 | End: 2024-02-26 | Stop reason: HOSPADM

## 2024-02-22 RX ADMIN — INSULIN LISPRO 3 UNITS: 100 INJECTION, SOLUTION INTRAVENOUS; SUBCUTANEOUS at 18:28

## 2024-02-22 RX ADMIN — SODIUM CHLORIDE 1000 ML: 0.9 INJECTION, SOLUTION INTRAVENOUS at 11:09

## 2024-02-22 RX ADMIN — SENNOSIDES 8.6 MG: 8.6 TABLET, FILM COATED ORAL at 18:25

## 2024-02-22 RX ADMIN — ALBUTEROL SULFATE 2 PUFF: 90 AEROSOL, METERED RESPIRATORY (INHALATION) at 18:25

## 2024-02-22 RX ADMIN — RISPERIDONE 1 MG: 1 TABLET ORAL at 18:25

## 2024-02-22 RX ADMIN — MORPHINE SULFATE 2 MG: 2 INJECTION, SOLUTION INTRAMUSCULAR; INTRAVENOUS at 12:22

## 2024-02-22 RX ADMIN — Medication 400 MG: at 18:25

## 2024-02-22 RX ADMIN — INSULIN LISPRO 2 UNITS: 100 INJECTION, SOLUTION INTRAVENOUS; SUBCUTANEOUS at 12:56

## 2024-02-22 RX ADMIN — IPRATROPIUM BROMIDE AND ALBUTEROL SULFATE 3 ML: 2.5; .5 SOLUTION RESPIRATORY (INHALATION) at 10:17

## 2024-02-22 RX ADMIN — ATORVASTATIN CALCIUM 40 MG: 40 TABLET, FILM COATED ORAL at 18:25

## 2024-02-22 RX ADMIN — CEFTRIAXONE 2000 MG: 2 INJECTION, SOLUTION INTRAVENOUS at 10:16

## 2024-02-22 RX ADMIN — METHOCARBAMOL TABLETS 750 MG: 750 TABLET, COATED ORAL at 18:25

## 2024-02-22 RX ADMIN — INSULIN LISPRO 6 UNITS: 100 INJECTION, SOLUTION INTRAVENOUS; SUBCUTANEOUS at 21:53

## 2024-02-22 NOTE — ASSESSMENT & PLAN NOTE
73-year-old male patient with past medical history of CHF, COPD, not home O2 dependent, chronic degenerative bone disease, presented to [facility] emergency room likely with multiple complaints.  Seems like to the ED provided patient had complaint of feeling shortness of breath however on my encounter patient reports that the main reason for him to come to the hospital was a fall.  Patient reports having a fall due to amatory dysfunction and currently complaining of right-sided flank pain.  Denies head trauma, loss of consciousness.    CBC noted with mild leukocytosis.  CMP within normal limits.  HS trop negative x 3.   EKG noted with sinus tachycardia.  Chest x-ray report noted negative for acute finding.    COVID-19/flu/RSV negative.  BNP within normal limits  Received IV ceftriaxone, Solu-Medrol in ED.    On my encounter patient appears comfortable not in distress.  Does complain of having right-sided flank pain.  Denies chest pain, dyspnea, fever, chills, nausea, vomiting, abdominal pain, dysuria, diarrhea, any other new illness.  Given his history of fall and him being poor historian we will follow-up with CT head, CT cervical spine, CT chest abdomen pelvis.  Fall precaution.  PT OT eval.  Continue supportive care.

## 2024-02-22 NOTE — ED PROVIDER NOTES
History  Chief Complaint   Patient presents with    Shortness of Breath     Pt came in EMS report SOB x 3days. EMS gave 3 duonebs and 125 solu medrol.      74 yo male with CHF and COPD who presents to ED by EMS for evaluation of SOB x approximately one week. EMS notes pt was in tripod position on arrival to scene with markedly increased WOB and sat of 85% improved after iv solumedrol, 3 duonebs and oxygen. They note pt was diaphoretic with a RR in the upper 40's. Pt arrives to ED with improved sxs but still mildly tachypneic. Associated fever and chills. No cp or hemoptysis.         Prior to Admission Medications   Prescriptions Last Dose Informant Patient Reported? Taking?   ARIPiprazole (ABILIFY) 20 MG tablet   No No   Sig: Take 1 tablet (20 mg total) by mouth daily   Patient not taking: Reported on 1/21/2023   Multiple Vitamins-Iron (MULTIVITAMIN/IRON PO)   Yes No   Sig: TAKE ONE CAP/TAB BY MOUTH EVERY MORNING   albuterol (ProAir HFA) 90 mcg/act inhaler   No No   Sig: Inhale 2 puffs every 4 (four) hours as needed for wheezing   aluminum-magnesium hydroxide-simethicone (MYLANTA) 200-200-20 mg/5 mL suspension   No No   Sig: Take 30 mL by mouth every 6 (six) hours as needed for indigestion or heartburn   aspirin 81 mg chewable tablet   No No   Sig: Chew 1 tablet (81 mg total) daily   atorvastatin (LIPITOR) 40 mg tablet   No No   Sig: Take 1 tablet (40 mg total) by mouth daily with dinner And stop Pravastatin when starting this medication   budesonide-formoterol (SYMBICORT) 160-4.5 mcg/act inhaler   Yes No   Sig: INHALE 2 PUFFS BY MOUTH TWICE A DAY *RINSE MOUTH WITH WATER AND SPIT*   Patient not taking: Reported on 1/21/2023   docusate sodium (COLACE) 100 mg capsule   No No   Sig: Take 1 capsule (100 mg total) by mouth 2 (two) times a day as needed for constipation   furosemide (LASIX) 40 mg tablet   No No   Sig: Take 1 tablet (40 mg total) by mouth daily Do not start before January 25, 2023.   ipratropium-albuterol  (DUO-NEB) 0.5-2.5 mg/3 mL nebulizer solution   No No   Sig: Take 1 vial (3 mL total) by nebulization 2 (two) times a day   lisinopril (ZESTRIL) 20 mg tablet   No No   Sig: Take 1 tablet (20 mg total) by mouth daily   magnesium oxide (MAG-OX) 400 mg   No No   Sig: Take 1 tablet (400 mg total) by mouth 2 (two) times a day   meloxicam (MOBIC) 15 mg tablet   No No   Sig: Take 1 tablet (15 mg total) by mouth daily   Patient not taking: Reported on 1/21/2023   metFORMIN (GLUCOPHAGE) 500 mg tablet   No No   Sig: Take 1 tablet (500 mg total) by mouth 2 (two) times a day with meals   methocarbamol (ROBAXIN) 750 mg tablet   No No   Sig: Take 1 tablet (750 mg total) by mouth every 6 (six) hours as needed for muscle spasms (back pain/initial rx.) for up to 5 days   metoprolol tartrate (LOPRESSOR) 25 mg tablet   No No   Sig: Take 1 tablet (25 mg total) by mouth every 12 (twelve) hours   oxyCODONE-acetaminophen (PERCOCET) 5-325 mg per tablet   No No   Sig: Take 1 tablet by mouth every 6 (six) hours as needed for severe pain (back pain/ongoing rx.) Label no driving no etoh. Initial rx.  Dx: Max Daily Amount: 4 tablets   Patient not taking: Reported on 1/21/2023   polyethylene glycol (MIRALAX) 17 g packet   No No   Sig: Take 17 g by mouth daily as needed (constipation)   risperiDONE (RisperDAL) 1 mg tablet   Yes No   Sig: TAKE ONE TABLET BY MOUTH TWICE A DAY FOR MOOD   senna (SENOKOT) 8.6 mg   No No   Sig: Take 1 tablet (8.6 mg total) by mouth 2 (two) times a day   traZODone (DESYREL) 100 mg tablet   Yes No   Sig: Take 100 mg by mouth   Patient not taking: Reported on 1/21/2023      Facility-Administered Medications: None       Past Medical History:   Diagnosis Date    Anxiety     CHF (congestive heart failure) (HCC)     COPD (chronic obstructive pulmonary disease) (HCC)     Depression     Diabetes mellitus (HCC)     Enlarged prostate     Hallucination     Hyperlipidemia     Hypertension     Memory loss     Panic attack     Panic  disorder     Psychiatric disorder     Psychiatric illness     Psychosis (HCC)     PTSD (post-traumatic stress disorder)     Schizoaffective disorder (HCC)     Schizophrenia (HCC)        History reviewed. No pertinent surgical history.    History reviewed. No pertinent family history.  I have reviewed and agree with the history as documented.    E-Cigarette/Vaping    E-Cigarette Use Never User      E-Cigarette/Vaping Substances     Social History     Tobacco Use    Smoking status: Former     Current packs/day: 0.00     Average packs/day: 2.0 packs/day for 0.5 years (1.0 ttl pk-yrs)     Types: Cigarettes     Start date: 1992     Quit date:      Years since quittin.1    Smokeless tobacco: Never    Tobacco comments:     quit in    Vaping Use    Vaping status: Never Used   Substance Use Topics    Alcohol use: Never    Drug use: Never       Review of Systems   Constitutional:  Positive for chills and fever.   Respiratory:  Positive for cough, shortness of breath and wheezing.        Physical Exam  Physical Exam  Vitals and nursing note reviewed.   Constitutional:       General: He is not in acute distress.     Appearance: He is well-developed. He is ill-appearing and diaphoretic. He is not toxic-appearing.   HENT:      Head: Normocephalic and atraumatic.      Mouth/Throat:      Mouth: Mucous membranes are moist.      Pharynx: Oropharynx is clear.   Eyes:      Conjunctiva/sclera: Conjunctivae normal.      Pupils: Pupils are equal, round, and reactive to light.   Neck:      Vascular: No JVD.   Cardiovascular:      Rate and Rhythm: Normal rate and regular rhythm.      Pulses: Normal pulses.      Heart sounds: Normal heart sounds. No murmur heard.     No friction rub. No gallop.   Pulmonary:      Effort: Pulmonary effort is normal. Tachypnea present. No accessory muscle usage or respiratory distress.      Breath sounds: No stridor. Wheezing present. No rales.   Abdominal:      General: There is no distension.       Palpations: Abdomen is soft.      Tenderness: There is no abdominal tenderness. There is no guarding or rebound.   Musculoskeletal:         General: No swelling, tenderness, deformity or signs of injury. Normal range of motion.      Cervical back: Normal range of motion and neck supple. No rigidity.      Right lower leg: Edema present.   Skin:     General: Skin is warm.      Capillary Refill: Capillary refill takes less than 2 seconds.      Coloration: Skin is not jaundiced or pale.      Findings: No bruising or erythema.   Neurological:      General: No focal deficit present.      Mental Status: He is alert and oriented to person, place, and time.      Cranial Nerves: No cranial nerve deficit.      Sensory: No sensory deficit.      Motor: No weakness or abnormal muscle tone.      Coordination: Coordination normal.      Gait: Gait normal.         Vital Signs  ED Triage Vitals   Temperature Pulse Respirations Blood Pressure SpO2   02/22/24 0929 02/22/24 0929 02/22/24 0929 02/22/24 0929 02/22/24 0929   99.8 °F (37.7 °C) 105 22 150/81 92 %      Temp src Heart Rate Source Patient Position - Orthostatic VS BP Location FiO2 (%)   -- 02/22/24 0929 02/22/24 1200 02/22/24 1200 --    Monitor Sitting Left arm       Pain Score       --                  Vitals:    02/22/24 0929 02/22/24 0945 02/22/24 1115 02/22/24 1200   BP: 150/81 139/67  139/79   Pulse: 105 (!) 107 104 101   Patient Position - Orthostatic VS:    Sitting         Visual Acuity      ED Medications  Medications   methylPREDNISolone sodium succinate (FOR EMS ONLY) (Solu-MEDROL) 125 MG injection 125 mg (0 mg Does not apply Given to EMS 2/22/24 0947)   ipratropium-albuterol (FOR EMS ONLY) (DUO-NEB) 0.5-2.5 mg/3 mL inhalation solution 6 mL (0 mL Does not apply Given to EMS 2/22/24 0947)   ipratropium-albuterol (DUO-NEB) 0.5-2.5 mg/3 mL inhalation solution 3 mL (3 mL Nebulization Given 2/22/24 1017)   cefTRIAXone (ROCEPHIN) IVPB (premix in dextrose) 2,000 mg 50 mL  (0 mg Intravenous Stopped 2/22/24 1109)   sodium chloride 0.9 % bolus 1,000 mL (1,000 mL Intravenous New Bag 2/22/24 1109)       Diagnostic Studies  Results Reviewed       Procedure Component Value Units Date/Time    HS Troponin I 2hr [[id number]] Collected: 02/22/24 1211    Lab Status: No result Specimen: Blood from Arm, Right     HS Troponin I 4hr [[id number]]     Lab Status: No result Specimen: Blood     HS Troponin 0hr (reflex protocol) [[id number]]  (Normal) Collected: 02/22/24 0955    Lab Status: Final result Specimen: Blood from Arm, Right Updated: 02/22/24 1040     hs TnI 0hr 17 ng/L     Protime-INR [[id number]]  (Normal) Collected: 02/22/24 0955    Lab Status: Final result Specimen: Blood from Arm, Right Updated: 02/22/24 1039     Protime 14.4 seconds      INR 1.06    APTT [[id number]]  (Normal) Collected: 02/22/24 0955    Lab Status: Final result Specimen: Blood from Arm, Right Updated: 02/22/24 1039     PTT 30 seconds     B-Type Natriuretic Peptide(BNP) [[id number]]  (Normal) Collected: 02/22/24 0955    Lab Status: Final result Specimen: Blood from Arm, Right Updated: 02/22/24 1037     BNP 39 pg/mL     Basic metabolic panel [[id number]]  (Abnormal) Collected: 02/22/24 0955    Lab Status: Final result Specimen: Blood from Arm, Right Updated: 02/22/24 1034     Sodium 136 mmol/L      Potassium 3.8 mmol/L      Chloride 103 mmol/L      CO2 22 mmol/L      ANION GAP 11 mmol/L      BUN 19 mg/dL      Creatinine 1.18 mg/dL      Glucose 226 mg/dL      Calcium 9.6 mg/dL      eGFR 60 ml/min/1.73sq m     Narrative:      National Kid[employer] guidelines for Chronic Kidney Disease (CKD):     Stage 1 with normal or high GFR (GFR > 90 mL/min/1.73 square meters)    Stage 2 Mild CKD (GFR = 60-89 mL/min/1.73 square meters)    Stage 3A Moderate CKD (GFR = 45-59 mL/min/1.73 square meters)    Stage 3B Moderate CKD (GFR = 30-44 mL/min/1.73 square meters)    Stage 4 Severe CKD (GFR = 15-29 mL/min/1.73 square meters)    Stage  5 End Stage CKD (GFR <15 mL/min/1.73 square meters)  Note: GFR calculation is accurate only with a steady state creatinine    Hepatic function panel [[id number]]  (Normal) Collected: 02/22/24 0955    Lab Status: Final result Specimen: Blood from Arm, Right Updated: 02/22/24 1034     Total Bilirubin 0.68 mg/dL      Bilirubin, Direct 0.14 mg/dL      Alkaline Phosphatase 47 U/L      AST 22 U/L      ALT 25 U/L      Total Protein 7.8 g/dL      Albumin 4.3 g/dL     Lactic acid, plasma (w/reflex if result > 2.0) [[id number]]  (Normal) Collected: 02/22/24 0955    Lab Status: Final result Specimen: Blood from Arm, Right Updated: 02/22/24 1031     LACTIC ACID 1.9 mmol/L     Narrative:      Result may be elevated if tourniquet was used during collection.    CBC and differential [[id number]]  (Abnormal) Collected: 02/22/24 0955    Lab Status: Final result Specimen: Blood from Arm, Right Updated: 02/22/24 1024     WBC 13.55 Thousand/uL      RBC 5.06 Million/uL      Hemoglobin 12.0 g/dL      Hematocrit 37.9 %      MCV 75 fL      MCH 23.7 pg      MCHC 31.7 g/dL      RDW 13.9 %      Platelets 132 Thousands/uL      nRBC 0 /100 WBCs      Neutrophils Relative 76 %      Immat GRANS % 1 %      Lymphocytes Relative 17 %      Monocytes Relative 6 %      Eosinophils Relative 0 %      Basophils Relative 0 %      Neutrophils Absolute 10.39 Thousands/µL      Immature Grans Absolute 0.07 Thousand/uL      Lymphocytes Absolute 2.24 Thousands/µL      Monocytes Absolute 0.80 Thousand/µL      Eosinophils Absolute 0.02 Thousand/µL      Basophils Absolute 0.03 Thousands/µL     Blood gas, venous [[id number]]  (Abnormal) Collected: 02/22/24 1018    Lab Status: Final result Specimen: Blood from Arm, Right Updated: 02/22/24 1023     pH, Rufus 7.455     pCO2, Rufus 31.1 mm Hg      pO2, Rufus 84.4 mm Hg      HCO3, Rufus 21.4 mmol/L      Base Excess, Rufus -1.6 mmol/L      O2 Content, Rufus 17.6 ml/dL      O2 HGB, VENOUS 94.4 %     FLU/RSV/COVID - if FLU/RSV clinically  relevant [[id number]]  (Normal) Collected: 02/22/24 0934    Lab Status: Final result Specimen: Nares from Nose Updated: 02/22/24 1021     SARS-CoV-2 Negative     INFLUENZA A PCR Negative     INFLUENZA B PCR Negative     RSV PCR Negative    Narrative:      FOR PEDIATRIC PATIENTS - copy/paste COVID Guidelines URL to browser: [email or web address]    SARS-CoV-2 assay is a Nucleic Acid Amplification assay intended for the  qualitative detection of nucleic acid from SARS-CoV-2 in nasopharyngeal  swabs. Results are for the presumptive identification of SARS-CoV-2 RNA.    Positive results are indicative of infection with SARS-CoV-2, the virus  causing COVID-19, but do not rule out bacterial infection or co-infection  with other viruses. Laboratories within the [place] and its  territories are required to report all positive results to the appropriate  public health authorities. Negative results do not preclude SARS-CoV-2  infection and should not be used as the sole basis for treatment or other  patient management decisions. Negative results must be combined with  clinical observations, patient history, and epidemiological information.  This test has not been FDA cleared or approved.    This test has been authorized by FDA under an Emergency Use Authorization  (EUA). This test is only authorized for the duration of time the  declaration that circumstances exist justifying the authorization of the  emergency use of an in vitro diagnostic tests for detection of SARS-CoV-2  virus and/or diagnosis of COVID-19 infection under section 564(b)(1) of  the Act, 21 U.S.C. 360bbb-3(b)(1), unless the authorization is terminated  or revoked sooner. The test has been validated but independent review by FDA  and CLIA is pending.    Test performed using [employer] GeneXpert: This RT-PCR assay targets N2,  a region unique to SARS-CoV-2. A conserved region in the E-gene was chosen  for  pan-Sarbecovirus detection which includes SARS-CoV-2.    According to CMS-2020-01-R, this platform meets the definition of high-throughput technology.    Blood culture #1 [[id number]] Collected: 02/22/24 0956    Lab Status: In process Specimen: Blood from Hand, Left Updated: 02/22/24 1014    Blood culture #2 [[id number]] Collected: 02/22/24 0957    Lab Status: In process Specimen: Blood from Hand, Right Updated: 02/22/24 1008                   XR chest 1 view portable    (Results Pending)              Procedures  ECG 12 Lead Documentation Only    Date/Time: 2/22/2024 9:36 AM    Performed by: Benja Khan MD  Authorized by: Benja Khan MD    Indications / Diagnosis:  Sob  ECG reviewed by me, the ED Provider: yes    Patient location:  ED  Previous ECG:     Previous ECG:  Compared to current    Comparison ECG info:  21 june 2023    Similarity:  Changes noted  Interpretation:     Interpretation: non-specific    Rate:     ECG rate:  109    ECG rate assessment: tachycardic    Rhythm:     Rhythm: sinus tachycardia    Comments:      ST. Nonspecific st changes. No rebekah or std.            ED Course  ED Course as of 02/22/24 1214   Thu Feb 22, 2024   1017 Reexamined. Sat 95% on 2LNC oxygen. Improved evaluation as compared to initial exam. Speaking full sentences. No respiratory distress.                                SBIRT 20yo+      Flowsheet Row Most Recent Value   Initial Alcohol Screen: US AUDIT-C     1. How often do you have a drink containing alcohol? 0 Filed at: 02/22/2024 0932   2. How many drinks containing alcohol do you have on a typical day you are drinking?  0 Filed at: 02/22/2024 0932   3a. Male UNDER 65: How often do you have five or more drinks on one occasion? 0 Filed at: 02/22/2024 0932   3b. FEMALE Any Age, or MALE 65+: How often do you have 4 or more drinks on one occassion? 0 Filed at: 02/22/2024 0932   Audit-C Score 0 Filed at: 02/22/2024 0932   COMFORT: How many times in the past year have you...    Used  an illegal drug or used a prescription medication for non-medical reasons? Never Filed at: 02/22/2024 0932                      Medical Decision Making  Problems Addressed:  COPD with acute exacerbation (HCC): complicated acute illness or injury with systemic symptoms that poses a threat to life or bodily functions  Hypoxia: complicated acute illness or injury with systemic symptoms that poses a threat to life or bodily functions  Pneumonia: complicated acute illness or injury that poses a threat to life or bodily functions    Amount and/or Complexity of Data Reviewed  Labs: ordered. Decision-making details documented in ED Course.  Radiology: ordered and independent interpretation performed.  ECG/medicine tests: ordered and independent interpretation performed. Decision-making details documented in ED Course.    Risk  Prescription drug management.  Decision regarding hospitalization.             Disposition  Final diagnoses:   Hypoxia   COPD with acute exacerbation (HCC)   Pneumonia   CHF (congestive heart failure) (HCC)     Time reflects when diagnosis was documented in both MDM as applicable and the Disposition within this note       Time User Action Codes Description Comment    2/22/2024 11:01 AM Khan, Benja Add [R09.02] Hypoxia     2/22/2024 11:01 AM Khan, Benja Add [J44.1] COPD exacerbation (HCC)     2/22/2024 11:01 AM Khan, Benja Add [J44.1] COPD with acute exacerbation (HCC)     2/22/2024 11:01 AM Khan, Benja Remove [J44.1] COPD exacerbation (HCC)     2/22/2024 11:01 AM Khan, Benja Add [J18.9] Pneumonia     2/22/2024 11:01 AM Khan, Benja Add [I50.9] CHF (congestive heart failure) (HCC)           ED Disposition       ED Disposition   Admit    Condition   Stable    Date/Time   u Feb 22, 2024 1100    Comment   Case was discussed with Dr Palencia and the patient's admission status was agreed to be Admission Status: inpatient status to the service of Dr. Palencia .               Follow-up Information     None         Patient's Medications   Discharge Prescriptions    No medications on file       No discharge procedures on file.    PDMP Review         Value Time User    PDMP Reviewed  Yes 1/24/2023 11:42 AM Niranjan GARCIA MD            ED Provider  Electronically Signed by             Benja Khan MD  02/22/24 3570

## 2024-02-22 NOTE — PLAN OF CARE

## 2024-02-22 NOTE — H&P
[facility]  H&P  Name: Lorne Waterman 73 y.o. male I MRN: [id number]  Unit/Bed#: -01 I Date of Admission: 2/22/2024   Date of Service: 2/22/2024  Hospital Day: 0      Assessment/Plan   * Fall  Assessment & Plan  73-year-old male patient with past medical history of CHF, COPD, not home O2 dependent, chronic degenerative bone disease, presented to [facility] emergency room likely with multiple complaints.  Seems like to the ED provided patient had complaint of feeling shortness of breath however on my encounter patient reports that the main reason for him to come to the hospital was a fall.  Patient reports having a fall due to amatory dysfunction and currently complaining of right-sided flank pain.  Denies head trauma, loss of consciousness.    CBC noted with mild leukocytosis.  CMP within normal limits.  HS trop negative x 3.   EKG noted with sinus tachycardia.  Chest x-ray report noted negative for acute finding.    COVID-19/flu/RSV negative.  BNP within normal limits  Received IV ceftriaxone, Solu-Medrol in ED.    On my encounter patient appears comfortable not in distress.  Does complain of having right-sided flank pain.  Denies chest pain, dyspnea, fever, chills, nausea, vomiting, abdominal pain, dysuria, diarrhea, any other new illness.  Given his history of fall and him being poor historian we will follow-up with CT head, CT cervical spine, CT chest abdomen pelvis.  Fall precaution.  PT OT eval.  Continue supportive care.            History of CHF (congestive heart failure)  Assessment & Plan  Present on admission history of CHF.  Echo report from 2019 noted with normal EF with grade 1 diastolic function.  Follow-up with repeat echocardiogram.  Currently clinically does not appear to be volume overloaded.  Continue home dose of p.o. Lasix 40 mg daily.      Dyspnea  Assessment & Plan  Patient appears to have multiple complaints.  During initial encounter in emergency room  seems like dyspnea was primary complaint however on my encounter patient reports fall, right-sided flank pain is the reason for him to come to the hospital.    Patient was treated with IV Solu-Medrol, ceftriaxone empirically for possible COPD exacerbation.  On my encounter patient appears comfortable on discharge.  O2 saturation 93 to 95% on room air.  Does not appear to be in distress.  Hold further antibiotics or steroids for now.  Follow-up with CT chest abdomen pelvis.  Continue with supportive care.    Anxiety  Assessment & Plan  Resume home dose of Abilify, trazodone, risperidone.    Diabetes mellitus type 2 in obese   Assessment & Plan  Lab Results   Component Value Date    HGBA1C 6.7 (H) 01/21/2023       Recent Labs     02/22/24  1246 02/22/24  1547   POCGLU 275* 351*       Blood Sugar Average: Last 72 hrs:  (P) 313    Present on admission history of diabetes.  Most recent A1c 6.7 in 01/2023  Follow-up with repeat A1c.  Accu-Cheks noted uncontrolled.    Hold p.o. agents for inpatient pain  Started on lispro 3 units 3 times daily with meals.  Continue with sliding-scale coverage.        Hypertension  Assessment & Plan  Blood pressure currently controlled.  Resume home dose of Lopressor, lisinopril, Lasix.           VTE Prophylaxis: Enoxaparin (Lovenox)     Code Status: Level 1 full code  Discussion with family: called wife but did not answer.     Anticipated Length of Stay:  Patient will be admitted on an Inpatient basis with an anticipated length of stay of  > 2 midnights.   Justification for Hospital Stay: Fall, Dyspnea.     Total Time for Visit, including Counseling / Coordination of Care: 90 minutes.  Greater than 50% of this total time spent on direct patient counseling and coordination of care.    Chief Complaint:   Multiple complaints including mechanical fall, right-sided flank pain, dyspnea.    History of Present Illness:    Lorne Waterman is a 73 y.o. male patient with past medical history of  Parkinson disease, schizophrenia, PTSD, diabetes, hypertension, CHF, COPD, not home O2 dependent, morbid obesity, diabetes, chronic degenerative disc disease, who presents with multiple complaints.  Upon reviewing the chart seems like patient had complained of feeling shortness of breath and was noted hypoxic upon EMS which improved with IV steroids and DuoNebs.  On my encounter patient reports that he had a fall today prior to coming to the hospital and coming hypodensity flank pain.  Patient denies any loss of consciousness or head trauma.  Patient was treated with IV ceftriaxone, Solu-Medrol empirically due to possibly COPD exacerbation as per ED report.  On my encounter patient is not in respite distress.  Saturating well on room air.  Currently his primary complaint appears to be right-sided flank pain and ambulatory dysfunction.  Patient is not the greatest historian.  Currently denies chest pain, dyspnea, fever, chills, nausea, vomiting, dysuria, diarrhea, any other new complaints.  No other events reported.  Former smoker.    Review of Systems:    Review of Systems   Constitutional:  Negative for diaphoresis, fatigue and fever.   HENT:  Negative for congestion.    Eyes:  Negative for visual disturbance.   Respiratory:  Positive for shortness of breath. Negative for cough.    Cardiovascular:  Negative for chest pain and palpitations.   Gastrointestinal:  Positive for abdominal pain. Negative for diarrhea, nausea and vomiting.   Endocrine: Negative for polyuria.   Genitourinary:  Negative for dysuria.   Musculoskeletal:  Positive for arthralgias.   Neurological:  Positive for weakness.   Psychiatric/Behavioral:  Negative for agitation.        Past Medical and Surgical History:     Past Medical History:   Diagnosis Date    Anxiety     CHF (congestive heart failure) (HCC)     COPD (chronic obstructive pulmonary disease) (HCC)     Depression     Diabetes mellitus (HCC)     Enlarged prostate     Hallucination      Hyperlipidemia     Hypertension     Memory loss     Panic attack     Panic disorder     Psychiatric disorder     Psychiatric illness     Psychosis (HCC)     PTSD (post-traumatic stress disorder)     Schizoaffective disorder (HCC)     Schizophrenia (HCC)        History reviewed. No pertinent surgical history.    Meds/Allergies:    Prior to Admission medications    Medication Sig Start Date End Date Taking? Authorizing Provider   ARIPiprazole (ABILIFY) 20 MG tablet Take 1 tablet (20 mg total) by mouth daily 3/30/18  Yes Shweta Coronado PA-C   atorvastatin (LIPITOR) 40 mg tablet Take 1 tablet (40 mg total) by mouth daily with dinner And stop Pravastatin when starting this medication 1/24/23  Yes Niranjan GARCIA MD   docusate sodium (COLACE) 100 mg capsule Take 1 capsule (100 mg total) by mouth 2 (two) times a day as needed for constipation 3/23/18  Yes MACKENZIE Alcala   furosemide (LASIX) 40 mg tablet Take 1 tablet (40 mg total) by mouth daily Do not start before January 25, 2023. 1/25/23  Yes Niranjan GARCIA MD   ipratropium-albuterol (DUO-NEB) 0.5-2.5 mg/3 mL nebulizer solution Take 1 vial (3 mL total) by nebulization 2 (two) times a day 5/14/20  Yes MACKENZIE Burdick   lisinopril (ZESTRIL) 20 mg tablet Take 1 tablet (20 mg total) by mouth daily 5/3/19  Yes Bruna Huynh MD   magnesium oxide (MAG-OX) 400 mg Take 1 tablet (400 mg total) by mouth 2 (two) times a day 4/29/19  Yes Bruna Huynh MD   metFORMIN (GLUCOPHAGE) 500 mg tablet Take 1 tablet (500 mg total) by mouth 2 (two) times a day with meals 3/29/18  Yes Shweta Coronado PA-C   metoprolol tartrate (LOPRESSOR) 25 mg tablet Take 1 tablet (25 mg total) by mouth every 12 (twelve) hours 3/29/18  Yes Shweta Coronado PA-C   risperiDONE (RisperDAL) 1 mg tablet TAKE ONE TABLET BY MOUTH TWICE A DAY FOR MOOD 4/22/20  Yes Historical Provider, MD   senna (SENOKOT) 8.6 mg Take 1 tablet (8.6 mg total) by mouth 2 (two) times a day 10/14/20   Yes MACKENZIE Holbrook   albuterol (ProAir HFA) 90 mcg/act inhaler Inhale 2 puffs every 4 (four) hours as needed for wheezing 4/6/23   Prasanna [facility] MD   aluminum-magnesium hydroxide-simethicone (MYLANTA) 200-200-20 mg/5 mL suspension Take 30 mL by mouth every 6 (six) hours as needed for indigestion or heartburn 10/14/20   MACKENZIE Holbrook   aspirin 81 mg chewable tablet Chew 1 tablet (81 mg total) daily 3/24/18   MACKENZIE Alcala   budesonide-formoterol (SYMBICORT) 160-4.5 mcg/act inhaler INHALE 2 PUFFS BY MOUTH TWICE A DAY *RINSE MOUTH WITH WATER AND SPIT*  Patient not taking: Reported on 1/21/2023 7/1/20   Historical Provider, MD   meloxicam (MOBIC) 15 mg tablet Take 1 tablet (15 mg total) by mouth daily  Patient not taking: Reported on 1/21/2023 8/24/21   Carson Prado MD   methocarbamol (ROBAXIN) 750 mg tablet Take 1 tablet (750 mg total) by mouth every 6 (six) hours as needed for muscle spasms (back pain/initial rx.) for up to 5 days 5/4/22 5/9/22  Austin Sharma PA-C   Multiple Vitamins-Iron (MULTIVITAMIN/IRON PO) TAKE ONE CAP/TAB BY MOUTH EVERY MORNING  Patient not taking: Reported on 2/22/2024 6/8/20   Historical Provider, MD   oxyCODONE-acetaminophen (PERCOCET) 5-325 mg per tablet Take 1 tablet by mouth every 6 (six) hours as needed for severe pain (back pain/ongoing rx.) Label no driving no etoh. Initial rx.  Dx: Max Daily Amount: 4 tablets  Patient not taking: Reported on 1/21/2023 5/4/22   Austin Sharma PA-C   polyethylene glycol (MIRALAX) 17 g packet Take 17 g by mouth daily as needed (constipation)  Patient not taking: Reported on 2/22/2024 3/23/18   MACKENZIE Alcala   traZODone (DESYREL) 100 mg tablet Take 100 mg by mouth  Patient not taking: Reported on 1/21/2023    Historical Provider, MD     I have reviewed home medications with a medical source (PCP, Pharmacy, other).    Allergies: No Known Allergies    Social History:     Marital Status: /Civil Union  "    Substance Use History:   Social History     Substance and Sexual Activity   Alcohol Use Never     Social History     Tobacco Use   Smoking Status Former    Current packs/day: 0.00    Average packs/day: 2.0 packs/day for 0.5 years (1.0 ttl pk-yrs)    Types: Cigarettes    Start date: 1992    Quit date:     Years since quittin.1   Smokeless Tobacco Never   Tobacco Comments    quit in      Social History     Substance and Sexual Activity   Drug Use Never       Family History:    History reviewed. No pertinent family history.    Physical Exam:     Vitals:   Blood Pressure: 134/76 (24 1540)  Pulse: 99 (24 1540)  Temperature: 99 °F (37.2 °C) (24 1540)  Respirations: 19 (24 1540)  Height: 5' 10\" (177.8 cm) (24 1500)  Weight - Scale: 130 kg (286 lb 9.6 oz) (24 1500)  SpO2: 93 % (24 1540)    Physical Exam  Constitutional:       General: He is not in acute distress.     Appearance: Normal appearance. He is obese. He is not ill-appearing, toxic-appearing or diaphoretic.      Comments: Morbidly obese male patient acutely nontoxic appearing.   HENT:      Head: Normocephalic and atraumatic.   Eyes:      Pupils: Pupils are equal, round, and reactive to light.   Cardiovascular:      Rate and Rhythm: Normal rate.      Pulses: Normal pulses.   Pulmonary:      Effort: Pulmonary effort is normal. No respiratory distress.      Breath sounds: No rales.      Comments: O2 saturation 93 to 95% on room air.  Abdominal:      General: Bowel sounds are normal. There is distension.      Palpations: Abdomen is soft.      Tenderness: There is right CVA tenderness.   Musculoskeletal:      Right lower leg: No edema.      Left lower leg: No edema.   Neurological:      Mental Status: He is alert and oriented to person, place, and time.       Additional Data:     Lab Results: I have personally reviewed pertinent reports.      Results from last 7 days   Lab Units 24  0955   WBC " Thousand/uL 13.55*   HEMOGLOBIN g/dL 12.0   HEMATOCRIT % 37.9   PLATELETS Thousands/uL 132*   NEUTROS PCT % 76*   LYMPHS PCT % 17   MONOS PCT % 6   EOS PCT % 0     Results from last 7 days   Lab Units 02/22/24  0955   SODIUM mmol/L 136   POTASSIUM mmol/L 3.8   CHLORIDE mmol/L 103   CO2 mmol/L 22   BUN mg/dL 19   CREATININE mg/dL 1.18   ANION GAP mmol/L 11   CALCIUM mg/dL 9.6   ALBUMIN g/dL 4.3   TOTAL BILIRUBIN mg/dL 0.68   ALK PHOS U/L 47   ALT U/L 25   AST U/L 22   GLUCOSE RANDOM mg/dL 226*     Results from last 7 days   Lab Units 02/22/24  0955   INR  1.06     Results from last 7 days   Lab Units 02/22/24  1547 02/22/24  1246   POC GLUCOSE mg/dl 351* 275*         Results from last 7 days   Lab Units 02/22/24  0955   LACTIC ACID mmol/L 1.9       Imaging: I have personally reviewed pertinent reports.      XR chest 1 view portable   Final Result by Rafael Boucher DO (02/22 1547)      No acute cardiopulmonary disease.            Workstation performed: [id number]             EKG, Pathology, and Other Studies Reviewed on Admission:   EKG: Sinus tachycardia.    ** Please Note: This note has been constructed using a voice recognition system. **

## 2024-02-22 NOTE — ASSESSMENT & PLAN NOTE
Patient appears to have multiple complaints.  During initial encounter in emergency room seems like dyspnea was primary complaint however on my encounter patient reports fall, right-sided flank pain is the reason for him to come to the hospital.    Patient was treated with IV Solu-Medrol, ceftriaxone empirically for possible COPD exacerbation.  On my encounter patient appears comfortable on discharge.  O2 saturation 93 to 95% on room air.  Does not appear to be in distress.  Hold further antibiotics or steroids for now.  Follow-up with CT chest abdomen pelvis.  Continue with supportive care.

## 2024-02-22 NOTE — RESPIRATORY THERAPY NOTE
RT Protocol Note  Lorne Waterman 73 y.o. male MRN: [id number]  Unit/Bed#: -01 Encounter: [id number]    Assessment    Principal Problem:    Fall  Active Problems:    Hypertension    Diabetes mellitus type 2 in obese     Anxiety    Dyspnea    History of CHF (congestive heart failure)      Home Pulmonary Medications:     02/22/24 9641   Respiratory Protocol   Protocol Initiated? Yes   Protocol Selection Respiratory   Language Barrier? No   Medical & Social History Reviewed? Yes   Diagnostic Studies Reviewed? Yes   Physical Assessment Performed? Yes   Respiratory Plan Home Bronchodilator Patient pathway   Respiratory Assessment   Assessment Type Assess only   General Appearance Alert;Awake   Respiratory Pattern Normal   Chest Assessment Chest expansion symmetrical   Bilateral Breath Sounds Diminished   Resp Comments patient presents to ed with sob, hx copd chf, no home O2 or CPAP/BIpap, bbs diminished with few crackles in bases, patient states takes albuterol mdi 4 times a day at home, will resume home regimen, spo2 91% 2L NC   O2 Device nc   Additional Assessments   Pulse 97   SpO2 91 %            Past Medical History:   Diagnosis Date    Anxiety     CHF (congestive heart failure) (HCC)     COPD (chronic obstructive pulmonary disease) (HCC)     Depression     Diabetes mellitus (HCC)     Enlarged prostate     Hallucination     Hyperlipidemia     Hypertension     Memory loss     Panic attack     Panic disorder     Psychiatric disorder     Psychiatric illness     Psychosis (HCC)     PTSD (post-traumatic stress disorder)     Schizoaffective disorder (HCC)     Schizophrenia (HCC)      Social History     Socioeconomic History    Marital status: /Civil Union     Spouse name: None    Number of children: None    Years of education: None    Highest education level: None   Occupational History    None   Tobacco Use    Smoking status: Former     Current packs/day: 0.00     Average packs/day: 2.0 packs/day for 0.5  "years (1.0 ttl pk-yrs)     Types: Cigarettes     Start date: 1992     Quit date:      Years since quittin.1    Smokeless tobacco: Never    Tobacco comments:     quit in    Vaping Use    Vaping status: Never Used   Substance and Sexual Activity    Alcohol use: Never    Drug use: Never    Sexual activity: Never   Other Topics Concern    None   Social History Narrative    None     Social Determinants of Health     Financial Resource Strain: Not on file   Food Insecurity: No Food Insecurity (2024)    Hunger Vital Sign     Worried About Running Out of Food in the Last Year: Never true     Ran Out of Food in the Last Year: Never true   Transportation Needs: No Transportation Needs (2024)    PRAPARE - Transportation     Lack of Transportation (Medical): No     Lack of Transportation (Non-Medical): No   Physical Activity: Not on file   Stress: Not on file   Social Connections: Not on file   Intimate Partner Violence: Not on file   Housing Stability: Unknown (2024)    Housing Stability Vital Sign     Unable to Pay for Housing in the Last Year: No     Number of Places Lived in the Last Year: Not on file     Unstable Housing in the Last Year: No       Subjective         Objective    Physical Exam:   Assessment Type: Assess only  General Appearance: Alert, Awake  Respiratory Pattern: Normal  Chest Assessment: Chest expansion symmetrical  Bilateral Breath Sounds: Diminished  O2 Device: nc    Vitals:  Blood pressure 134/76, pulse 97, temperature 99 °F (37.2 °C), resp. rate 19, height 5' 10\" (1.778 m), weight 130 kg (286 lb 9.6 oz), SpO2 91%.          Imaging and other studies: I have personally reviewed pertinent reports.      O2 Device: nc     Plan    Respiratory Plan: Home Bronchodilator Patient pathway        Resp Comments: patient presents to ed with sob, hx copd chf, no home O2 or CPAP/BIpap, bbs diminished with few crackles in bases, patient states takes albuterol mdi 4 times a day at home, " will resume home regimen, spo2 91% 2L NC

## 2024-02-22 NOTE — ASSESSMENT & PLAN NOTE
Present on admission history of CHF.  Echo report from 2019 noted with normal EF with grade 1 diastolic function.  Follow-up with repeat echocardiogram.  Currently clinically does not appear to be volume overloaded.  Continue home dose of p.o. Lasix 40 mg daily.

## 2024-02-23 ENCOUNTER — APPOINTMENT (INPATIENT)
Dept: NON INVASIVE DIAGNOSTICS | Facility: HOSPITAL | Age: 74
DRG: 190 | End: 2024-02-23
Payer: COMMERCIAL

## 2024-02-23 LAB
ANION GAP SERPL CALCULATED.3IONS-SCNC: 9 MMOL/L
AORTIC ROOT: 3.9 CM
APICAL FOUR CHAMBER EJECTION FRACTION: 55 %
ASCENDING AORTA: 4 CM
BSA FOR ECHO PROCEDURE: 2.43 M2
BUN SERPL-MCNC: 23 MG/DL (ref 5–25)
CALCIUM SERPL-MCNC: 8.9 MG/DL (ref 8.4–10.2)
CHLORIDE SERPL-SCNC: 103 MMOL/L (ref 96–108)
CO2 SERPL-SCNC: 23 MMOL/L (ref 21–32)
CREAT SERPL-MCNC: 1.04 MG/DL (ref 0.6–1.3)
E WAVE DECELERATION TIME: 176 MS
E/A RATIO: 0.59
ERYTHROCYTE [DISTWIDTH] IN BLOOD BY AUTOMATED COUNT: 13.6 % (ref 11.6–15.1)
FRACTIONAL SHORTENING: 27 (ref 28–44)
GFR SERPL CREATININE-BSD FRML MDRD: 69 ML/MIN/1.73SQ M
GLUCOSE SERPL-MCNC: 221 MG/DL (ref 65–140)
GLUCOSE SERPL-MCNC: 231 MG/DL (ref 65–140)
GLUCOSE SERPL-MCNC: 233 MG/DL (ref 65–140)
GLUCOSE SERPL-MCNC: 317 MG/DL (ref 65–140)
GLUCOSE SERPL-MCNC: 323 MG/DL (ref 65–140)
HCT VFR BLD AUTO: 34.4 % (ref 36.5–49.3)
HGB BLD-MCNC: 10.9 G/DL (ref 12–17)
INTERVENTRICULAR SEPTUM IN DIASTOLE (PARASTERNAL SHORT AXIS VIEW): 1.4 CM
INTERVENTRICULAR SEPTUM: 1.4 CM (ref 0.6–1.1)
LAAS-AP2: 19.4 CM2
LAAS-AP4: 17.5 CM2
LEFT ATRIUM AREA SYSTOLE SINGLE PLANE A4C: 17.3 CM2
LEFT ATRIUM SIZE: 3.7 CM
LEFT ATRIUM VOLUME (MOD BIPLANE): 47 ML
LEFT ATRIUM VOLUME INDEX (MOD BIPLANE): 19.3 ML/M2
LEFT INTERNAL DIMENSION IN SYSTOLE: 3.2 CM (ref 2.1–4)
LEFT VENTRICULAR INTERNAL DIMENSION IN DIASTOLE: 4.4 CM (ref 3.5–6)
LEFT VENTRICULAR POSTERIOR WALL IN END DIASTOLE: 1.4 CM
LEFT VENTRICULAR STROKE VOLUME: 49 ML
LVSV (TEICH): 49 ML
MCH RBC QN AUTO: 23.6 PG (ref 26.8–34.3)
MCHC RBC AUTO-ENTMCNC: 31.7 G/DL (ref 31.4–37.4)
MCV RBC AUTO: 75 FL (ref 82–98)
MV E'TISSUE VEL-SEP: 6 CM/S
MV PEAK A VEL: 1.05 M/S
MV PEAK E VEL: 62 CM/S
MV STENOSIS PRESSURE HALF TIME: 51 MS
MV VALVE AREA P 1/2 METHOD: 4.31
PLATELET # BLD AUTO: 126 THOUSANDS/UL (ref 149–390)
POTASSIUM SERPL-SCNC: 4.3 MMOL/L (ref 3.5–5.3)
RBC # BLD AUTO: 4.62 MILLION/UL (ref 3.88–5.62)
RIGHT ATRIUM AREA SYSTOLE A4C: 11.6 CM2
RIGHT VENTRICLE ID DIMENSION: 2.8 CM
SL CV LEFT ATRIUM LENGTH A2C: 6 CM
SL CV LV EF: 55
SL CV PED ECHO LEFT VENTRICLE DIASTOLIC VOLUME (MOD BIPLANE) 2D: 90 ML
SL CV PED ECHO LEFT VENTRICLE SYSTOLIC VOLUME (MOD BIPLANE) 2D: 40 ML
SODIUM SERPL-SCNC: 135 MMOL/L (ref 135–147)
TRICUSPID ANNULAR PLANE SYSTOLIC EXCURSION: 2.1 CM
WBC # BLD AUTO: 12.18 THOUSAND/UL (ref 4.31–10.16)

## 2024-02-23 PROCEDURE — 80048 BASIC METABOLIC PNL TOTAL CA: CPT | Performed by: STUDENT IN AN ORGANIZED HEALTH CARE EDUCATION/TRAINING PROGRAM

## 2024-02-23 PROCEDURE — 85027 COMPLETE CBC AUTOMATED: CPT | Performed by: STUDENT IN AN ORGANIZED HEALTH CARE EDUCATION/TRAINING PROGRAM

## 2024-02-23 PROCEDURE — 93306 TTE W/DOPPLER COMPLETE: CPT

## 2024-02-23 PROCEDURE — 97167 OT EVAL HIGH COMPLEX 60 MIN: CPT

## 2024-02-23 PROCEDURE — 93306 TTE W/DOPPLER COMPLETE: CPT | Performed by: INTERNAL MEDICINE

## 2024-02-23 PROCEDURE — 82948 REAGENT STRIP/BLOOD GLUCOSE: CPT

## 2024-02-23 PROCEDURE — 94760 N-INVAS EAR/PLS OXIMETRY 1: CPT

## 2024-02-23 PROCEDURE — 99232 SBSQ HOSP IP/OBS MODERATE 35: CPT | Performed by: INTERNAL MEDICINE

## 2024-02-23 PROCEDURE — 97163 PT EVAL HIGH COMPLEX 45 MIN: CPT

## 2024-02-23 PROCEDURE — 94664 DEMO&/EVAL PT USE INHALER: CPT

## 2024-02-23 RX ORDER — ALBUTEROL SULFATE 90 UG/1
2 AEROSOL, METERED RESPIRATORY (INHALATION) EVERY 4 HOURS PRN
Status: DISCONTINUED | OUTPATIENT
Start: 2024-02-23 | End: 2024-02-26 | Stop reason: HOSPADM

## 2024-02-23 RX ADMIN — INSULIN LISPRO 3 UNITS: 100 INJECTION, SOLUTION INTRAVENOUS; SUBCUTANEOUS at 22:03

## 2024-02-23 RX ADMIN — Medication 400 MG: at 10:15

## 2024-02-23 RX ADMIN — INSULIN LISPRO 1 UNITS: 100 INJECTION, SOLUTION INTRAVENOUS; SUBCUTANEOUS at 17:02

## 2024-02-23 RX ADMIN — LISINOPRIL 20 MG: 20 TABLET ORAL at 10:16

## 2024-02-23 RX ADMIN — Medication 400 MG: at 17:03

## 2024-02-23 RX ADMIN — METOPROLOL TARTRATE 25 MG: 25 TABLET, FILM COATED ORAL at 22:05

## 2024-02-23 RX ADMIN — FUROSEMIDE 40 MG: 40 TABLET ORAL at 10:16

## 2024-02-23 RX ADMIN — ARIPIPRAZOLE 20 MG: 5 TABLET ORAL at 10:15

## 2024-02-23 RX ADMIN — ASPIRIN 81 MG: 81 TABLET, CHEWABLE ORAL at 10:17

## 2024-02-23 RX ADMIN — ATORVASTATIN CALCIUM 40 MG: 40 TABLET, FILM COATED ORAL at 17:02

## 2024-02-23 RX ADMIN — INSULIN LISPRO 3 UNITS: 100 INJECTION, SOLUTION INTRAVENOUS; SUBCUTANEOUS at 17:01

## 2024-02-23 RX ADMIN — INSULIN LISPRO 3 UNITS: 100 INJECTION, SOLUTION INTRAVENOUS; SUBCUTANEOUS at 11:59

## 2024-02-23 RX ADMIN — RISPERIDONE 1 MG: 1 TABLET ORAL at 10:16

## 2024-02-23 RX ADMIN — SENNOSIDES 8.6 MG: 8.6 TABLET, FILM COATED ORAL at 10:16

## 2024-02-23 RX ADMIN — INSULIN LISPRO 3 UNITS: 100 INJECTION, SOLUTION INTRAVENOUS; SUBCUTANEOUS at 07:42

## 2024-02-23 RX ADMIN — ALBUTEROL SULFATE 2 PUFF: 90 AEROSOL, METERED RESPIRATORY (INHALATION) at 12:00

## 2024-02-23 RX ADMIN — ALBUTEROL SULFATE 2 PUFF: 90 AEROSOL, METERED RESPIRATORY (INHALATION) at 10:17

## 2024-02-23 RX ADMIN — SENNOSIDES 8.6 MG: 8.6 TABLET, FILM COATED ORAL at 17:02

## 2024-02-23 RX ADMIN — ENOXAPARIN SODIUM 40 MG: 40 INJECTION SUBCUTANEOUS at 10:15

## 2024-02-23 RX ADMIN — INSULIN LISPRO 2 UNITS: 100 INJECTION, SOLUTION INTRAVENOUS; SUBCUTANEOUS at 11:59

## 2024-02-23 RX ADMIN — RISPERIDONE 1 MG: 1 TABLET ORAL at 17:02

## 2024-02-23 RX ADMIN — ALBUTEROL SULFATE 2 PUFF: 90 AEROSOL, METERED RESPIRATORY (INHALATION) at 22:08

## 2024-02-23 RX ADMIN — METOPROLOL TARTRATE 25 MG: 25 TABLET, FILM COATED ORAL at 10:16

## 2024-02-23 NOTE — PHYSICAL THERAPY NOTE
Physical Therapy Evaluation     Patient's Name: Lorne Waterman    Admitting Diagnosis  CHF (congestive heart failure) (Formerly [facility]) [I50.9]  Pneumonia [J18.9]  SOB (shortness of breath) [R06.02]  Hypoxia [R09.02]  COPD with acute exacerbation (Formerly [facility]) [J44.1]    Problem List  Patient Active Problem List   Diagnosis    Other chest pain    Schizophrenia (HCC)    Hypertension    Hyperlipidemia    Diabetes mellitus type 2 in obese     Constipated    Anxiety    Chronic post-traumatic stress disorder (PTSD)    Tardive dyskinesia    Encounter for examination and observation for other specified reasons    Near syncope    Elevated troponin    Chronic combined systolic and diastolic congestive heart failure (HCC)    Osteoarthritis of both knees    Acute pulmonary insufficiency    Dyspnea    Other emphysema (HCC)    Essential hypertension    History of CHF (congestive heart failure)    Morbid obesity due to excess calories (HCC)    Type 2 diabetes mellitus without complication, without long-term current use of insulin (HCC)    Chronic left-sided low back pain without sciatica    Left leg injury    Parkinson disease    Fall       Past Medical History  Past Medical History:   Diagnosis Date    Anxiety     CHF (congestive heart failure) (HCC)     COPD (chronic obstructive pulmonary disease) (HCC)     Depression     Diabetes mellitus (HCC)     Enlarged prostate     Hallucination     Hyperlipidemia     Hypertension     Memory loss     Panic attack     Panic disorder     Psychiatric disorder     Psychiatric illness     Psychosis (HCC)     PTSD (post-traumatic stress disorder)     Schizoaffective disorder (HCC)     Schizophrenia (HCC)        Past Surgical History  History reviewed. No pertinent surgical history.       02/23/24 0822   PT Last Visit   PT Visit Date 02/23/24   Note Type   Note type Evaluation   Pain Assessment   Pain Assessment Tool 0-10   Pain Score No Pain  (none currently, reported R side pain when laying on that side)    Restrictions/Precautions   Weight Bearing Precautions Per Order No   Other Precautions Chair Alarm;Bed Alarm;O2;Fall Risk  (3L O2 NC)   Home Living   Type of Home House   Home Layout Multi-level;Ramped entrance;Performs ADLs on one level;Bed/bath upstairs;1/2 bath on main level  (stair glide to 2nd floor)   Bathroom Shower/Tub Walk-in shower   Bathroom Toilet Raised   Bathroom Equipment Grab bars in shower;Shower chair;Built-in shower seat   Bathroom Accessibility Accessible   Home Equipment Quad cane;Walker;Electric scooter  (QC at baseline)   Additional Comments no home O2   Prior Function   Level of [place] Independent with functional mobility;Needs assistance with ADLs;Needs assistance with IADLS   Lives With Spouse;Family   Receives Help From Family;Friend(s)   IADLs Family/Friend/Other provides transportation;Family/Friend/Other provides meals;Independent with medication management   Falls in the last 6 months 1 to 4  (2 falls)   Vocational Retired   General   Family/Caregiver Present No   Cognition   Overall Cognitive Status Impaired   Arousal/Participation Alert   Attention Attends with cues to redirect   Orientation Level Oriented to person;Oriented to place;Oriented to situation   Memory Decreased short term memory   Following Commands Follows one step commands with increased time or repetition   RLE Assessment   RLE Assessment   (grossly 3+/5 observed with functional mobility)   LLE Assessment   LLE Assessment   (grossly 3+/5 observed with functional mobility)   Vision-Basic Assessment   Current Vision Wears glasses only for reading   Coordination   Sensation X  (intermittent neuropathy hands and feet)   Light Touch   RLE Light Touch Impaired   LLE Light Touch Impaired   Bed Mobility   Supine to Sit 3  Moderate assistance   Additional items Assist x 1;HOB elevated;Bedrails;Increased time required;Verbal cues;LE management   Additional Comments Pt reported (+) dizziness upin sitting at EOB. Symptoms  resolved with increased seated rest.   Transfers   Sit to Stand 4  Minimal assistance   Additional items Assist x 2;Increased time required;Verbal cues;Bedrails   Stand to Sit 4  Minimal assistance   Additional items Assist x 2;Armrests;Increased time required;Verbal cues   Ambulation/Elevation   Gait pattern Improper Weight shift;Decreased foot clearance;Forward Flexion;Wide HAO;Short stride;Step to;Shuffling;Knees flexed;Festenating  (knee buckling, magnetic gait)   Gait Assistance 4  Minimal assist   Additional items Assist x 2;Verbal cues;Tactile cues   Assistive Device Bariatric Rolling walker   Distance 3' from EOB to recliner   Balance   Static Sitting Fair +   Dynamic Sitting Fair   Static Standing Fair -   Dynamic Standing Poor +   Ambulatory Poor   Endurance Deficit   Endurance Deficit Yes   Activity Tolerance   Activity Tolerance Patient limited by fatigue   Medical Staff Made Aware Pt seen as a co-eval with OT due to the patient's co-morbidities, clinically unstable presentation, and present impairments which are a regression from the patient's baseline.   Nurse Made Aware RADU James   Assessment   Prognosis Good   Problem List Decreased strength;Decreased endurance;Impaired balance;Decreased mobility;Impaired sensation;Obesity;Pain   Assessment Pt is 73 y.o. male seen for PT evaluation on 2/23/2024 s/p admit to [facility] on 2/22/2024 w/ Fall. PT was consulted to assess pt's functional mobility and d/c needs. Order placed for PT eval and tx. PTA, pt resides with wife and family in 2SH with ramped entrance/ stair glide to 2nd floor; +fall history. At time of eval, pt requiring mod A For bed mobility, min a x2 for transfers and short gait trial from EOB to recliner. Upon evaluation, pt presenting with impaired functional mobility d/t decreased strength, decreased endurance, impaired balance, decreased mobility, impaired sensation, obesity c BMI of 41.12 kg/m2, pain, and activity intolerance.  Pertinent PMHx and current co-morbidities affecting pt's physical performance at time of assessment include: fall, HTN, DM type 2, anxiety, dyspnea, schizophrenia, tardive dyskinesia, near syncope, emphysema, morbid obesity, Parkinson disease, PTSD. Personal factors affecting pt at time of eval include: ambulating w/ assistive device, inability to ambulate household distances, inability to navigate level surfaces w/o external assistance, and positive fall history. The following objective measures performed on IE also reveal limitations: Barthel Index: 40/100, Modified Armando: 4 (moderate/severe disability), and AM-PAC 6-Clicks: 10/24. Pt's clinical presentation is currently unstable/unpredictable seen in pt's presentation of abnormal lab value(s) and ongoing medical assessment. Overall, pt's rehab potential and prognosis to return to PLOF is good as impacted by objective findings, warranting pt to receive further skilled PT interventions to address identified impairments, activity limitation(s), and participation restriction(s). Pt to benefit from continued PT tx to address deficits as defined above and maximize level of functional independent mobility. From PT/mobility standpoint, recommend level 2, moderate resource intensity in order to facilitate return to PLOF.   Barriers to Discharge Inaccessible home environment;Decreased caregiver support   Goals   Patient Goals to get better   STG Expiration Date 03/04/24   Short Term Goal #1 In 7-10 days: Increase bilateral LE strength 1/2 grade to facilitate independent mobility, Perform all bed mobility tasks with min A of 1 to decrease caregiver burden, Perform all transfers with min A of 1 to improve independence, Ambulate > 50 ft. with least restrictive assistive device with min A of 1 w/o LOB and w/ normalized gait pattern 100% of the time, Increase all balance 1/2 grade to decrease risk for falls, and Tolerate 4 hr OOB to faciliate upright tolerance   PT Treatment  Day 0   Plan   Treatment/Interventions Functional transfer training;LE strengthening/ROM;Therapeutic exercise;Endurance training;Patient/family training;Equipment eval/education;Bed mobility;Gait training;Spoke to nursing   PT Frequency 3-5x/wk   Discharge Recommendation   Rehab Resource Intensity Level, PT II (Moderate Resource Intensity)   AM-PAC Basic Mobility Inpatient   Turning in Flat Bed Without Bedrails 2   Lying on Back to Sitting on Edge of Flat Bed Without Bedrails 2   Moving Bed to Chair 2   Standing Up From Chair Using Arms 2   Walk in Room 1   Climb 3-5 Stairs With Railing 1   Basic Mobility Inpatient Raw Score 10   Turning Head Towards Sound 4   Follow Simple Instructions 4   Low Function Basic Mobility Raw Score  18   Low Function Basic Mobility Standardized Score  29.25   Highest Level Of Mobility   JH-HLM Goal 4: Move to chair/commode   JH-HLM Achieved 4: Move to chair/commode   Modified Armando Scale   Modified [place] Scale 4   Barthel Index   Feeding 10   Bathing 0   Grooming Score 0   Dressing Score 5   Bladder Score 5   Bowels Score 10   Toilet Use Score 5   Transfers (Bed/Chair) Score 5   Mobility (Level Surface) Score 0   Stairs Score 0   Barthel Index Score 40         Bonnie Womack, PT, DPT

## 2024-02-23 NOTE — PROGRESS NOTES
[facility]  Progress Note  Name: Lorne Waterman I  MRN: [id number]  Unit/Bed#: [facility]-01 I Date of Admission: 2024   Date of Service: 2024 I Hospital Day: 1    Assessment/Plan   * Fall  Assessment & Plan  73-year-old male patient with past medical history of CHF, COPD, not home O2 dependent, chronic degenerative bone disease, history presented after a fall  PT/OT eval  For possible placement  Follow-up with case management      History of CHF (congestive heart failure)  Assessment & Plan  Remains euvolemic  Continue oral Lasix    Anxiety  Assessment & Plan  Continue Abilify, trazodone, risperidone.    Diabetes mellitus type 2 in obese   Assessment & Plan  Hold home p.o. agents  Sliding-scale insulin  Monitor blood glucose      Hypertension  Assessment & Plan  Blood pressure controlled  Continue lisinopril, Lasix, beta-blocker           VTE Pharmacologic Prophylaxis:   Pharmacologic: Enoxaparin (Lovenox)  Mechanical VTE Prophylaxis in Place: Yes    Patient Centered Rounds: I have performed bedside rounds with nursing staff today.    Discussions with Specialists or Other Care Team Provider: cm, nursing    Education and Discussions with Family / Patient: pt    Time Spent for Care: 30 minutes.  More than 50% of total time spent on counseling and coordination of care as described above.    Current Length of Stay: 1 day(s)    Current Patient Status: Inpatient   Certification Statement: The patient will continue to require additional inpatient hospital stay due to see below    Discharge Plan: Medically clear awaiting placement    Code Status: Level 1 - Full Code      Subjective:   Denies chest pain, shortness of breath, cough, fevers,    Objective:     Vitals:   Temp (24hrs), Av.7 °F (37.1 °C), Min:98.6 °F (37 °C), Max:99 °F (37.2 °C)    Temp:  [98.6 °F (37 °C)-99 °F (37.2 °C)] 98.6 °F (37 °C)  HR:  [] 83  Resp:  [16-29] 16  BP: (123-156)/(60-79) 123/78  SpO2:  [90 %-95 %]  92 %  Body mass index is 41.12 kg/m².     Input and Output Summary (last 24 hours):       Intake/Output Summary (Last 24 hours) at 2/23/2024 1041  Last data filed at 2/23/2024 1001  Gross per 24 hour   Intake 50 ml   Output 1250 ml   Net -1200 ml       Physical Exam:     Physical Exam  Constitutional:       General: He is not in acute distress.     Appearance: He is well-developed. He is not diaphoretic.   HENT:      Head: Normocephalic and atraumatic.      Nose: Nose normal.      Mouth/Throat:      Pharynx: No oropharyngeal exudate.   Eyes:      General: No scleral icterus.     Conjunctiva/sclera: Conjunctivae normal.   Cardiovascular:      Rate and Rhythm: Normal rate and regular rhythm.      Heart sounds: Normal heart sounds. No murmur heard.     No friction rub. No gallop.   Pulmonary:      Effort: Pulmonary effort is normal. No respiratory distress.      Breath sounds: Normal breath sounds. No wheezing or rales.   Chest:      Chest wall: No tenderness.   Abdominal:      General: Bowel sounds are normal. There is no distension.      Palpations: Abdomen is soft.      Tenderness: There is no abdominal tenderness. There is no guarding.   Musculoskeletal:         General: No tenderness or deformity. Normal range of motion.      Cervical back: Normal range of motion and neck supple.   Skin:     General: Skin is warm and dry.      Findings: No erythema.   Neurological:      Mental Status: He is alert. Mental status is at baseline.           Additional Data:     Labs:    Results from last 7 days   Lab Units 02/23/24  0514 02/22/24  0955   WBC Thousand/uL 12.18* 13.55*   HEMOGLOBIN g/dL 10.9* 12.0   HEMATOCRIT % 34.4* 37.9   PLATELETS Thousands/uL 126* 132*   NEUTROS PCT %  --  76*   LYMPHS PCT %  --  17   MONOS PCT %  --  6   EOS PCT %  --  0     Results from last 7 days   Lab Units 02/23/24  0514 02/22/24  0955   SODIUM mmol/L 135 136   POTASSIUM mmol/L 4.3 3.8   CHLORIDE mmol/L 103 103   CO2 mmol/L 23 22   BUN mg/dL  23 19   CREATININE mg/dL 1.04 1.18   ANION GAP mmol/L 9 11   CALCIUM mg/dL 8.9 9.6   ALBUMIN g/dL  --  4.3   TOTAL BILIRUBIN mg/dL  --  0.68   ALK PHOS U/L  --  47   ALT U/L  --  25   AST U/L  --  22   GLUCOSE RANDOM mg/dL 323* 226*     Results from last 7 days   Lab Units 02/22/24  0955   INR  1.06     Results from last 7 days   Lab Units 02/23/24  0546 02/22/24  2051 02/22/24  1547 02/22/24  1246   POC GLUCOSE mg/dl 317* 397* 351* 275*     Results from last 7 days   Lab Units 02/22/24  0955   HEMOGLOBIN A1C % 8.7*     Results from last 7 days   Lab Units 02/22/24  0955   LACTIC ACID mmol/L 1.9           * I Have Reviewed All Lab Data Listed Above.  * Additional Pertinent Lab Tests Reviewed: All Labs Within Last 24 Hours Reviewed    Imaging:    Imaging Reports Reviewed Today Include: na  Imaging Personally Reviewed by Myself Includes:  na    Recent Cultures (last 7 days):     Results from last 7 days   Lab Units 02/22/24  0957 02/22/24  0956   BLOOD CULTURE  Received in Microbiology Lab. Culture in Progress. Received in Microbiology Lab. Culture in Progress.       Last 24 Hours Medication List:   Current Facility-Administered Medications   Medication Dose Route Frequency Provider Last Rate    albuterol  2 puff Inhalation 4x Daily Niranjan GARCIA MD      aluminum-magnesium hydroxide-simethicone  30 mL Oral Q6H PRN Niranjan GARCIA MD      ARIPiprazole  20 mg Oral Daily Niranjan GARCIA MD      aspirin  81 mg Oral Daily Niranjan GARCIA MD      atorvastatin  40 mg Oral Daily With Dinner Niranjan GARCIA MD      docusate sodium  100 mg Oral BID PRN Niranjan GARCIA MD      enoxaparin  40 mg Subcutaneous Daily Niranjan GARCIA MD      furosemide  40 mg Oral Daily Niranjan GARCIA MD      insulin lispro  1-5 Units Subcutaneous TID AC Niranjan GARCIA MD      insulin lispro  1-6 Units Subcutaneous HS Lnea Mcghee PA-C      insulin lispro  3 Units Subcutaneous TID With Meals Niranjan GARCIA MD      lisinopril  20 mg  Oral Daily Niranjan GARCIA MD      magnesium Oxide  400 mg Oral BID Niranjan GARCIA MD      methocarbamol  750 mg Oral Q6H PRN Niranjan GARCIA MD      metoprolol tartrate  25 mg Oral Q12H TREVOR Niranjan GARCIA MD      risperiDONE  1 mg Oral BID Niranjan GARCIA MD      senna  8.6 mg Oral BID Niranjan GARCIA MD          Today, Patient Was Seen By: Cole Newton MD    ** Please Note: Dictation voice to text software may have been used in the creation of this document. **

## 2024-02-23 NOTE — CASE MANAGEMENT
Case Management Progress Note    Patient name Lorne Waterman  Location /-01 MRN [id number]  : 1950 Date 2024       LOS (days): 1  Geometric Mean LOS (GMLOS) (days): 2.7  Days to GMLOS:1.6        OBJECTIVE:        Current admission status: Inpatient  Preferred Pharmacy:   [facility] [place] - [address]  [address]  [place] [address]  Phone: [phone] Fax: [phone]    [id number] - [place] - [address]  [place] [address]  Phone: [phone] Fax: [phone]    Primary Care Provider: Alysa Santoro MD    Primary Insurance: [facility]  Secondary Insurance: MEDICARE    PROGRESS NOTE:    STR referral fax to [facility] [phone] Fax # [phone], [facility] [phone] Fax#[phone] and  [facility] [phone] Fax # [phone]. No update on bed availability or acceptance yet. ANAHY [facility] at [phone] who is aware and will give auth once facility is available and offers a bed. ANAHY continues to follow and assist with pt dc plans.

## 2024-02-23 NOTE — ASSESSMENT & PLAN NOTE
73-year-old male patient with past medical history of CHF, COPD, not home O2 dependent, chronic degenerative bone disease, history presented after a fall  PT/OT eval  For possible placement  Follow-up with case management

## 2024-02-23 NOTE — CASE MANAGEMENT
Case Management Assessment & Discharge Planning Note    Patient name Lorne Waterman  Location /-01 MRN [id number]  : 1950 Date 2024       Current Admission Date: 2024  Current Admission Diagnosis:Fall   Patient Active Problem List    Diagnosis Date Noted    Fall 2024    Parkinson disease 2023    Type 2 diabetes mellitus without complication, without long-term current use of insulin (HCC) 2023    Chronic left-sided low back pain without sciatica 2023    Left leg injury 2023    Morbid obesity due to excess calories (HCC) 10/09/2020    Dyspnea 10/07/2020    Other emphysema (HCC) 10/07/2020    Essential hypertension 10/07/2020    History of CHF (congestive heart failure) 10/07/2020    Acute pulmonary insufficiency 2020    Chronic combined systolic and diastolic congestive heart failure (HCC) 2019    Osteoarthritis of both knees 2019    Near syncope 2019    Elevated troponin 2019    Anxiety 2018    Chronic post-traumatic stress disorder (PTSD) 2018    Tardive dyskinesia 2018    Encounter for examination and observation for other specified reasons     Constipated 2018    Other chest pain 2018    Schizophrenia (HCC) 2018    Hypertension 2018    Hyperlipidemia 2018    Diabetes mellitus type 2 in obese  2018      LOS (days): 1  Geometric Mean LOS (GMLOS) (days):   Days to GMLOS:     OBJECTIVE:    Risk of Unplanned Readmission Score: 12.39         Current admission status: Inpatient       Preferred Pharmacy:   [facility] - [place] - [address]  [address]  [place] [address]  Phone: [phone] Fax: [phone]    [id number] - [place] - [address]  [address]  [place] [address]  Phone: [phone] Fax: [phone]    Primary Care Provider: Alysa Santoro MD    Primary Insurance:  V[employer]ANS  Secondary Insurance:     ASSESSMENT:  Active Health Care Proxies       Isabelle Waterman Health Care Representative - Spouse   Primary Phone: [phone] (Home)                 Advance Directives  Does patient have a Health Care POA?: Yes  Does patient have Advance Directives?: Yes  Advance Directives: Power of  for health care  Primary Contact: Isabelle Waterman (Spouse)  [phone]         Readmission Root Cause  30 Day Readmission: No    Patient Information  Admitted from:: Home  Mental Status: Alert  During Assessment patient was accompanied by: Not accompanied during assessment  Assessment information provided by:: Patient  Primary Caregiver: Family  Caregiver's Name:: Isabelle  Caregiver's Relationship to Patient:: Family Member  Caregiver's Telephone Number:: [phone]  Support Systems: Self, Spouse/significant other, Friend  County of Residence: [place]  What city do you live in?: [place]  Home entry access options. Select all that apply.: Ramp  Type of Current Residence: 2 story home  Upon entering residence, is there a bedroom on the main floor (no further steps)?: Yes  Upon entering residence, is there a bathroom on the main floor (no further steps)?: Yes  Living Arrangements: Lives w/ Spouse/significant other  Is patient a ?: Yes  Is patient active with [facility])?: Yes  Is patient service connected?: Yes (Moira Sexton [phone] x [phone] for social work dept)    Activities of Daily Living Prior to Admission  Functional Status: Assistance  Completes ADLs independently?: No  Level of ADL dependence: Assistance  Ambulates independently?: No  Level of ambulatory dependence: Assistance  Does patient use assisted devices?: Yes  Assisted Devices (DME) used: Wheelchair, Walker, Straight Cane, Shower Chair, Nebulizer, Stair Chair/[place]  Does patient currently own DME?: Yes  What DME does the patient currently own?: Nebulizer, Shower Chair, Stair Chair/Glide, Wheelchair,  Yuri Johnson  Does patient have a history of Outpatient Therapy (PT/OT)?: No  Does the patient have a history of Short-Term Rehab?: No  Does patient have a history of HHC?: Yes (Residential)  Does patient currently have HHC?: No         Patient Information Continued  Income Source: Pension/snf  Does patient have prescription coverage?: Yes  Does patient receive dialysis treatments?: No         Means of Transportation  Means of Transport to [facility]:: Family transport      Social Determinants of Health (SDOH)      Flowsheet Row Most Recent Value   Housing Stability    In the last 12 months, was there a time when you were not able to pay the mortgage or rent on time? N   In the last 12 months, how many places have you lived? 1   In the last 12 months, was there a time when you did not have a steady place to sleep or slept in a shelter (including now)? N   Transportation Needs    In the past 12 months, has lack of transportation kept you from medical appointments or from getting medications? no   In the past 12 months, has lack of transportation kept you from meetings, work, or from getting things needed for daily living? No   Food Insecurity    Within the past 12 months, you worried that your food would run out before you got the money to buy more. Never true   Within the past 12 months, the food you bought just didn't last and you didn't have money to get more. Never true   Utilities    In the past 12 months has the electric, gas, oil, or water company threatened to shut off services in your home? No            DISCHARGE DETAILS:    Discharge planning discussed with:: Pt at bedside  [place] of Choice: Yes  Comments - Freedom of Choice: CM met with pt at bedside and introduced self/role. Pt is alert and oriented x3 able to make his needs known. FOC discussed with pt at length. Pt is now open for STR however pt is  and CM reached out to pt  Keyana High at [phone] ext. [phone]  who was made aware. [facility] provided CM with a list of available STR that is in network with the [facility]. Referrals made via [facility] and faxed directly to facilities. PT/OT eval pending. CM continue to be available to assist with pt dc plans.  CM contacted family/caregiver?: Yes (Left message with pt wife)  Were Treatment Team discharge recommendations reviewed with patient/caregiver?: Yes  Did patient/caregiver verbalize understanding of patient care needs?: Yes  Were patient/caregiver advised of the risks associated with not following Treatment Team discharge recommendations?: Yes    Contacts  Patient Contacts: Isabelle Waterman (Spouse)  Relationship to Patient:: Family  Contact Method: Phone  Phone Number: [phone]  Reason/Outcome: Continuity of Care, Emergency Contact, Discharge Planning    Requested Home Health Care         Is the patient interested in HHC at discharge?: No    DME Referral Provided  Referral made for DME?: No    Other Referral/Resources/Interventions Provided:  Referral Comments: [facility] made aware of STR and list provided    Would you like to participate in our [facility] service program?  : No - Declined    Treatment Team Recommendation: Short Term Rehab  Discharge Destination Plan:: Short Term Rehab  Transport at Discharge : Wheelchair van  Dispatcher Contacted: No

## 2024-02-23 NOTE — PLAN OF CARE
Problem: PHYSICAL THERAPY ADULT  Goal: Performs mobility at highest level of function for planned discharge setting.  See evaluation for individualized goals.  Description: Treatment/Interventions: Functional transfer training, LE strengthening/ROM, Therapeutic exercise, Endurance training, Patient/family training, Equipment eval/education, Bed mobility, Gait training, Spoke to nursing          See flowsheet documentation for full assessment, interventions and recommendations.  2/23/2024 1307 by Bonnie Womack PT  Note: Prognosis: Good  Problem List: Decreased strength, Decreased endurance, Impaired balance, Decreased mobility, Impaired sensation, Obesity, Pain  Assessment: Pt is 73 y.o. male seen for PT evaluation on 2/23/2024 s/p admit to [facility] on 2/22/2024 w/ Fall. PT was consulted to assess pt's functional mobility and d/c needs. Order placed for PT eval and tx. PTA, pt resides with wife and family in 2SH with ramped entrance/ stair glide to 2nd floor; +fall history. At time of eval, pt requiring mod A For bed mobility, min a x2 for transfers and short gait trial from EOB to recliner. Upon evaluation, pt presenting with impaired functional mobility d/t decreased strength, decreased endurance, impaired balance, decreased mobility, impaired sensation, obesity c BMI of 41.12 kg/m2, pain, and activity intolerance. Pertinent PMHx and current co-morbidities affecting pt's physical performance at time of assessment include: fall, HTN, DM type 2, anxiety, dyspnea, schizophrenia, tardive dyskinesia, near syncope, emphysema, morbid obesity, Parkinson disease, PTSD. Personal factors affecting pt at time of eval include: ambulating w/ assistive device, inability to ambulate household distances, inability to navigate level surfaces w/o external assistance, and positive fall history. The following objective measures performed on IE also reveal limitations: Barthel Index: 40/100, Modified [place]: 4  (moderate/severe disability), and AM-PAC 6-Clicks: 10/24. Pt's clinical presentation is currently unstable/unpredictable seen in pt's presentation of abnormal lab value(s) and ongoing medical assessment. Overall, pt's rehab potential and prognosis to return to PLOF is good as impacted by objective findings, warranting pt to receive further skilled PT interventions to address identified impairments, activity limitation(s), and participation restriction(s). Pt to benefit from continued PT tx to address deficits as defined above and maximize level of functional independent mobility. From PT/mobility standpoint, recommend level 2, moderate resource intensity in order to facilitate return to PLOF.  Barriers to Discharge: Inaccessible home environment, Decreased caregiver support     Rehab Resource Intensity Level, PT: II (Moderate Resource Intensity)    See flowsheet documentation for full assessment.     2/23/2024 1306 by Bonnie Womack PT  Note: Prognosis: Good  Problem List: Decreased strength, Decreased endurance, Impaired balance, Decreased mobility, Impaired sensation, Obesity, Pain  Assessment: Pt is 73 y.o. male seen for PT evaluation on 2/23/2024 s/p admit to [facility] on 2/22/2024 w/ Fall. PT was consulted to assess pt's functional mobility and d/c needs. Order placed for PT eval and tx. PTA, pt resides with wife and family in [place]SH with ramped entrance/ stair glide to 2nd floor; +fall history. At time of eval, pt requiring mod A For bed mobility, min a x2 for transfers and short gait trial from EOB to recliner. Upon evaluation, pt presenting with impaired functional mobility d/t decreased strength, decreased endurance, impaired balance, decreased mobility, impaired sensation, obesity c BMI of 41.12 kg/m2, pain, and activity intolerance. Pertinent PMHx and current co-morbidities affecting pt's physical performance at time of assessment include: fall, HTN, DM type 2, anxiety, dyspnea, schizophrenia, tardive  dyskinesia, near syncope, emphysema, morbid obesity, Parkinson disease, PTSD. Personal factors affecting pt at time of eval include: ambulating w/ assistive device, inability to ambulate household distances, inability to navigate level surfaces w/o external assistance, and positive fall history. The following objective measures performed on IE also reveal limitations: Barthel Index: 40/100, Modified [place]: 4 (moderate/severe disability), and AM-PAC 6-Clicks: 10/24. Pt's clinical presentation is currently unstable/unpredictable seen in pt's presentation of abnormal lab value(s) and ongoing medical assessment. Overall, pt's rehab potential and prognosis to return to PLOF is good as impacted by objective findings, warranting pt to receive further skilled PT interventions to address identified impairments, activity limitation(s), and participation restriction(s). Pt to benefit from continued PT tx to address deficits as defined above and maximize level of functional independent mobility. From PT/mobility standpoint, recommend level 2, moderate resource intensity in order to facilitate return to PLOF.  Barriers to Discharge: Inaccessible home environment, Decreased caregiver support     Rehab Resource Intensity Level, PT: II (Moderate Resource Intensity)    See flowsheet documentation for full assessment.

## 2024-02-23 NOTE — PLAN OF CARE
Problem: OCCUPATIONAL THERAPY ADULT  Goal: Performs self-care activities at highest level of function for planned discharge setting.  See evaluation for individualized goals.  Description: Treatment Interventions: ADL retraining, Functional transfer training, UE strengthening/ROM, Cognitive reorientation, Endurance training, Patient/family training, Compensatory technique education, Activityengagement          See flowsheet documentation for full assessment, interventions and recommendations.   Note: Limitation: Decreased ADL status, Decreased UE strength, Decreased Safe judgement during ADL, Decreased endurance, Decreased cognition, Decreased self-care trans, Decreased high-level ADLs  Prognosis: Good  Assessment: Patient is a 73 y.o. male seen for OT evaluation s/p admit to [facility]  on 2/22/2024 w/Fall. Commorbidities affecting patient's functional performance at time of assessment include: HTN, DM2, anxiety, dyspnea, and hx of CHF. Orders placed for OT evaluation and treatment and up and OOB as tolerated . Performed at least two patient identifiers during session including name and wristband.  Prior to admission, Patient requires assistance with ADLs/IADLs, ambulatory with cane, and lives with family. Personal factors affecting patient at time of initial evaluation include: decreased initiation and engagement, difficulty performing ADLs, and difficulty performing IADLs. Upon evaluation, patient requires supervision and minimal  assist for UB ADLs, moderate and maximal assist for LB ADLs, transfers and functional ambulation in room and bathroom with minimal  and moderate assist of 2, with the use of Rolling Walker.  Patient is oriented to name,, oriented to place,, and oriented to situation, and presents with limited ability to recall information after a brief period of time (short term memory) / working memory .  Occupational performance is affected by the following deficits: orientation,  decreased muscle strength, dynamic sit/ stand balance deficit with poor standing tolerance time for self care and functional mobility, decreased activity tolerance, impaired memory, impaired problem solving, decreased safety awareness, and delayed righting and equilibrium reactions. Patient to benefit from continued Occupational Therapy treatment while in the hospital to address deficits as defined above and maximize level of functional independence with ADLs and functional mobility. Occupational Performance areas to address include: eating, grooming , bathing/ shower, dressing, toilet hygiene, and transfer to all surfaces. From OT standpoint, recommendation at time of d/c would be Level II (moderate resource intensity).     Rehab Resource Intensity Level, OT: II (Moderate Resource Intensity)     Kathi Hamlin OTLATOYA, OTR/L

## 2024-02-23 NOTE — OCCUPATIONAL THERAPY NOTE
Occupational Therapy Evaluation      Lorne Guevara    2/23/2024    Patient Active Problem List   Diagnosis    Other chest pain    Schizophrenia (HCC)    Hypertension    Hyperlipidemia    Diabetes mellitus type 2 in obese     Constipated    Anxiety    Chronic post-traumatic stress disorder (PTSD)    Tardive dyskinesia    Encounter for examination and observation for other specified reasons    Near syncope    Elevated troponin    Chronic combined systolic and diastolic congestive heart failure (HCC)    Osteoarthritis of both knees    Acute pulmonary insufficiency    Dyspnea    Other emphysema (HCC)    Essential hypertension    History of CHF (congestive heart failure)    Morbid obesity due to excess calories (HCC)    Type 2 diabetes mellitus without complication, without long-term current use of insulin (HCC)    Chronic left-sided low back pain without sciatica    Left leg injury    Parkinson disease    Fall       Past Medical History:   Diagnosis Date    Anxiety     CHF (congestive heart failure) (HCC)     COPD (chronic obstructive pulmonary disease) ([facility])     Depression     Diabetes mellitus ([facility])     Enlarged prostate     Hallucination     Hyperlipidemia     Hypertension     Memory loss     Panic attack     Panic disorder     Psychiatric disorder     Psychiatric illness     Psychosis (HCC)     PTSD (post-traumatic stress disorder)     Schizoaffective disorder ([facility])     Schizophrenia ([facility])           02/23/24 0839   OT Last Visit   OT Visit Date 02/23/24   Note Type   Note type Evaluation   Additional Comments Pt agreeable to OT eval. Upon arrival pt supine in bed with HOB elevated.   Pain Assessment   Pain Assessment Tool 0-10   Pain Score No Pain   Restrictions/Precautions   Weight Bearing Precautions Per Order No   Other Precautions Chair Alarm;Bed Alarm;O2;Fall Risk  (3 L O2 via NC- not used at baseline)   Home Living   Type of Home House   Home Layout Multi-level;Bed/bath upstairs;Ramped entrance;1/2 bath on  main level  (stair glide to 2nd floor)   Bathroom Shower/Tub Walk-in shower   Bathroom Toilet Raised   Bathroom Equipment Grab bars in shower;Shower chair   Bathroom Accessibility Accessible   Home Equipment Walker;Quad cane;Electric scooter   Prior Function   Level of [place] Independent with functional mobility;Needs assistance with ADLs;Needs assistance with IADLS   Lives With Spouse;Family   Receives Help From Family;Friend(s)   IADLs Family/Friend/Other provides transportation;Family/Friend/Other provides meals;Independent with medication management   Falls in the last 6 months 1 to 4  (2 falls)   Vocational Retired   Lifestyle   Autonomy Patient requires assistance with ADLs/IADLs, ambulatory with cane, and lives with family   Reciprocal Relationships Supportive family   Service to Others Retired   ADL   Eating Assistance 6  Modified independent   Grooming Assistance 6  Modified Independent   UB Bathing Assistance 5  Supervision/Setup   LB Bathing Assistance 3  Moderate Assistance   UB Dressing Assistance 4  Minimal Assistance   LB Dressing Assistance 2  Maximal Assistance   Toileting Assistance  2  Maximal Assistance   Additional Comments ADL levels based on functional performance during OT eval.   Bed Mobility   Supine to Sit 3  Moderate assistance   Additional items Assist x 1;HOB elevated;Bedrails;Increased time required;Verbal cues;LE management   Sit to Supine   (DNT: pt seated OOB in recliner at end of session)   Additional Comments Pt reported (+) dizziness upin sitting at EOB. Symptoms resolved with increased seated rest.   Transfers   Sit to Stand 4  Minimal assistance   Additional items Assist x 2;Increased time required;Verbal cues;Bedrails   Stand to Sit 4  Minimal assistance   Additional items Assist x 2;Armrests;Increased time required;Verbal cues   Functional Mobility   Functional Mobility 3  Moderate assistance  (Assist of 2)   Additional Comments Pt ambulated short distance to recliner.  Pt unsteady and requires cues for RW management/sequencing.   Additional items Rolling walker   Balance   Static Sitting Fair +   Dynamic Sitting Fair   Static Standing Fair -   Dynamic Standing Poor +   Activity Tolerance   Activity Tolerance Patient limited by fatigue   Medical Staff Made Aware Pt seen as a co-eval with PT due to the patient's co-morbidities and clinically unstable presentation indicated by chart review.   RUE Assessment   RUE Assessment WFL  (full AROM, 4-/5 MMT)   LUE Assessment   LUE Assessment WFL  (full AROM, 4-/5 MMT)   Hand Function   Gross Motor Coordination Functional   Fine Motor Coordination Functional   Sensation   Light Touch Partial deficits in the RUE;Partial deficits in the LUE;Partial deficits in the RLE;Partial deficits in the LLE  (impaired d/t neuropathy)   Vision-Basic Assessment   Current Vision Wears glasses only for reading   Cognition   Overall Cognitive Status Impaired   Arousal/Participation Alert;Responsive;Cooperative   Attention Attends with cues to redirect   Orientation Level Oriented to person;Oriented to place;Oriented to situation   Memory Decreased short term memory   Following Commands Follows one step commands with increased time or repetition   Assessment   Limitation Decreased ADL status;Decreased UE strength;Decreased Safe judgement during ADL;Decreased endurance;Decreased cognition;Decreased self-care trans;Decreased high-level ADLs   Prognosis Good   Assessment Patient is a 73 y.o. male seen for OT evaluation s/p admit to [facility]  on 2/22/2024 w/Fall. Commorbidities affecting patient's functional performance at time of assessment include: HTN, DM2, anxiety, dyspnea, and hx of CHF. Orders placed for OT evaluation and treatment and up and OOB as tolerated . Performed at least two patient identifiers during session including name and wristband.  Prior to admission, Patient requires assistance with ADLs/IADLs, ambulatory with cane, and lives  with family. Personal factors affecting patient at time of initial evaluation include: decreased initiation and engagement, difficulty performing ADLs, and difficulty performing IADLs. Upon evaluation, patient requires supervision and minimal  assist for UB ADLs, moderate and maximal assist for LB ADLs, transfers and functional ambulation in room and bathroom with minimal  and moderate assist of 2, with the use of Rolling Walker.  Patient is oriented to name,, oriented to place,, and oriented to situation, and presents with limited ability to recall information after a brief period of time (short term memory) / working memory .  Occupational performance is affected by the following deficits: orientation, decreased muscle strength, dynamic sit/ stand balance deficit with poor standing tolerance time for self care and functional mobility, decreased activity tolerance, impaired memory, impaired problem solving, decreased safety awareness, and delayed righting and equilibrium reactions. Patient to benefit from continued Occupational Therapy treatment while in the hospital to address deficits as defined above and maximize level of functional independence with ADLs and functional mobility. Occupational Performance areas to address include: eating, grooming , bathing/ shower, dressing, toilet hygiene, and transfer to all surfaces. From OT standpoint, recommendation at time of d/c would be Level II (moderate resource intensity).   Goals   Patient Goals to get better   Plan   Treatment Interventions ADL retraining;Functional transfer training;UE strengthening/ROM;Cognitive reorientation;Endurance training;Patient/family training;Compensatory technique education;Activityengagement   Goal Expiration Date 03/09/24   OT Treatment Day 0   OT Frequency 3-5x/wk   Discharge Recommendation   Rehab Resource Intensity Level, OT II (Moderate Resource Intensity)   AM-PAC Daily Activity Inpatient   Lower Body Dressing 1   Bathing 2    Toileting 1   Upper Body Dressing 3   Grooming 3   Eating 3   Daily Activity Raw Score 13   Daily Activity Standardized Score (Calc for Raw Score >=11) 32.03   AM-PAC Applied Cognition Inpatient   Following a Speech/Presentation 3   Understanding Ordinary Conversation 4   Taking Medications 3   Remembering Where Things Are Placed or Put Away 2   Remembering List of 4-5 Errands 2   Taking Care of Complicated Tasks 2   Applied Cognition Raw Score 16   Applied Cognition Standardized Score 35.03   Mini-Cog Assessment   Word Version Version 1: Banana, Sunrise, Chair   Clock Draw (CDT) 0   Word Recall 1   Mini-Cog Score 1   Mini-Cog Results Positive screen for dementia   Modified Armando Scale   Modified Falls Scale 4   End of Consult   Patient Position at End of Consult Bedside chair;Bed/Chair alarm activated;All needs within reach   Nurse Communication Nurse aware of consult     GOALS:    *ADL transfers with CGA for inc'd independence with ADLs/purposeful tasks    *UB ADL with (S) for inc'd independence with self cares    *LB ADL with Min (A) using AE prn for inc'd independence with self cares    *Toileting with Min (A) for clothing management and hygiene for return to PLOF with personal care    *Increase stand tolerance x 5  m for inc'd tolerance with standing purposeful tasks    *Participate in 10m UE therex to increase overall stamina/activity tolerance for purposeful tasks    *Bed mobility- Min (A) for inc'd independence to manage own comfort and initiate EOB & OOB purposeful tasks    *Patient will verbalize 3 safety awareness/ principles to prevent falls in the home setting.     *Patient will verbalize and demonstrate use of energy conservation/deep breathing techniques and work simplification skills during functional activities with no verbal cues.     *Patient will increase OOB/sitting tolerance to 2-4 hours per day to increase participation in self-care and leisure tasks with no s/s of exertion.      *Patient will engage in ongoing cognitive assessment to assist with safe discharge planning/recommendations.     *Pt will demonstrate use of long handled AE during 100% of tx sessions for increased ADL safety and independence following D/C     HECTOR Higgins, OTR/L

## 2024-02-23 NOTE — PLAN OF CARE
Problem: PAIN - ADULT  Goal: Verbalizes/displays adequate comfort level or baseline comfort level  Description: Interventions:  - Encourage patient to monitor pain and request assistance  - Assess pain using appropriate pain scale  - Administer analgesics based on type and severity of pain and evaluate response  - Implement non-pharmacological measures as appropriate and evaluate response  - Consider cultural and social influences on pain and pain management  - Notify physician/advanced practitioner if interventions unsuccessful or patient reports new pain  Outcome: Progressing     Problem: INFECTION - ADULT  Goal: Absence or prevention of progression during hospitalization  Description: INTERVENTIONS:  - Assess and monitor for signs and symptoms of infection  - Monitor lab/diagnostic results  - Monitor all insertion sites, i.e. indwelling lines, tubes, and drains  - Monitor endotracheal if appropriate and nasal secretions for changes in amount and color  - Irene appropriate cooling/warming therapies per order  - Administer medications as ordered  - Instruct and encourage patient and family to use good hand hygiene technique  - Identify and instruct in appropriate isolation precautions for identified infection/condition  Outcome: Progressing  Goal: Absence of fever/infection during neutropenic period  Description: INTERVENTIONS:  - Monitor WBC    Outcome: Progressing     Problem: DISCHARGE PLANNING  Goal: Discharge to home or other facility with appropriate resources  Description: INTERVENTIONS:  - Identify barriers to discharge w/patient and caregiver  - Arrange for needed discharge resources and transportation as appropriate  - Identify discharge learning needs (meds, wound care, etc.)  - Arrange for interpretive services to assist at discharge as needed  - Refer to Case Management Department for coordinating discharge planning if the patient needs post-hospital services based on physician/advanced  practitioner order or complex needs related to functional status, cognitive ability, or social support system  Outcome: Progressing

## 2024-02-24 LAB
GLUCOSE SERPL-MCNC: 227 MG/DL (ref 65–140)
GLUCOSE SERPL-MCNC: 243 MG/DL (ref 65–140)
GLUCOSE SERPL-MCNC: 252 MG/DL (ref 65–140)
GLUCOSE SERPL-MCNC: 259 MG/DL (ref 65–140)

## 2024-02-24 PROCEDURE — 99232 SBSQ HOSP IP/OBS MODERATE 35: CPT | Performed by: INTERNAL MEDICINE

## 2024-02-24 PROCEDURE — 94760 N-INVAS EAR/PLS OXIMETRY 1: CPT

## 2024-02-24 PROCEDURE — 82948 REAGENT STRIP/BLOOD GLUCOSE: CPT

## 2024-02-24 PROCEDURE — 94664 DEMO&/EVAL PT USE INHALER: CPT

## 2024-02-24 RX ADMIN — ALBUTEROL SULFATE 2 PUFF: 90 AEROSOL, METERED RESPIRATORY (INHALATION) at 08:53

## 2024-02-24 RX ADMIN — Medication 400 MG: at 17:25

## 2024-02-24 RX ADMIN — ASPIRIN 81 MG: 81 TABLET, CHEWABLE ORAL at 08:50

## 2024-02-24 RX ADMIN — RISPERIDONE 1 MG: 1 TABLET ORAL at 17:25

## 2024-02-24 RX ADMIN — INSULIN LISPRO 2 UNITS: 100 INJECTION, SOLUTION INTRAVENOUS; SUBCUTANEOUS at 11:40

## 2024-02-24 RX ADMIN — METOPROLOL TARTRATE 25 MG: 25 TABLET, FILM COATED ORAL at 21:30

## 2024-02-24 RX ADMIN — ALBUTEROL SULFATE 2 PUFF: 90 AEROSOL, METERED RESPIRATORY (INHALATION) at 11:42

## 2024-02-24 RX ADMIN — INSULIN LISPRO 3 UNITS: 100 INJECTION, SOLUTION INTRAVENOUS; SUBCUTANEOUS at 11:40

## 2024-02-24 RX ADMIN — INSULIN LISPRO 2 UNITS: 100 INJECTION, SOLUTION INTRAVENOUS; SUBCUTANEOUS at 17:23

## 2024-02-24 RX ADMIN — INSULIN LISPRO 3 UNITS: 100 INJECTION, SOLUTION INTRAVENOUS; SUBCUTANEOUS at 17:23

## 2024-02-24 RX ADMIN — ENOXAPARIN SODIUM 40 MG: 40 INJECTION SUBCUTANEOUS at 08:50

## 2024-02-24 RX ADMIN — INSULIN LISPRO 2 UNITS: 100 INJECTION, SOLUTION INTRAVENOUS; SUBCUTANEOUS at 07:34

## 2024-02-24 RX ADMIN — INSULIN LISPRO 3 UNITS: 100 INJECTION, SOLUTION INTRAVENOUS; SUBCUTANEOUS at 07:34

## 2024-02-24 RX ADMIN — LISINOPRIL 20 MG: 20 TABLET ORAL at 08:50

## 2024-02-24 RX ADMIN — FUROSEMIDE 40 MG: 40 TABLET ORAL at 08:50

## 2024-02-24 RX ADMIN — INSULIN LISPRO 3 UNITS: 100 INJECTION, SOLUTION INTRAVENOUS; SUBCUTANEOUS at 21:31

## 2024-02-24 RX ADMIN — ARIPIPRAZOLE 20 MG: 5 TABLET ORAL at 08:50

## 2024-02-24 RX ADMIN — RISPERIDONE 1 MG: 1 TABLET ORAL at 08:53

## 2024-02-24 RX ADMIN — SENNOSIDES 8.6 MG: 8.6 TABLET, FILM COATED ORAL at 17:25

## 2024-02-24 RX ADMIN — METOPROLOL TARTRATE 25 MG: 25 TABLET, FILM COATED ORAL at 08:50

## 2024-02-24 RX ADMIN — SENNOSIDES 8.6 MG: 8.6 TABLET, FILM COATED ORAL at 08:50

## 2024-02-24 RX ADMIN — Medication 400 MG: at 08:50

## 2024-02-24 RX ADMIN — ALBUTEROL SULFATE 2 PUFF: 90 AEROSOL, METERED RESPIRATORY (INHALATION) at 21:32

## 2024-02-24 RX ADMIN — ATORVASTATIN CALCIUM 40 MG: 40 TABLET, FILM COATED ORAL at 17:25

## 2024-02-24 NOTE — RESPIRATORY THERAPY NOTE
RT Protocol Note  Lorne Waterman 73 y.o. male MRN: [id number]  Unit/Bed#: -01 Encounter: [id number]    Assessment    Principal Problem:    Fall  Active Problems:    Hypertension    Diabetes mellitus type 2 in obese     Anxiety    Dyspnea    History of CHF (congestive heart failure)      Home Pulmonary Medications:         Past Medical History:   Diagnosis Date    Anxiety     CHF (congestive heart failure) (HCC)     COPD (chronic obstructive pulmonary disease) (HCC)     Depression     Diabetes mellitus (HCC)     Enlarged prostate     Hallucination     Hyperlipidemia     Hypertension     Memory loss     Panic attack     Panic disorder     Psychiatric disorder     Psychiatric illness     Psychosis (HCC)     PTSD (post-traumatic stress disorder)     Schizoaffective disorder (HCC)     Schizophrenia (HCC)      Social History     Socioeconomic History    Marital status: /Civil Union     Spouse name: None    Number of children: None    Years of education: None    Highest education level: None   Occupational History    None   Tobacco Use    Smoking status: Former     Current packs/day: 0.00     Average packs/day: 2.0 packs/day for 0.5 years (1.0 ttl pk-yrs)     Types: Cigarettes     Start date: 1992     Quit date:      Years since quittin.1    Smokeless tobacco: Never    Tobacco comments:     quit in    Vaping Use    Vaping status: Never Used   Substance and Sexual Activity    Alcohol use: Never    Drug use: Never    Sexual activity: Never   Other Topics Concern    None   Social History Narrative    None     Social Determinants of Health     Financial Resource Strain: Not on file   Food Insecurity: No Food Insecurity (2024)    Hunger Vital Sign     Worried About Running Out of Food in the Last Year: Never true     Ran Out of Food in the Last Year: Never true   Transportation Needs: No Transportation Needs (2024)    PRAPARE - Transportation     Lack of Transportation (Medical): No     " Lack of Transportation (Non-Medical): No   Physical Activity: Not on file   Stress: Not on file   Social Connections: Not on file   Intimate Partner Violence: Not on file   Housing Stability: Low Risk  (2/23/2024)    Housing Stability Vital Sign     Unable to Pay for Housing in the Last Year: No     Number of Places Lived in the Last Year: 1     Unstable Housing in the Last Year: No       Subjective    Subjective Data: awake and alert    Objective    Physical Exam:        Vitals:  Blood pressure 146/68, pulse 86, temperature 98.5 °F (36.9 °C), resp. rate 18, height 5' 10\" (1.778 m), weight 130 kg (286 lb), SpO2 93%.          Imaging and other studies: I have personally reviewed pertinent reports.      O2 Device: nasal cannula     Plan    Respiratory Plan: Home Bronchodilator Patient pathway        Resp Comments: will continue with home regimen of albuterol mdi   "

## 2024-02-24 NOTE — PROGRESS NOTES
[facility]  Progress Note  Name: Lorne Waterman I  MRN: [id number]  Unit/Bed#: MS [facility]-01 I Date of Admission: 2024   Date of Service: 2024 I Hospital Day: 2    Assessment/Plan   * Fall  Assessment & Plan  73-year-old male patient with past medical history of CHF, COPD, not home O2 dependent, chronic degenerative bone disease, history presented after a fall  PT/OT recommending possible rehab  Follow-up with case management    History of CHF (congestive heart failure)  Assessment & Plan  Remains euvolemic  Continue oral Lasix    Anxiety  Assessment & Plan  Continue Abilify, trazodone, risperidone.    Diabetes mellitus type 2 in obese   Assessment & Plan  Sliding-scale insulin  Monitor blood glucose    Hypertension  Assessment & Plan  Blood pressure controlled  Continue lisinopril, beta-blocker, Lasix       VTE Pharmacologic Prophylaxis:   Pharmacologic: Enoxaparin (Lovenox)  Mechanical VTE Prophylaxis in Place: Yes    Patient Centered Rounds: I have performed bedside rounds with nursing staff today.    Discussions with Specialists or Other Care Team Provider: cm, nursing    Education and Discussions with Family / Patient: pt    Time Spent for Care: 30 minutes.  More than 50% of total time spent on counseling and coordination of care as described above.    Current Length of Stay: 2 day(s)    Current Patient Status: Inpatient   Certification Statement: The patient will continue to require additional inpatient hospital stay due to see below    Discharge Plan: Medically clear awaiting rehab placement    Code Status: Level 1 - Full Code      Subjective:   Denies chest pain, shortness of breath, cough, fevers, chills    Objective:     Vitals:   Temp (24hrs), Av.5 °F (36.9 °C), Min:98.2 °F (36.8 °C), Max:98.7 °F (37.1 °C)    Temp:  [98.2 °F (36.8 °C)-98.7 °F (37.1 °C)] 98.2 °F (36.8 °C)  HR:  [87-96] 89  Resp:  [17-19] 17  BP: (123-133)/(72-79) 133/79  SpO2:  [91 %-93 %] 92 %  Body  mass index is 41.04 kg/m².     Input and Output Summary (last 24 hours):       Intake/Output Summary (Last 24 hours) at 2/24/2024 1014  Last data filed at 2/24/2024 0701  Gross per 24 hour   Intake 840 ml   Output 800 ml   Net 40 ml       Physical Exam:     Physical Exam  Constitutional:       General: He is not in acute distress.     Appearance: He is well-developed. He is not diaphoretic.   HENT:      Head: Normocephalic and atraumatic.      Nose: Nose normal.      Mouth/Throat:      Pharynx: No oropharyngeal exudate.   Eyes:      General: No scleral icterus.     Conjunctiva/sclera: Conjunctivae normal.   Cardiovascular:      Rate and Rhythm: Normal rate and regular rhythm.      Heart sounds: Normal heart sounds. No murmur heard.     No friction rub. No gallop.   Pulmonary:      Effort: Pulmonary effort is normal. No respiratory distress.      Breath sounds: Normal breath sounds. No wheezing or rales.   Chest:      Chest wall: No tenderness.   Abdominal:      General: Bowel sounds are normal. There is no distension.      Palpations: Abdomen is soft.      Tenderness: There is no abdominal tenderness. There is no guarding.   Musculoskeletal:         General: No tenderness or deformity. Normal range of motion.      Cervical back: Normal range of motion and neck supple.   Skin:     General: Skin is warm and dry.      Findings: No erythema.   Neurological:      Mental Status: He is alert. Mental status is at baseline.         Additional Data:     Labs:    Results from last 7 days   Lab Units 02/23/24  0514 02/22/24  0955   WBC Thousand/uL 12.18* 13.55*   HEMOGLOBIN g/dL 10.9* 12.0   HEMATOCRIT % 34.4* 37.9   PLATELETS Thousands/uL 126* 132*   NEUTROS PCT %  --  76*   LYMPHS PCT %  --  17   MONOS PCT %  --  6   EOS PCT %  --  0     Results from last 7 days   Lab Units 02/23/24  0514 02/22/24  0955   SODIUM mmol/L 135 136   POTASSIUM mmol/L 4.3 3.8   CHLORIDE mmol/L 103 103   CO2 mmol/L 23 22   BUN mg/dL 23 19    CREATININE mg/dL 1.04 1.18   ANION GAP mmol/L 9 11   CALCIUM mg/dL 8.9 9.6   ALBUMIN g/dL  --  4.3   TOTAL BILIRUBIN mg/dL  --  0.68   ALK PHOS U/L  --  47   ALT U/L  --  25   AST U/L  --  22   GLUCOSE RANDOM mg/dL 323* 226*     Results from last 7 days   Lab Units 02/22/24  0955   INR  1.06     Results from last 7 days   Lab Units 02/24/24  0657 02/23/24 2056 02/23/24  1613 02/23/24  1131 02/23/24  0546 02/22/24  2051 02/22/24  1547 02/22/24  1246   POC GLUCOSE mg/dl 227* 233* 221* 231* 317* 397* 351* 275*     Results from last 7 days   Lab Units 02/22/24  0955   HEMOGLOBIN A1C % 8.7*     Results from last 7 days   Lab Units 02/22/24  0955   LACTIC ACID mmol/L 1.9           * I Have Reviewed All Lab Data Listed Above.  * Additional Pertinent Lab Tests Reviewed: All Labs Within Last 24 Hours Reviewed    Imaging:    Imaging Reports Reviewed Today Include: na  Imaging Personally Reviewed by Myself Includes:  na    Recent Cultures (last 7 days):     Results from last 7 days   Lab Units 02/22/24  0957 02/22/24  0956   BLOOD CULTURE  No Growth at 24 hrs. No Growth at 24 hrs.       Last 24 Hours Medication List:   Current Facility-Administered Medications   Medication Dose Route Frequency Provider Last Rate    albuterol  2 puff Inhalation 4x Daily Niranjan GARCIA MD      albuterol  2 puff Inhalation Q4H PRN Cole Newton MD      aluminum-magnesium hydroxide-simethicone  30 mL Oral Q6H PRN Niranjan GARCIA MD      ARIPiprazole  20 mg Oral Daily Niranjan GARCIA MD      aspirin  81 mg Oral Daily Niranjan GARCIA MD      atorvastatin  40 mg Oral Daily With Dinner Niranjan GARCIA MD      docusate sodium  100 mg Oral BID PRN Niranjan GARCIA MD      enoxaparin  40 mg Subcutaneous Daily Niranjan GARCIA MD      furosemide  40 mg Oral Daily Niranjan GARCIA MD      insulin lispro  1-5 Units Subcutaneous TID AC Niranjan GARCIA MD      insulin lispro  1-6 Units Subcutaneous HS Lena Mcghee PA-C      insulin lispro  3  Units Subcutaneous TID With Meals Niranjan GARCIA MD      lisinopril  20 mg Oral Daily Niranjan GARCIA MD      magnesium Oxide  400 mg Oral BID Niranjan GARCIA MD      methocarbamol  750 mg Oral Q6H PRN Niranjan GARCIA MD      metoprolol tartrate  25 mg Oral Q12H [facility] Niranjan GARCIA MD      risperiDONE  1 mg Oral BID Niranjan GARCIA MD      senna  8.6 mg Oral BID Niranjan GARCIA MD          Today, Patient Was Seen By: Cole Newton MD    ** Please Note: Dictation voice to text software may have been used in the creation of this document. **

## 2024-02-24 NOTE — ASSESSMENT & PLAN NOTE
[facility] DERMATOLOGY  [address] [place] [id number]  [phone]     MRN: [id number] : 1980  Encounter: [id number]  Patient Information: Amanda Desai    Subjective:     40-year-old female presents secondary to the stasis ulcer right lower leg     Objective:   Temp (!) 96 7 °F (35 9 °C)     Physical Exam:    General Appearance:    Alert, cooperative, no distress   Skin:   Previous site of ulceration continues to heal punctate ulcerations still present minimal at this point     Assessment:     1  Stasis leg ulcer, right ([address] )           Plan:   Previous site continues to slowly heal DuoDerm Unna boot and Coban dressing follow-up in 1 week      Prior to Admission medications    Medication Sig Start Date End Date Taking?  Authorizing Provider   ferrous sulfate 325 (65 Fe) mg tablet Take 1 tablet (325 mg total) by mouth daily 20  Yes Sherri Mars,    metroNIDAZOLE (METROCREAM) 0 75 % cream Apply topically 2 (two) times a day 10/13/20  Yes Bessy Snyder MD   naproxen (NAPROSYN) 500 mg tablet Take 1 tablet (500 mg total) by mouth 2 (two) times a day with meals 20  Yes Santos Prabhakar DO   norethindrone-ethinyl estradiol (JUNEL FE ) 1-20 MG-MCG per tablet Take 1 tablet by mouth daily 20 Yes Historical Provider, MD Velasquez Stamp 0 18/0 215/0 25 MG-25 MCG per tablet Take 1 tablet by mouth daily 18  Yes Historical Provider, MD   valACYclovir (VALTREX) 500 mg tablet TAKE 1 TABLET BY MOUTH TWICE A DAY  Patient taking differently: No sig reported 4/15/20 4/15/21 Yes Penelope Browne MD     Allergies   Allergen Reactions    Pollen Extract Sneezing and Nasal Congestion     Depending on the season       Bessy Snyder MD  2021,8:22 AM    Portions of the record may have been created with voice recognition software   Occasional wrong word or "sound a like" substitutions may have occurred due to the inherent limitations Remains euvolemic  Continue oral Lasix   of voice recognition software   Read the chart carefully and recognize, using context, where substitutions have occurred

## 2024-02-24 NOTE — PLAN OF CARE
Problem: PAIN - ADULT  Goal: Verbalizes/displays adequate comfort level or baseline comfort level  Description: Interventions:  - Encourage patient to monitor pain and request assistance  - Assess pain using appropriate pain scale  - Administer analgesics based on type and severity of pain and evaluate response  - Implement non-pharmacological measures as appropriate and evaluate response  - Consider cultural and social influences on pain and pain management  - Notify physician/advanced practitioner if interventions unsuccessful or patient reports new pain  Outcome: Progressing     Problem: INFECTION - ADULT  Goal: Absence or prevention of progression during hospitalization  Description: INTERVENTIONS:  - Assess and monitor for signs and symptoms of infection  - Monitor lab/diagnostic results  - Monitor all insertion sites, i.e. indwelling lines, tubes, and drains  - Monitor endotracheal if appropriate and nasal secretions for changes in amount and color  - [place] appropriate cooling/warming therapies per order  - Administer medications as ordered  - Instruct and encourage patient and family to use good hand hygiene technique  - Identify and instruct in appropriate isolation precautions for identified infection/condition  Outcome: Progressing  Goal: Absence of fever/infection during neutropenic period  Description: INTERVENTIONS:  - Monitor WBC    Outcome: Progressing     Problem: DISCHARGE PLANNING  Goal: Discharge to home or other facility with appropriate resources  Description: INTERVENTIONS:  - Identify barriers to discharge w/patient and caregiver  - Arrange for needed discharge resources and transportation as appropriate  - Identify discharge learning needs (meds, wound care, etc.)  - Arrange for interpretive services to assist at discharge as needed  - Refer to Case Management Department for coordinating discharge planning if the patient needs post-hospital services based on physician/advanced  practitioner order or complex needs related to functional status, cognitive ability, or social support system  Outcome: Progressing

## 2024-02-24 NOTE — ASSESSMENT & PLAN NOTE
73-year-old male patient with past medical history of CHF, COPD, not home O2 dependent, chronic degenerative bone disease, history presented after a fall  PT/OT recommending possible rehab  Follow-up with case management

## 2024-02-24 NOTE — NURSING NOTE
Patient wants nurse to write a note in the chart for [facility]. He states his dose of Olanzapine has just been increased and he thinks that is what may be causing his dizziness and loss of balance.

## 2024-02-24 NOTE — PLAN OF CARE
Problem: Potential for Falls  Goal: Patient will remain free of falls  Description: INTERVENTIONS:  - Educate patient/family on patient safety including physical limitations  - Instruct patient to call for assistance with activity   - Consult OT/PT to assist with strengthening/mobility   - Keep Call bell within reach  - Keep bed low and locked with side rails adjusted as appropriate  - Keep care items and personal belongings within reach  - Initiate and maintain comfort rounds  - Make Fall Risk Sign visible to staff  - Offer Toileting every  Hours, in advance of need  - Initiate/Maintain alarm  - Obtain necessary fall risk management equipment:   - Apply yellow socks and bracelet for high fall risk patients  - Consider moving patient to room near nurses station  Outcome: Progressing     Problem: PAIN - ADULT  Goal: Verbalizes/displays adequate comfort level or baseline comfort level  Description: Interventions:  - Encourage patient to monitor pain and request assistance  - Assess pain using appropriate pain scale  - Administer analgesics based on type and severity of pain and evaluate response  - Implement non-pharmacological measures as appropriate and evaluate response  - Consider cultural and social influences on pain and pain management  - Notify physician/advanced practitioner if interventions unsuccessful or patient reports new pain  Outcome: Progressing     Problem: INFECTION - ADULT  Goal: Absence or prevention of progression during hospitalization  Description: INTERVENTIONS:  - Assess and monitor for signs and symptoms of infection  - Monitor lab/diagnostic results  - Monitor all insertion sites, i.e. indwelling lines, tubes, and drains  - Monitor endotracheal if appropriate and nasal secretions for changes in amount and color  - [place] appropriate cooling/warming therapies per order  - Administer medications as ordered  - Instruct and encourage patient and family to use good hand hygiene technique  -  Identify and instruct in appropriate isolation precautions for identified infection/condition  Outcome: Progressing  Goal: Absence of fever/infection during neutropenic period  Description: INTERVENTIONS:  - Monitor WBC    Outcome: Progressing     Problem: SAFETY ADULT  Goal: Patient will remain free of falls  Description: INTERVENTIONS:  - Educate patient/family on patient safety including physical limitations  - Instruct patient to call for assistance with activity   - Consult OT/PT to assist with strengthening/mobility   - Keep Call bell within reach  - Keep bed low and locked with side rails adjusted as appropriate  - Keep care items and personal belongings within reach  - Initiate and maintain comfort rounds  - Make Fall Risk Sign visible to staff  - Offer Toileting every  Hours, in advance of need  - Initiate/Maintain alarm  - Obtain necessary fall risk management equipment:   - Apply yellow socks and bracelet for high fall risk patients  - Consider moving patient to room near nurses station  Outcome: Progressing  Goal: Maintain or return to baseline ADL function  Description: INTERVENTIONS:  -  Assess patient's ability to carry out ADLs; assess patient's baseline for ADL function and identify physical deficits which impact ability to perform ADLs (bathing, care of mouth/teeth, toileting, grooming, dressing, etc.)  - Assess/evaluate cause of self-care deficits   - Assess range of motion  - Assess patient's mobility; develop plan if impaired  - Assess patient's need for assistive devices and provide as appropriate  - Encourage maximum independence but intervene and supervise when necessary  - Involve family in performance of ADLs  - Assess for home care needs following discharge   - Consider OT consult to assist with ADL evaluation and planning for discharge  - Provide patient education as appropriate  Outcome: Progressing  Goal: Maintains/Returns to pre admission functional level  Description:  INTERVENTIONS:  - Perform AM-PAC 6 Click Basic Mobility/ Daily Activity assessment daily.  - Set and communicate daily mobility goal to care team and patient/family/caregiver.   - Collaborate with rehabilitation services on mobility goals if consulted  - Perform Range of Motion  times a day.  - Reposition patient every  hours.  - Dangle patient  times a day  - Stand patient  times a day  - Ambulate patient  times a day  - Out of bed to chair  times a day   - Out of bed for meals  times a day  - Out of bed for toileting  - Record patient progress and toleration of activity level   Outcome: Progressing     Problem: DISCHARGE PLANNING  Goal: Discharge to home or other facility with appropriate resources  Description: INTERVENTIONS:  - Identify barriers to discharge w/patient and caregiver  - Arrange for needed discharge resources and transportation as appropriate  - Identify discharge learning needs (meds, wound care, etc.)  - Arrange for interpretive services to assist at discharge as needed  - Refer to Case Management Department for coordinating discharge planning if the patient needs post-hospital services based on physician/advanced practitioner order or complex needs related to functional status, cognitive ability, or social support system  Outcome: Progressing

## 2024-02-25 LAB
ANION GAP SERPL CALCULATED.3IONS-SCNC: 6 MMOL/L
BASOPHILS # BLD AUTO: 0.03 THOUSANDS/ÂΜL (ref 0–0.1)
BASOPHILS NFR BLD AUTO: 1 % (ref 0–1)
BUN SERPL-MCNC: 22 MG/DL (ref 5–25)
CALCIUM SERPL-MCNC: 8.8 MG/DL (ref 8.4–10.2)
CHLORIDE SERPL-SCNC: 103 MMOL/L (ref 96–108)
CO2 SERPL-SCNC: 27 MMOL/L (ref 21–32)
CREAT SERPL-MCNC: 1.08 MG/DL (ref 0.6–1.3)
EOSINOPHIL # BLD AUTO: 0.19 THOUSAND/ÂΜL (ref 0–0.61)
EOSINOPHIL NFR BLD AUTO: 3 % (ref 0–6)
ERYTHROCYTE [DISTWIDTH] IN BLOOD BY AUTOMATED COUNT: 13.5 % (ref 11.6–15.1)
GFR SERPL CREATININE-BSD FRML MDRD: 66 ML/MIN/1.73SQ M
GLUCOSE SERPL-MCNC: 230 MG/DL (ref 65–140)
GLUCOSE SERPL-MCNC: 241 MG/DL (ref 65–140)
GLUCOSE SERPL-MCNC: 258 MG/DL (ref 65–140)
GLUCOSE SERPL-MCNC: 261 MG/DL (ref 65–140)
GLUCOSE SERPL-MCNC: 314 MG/DL (ref 65–140)
HCT VFR BLD AUTO: 36.6 % (ref 36.5–49.3)
HGB BLD-MCNC: 11.6 G/DL (ref 12–17)
IMM GRANULOCYTES # BLD AUTO: 0.02 THOUSAND/UL (ref 0–0.2)
IMM GRANULOCYTES NFR BLD AUTO: 0 % (ref 0–2)
LYMPHOCYTES # BLD AUTO: 2.59 THOUSANDS/ÂΜL (ref 0.6–4.47)
LYMPHOCYTES NFR BLD AUTO: 39 % (ref 14–44)
MCH RBC QN AUTO: 24 PG (ref 26.8–34.3)
MCHC RBC AUTO-ENTMCNC: 31.7 G/DL (ref 31.4–37.4)
MCV RBC AUTO: 76 FL (ref 82–98)
MONOCYTES # BLD AUTO: 0.64 THOUSAND/ÂΜL (ref 0.17–1.22)
MONOCYTES NFR BLD AUTO: 10 % (ref 4–12)
NEUTROPHILS # BLD AUTO: 3.17 THOUSANDS/ÂΜL (ref 1.85–7.62)
NEUTS SEG NFR BLD AUTO: 47 % (ref 43–75)
NRBC BLD AUTO-RTO: 0 /100 WBCS
PLATELET # BLD AUTO: 159 THOUSANDS/UL (ref 149–390)
POTASSIUM SERPL-SCNC: 4 MMOL/L (ref 3.5–5.3)
RBC # BLD AUTO: 4.84 MILLION/UL (ref 3.88–5.62)
SODIUM SERPL-SCNC: 136 MMOL/L (ref 135–147)
WBC # BLD AUTO: 6.64 THOUSAND/UL (ref 4.31–10.16)

## 2024-02-25 PROCEDURE — 94664 DEMO&/EVAL PT USE INHALER: CPT

## 2024-02-25 PROCEDURE — 94760 N-INVAS EAR/PLS OXIMETRY 1: CPT

## 2024-02-25 PROCEDURE — 82948 REAGENT STRIP/BLOOD GLUCOSE: CPT

## 2024-02-25 PROCEDURE — 85025 COMPLETE CBC W/AUTO DIFF WBC: CPT | Performed by: INTERNAL MEDICINE

## 2024-02-25 PROCEDURE — 80048 BASIC METABOLIC PNL TOTAL CA: CPT | Performed by: INTERNAL MEDICINE

## 2024-02-25 PROCEDURE — 99232 SBSQ HOSP IP/OBS MODERATE 35: CPT | Performed by: INTERNAL MEDICINE

## 2024-02-25 RX ADMIN — ALBUTEROL SULFATE 2 PUFF: 90 AEROSOL, METERED RESPIRATORY (INHALATION) at 08:07

## 2024-02-25 RX ADMIN — FUROSEMIDE 40 MG: 40 TABLET ORAL at 08:07

## 2024-02-25 RX ADMIN — ENOXAPARIN SODIUM 40 MG: 40 INJECTION SUBCUTANEOUS at 08:07

## 2024-02-25 RX ADMIN — Medication 400 MG: at 17:17

## 2024-02-25 RX ADMIN — INSULIN LISPRO 2 UNITS: 100 INJECTION, SOLUTION INTRAVENOUS; SUBCUTANEOUS at 08:08

## 2024-02-25 RX ADMIN — METOPROLOL TARTRATE 25 MG: 25 TABLET, FILM COATED ORAL at 08:07

## 2024-02-25 RX ADMIN — INSULIN LISPRO 3 UNITS: 100 INJECTION, SOLUTION INTRAVENOUS; SUBCUTANEOUS at 12:17

## 2024-02-25 RX ADMIN — ARIPIPRAZOLE 20 MG: 5 TABLET ORAL at 08:07

## 2024-02-25 RX ADMIN — INSULIN LISPRO 3 UNITS: 100 INJECTION, SOLUTION INTRAVENOUS; SUBCUTANEOUS at 22:26

## 2024-02-25 RX ADMIN — ATORVASTATIN CALCIUM 40 MG: 40 TABLET, FILM COATED ORAL at 17:17

## 2024-02-25 RX ADMIN — METOPROLOL TARTRATE 25 MG: 25 TABLET, FILM COATED ORAL at 20:21

## 2024-02-25 RX ADMIN — ALBUTEROL SULFATE 2 PUFF: 90 AEROSOL, METERED RESPIRATORY (INHALATION) at 22:21

## 2024-02-25 RX ADMIN — ALBUTEROL SULFATE 2 PUFF: 90 AEROSOL, METERED RESPIRATORY (INHALATION) at 17:17

## 2024-02-25 RX ADMIN — ALBUTEROL SULFATE 2 PUFF: 90 AEROSOL, METERED RESPIRATORY (INHALATION) at 12:17

## 2024-02-25 RX ADMIN — INSULIN LISPRO 3 UNITS: 100 INJECTION, SOLUTION INTRAVENOUS; SUBCUTANEOUS at 17:16

## 2024-02-25 RX ADMIN — SENNOSIDES 8.6 MG: 8.6 TABLET, FILM COATED ORAL at 17:17

## 2024-02-25 RX ADMIN — LISINOPRIL 20 MG: 20 TABLET ORAL at 08:07

## 2024-02-25 RX ADMIN — RISPERIDONE 1 MG: 1 TABLET ORAL at 17:17

## 2024-02-25 RX ADMIN — Medication 400 MG: at 08:07

## 2024-02-25 RX ADMIN — ASPIRIN 81 MG: 81 TABLET, CHEWABLE ORAL at 08:07

## 2024-02-25 RX ADMIN — SENNOSIDES 8.6 MG: 8.6 TABLET, FILM COATED ORAL at 08:07

## 2024-02-25 RX ADMIN — RISPERIDONE 1 MG: 1 TABLET ORAL at 08:07

## 2024-02-25 RX ADMIN — INSULIN LISPRO 2 UNITS: 100 INJECTION, SOLUTION INTRAVENOUS; SUBCUTANEOUS at 12:17

## 2024-02-25 RX ADMIN — INSULIN LISPRO 3 UNITS: 100 INJECTION, SOLUTION INTRAVENOUS; SUBCUTANEOUS at 08:08

## 2024-02-25 NOTE — PLAN OF CARE
Problem: Potential for Falls  Goal: Patient will remain free of falls  Description: INTERVENTIONS:  - Educate patient/family on patient safety including physical limitations  - Instruct patient to call for assistance with activity   - Consult OT/PT to assist with strengthening/mobility   - Keep Call bell within reach  - Keep bed low and locked with side rails adjusted as appropriate  - Keep care items and personal belongings within reach  - Initiate and maintain comfort rounds  - Make Fall Risk Sign visible to staff  - Offer Toileting every 2 Hours, in advance of need  - Initiate/Maintain bedalarm  - Obtain necessary fall risk management equipment:   - Apply yellow socks and bracelet for high fall risk patients  - Consider moving patient to room near nurses station  Outcome: Progressing     Problem: PAIN - ADULT  Goal: Verbalizes/displays adequate comfort level or baseline comfort level  Description: Interventions:  - Encourage patient to monitor pain and request assistance  - Assess pain using appropriate pain scale  - Administer analgesics based on type and severity of pain and evaluate response  - Implement non-pharmacological measures as appropriate and evaluate response  - Consider cultural and social influences on pain and pain management  - Notify physician/advanced practitioner if interventions unsuccessful or patient reports new pain  Outcome: Progressing     Problem: INFECTION - ADULT  Goal: Absence or prevention of progression during hospitalization  Description: INTERVENTIONS:  - Assess and monitor for signs and symptoms of infection  - Monitor lab/diagnostic results  - Monitor all insertion sites, i.e. indwelling lines, tubes, and drains  - Monitor endotracheal if appropriate and nasal secretions for changes in amount and color  - [place] appropriate cooling/warming therapies per order  - Administer medications as ordered  - Instruct and encourage patient and family to use good hand hygiene  technique  - Identify and instruct in appropriate isolation precautions for identified infection/condition  Outcome: Progressing  Goal: Absence of fever/infection during neutropenic period  Description: INTERVENTIONS:  - Monitor WBC    Outcome: Progressing     Problem: SAFETY ADULT  Goal: Patient will remain free of falls  Description: INTERVENTIONS:  - Educate patient/family on patient safety including physical limitations  - Instruct patient to call for assistance with activity   - Consult OT/PT to assist with strengthening/mobility   - Keep Call bell within reach  - Keep bed low and locked with side rails adjusted as appropriate  - Keep care items and personal belongings within reach  - Initiate and maintain comfort rounds  - Make Fall Risk Sign visible to staff  - Offer Toileting every 2 Hours, in advance of need  - Initiate/Maintain bedalarm  - Obtain necessary fall risk management equipment:   - Apply yellow socks and bracelet for high fall risk patients  - Consider moving patient to room near nurses station  Outcome: Progressing  Goal: Maintain or return to baseline ADL function  Description: INTERVENTIONS:  -  Assess patient's ability to carry out ADLs; assess patient's baseline for ADL function and identify physical deficits which impact ability to perform ADLs (bathing, care of mouth/teeth, toileting, grooming, dressing, etc.)  - Assess/evaluate cause of self-care deficits   - Assess range of motion  - Assess patient's mobility; develop plan if impaired  - Assess patient's need for assistive devices and provide as appropriate  - Encourage maximum independence but intervene and supervise when necessary  - Involve family in performance of ADLs  - Assess for home care needs following discharge   - Consider OT consult to assist with ADL evaluation and planning for discharge  - Provide patient education as appropriate  Outcome: Progressing  Goal: Maintains/Returns to pre admission functional level  Description:  INTERVENTIONS:  - Perform AM-PAC 6 Click Basic Mobility/ Daily Activity assessment daily.  - Set and communicate daily mobility goal to care team and patient/family/caregiver.   - Collaborate with rehabilitation services on mobility goals if consulted  - Perform Range of Motion 3 times a day.  - Reposition patient every 2 hours.  - Dangle patient 3 times a day  - Stand patient 3 times a day  - Ambulate patient 3 times a day  - Out of bed to chair 3 times a day   - Out of bed for meals 3 times a day  - Out of bed for toileting  - Record patient progress and toleration of activity level   Outcome: Progressing     Problem: DISCHARGE PLANNING  Goal: Discharge to home or other facility with appropriate resources  Description: INTERVENTIONS:  - Identify barriers to discharge w/patient and caregiver  - Arrange for needed discharge resources and transportation as appropriate  - Identify discharge learning needs (meds, wound care, etc.)  - Arrange for interpretive services to assist at discharge as needed  - Refer to Case Management Department for coordinating discharge planning if the patient needs post-hospital services based on physician/advanced practitioner order or complex needs related to functional status, cognitive ability, or social support system  Outcome: Progressing     Problem: Knowledge Deficit  Goal: Patient/family/caregiver demonstrates understanding of disease process, treatment plan, medications, and discharge instructions  Description: Complete learning assessment and assess knowledge base.  Interventions:  - Provide teaching at level of understanding  - Provide teaching via preferred learning methods  Outcome: Progressing     Problem: Prexisting or High Potential for Compromised Skin Integrity  Goal: Skin integrity is maintained or improved  Description: INTERVENTIONS:  - Identify patients at risk for skin breakdown  - Assess and monitor skin integrity  - Assess and monitor nutrition and hydration  status  - Monitor labs   - Assess for incontinence   - Turn and reposition patient  - Assist with mobility/ambulation  - Relieve pressure over bony prominences  - Avoid friction and shearing  - Provide appropriate hygiene as needed including keeping skin clean and dry  - Evaluate need for skin moisturizer/barrier cream  - Collaborate with interdisciplinary team   - Patient/family teaching  - Consider wound care consult   Outcome: Progressing     Problem: Nutrition/Hydration-ADULT  Goal: Nutrient/Hydration intake appropriate for improving, restoring or maintaining nutritional needs  Description: Monitor and assess patient's nutrition/hydration status for malnutrition. Collaborate with interdisciplinary team and initiate plan and interventions as ordered.  Monitor patient's weight and dietary intake as ordered or per policy. Utilize nutrition screening tool and intervene as necessary. Determine patient's food preferences and provide high-protein, high-caloric foods as appropriate.     INTERVENTIONS:  - Monitor oral intake, urinary output, labs, and treatment plans  - Assess nutrition and hydration status and recommend course of action  - Evaluate amount of meals eaten  - Assist patient with eating if necessary   - Allow adequate time for meals  - Recommend/ encourage appropriate diets, oral nutritional supplements, and vitamin/mineral supplements  - Order, calculate, and assess calorie counts as needed  - Recommend, monitor, and adjust tube feedings and TPN/PPN based on assessed needs  - Assess need for intravenous fluids  - Provide specific nutrition/hydration education as appropriate  - Include patient/family/caregiver in decisions related to nutrition  Outcome: Progressing

## 2024-02-25 NOTE — PLAN OF CARE
Problem: Potential for Falls  Goal: Patient will remain free of falls  Description: INTERVENTIONS:  - Educate patient/family on patient safety including physical limitations  - Instruct patient to call for assistance with activity   - Consult OT/PT to assist with strengthening/mobility   - Keep Call bell within reach  - Keep bed low and locked with side rails adjusted as appropriate  - Keep care items and personal belongings within reach  - Initiate and maintain comfort rounds  - Make Fall Risk Sign visible to staff  - Offer Toileting every 2 Hours, in advance of need  - Initiate/Maintain chair and bed alarm  - Obtain necessary fall risk management equipment: walker  - Apply yellow socks and bracelet for high fall risk patients  - Consider moving patient to room near nurses station  Outcome: Progressing

## 2024-02-25 NOTE — RESPIRATORY THERAPY NOTE
RT Protocol Note  Lorne Waterman 73 y.o. male MRN: [id number]  Unit/Bed#: -01 Encounter: [id number]    Assessment    Principal Problem:    Fall  Active Problems:    Hypertension    Diabetes mellitus type 2 in obese     Anxiety    Dyspnea    History of CHF (congestive heart failure)      Home Pulmonary Medications:         Past Medical History:   Diagnosis Date    Anxiety     CHF (congestive heart failure) (HCC)     COPD (chronic obstructive pulmonary disease) (HCC)     Depression     Diabetes mellitus (HCC)     Enlarged prostate     Hallucination     Hyperlipidemia     Hypertension     Memory loss     Panic attack     Panic disorder     Psychiatric disorder     Psychiatric illness     Psychosis (HCC)     PTSD (post-traumatic stress disorder)     Schizoaffective disorder (HCC)     Schizophrenia (HCC)      Social History     Socioeconomic History    Marital status: /Civil Union     Spouse name: None    Number of children: None    Years of education: None    Highest education level: None   Occupational History    None   Tobacco Use    Smoking status: Former     Current packs/day: 0.00     Average packs/day: 2.0 packs/day for 0.5 years (1.0 ttl pk-yrs)     Types: Cigarettes     Start date: 1992     Quit date:      Years since quittin.1    Smokeless tobacco: Never    Tobacco comments:     quit in    Vaping Use    Vaping status: Never Used   Substance and Sexual Activity    Alcohol use: Never    Drug use: Never    Sexual activity: Never   Other Topics Concern    None   Social History Narrative    None     Social Determinants of Health     Financial Resource Strain: Not on file   Food Insecurity: No Food Insecurity (2024)    Hunger Vital Sign     Worried About Running Out of Food in the Last Year: Never true     Ran Out of Food in the Last Year: Never true   Transportation Needs: No Transportation Needs (2024)    PRAPARE - Transportation     Lack of Transportation (Medical): No     " Lack of Transportation (Non-Medical): No   Physical Activity: Not on file   Stress: Not on file   Social Connections: Not on file   Intimate Partner Violence: Not on file   Housing Stability: Low Risk  (2/23/2024)    Housing Stability Vital Sign     Unable to Pay for Housing in the Last Year: No     Number of Places Lived in the Last Year: 1     Unstable Housing in the Last Year: No       Subjective    Subjective Data: awake and alert    Objective    Physical Exam:        Vitals:  Blood pressure 146/92, pulse 83, temperature 98.2 °F (36.8 °C), resp. rate 18, height 5' 10\" (1.778 m), weight 130 kg (286 lb), SpO2 93%.          Imaging and other studies: I have personally reviewed pertinent reports.      O2 Device: nasal cannula     Plan    Respiratory Plan: Home Bronchodilator Patient pathway        Resp Comments: will continue with prn albuterol   "

## 2024-02-25 NOTE — ASSESSMENT & PLAN NOTE
73-year-old male patient with past medical history of CHF, COPD, not home O2 dependent, chronic degenerative bone disease, history presented after a fall  PT/OT recommending rehab  Currently medically clear  Follow-up with case management

## 2024-02-26 VITALS
RESPIRATION RATE: 16 BRPM | BODY MASS INDEX: 40.94 KG/M2 | HEART RATE: 94 BPM | TEMPERATURE: 98.8 F | SYSTOLIC BLOOD PRESSURE: 120 MMHG | WEIGHT: 286 LBS | DIASTOLIC BLOOD PRESSURE: 76 MMHG | OXYGEN SATURATION: 90 % | HEIGHT: 70 IN

## 2024-02-26 LAB
ANION GAP SERPL CALCULATED.3IONS-SCNC: 6 MMOL/L
BASOPHILS # BLD AUTO: 0.03 THOUSANDS/ÂΜL (ref 0–0.1)
BASOPHILS NFR BLD AUTO: 0 % (ref 0–1)
BUN SERPL-MCNC: 21 MG/DL (ref 5–25)
CALCIUM SERPL-MCNC: 9.5 MG/DL (ref 8.4–10.2)
CHLORIDE SERPL-SCNC: 102 MMOL/L (ref 96–108)
CO2 SERPL-SCNC: 27 MMOL/L (ref 21–32)
CREAT SERPL-MCNC: 1.1 MG/DL (ref 0.6–1.3)
EOSINOPHIL # BLD AUTO: 0.21 THOUSAND/ÂΜL (ref 0–0.61)
EOSINOPHIL NFR BLD AUTO: 3 % (ref 0–6)
ERYTHROCYTE [DISTWIDTH] IN BLOOD BY AUTOMATED COUNT: 13.4 % (ref 11.6–15.1)
GFR SERPL CREATININE-BSD FRML MDRD: 65 ML/MIN/1.73SQ M
GLUCOSE SERPL-MCNC: 256 MG/DL (ref 65–140)
GLUCOSE SERPL-MCNC: 260 MG/DL (ref 65–140)
GLUCOSE SERPL-MCNC: 266 MG/DL (ref 65–140)
HCT VFR BLD AUTO: 37 % (ref 36.5–49.3)
HGB BLD-MCNC: 11.4 G/DL (ref 12–17)
IMM GRANULOCYTES # BLD AUTO: 0.02 THOUSAND/UL (ref 0–0.2)
IMM GRANULOCYTES NFR BLD AUTO: 0 % (ref 0–2)
LYMPHOCYTES # BLD AUTO: 3.07 THOUSANDS/ÂΜL (ref 0.6–4.47)
LYMPHOCYTES NFR BLD AUTO: 43 % (ref 14–44)
MCH RBC QN AUTO: 23.6 PG (ref 26.8–34.3)
MCHC RBC AUTO-ENTMCNC: 30.8 G/DL (ref 31.4–37.4)
MCV RBC AUTO: 77 FL (ref 82–98)
MONOCYTES # BLD AUTO: 0.53 THOUSAND/ÂΜL (ref 0.17–1.22)
MONOCYTES NFR BLD AUTO: 8 % (ref 4–12)
NEUTROPHILS # BLD AUTO: 3.21 THOUSANDS/ÂΜL (ref 1.85–7.62)
NEUTS SEG NFR BLD AUTO: 46 % (ref 43–75)
NRBC BLD AUTO-RTO: 0 /100 WBCS
PLATELET # BLD AUTO: 162 THOUSANDS/UL (ref 149–390)
PMV BLD AUTO: 13.2 FL (ref 8.9–12.7)
POTASSIUM SERPL-SCNC: 4.5 MMOL/L (ref 3.5–5.3)
RBC # BLD AUTO: 4.83 MILLION/UL (ref 3.88–5.62)
SODIUM SERPL-SCNC: 135 MMOL/L (ref 135–147)
WBC # BLD AUTO: 7.07 THOUSAND/UL (ref 4.31–10.16)

## 2024-02-26 PROCEDURE — 82948 REAGENT STRIP/BLOOD GLUCOSE: CPT

## 2024-02-26 PROCEDURE — 85025 COMPLETE CBC W/AUTO DIFF WBC: CPT | Performed by: INTERNAL MEDICINE

## 2024-02-26 PROCEDURE — 99239 HOSP IP/OBS DSCHRG MGMT >30: CPT | Performed by: INTERNAL MEDICINE

## 2024-02-26 PROCEDURE — 99232 SBSQ HOSP IP/OBS MODERATE 35: CPT | Performed by: INTERNAL MEDICINE

## 2024-02-26 PROCEDURE — 80048 BASIC METABOLIC PNL TOTAL CA: CPT | Performed by: INTERNAL MEDICINE

## 2024-02-26 RX ADMIN — INSULIN LISPRO 2 UNITS: 100 INJECTION, SOLUTION INTRAVENOUS; SUBCUTANEOUS at 11:54

## 2024-02-26 RX ADMIN — ALBUTEROL SULFATE 2 PUFF: 90 AEROSOL, METERED RESPIRATORY (INHALATION) at 11:54

## 2024-02-26 RX ADMIN — FUROSEMIDE 40 MG: 40 TABLET ORAL at 08:01

## 2024-02-26 RX ADMIN — ARIPIPRAZOLE 20 MG: 5 TABLET ORAL at 08:00

## 2024-02-26 RX ADMIN — LISINOPRIL 20 MG: 20 TABLET ORAL at 08:01

## 2024-02-26 RX ADMIN — INSULIN LISPRO 2 UNITS: 100 INJECTION, SOLUTION INTRAVENOUS; SUBCUTANEOUS at 07:58

## 2024-02-26 RX ADMIN — INSULIN LISPRO 3 UNITS: 100 INJECTION, SOLUTION INTRAVENOUS; SUBCUTANEOUS at 07:58

## 2024-02-26 RX ADMIN — ASPIRIN 81 MG: 81 TABLET, CHEWABLE ORAL at 08:01

## 2024-02-26 RX ADMIN — SENNOSIDES 8.6 MG: 8.6 TABLET, FILM COATED ORAL at 08:01

## 2024-02-26 RX ADMIN — ALBUTEROL SULFATE 2 PUFF: 90 AEROSOL, METERED RESPIRATORY (INHALATION) at 08:00

## 2024-02-26 RX ADMIN — RISPERIDONE 1 MG: 1 TABLET ORAL at 08:01

## 2024-02-26 RX ADMIN — METOPROLOL TARTRATE 25 MG: 25 TABLET, FILM COATED ORAL at 08:01

## 2024-02-26 RX ADMIN — Medication 400 MG: at 08:01

## 2024-02-26 RX ADMIN — INSULIN LISPRO 3 UNITS: 100 INJECTION, SOLUTION INTRAVENOUS; SUBCUTANEOUS at 11:54

## 2024-02-26 RX ADMIN — ENOXAPARIN SODIUM 40 MG: 40 INJECTION SUBCUTANEOUS at 08:01

## 2024-02-26 NOTE — PLAN OF CARE
Problem: Potential for Falls  Goal: Patient will remain free of falls  Description: INTERVENTIONS:  - Educate patient/family on patient safety including physical limitations  - Instruct patient to call for assistance with activity   - Consult OT/PT to assist with strengthening/mobility   - Keep Call bell within reach  - Keep bed low and locked with side rails adjusted as appropriate  - Keep care items and personal belongings within reach  - Initiate and maintain comfort rounds  - Make Fall Risk Sign visible to staff  - Offer Toileting every  Hours, in advance of need  - Initiate/Maintain alarm  - Obtain necessary fall risk management equipment:   - Apply yellow socks and bracelet for high fall risk patients  - Consider moving patient to room near nurses station  Outcome: Progressing     Problem: INFECTION - ADULT  Goal: Absence or prevention of progression during hospitalization  Description: INTERVENTIONS:  - Assess and monitor for signs and symptoms of infection  - Monitor lab/diagnostic results  - Monitor all insertion sites, i.e. indwelling lines, tubes, and drains  - Monitor endotracheal if appropriate and nasal secretions for changes in amount and color  - [place] appropriate cooling/warming therapies per order  - Administer medications as ordered  - Instruct and encourage patient and family to use good hand hygiene technique  - Identify and instruct in appropriate isolation precautions for identified infection/condition  Outcome: Progressing     Problem: SAFETY ADULT  Goal: Patient will remain free of falls  Description: INTERVENTIONS:  - Educate patient/family on patient safety including physical limitations  - Instruct patient to call for assistance with activity   - Consult OT/PT to assist with strengthening/mobility   - Keep Call bell within reach  - Keep bed low and locked with side rails adjusted as appropriate  - Keep care items and personal belongings within reach  - Initiate and maintain comfort  rounds  - Make Fall Risk Sign visible to staff  - Offer Toileting every  Hours, in advance of need  - Initiate/Maintain alarm  - Obtain necessary fall risk management equipment:   - Apply yellow socks and bracelet for high fall risk patients  - Consider moving patient to room near nurses station  Outcome: Progressing

## 2024-02-26 NOTE — ASSESSMENT & PLAN NOTE
Patient appears to have multiple complaints.  During initial encounter in emergency room seems like dyspnea was primary complaint however on my encounter patient reports fall, right-sided flank pain is the reason for him to come to the hospital.  No evidence of COPD exacerbation at this time  Currently saturating adequately on room air  No indication for IV steroids at the time

## 2024-02-26 NOTE — NURSING NOTE
Report called to [facility]. Spoke with Karine, all questions answered.  IV site removed. All belongings with pt and accounted for. Pt transport time 1630.

## 2024-02-26 NOTE — CASE MANAGEMENT
Case Management Assessment & Discharge Planning Note    Patient name Lorne Waterman  Location /-01 MRN [id number]  : 1950 Date 2024       Current Admission Date: 2024  Current Admission Diagnosis:Fall   Patient Active Problem List    Diagnosis Date Noted    Fall 2024    Parkinson disease 2023    Type 2 diabetes mellitus without complication, without long-term current use of insulin (HCC) 2023    Chronic left-sided low back pain without sciatica 2023    Left leg injury 2023    Morbid obesity due to excess calories (HCC) 10/09/2020    Dyspnea 10/07/2020    Other emphysema (HCC) 10/07/2020    Essential hypertension 10/07/2020    History of CHF (congestive heart failure) 10/07/2020    Acute pulmonary insufficiency 2020    Chronic combined systolic and diastolic congestive heart failure (HCC) 2019    Osteoarthritis of both knees 2019    Near syncope 2019    Elevated troponin 2019    Anxiety 2018    Chronic post-traumatic stress disorder (PTSD) 2018    Tardive dyskinesia 2018    Encounter for examination and observation for other specified reasons     Constipated 2018    Other chest pain 2018    Schizophrenia (HCC) 2018    Hypertension 2018    Hyperlipidemia 2018    Diabetes mellitus type 2 in obese  2018      LOS (days): 4  Geometric Mean LOS (GMLOS) (days): 2.7  Days to GMLOS:-1.5     OBJECTIVE:    Risk of Unplanned Readmission Score: 11.48         Current admission status: Inpatient       Preferred Pharmacy:   [facility] [place] - [address]  [address]  [place] [address]  Phone: [phone] Fax: [phone]    [id number] - [place] - [address]  [address]  [place] [address]  Phone: [phone] Fax: [phone]    Primary Care Provider: Alysa Santoro MD    Primary  Insurance: [facility] [employer] [facility]  Secondary Insurance: MEDICARE    ASSESSMENT:  Active Health Care Proxies       Isabelle Waterman Health Care Representative - Spouse   Primary Phone: [phone] (Home)                                  Social Determinants of Health (SDOH)      Flowsheet Row Most Recent Value   Housing Stability    In the last 12 months, was there a time when you were not able to pay the mortgage or rent on time? N   In the last 12 months, how many places have you lived? 1   In the last 12 months, was there a time when you did not have a steady place to sleep or slept in a shelter (including now)? N   Transportation Needs    In the past 12 months, has lack of transportation kept you from medical appointments or from getting medications? no   In the past 12 months, has lack of transportation kept you from meetings, work, or from getting things needed for daily living? No   Food Insecurity    Within the past 12 months, you worried that your food would run out before you got the money to buy more. Never true   Within the past 12 months, the food you bought just didn't last and you didn't have money to get more. Never true   Utilities    In the past 12 months has the electric, gas, oil, or water company threatened to shut off services in your home? No            DISCHARGE DETAILS:    Discharge planning discussed with:: Patient, wife, VA CM- [facility] Admissions  Mobile of Choice: Yes  Comments - Freedom of Choice: CM met with patient to discuss the list of VA affiliated facilities. Patient's wife only interested in facilities close to her home. CM  informed patient and wife that [facility] does not have a VA contract and [facility] is affiliated and willing to accept.  List reviewed and they consented to speaking with the [facility]. The  patient and wife requested [facility] and ANAHY reached out to the VA for auth.  VA covering CM today is  Kathleen Madrigal [phone]    [place] [phone].  She staes they will give auth directly to [facility].   [facility] updated via [facility] and agreed to accept today.  Updated chart notes were attached in [facility]IN.  The VA CM was updtaed to the transfer date and time  CM contacted family/caregiver?: Yes     Did patient/caregiver verbalize understanding of patient care needs?: Yes  Were patient/caregiver advised of the risks associated with not following Treatment Team discharge recommendations?: Yes    Contacts  Patient Contacts: Isabelle Waterman (Spouse)  Relationship to Patient:: Family  Contact Method: In Person  Phone Number: [phone]  Reason/Outcome: Continuity of Care, Discharge Planning    Requested Home Health Care         Is the patient interested in HHC at discharge?: No    DME Referral Provided  Referral made for DME?: No    Other Referral/Resources/Interventions Provided:  Interventions: Short Term Rehab  Referral Comments: CM coordinated with the patient , wife, VA CM and [facility] for a plan to tranfer to that facility today.  VA CM will give auth directly to [facility]         Treatment Team Recommendation: Short Term Rehab     Transport at Discharge : [facility]        Transported by (Company and Unit #): [facility] [employer] claimed  and will arrive on 02/26/2024 at 4:30pm EST. Contact them at [phone].           Transfer Mode: Stretcher        IMM Given (Date):: 02/26/24  IMM Given to:: Patient  Family notified:: CM discussed IMM with patient and wife.  They verbalized understanding with no questions or concerns. Willian signed original, was given a copy and the original was placed in MR.       [facility] Facility Name, City & State : [facility]  Receiving Facility/Agency Phone Number: [phone]  Facility/Agency Fax Number: [phone]

## 2024-02-26 NOTE — PROGRESS NOTES
[facility]  Progress Note  Name: Lorne Waterman I  MRN: [id number]  Unit/Bed#: -01 I Date of Admission: 2/22/2024   Date of Service: 2/26/2024 I Hospital Day: 4    Assessment/Plan   * Fall  Assessment & Plan  73-year-old male patient with past medical history of CHF, COPD, not home O2 dependent, chronic degenerative bone disease, history presented after a fall  PT/OT recommending rehab  Follow-up with case management  Medically clear at this time    History of CHF (congestive heart failure)  Assessment & Plan  Remains euvolemic  Continue oral Lasix    Dyspnea  Assessment & Plan  Patient appears to have multiple complaints.  During initial encounter in emergency room seems like dyspnea was primary complaint however on my encounter patient reports fall, right-sided flank pain is the reason for him to come to the hospital.  No evidence of COPD exacerbation at this time  Currently saturating adequately on room air  No indication for IV steroids at the time    Anxiety  Assessment & Plan  Continue Abilify, trazodone, risperidone.    Diabetes mellitus type 2 in obese   Assessment & Plan  Sliding-scale insulin  Blood sugar currently controlled    Hypertension  Assessment & Plan  Blood pressure controlled  Continue lisinopril, beta-blocker, Lasix         Acute respiratory failure with hypoxia  Presenting emergency department with O2 sat of 85% with tachypnea and was diaphoretic was treated with IV steroids and DuoNebs in the emergency department and with oxygen 2 to 4 L nasal cannula  Resolved shortly after arrival      VTE Pharmacologic Prophylaxis:   Pharmacologic: Enoxaparin (Lovenox)  Mechanical VTE Prophylaxis in Place: Yes    Patient Centered Rounds: I have performed bedside rounds with nursing staff today.    Discussions with Specialists or Other Care Team Provider: cm, nursing    Education and Discussions with Family / Patient: pt, wife    Time Spent for Care: 30 minutes.  More than  50% of total time spent on counseling and coordination of care as described above.    Current Length of Stay: 4 day(s)    Current Patient Status: Inpatient   Certification Statement: The patient will continue to require additional inpatient hospital stay due to see below    Discharge Plan: Medically clear awaiting rehab    Code Status: Level 1 - Full Code      Subjective:   Denies chest pain, shortness of breath, cough, fevers, chills    Objective:     Vitals:   Temp (24hrs), Av.1 °F (36.7 °C), Min:97.3 °F (36.3 °C), Max:98.7 °F (37.1 °C)    Temp:  [97.3 °F (36.3 °C)-98.7 °F (37.1 °C)] 98.7 °F (37.1 °C)  HR:  [] 85  Resp:  [17-18] 17  BP: (123-149)/(75-87) 138/87  SpO2:  [91 %-95 %] 95 %  Body mass index is 41.04 kg/m².     Input and Output Summary (last 24 hours):       Intake/Output Summary (Last 24 hours) at 2024 1010  Last data filed at 2024 0759  Gross per 24 hour   Intake 600 ml   Output 920 ml   Net -320 ml       Physical Exam:     Physical Exam  Constitutional:       General: He is not in acute distress.     Appearance: He is well-developed. He is not diaphoretic.   HENT:      Head: Normocephalic and atraumatic.      Nose: Nose normal.      Mouth/Throat:      Pharynx: No oropharyngeal exudate.   Eyes:      General: No scleral icterus.     Conjunctiva/sclera: Conjunctivae normal.   Cardiovascular:      Rate and Rhythm: Normal rate and regular rhythm.      Heart sounds: Normal heart sounds. No murmur heard.     No friction rub. No gallop.   Pulmonary:      Effort: Pulmonary effort is normal. No respiratory distress.      Breath sounds: Normal breath sounds. No wheezing or rales.   Chest:      Chest wall: No tenderness.   Abdominal:      General: Bowel sounds are normal. There is no distension.      Palpations: Abdomen is soft.      Tenderness: There is no abdominal tenderness. There is no guarding.   Musculoskeletal:         General: No tenderness or deformity. Normal range of motion.       Cervical back: Normal range of motion and neck supple.   Skin:     General: Skin is warm and dry.      Findings: No erythema.   Neurological:      Mental Status: He is alert. Mental status is at baseline.         Additional Data:     Labs:    Results from last 7 days   Lab Units 02/26/24  0615   WBC Thousand/uL 7.07   HEMOGLOBIN g/dL 11.4*   HEMATOCRIT % 37.0   PLATELETS Thousands/uL 162   NEUTROS PCT % 46   LYMPHS PCT % 43   MONOS PCT % 8   EOS PCT % 3     Results from last 7 days   Lab Units 02/26/24  0615 02/23/24  0514 02/22/24  0955   SODIUM mmol/L 135   < > 136   POTASSIUM mmol/L 4.5   < > 3.8   CHLORIDE mmol/L 102   < > 103   CO2 mmol/L 27   < > 22   BUN mg/dL 21   < > 19   CREATININE mg/dL 1.10   < > 1.18   ANION GAP mmol/L 6   < > 11   CALCIUM mg/dL 9.5   < > 9.6   ALBUMIN g/dL  --   --  4.3   TOTAL BILIRUBIN mg/dL  --   --  0.68   ALK PHOS U/L  --   --  47   ALT U/L  --   --  25   AST U/L  --   --  22   GLUCOSE RANDOM mg/dL 260*   < > 226*    < > = values in this interval not displayed.     Results from last 7 days   Lab Units 02/22/24  0955   INR  1.06     Results from last 7 days   Lab Units 02/26/24  0617 02/25/24  2226 02/25/24  1603 02/25/24  1042 02/25/24  0639 02/24/24  2107 02/24/24  1532 02/24/24  1113 02/24/24  0657 02/23/24  2056 02/23/24  1613 02/23/24  1131   POC GLUCOSE mg/dl 266* 261* 314* 258* 230* 252* 259* 243* 227* 233* 221* 231*     Results from last 7 days   Lab Units 02/22/24  0955   HEMOGLOBIN A1C % 8.7*     Results from last 7 days   Lab Units 02/22/24  0955   LACTIC ACID mmol/L 1.9           * I Have Reviewed All Lab Data Listed Above.  * Additional Pertinent Lab Tests Reviewed: All Labs Within Last 24 Hours Reviewed    Imaging:    Imaging Reports Reviewed Today Include: na  Imaging Personally Reviewed by Myself Includes:  na    Recent Cultures (last 7 days):     Results from last 7 days   Lab Units 02/22/24  0957 02/22/24  0956   BLOOD CULTURE  No Growth at 72 hrs. No Growth at 72  hrs.       Last 24 Hours Medication List:   Current Facility-Administered Medications   Medication Dose Route Frequency Provider Last Rate    albuterol  2 puff Inhalation 4x Daily Niranjan GARCIA MD      albuterol  2 puff Inhalation Q4H PRN Cole Newton MD      aluminum-magnesium hydroxide-simethicone  30 mL Oral Q6H PRN Niranjan GARCIA MD      ARIPiprazole  20 mg Oral Daily Niranjan GARCIA MD      aspirin  81 mg Oral Daily Niranjan GARCIA MD      atorvastatin  40 mg Oral Daily With Dinner Niranjan GARCIA MD      docusate sodium  100 mg Oral BID PRN Niranjan GARCIA MD      enoxaparin  40 mg Subcutaneous Daily Niranjan GARCIA MD      furosemide  40 mg Oral Daily Niranjan GARCIA MD      insulin lispro  1-5 Units Subcutaneous TID AC Niranjan GARCIA MD      insulin lispro  1-6 Units Subcutaneous HS Lena Mcghee PA-C      insulin lispro  3 Units Subcutaneous TID With Meals Niranjan GARCIA MD      lisinopril  20 mg Oral Daily Niranjan GARCIA MD      magnesium Oxide  400 mg Oral BID Niranjan GARCIA MD      methocarbamol  750 mg Oral Q6H PRN Niranjan GARCIA MD      metoprolol tartrate  25 mg Oral Q12H TREVOR Niranjan GARCIA MD      risperiDONE  1 mg Oral BID Niranjan GARCIA MD      senna  8.6 mg Oral BID Niranjan GARCIA MD          Today, Patient Was Seen By: Cole Newton MD    ** Please Note: Dictation voice to text software may have been used in the creation of this document. **

## 2024-02-26 NOTE — PLAN OF CARE
Problem: Potential for Falls  Goal: Patient will remain free of falls  Description: INTERVENTIONS:  - Educate patient/family on patient safety including physical limitations  - Instruct patient to call for assistance with activity   - Consult OT/PT to assist with strengthening/mobility   - Keep Call bell within reach  - Keep bed low and locked with side rails adjusted as appropriate  - Keep care items and personal belongings within reach  - Initiate and maintain comfort rounds  - Make Fall Risk Sign visible to staff  - Offer Toileting every  Hours, in advance of need  - Initiate/Maintain alarm  - Obtain necessary fall risk management equipment:   - Apply yellow socks and bracelet for high fall risk patients  - Consider moving patient to room near nurses station  Outcome: Progressing     Problem: PAIN - ADULT  Goal: Verbalizes/displays adequate comfort level or baseline comfort level  Description: Interventions:  - Encourage patient to monitor pain and request assistance  - Assess pain using appropriate pain scale  - Administer analgesics based on type and severity of pain and evaluate response  - Implement non-pharmacological measures as appropriate and evaluate response  - Consider cultural and social influences on pain and pain management  - Notify physician/advanced practitioner if interventions unsuccessful or patient reports new pain  Outcome: Progressing     Problem: INFECTION - ADULT  Goal: Absence or prevention of progression during hospitalization  Description: INTERVENTIONS:  - Assess and monitor for signs and symptoms of infection  - Monitor lab/diagnostic results  - Monitor all insertion sites, i.e. indwelling lines, tubes, and drains  - Monitor endotracheal if appropriate and nasal secretions for changes in amount and color  - [place] appropriate cooling/warming therapies per order  - Administer medications as ordered  - Instruct and encourage patient and family to use good hand hygiene technique  -  Identify and instruct in appropriate isolation precautions for identified infection/condition  Outcome: Progressing  Goal: Absence of fever/infection during neutropenic period  Description: INTERVENTIONS:  - Monitor WBC    Outcome: Progressing     Problem: SAFETY ADULT  Goal: Patient will remain free of falls  Description: INTERVENTIONS:  - Educate patient/family on patient safety including physical limitations  - Instruct patient to call for assistance with activity   - Consult OT/PT to assist with strengthening/mobility   - Keep Call bell within reach  - Keep bed low and locked with side rails adjusted as appropriate  - Keep care items and personal belongings within reach  - Initiate and maintain comfort rounds  - Make Fall Risk Sign visible to staff  - Offer Toileting every  Hours, in advance of need  - Initiate/Maintain alarm  - Obtain necessary fall risk management equipment:   - Apply yellow socks and bracelet for high fall risk patients  - Consider moving patient to room near nurses station  Outcome: Progressing  Goal: Maintain or return to baseline ADL function  Description: INTERVENTIONS:  -  Assess patient's ability to carry out ADLs; assess patient's baseline for ADL function and identify physical deficits which impact ability to perform ADLs (bathing, care of mouth/teeth, toileting, grooming, dressing, etc.)  - Assess/evaluate cause of self-care deficits   - Assess range of motion  - Assess patient's mobility; develop plan if impaired  - Assess patient's need for assistive devices and provide as appropriate  - Encourage maximum independence but intervene and supervise when necessary  - Involve family in performance of ADLs  - Assess for home care needs following discharge   - Consider OT consult to assist with ADL evaluation and planning for discharge  - Provide patient education as appropriate  Outcome: Progressing  Goal: Maintains/Returns to pre admission functional level  Description:  INTERVENTIONS:  - Perform AM-PAC 6 Click Basic Mobility/ Daily Activity assessment daily.  - Set and communicate daily mobility goal to care team and patient/family/caregiver.   - Collaborate with rehabilitation services on mobility goals if consulted  - Perform Range of Motion  times a day.  - Reposition patient every  hours.  - Dangle patient  times a day  - Stand patient  times a day  - Ambulate patient  times a day  - Out of bed to chair  times a day   - Out of bed for meals  times a day  - Out of bed for toileting  - Record patient progress and toleration of activity level   Outcome: Progressing     Problem: DISCHARGE PLANNING  Goal: Discharge to home or other facility with appropriate resources  Description: INTERVENTIONS:  - Identify barriers to discharge w/patient and caregiver  - Arrange for needed discharge resources and transportation as appropriate  - Identify discharge learning needs (meds, wound care, etc.)  - Arrange for interpretive services to assist at discharge as needed  - Refer to Case Management Department for coordinating discharge planning if the patient needs post-hospital services based on physician/advanced practitioner order or complex needs related to functional status, cognitive ability, or social support system  Outcome: Progressing     Problem: Knowledge Deficit  Goal: Patient/family/caregiver demonstrates understanding of disease process, treatment plan, medications, and discharge instructions  Description: Complete learning assessment and assess knowledge base.  Interventions:  - Provide teaching at level of understanding  - Provide teaching via preferred learning methods  Outcome: Progressing     Problem: Prexisting or High Potential for Compromised Skin Integrity  Goal: Skin integrity is maintained or improved  Description: INTERVENTIONS:  - Identify patients at risk for skin breakdown  - Assess and monitor skin integrity  - Assess and monitor nutrition and hydration status  -  Monitor labs   - Assess for incontinence   - Turn and reposition patient  - Assist with mobility/ambulation  - Relieve pressure over bony prominences  - Avoid friction and shearing  - Provide appropriate hygiene as needed including keeping skin clean and dry  - Evaluate need for skin moisturizer/barrier cream  - Collaborate with interdisciplinary team   - Patient/family teaching  - Consider wound care consult   Outcome: Progressing     Problem: Nutrition/Hydration-ADULT  Goal: Nutrient/Hydration intake appropriate for improving, restoring or maintaining nutritional needs  Description: Monitor and assess patient's nutrition/hydration status for malnutrition. Collaborate with interdisciplinary team and initiate plan and interventions as ordered.  Monitor patient's weight and dietary intake as ordered or per policy. Utilize nutrition screening tool and intervene as necessary. Determine patient's food preferences and provide high-protein, high-caloric foods as appropriate.     INTERVENTIONS:  - Monitor oral intake, urinary output, labs, and treatment plans  - Assess nutrition and hydration status and recommend course of action  - Evaluate amount of meals eaten  - Assist patient with eating if necessary   - Allow adequate time for meals  - Recommend/ encourage appropriate diets, oral nutritional supplements, and vitamin/mineral supplements  - Order, calculate, and assess calorie counts as needed  - Recommend, monitor, and adjust tube feedings and TPN/PPN based on assessed needs  - Assess need for intravenous fluids  - Provide specific nutrition/hydration education as appropriate  - Include patient/family/caregiver in decisions related to nutrition  Outcome: Progressing

## 2024-02-26 NOTE — ASSESSMENT & PLAN NOTE
73-year-old male patient with past medical history of CHF, COPD, not home O2 dependent, chronic degenerative bone disease, history presented after a fall  PT/OT recommending rehab  Follow-up with case management  Medically clear at this time

## 2024-02-27 LAB
BACTERIA BLD CULT: NORMAL
BACTERIA BLD CULT: NORMAL

## 2024-02-28 NOTE — PROGRESS NOTES
Understanding Preeclampsia  Preeclampsia is high blood pressure (hypertension) that happens during pregnancy. It often shows up around the 20th week of pregnancy. It often goes back to normal by the 12th week after you give birth. It can lead to serious health risks for you and your baby. During your pregnancy, your healthcare provider will watch your blood pressure.    Symptoms  A common symptom of preeclampsia is high blood pressure. Other symptoms may include:  Rapid weight gain  Protein in your urine  Headache  Belly (abdominal) pain on your right side  Vision problems. These include flashes or spots.  Swelling (edema) in your face or hands. This also often happens near the end of normal pregnancies, even without preeclampsia.  Tests you may have  Your healthcare provider will want to check your blood pressure throughout your pregnancy. If your blood pressure is high, you may have the following tests:  Urine tests to look for protein  Blood tests to confirm preeclampsia  Fetal monitoring to make sure that your baby is healthy  Treating preeclampsia  You may need to take a daily low dose of aspirin if you are at risk for preeclampsia. Preeclampsia almost always ends soon after you give birth. Until then, your healthcare provider can help manage your condition. If your symptoms are mild, you may need activity limits at home, including bed rest and no heavy lifting. If your symptoms are severe, you will stay in the hospital. Hospital treatment includes:  Activity limits to help control blood pressure. This means no heavy lifting and 8 hours per day lying down with the feet up.  Magnesium IV (intravenous) drip during labor to prevent seizures  Induced labor or surgical delivery by  section. Delivery is considered the cure for preeclampsia.  When to call your healthcare provider  Call your healthcare provider if swelling, weight gain, or other symptoms come on quickly or are severe. Some cases of  [facility]  Progress Note  Name: Lorne Waterman I  MRN: [id number]  Unit/Bed#: MS [facility]-01 I Date of Admission: 2024   Date of Service: 2024 I Hospital Day: 3    Assessment/Plan   * Fall  Assessment & Plan  73-year-old male patient with past medical history of CHF, COPD, not home O2 dependent, chronic degenerative bone disease, history presented after a fall  PT/OT recommending rehab  Currently medically clear  Follow-up with case management    History of CHF (congestive heart failure)  Assessment & Plan  Remains euvolemic  Continue oral Lasix    Anxiety  Assessment & Plan  Continue Abilify, trazodone, risperidone.    Diabetes mellitus type 2 in obese   Assessment & Plan  Sliding-scale insulin  Blood sugar currently controlled    Hypertension  Assessment & Plan  Blood pressure controlled  Continue lisinopril, beta-blocker, Lasix         VTE Pharmacologic Prophylaxis:   Pharmacologic: Enoxaparin (Lovenox)  Mechanical VTE Prophylaxis in Place: Yes    Patient Centered Rounds: I have performed bedside rounds with nursing staff today.    Discussions with Specialists or Other Care Team Provider: cm, nursing    Education and Discussions with Family / Patient: pt, wife    Time Spent for Care: 30 minutes.  More than 50% of total time spent on counseling and coordination of care as described above.    Current Length of Stay: 3 day(s)    Current Patient Status: Inpatient   Certification Statement: The patient will continue to require additional inpatient hospital stay due to see below    Discharge Plan: Medically clear awaiting rehab  Code Status: Level 1 - Full Code      Subjective:   Denies any acute complaints.  Resting comfortably.  Denies fevers, chills, cough, shortness of breath    Objective:     Vitals:   Temp (24hrs), Av.4 °F (36.9 °C), Min:98.2 °F (36.8 °C), Max:98.5 °F (36.9 °C)    Temp:  [98.2 °F (36.8 °C)-98.5 °F (36.9 °C)] 98.2 °F (36.8 °C)  HR:  [83-91] 83  Resp:  [18-20]  18  BP: (135-146)/(68-92) 146/92  SpO2:  [93 %-96 %] 93 %  Body mass index is 41.04 kg/m².     Input and Output Summary (last 24 hours):       Intake/Output Summary (Last 24 hours) at 2/25/2024 1029  Last data filed at 2/25/2024 0956  Gross per 24 hour   Intake 1200 ml   Output 1700 ml   Net -500 ml       Physical Exam:     Physical Exam  Constitutional:       General: He is not in acute distress.     Appearance: He is well-developed. He is not diaphoretic.   HENT:      Head: Normocephalic and atraumatic.      Nose: Nose normal.      Mouth/Throat:      Pharynx: No oropharyngeal exudate.   Eyes:      General: No scleral icterus.     Conjunctiva/sclera: Conjunctivae normal.   Cardiovascular:      Rate and Rhythm: Normal rate and regular rhythm.      Heart sounds: Normal heart sounds. No murmur heard.     No friction rub. No gallop.   Pulmonary:      Effort: Pulmonary effort is normal. No respiratory distress.      Breath sounds: Normal breath sounds. No wheezing or rales.   Chest:      Chest wall: No tenderness.   Abdominal:      General: Bowel sounds are normal. There is no distension.      Palpations: Abdomen is soft.      Tenderness: There is no abdominal tenderness. There is no guarding.   Musculoskeletal:         General: No tenderness or deformity. Normal range of motion.      Cervical back: Normal range of motion and neck supple.   Skin:     General: Skin is warm and dry.      Findings: No erythema.   Neurological:      Mental Status: He is alert. Mental status is at baseline.           Additional Data:     Labs:    Results from last 7 days   Lab Units 02/25/24  0428   WBC Thousand/uL 6.64   HEMOGLOBIN g/dL 11.6*   HEMATOCRIT % 36.6   PLATELETS Thousands/uL 159   NEUTROS PCT % 47   LYMPHS PCT % 39   MONOS PCT % 10   EOS PCT % 3     Results from last 7 days   Lab Units 02/25/24  0428 02/23/24  0514 02/22/24  0955   SODIUM mmol/L 136   < > 136   POTASSIUM mmol/L 4.0   < > 3.8   CHLORIDE mmol/L 103   < > 103   CO2  preeclampsia are more severe than others. Your symptoms also may change or get worse as you get closer to your due date.  Who’s at risk?  No one knows what causes preeclampsia. Preeclampsia can happen in any pregnant woman. But it is more common in first-time pregnancies. Things that increase the risk include:  Previous pregnancies. You are at risk if you had preeclampsia, intrauterine growth retardation (IUGR),  birth, placental abruption, or fetal death in a past pregnancy.  [employer]. [address] [place] [id number]. [employer]. [address] [place] [address]. All rights reserved. This information is not intended as a substitute for professional medical care. Always follow your healthcare professional's instructions.        Recognizing  mmol/L 27   < > 22   BUN mg/dL 22   < > 19   CREATININE mg/dL 1.08   < > 1.18   ANION GAP mmol/L 6   < > 11   CALCIUM mg/dL 8.8   < > 9.6   ALBUMIN g/dL  --   --  4.3   TOTAL BILIRUBIN mg/dL  --   --  0.68   ALK PHOS U/L  --   --  47   ALT U/L  --   --  25   AST U/L  --   --  22   GLUCOSE RANDOM mg/dL 241*   < > 226*    < > = values in this interval not displayed.     Results from last 7 days   Lab Units 02/22/24  0955   INR  1.06     Results from last 7 days   Lab Units 02/25/24  0639 02/24/24  2107 02/24/24  1532 02/24/24  1113 02/24/24  0657 02/23/24  2056 02/23/24  1613 02/23/24  1131 02/23/24  0546 02/22/24  2051 02/22/24  1547 02/22/24  1246   POC GLUCOSE mg/dl 230* 252* 259* 243* 227* 233* 221* 231* 317* 397* 351* 275*     Results from last 7 days   Lab Units 02/22/24  0955   HEMOGLOBIN A1C % 8.7*     Results from last 7 days   Lab Units 02/22/24  0955   LACTIC ACID mmol/L 1.9           * I Have Reviewed All Lab Data Listed Above.  * Additional Pertinent Lab Tests Reviewed: All Labs Within Last 24 Hours Reviewed    Imaging:    Imaging Reports Reviewed Today Include: na  Imaging Personally Reviewed by Myself Includes:  na    Recent Cultures (last 7 days):     Results from last 7 days   Lab Units 02/22/24  0957 02/22/24  0956   BLOOD CULTURE  No Growth at 48 hrs. No Growth at 48 hrs.       Last 24 Hours Medication List:   Current Facility-Administered Medications   Medication Dose Route Frequency Provider Last Rate    albuterol  2 puff Inhalation 4x Daily Niranjan GARCIA MD      albuterol  2 puff Inhalation Q4H PRN Cole Newton MD      aluminum-magnesium hydroxide-simethicone  30 mL Oral Q6H PRN Niranjan GARCIA MD      ARIPiprazole  20 mg Oral Daily Niranjan GARCIA MD      aspirin  81 mg Oral Daily Niranjan GARCIA MD      atorvastatin  40 mg Oral Daily With Dinner Niranjan GARCIA MD      docusate sodium  100 mg Oral BID PRN Niranjan GARCIA MD      enoxaparin  40 mg Subcutaneous Daily Niranjan GARCIA MD       furosemide  40 mg Oral Daily Niranjan GARCIA MD      insulin lispro  1-5 Units Subcutaneous TID AC Niranjan GARCIA MD      insulin lispro  1-6 Units Subcutaneous HS Lena Mcghee PA-C      insulin lispro  3 Units Subcutaneous TID With Meals Niranjan GARCIA MD      lisinopril  20 mg Oral Daily Niranjan GARCIA MD      magnesium Oxide  400 mg Oral BID Niranjan GARCIA MD      methocarbamol  750 mg Oral Q6H PRN Niranjan GARCIA MD      metoprolol tartrate  25 mg Oral Q12H TREVOR Niranjan GARCIA MD      risperiDONE  1 mg Oral BID Niranjan GARCIA MD      senna  8.6 mg Oral BID Niranjan GARCIA MD          Today, Patient Was Seen By: Cole Newton MD    ** Please Note: Dictation voice to text software may have been used in the creation of this document. **        Labor    The beginning of labor is the beginning of birth. You’ll start to feel strong contractions. That’s when the muscles of your uterus tighten up to help push your baby out during birth.  Yes, labor has probably started   Signs of labor include:  Your contractions are getting stronger and more painful instead of weaker. You’ll probably feel them throughout your whole uterus.  Your contractions are regular. This means that you feel them about every 5 to 10 minutes. And they are getting closer together.  You have pink-colored or blood-streaked fluid from your vagina.  You feel that the baby has \"dropped\" lower in your pelvis   Your water breaks. It may be a gush or a slow trickle of clear fluid from your vagina.  No, it’s probably not real labor   Signs of false labor include:  Your contractions aren’t regular or strong.  You feel the contractions only in your lower uterus.  Your contractions go away when you walk or change position.  Your contractions go away after drinking fluids.  When to call your healthcare provider  Call your healthcare provider or clinic right away if you notice any of these signs:  Fluid from your vagina, with or without contractions.  Bleeding heavy enough that you need a sanitary pad.  You don’t feel your baby moving as much as before.     NOTE: Contractions are timed by both of these measures:  The length of each contraction from its start to its finish.  How far apart the contractions are--the time between the start of one contraction and the start of the next contraction.   [employer] last reviewed this educational content on 10/1/2017  © 6653-7866 [employer]. [address] [place] [address], [place] [address]. All rights reserved. This information is not intended as a substitute for professional medical care. Always follow your healthcare professional's instructions.        Stages of Labor    Labor has 3 stages. Your healthcare provider may talk about the progress of your labor  with certain words. One of these is your baby’s position. Another is your baby’s station. And the effacement and dilation of your cervix will be noted. Read below to learn about these terms and the 3 stages of labor.  Your baby moves into position  Position is your baby’s placement in your uterus. Your baby may be facing left or right. He or she may be head first or feet first. Station refers to how far your baby has moved down into your pelvic cavity.  First stage of labor  During the first stage of labor, contractions of the uterus help your cervix thin (efface). They also help it widen (dilate). This will help your baby pass through the birth canal (vagina). At first your contractions will not come that often or last that long. But as time passes, they will come more often, they may be more painful, and they will last longer. They will last about 30 to 60 seconds each. The first stage of labor lasts until the cervix is fully dilated.  Second stage of labor  When your cervix is fully dilated, the second stage of labor begins. In this stage, you will have stronger contractions of your uterus that will help your baby move down the birth canal. They may happen every 2 to 5 minutes. They may last from 45 to 90 seconds each. Your healthcare provider will ask you to push with each contraction. Try to rest between the contractions if you can. Your baby is delivered at the end of this stage of labor.  Third stage of labor  The third stage of labor comes after your baby is born. This is when the afterbirth (placenta) comes out of your uterus. Your uterus will continue to contract. But the contractions are much milder than before.  [employer] last reviewed this educational content on 10/1/2017  © 5405-8039 [employer]. [address] [place] [address], [place] [address]. All rights reserved. This information is not intended as a substitute for professional medical care. Always follow your healthcare professional's  instructions.

## 2024-02-29 NOTE — DISCHARGE SUMMARY
[facility]  Discharge- Lorne Waterman 1950, 73 y.o. male MRN: [id number]  Unit/Bed#: MS [facility]-01 Encounter: [id number]  Primary Care Provider: Alysa Santoro MD   Date and time admitted to hospital: 2/22/2024  9:26 AM    Discharging Physician / Practitioner: Cole Newton MD  PCP: Alysa Santoro MD  Admission Date:   Admission Orders (From admission, onward)       Ordered        02/22/24 1101  INPATIENT ADMISSION  Once                          Discharge Date: 02/26/24    Medical Problems       Resolved Problems  Date Reviewed: 2/26/2024   None         Consultations During Hospital Stay:  none    Procedures Performed:   none    Significant Findings / Test Results:   CT head wo contrast    Result Date: 2/23/2024  Impression: No acute intracranial abnormality. No cervical spine fracture or traumatic malalignment. Workstation performed: [id number]     CT spine cervical wo contrast    Result Date: 2/23/2024  Impression: No acute intracranial abnormality. No cervical spine fracture or traumatic malalignment. Workstation performed: [id number]     CT chest abdomen pelvis wo contrast    Result Date: 2/23/2024  Impression: No CT evidence of acute findings. Marked prostatomegaly again seen. Workstation performed: [id number]     XR chest 1 view portable    Result Date: 2/22/2024  Impression: No acute cardiopulmonary disease. Workstation performed: [id number]      Incidental Findings:   none    Test Results Pending at Discharge (will require follow up):   none     Outpatient Tests Requested:  none    Complications:  none    Reason for Admission: Ambulatory dysfunction/fall    Hospital Course:     Lorne Waterman is a 73 y.o. male patient who originally presented to the hospital on 2/22/2024 with past medical history significant of CHF, type 2 diabetes, hypertension, anxiety initially presented after a fall.  Was evaluated by PT/OT recommended rehab. With negative acute shortness of  breath shortly upon arrival which resolved shortly after admission.  Will follow-up outpatient with primary care doctor in approximate 1 week    Please see above list of diagnoses and related plan for additional information.     Condition at Discharge: stable     Discharge Day Visit / Exam:     * Please refer to separate progress note for these details *    Discussion with Family: pt, wife    Discharge instructions/Information to patient and family:   See after visit summary for information provided to patient and family.      Provisions for Follow-Up Care:  See after visit summary for information related to follow-up care and any pertinent home health orders.      Disposition:     Acute Rehab at     For Discharges to [facility]:   Not Applicable to this Patient - Not Applicable to this Patient    Planned Readmission: none     Discharge Statement:  I spent 35 minutes discharging the patient. This time was spent on the day of discharge. I had direct contact with the patient on the day of discharge. Greater than 50% of the total time was spent examining patient, answering all patient questions, arranging and discussing plan of care with patient as well as directly providing post-discharge instructions.  Additional time then spent on discharge activities.    Discharge Medications:  See after visit summary for reconciled discharge medications provided to patient and family.      ** Please Note: This note has been constructed using a voice recognition system **

## 2024-03-28 ENCOUNTER — APPOINTMENT (EMERGENCY)
Dept: RADIOLOGY | Facility: HOSPITAL | Age: 74
DRG: 149 | End: 2024-03-28
Payer: COMMERCIAL

## 2024-03-28 ENCOUNTER — APPOINTMENT (EMERGENCY)
Dept: CT IMAGING | Facility: HOSPITAL | Age: 74
DRG: 149 | End: 2024-03-28
Payer: COMMERCIAL

## 2024-03-28 ENCOUNTER — HOSPITAL ENCOUNTER (INPATIENT)
Facility: HOSPITAL | Age: 74
LOS: 3 days | Discharge: NURSING, REHAB OR LONG-TERM CARE | DRG: 149 | End: 2024-04-01
Attending: EMERGENCY MEDICINE | Admitting: INTERNAL MEDICINE
Payer: COMMERCIAL

## 2024-03-28 DIAGNOSIS — B95.2 UTI (URINARY TRACT INFECTION) DUE TO ENTEROCOCCUS: ICD-10-CM

## 2024-03-28 DIAGNOSIS — I65.09 VERTEBRAL ARTERY STENOSIS: ICD-10-CM

## 2024-03-28 DIAGNOSIS — N39.0 UTI (URINARY TRACT INFECTION) DUE TO ENTEROCOCCUS: ICD-10-CM

## 2024-03-28 DIAGNOSIS — R26.9 GAIT DISORDER: ICD-10-CM
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
## 2024-03-28 DIAGNOSIS — R42 LIGHTHEADEDNESS: Primary | ICD-10-CM

## 2024-03-28 LAB
2HR DELTA HS TROPONIN: 0 NG/L
4HR DELTA HS TROPONIN: 0 NG/L
ALBUMIN SERPL BCP-MCNC: 4.3 G/DL (ref 3.5–5)
ALP SERPL-CCNC: 52 U/L (ref 34–104)
ALT SERPL W P-5'-P-CCNC: 31 U/L (ref 7–52)
ANION GAP SERPL CALCULATED.3IONS-SCNC: 7 MMOL/L (ref 4–13)
APTT PPP: 29 SECONDS (ref 23–37)
AST SERPL W P-5'-P-CCNC: 14 U/L (ref 13–39)
ATRIAL RATE: 90 BPM
BACTERIA UR QL AUTO: ABNORMAL /HPF
BASOPHILS # BLD AUTO: 0.04 THOUSANDS/ÂΜL (ref 0–0.1)
BASOPHILS NFR BLD AUTO: 1 % (ref 0–1)
BILIRUB DIRECT SERPL-MCNC: 0.1 MG/DL (ref 0–0.2)
BILIRUB SERPL-MCNC: 0.33 MG/DL (ref 0.2–1)
BILIRUB UR QL STRIP: NEGATIVE
BNP SERPL-MCNC: 11 PG/ML (ref 0–100)
BUN SERPL-MCNC: 19 MG/DL (ref 5–25)
CALCIUM SERPL-MCNC: 9.5 MG/DL (ref 8.4–10.2)
CARDIAC TROPONIN I PNL SERPL HS: 3 NG/L
CHLORIDE SERPL-SCNC: 107 MMOL/L (ref 96–108)
CLARITY UR: CLEAR
CO2 SERPL-SCNC: 26 MMOL/L (ref 21–32)
COLOR UR: YELLOW
CREAT SERPL-MCNC: 1.15 MG/DL (ref 0.6–1.3)
EOSINOPHIL # BLD AUTO: 0.19 THOUSAND/ÂΜL (ref 0–0.61)
EOSINOPHIL NFR BLD AUTO: 2 % (ref 0–6)
ERYTHROCYTE [DISTWIDTH] IN BLOOD BY AUTOMATED COUNT: 14.6 % (ref 11.6–15.1)
FLUAV RNA RESP QL NAA+PROBE: NEGATIVE
FLUBV RNA RESP QL NAA+PROBE: NEGATIVE
GFR SERPL CREATININE-BSD FRML MDRD: 61 ML/MIN/1.73SQ M
GLUCOSE SERPL-MCNC: 162 MG/DL (ref 65–140)
GLUCOSE SERPL-MCNC: 182 MG/DL (ref 65–140)
GLUCOSE SERPL-MCNC: 198 MG/DL (ref 65–140)
GLUCOSE UR STRIP-MCNC: ABNORMAL MG/DL
HCT VFR BLD AUTO: 37.6 % (ref 36.5–49.3)
HGB BLD-MCNC: 11.5 G/DL (ref 12–17)
HGB UR QL STRIP.AUTO: NEGATIVE
IMM GRANULOCYTES # BLD AUTO: 0.02 THOUSAND/UL (ref 0–0.2)
IMM GRANULOCYTES NFR BLD AUTO: 0 % (ref 0–2)
INR PPP: 1.03 (ref 0.84–1.19)
KETONES UR STRIP-MCNC: NEGATIVE MG/DL
LEUKOCYTE ESTERASE UR QL STRIP: ABNORMAL
LIPASE SERPL-CCNC: 14 U/L (ref 11–82)
LYMPHOCYTES # BLD AUTO: 3.55 THOUSANDS/ÂΜL (ref 0.6–4.47)
LYMPHOCYTES NFR BLD AUTO: 42 % (ref 14–44)
MAGNESIUM SERPL-MCNC: 1.9 MG/DL (ref 1.9–2.7)
MCH RBC QN AUTO: 23.9 PG (ref 26.8–34.3)
MCHC RBC AUTO-ENTMCNC: 30.6 G/DL (ref 31.4–37.4)
MCV RBC AUTO: 78 FL (ref 82–98)
MONOCYTES # BLD AUTO: 0.55 THOUSAND/ÂΜL (ref 0.17–1.22)
MONOCYTES NFR BLD AUTO: 7 % (ref 4–12)
MUCOUS THREADS UR QL AUTO: ABNORMAL
NEUTROPHILS # BLD AUTO: 4.15 THOUSANDS/ÂΜL (ref 1.85–7.62)
NEUTS SEG NFR BLD AUTO: 48 % (ref 43–75)
NITRITE UR QL STRIP: NEGATIVE
NON-SQ EPI CELLS URNS QL MICRO: ABNORMAL /HPF
NRBC BLD AUTO-RTO: 0 /100 WBCS
P AXIS: 42 DEGREES
PH UR STRIP.AUTO: 5.5 [PH]
PLATELET # BLD AUTO: 151 THOUSANDS/UL (ref 149–390)
PLATELET # BLD AUTO: 170 THOUSANDS/UL (ref 149–390)
PMV BLD AUTO: 12.5 FL (ref 8.9–12.7)
POTASSIUM SERPL-SCNC: 4.2 MMOL/L (ref 3.5–5.3)
PR INTERVAL: 154 MS
PROT SERPL-MCNC: 7.4 G/DL (ref 6.4–8.4)
PROT UR STRIP-MCNC: ABNORMAL MG/DL
PROTHROMBIN TIME: 14.1 SECONDS (ref 11.6–14.5)
QRS AXIS: 13 DEGREES
QRSD INTERVAL: 90 MS
QT INTERVAL: 326 MS
QTC INTERVAL: 398 MS
RBC # BLD AUTO: 4.82 MILLION/UL (ref 3.88–5.62)
RBC #/AREA URNS AUTO: ABNORMAL /HPF
RSV RNA RESP QL NAA+PROBE: NEGATIVE
SARS-COV-2 RNA RESP QL NAA+PROBE: NEGATIVE
SODIUM SERPL-SCNC: 140 MMOL/L (ref 135–147)
SP GR UR STRIP.AUTO: 1.03 (ref 1–1.03)
T WAVE AXIS: 6 DEGREES
TSH SERPL DL<=0.05 MIU/L-ACNC: 0.95 UIU/ML (ref 0.45–4.5)
UROBILINOGEN UR STRIP-ACNC: <2 MG/DL
VENTRICULAR RATE: 90 BPM
WBC # BLD AUTO: 8.5 THOUSAND/UL (ref 4.31–10.16)
WBC #/AREA URNS AUTO: ABNORMAL /HPF

## 2024-03-28 PROCEDURE — 70498 CT ANGIOGRAPHY NECK: CPT

## 2024-03-28 PROCEDURE — 82948 REAGENT STRIP/BLOOD GLUCOSE: CPT

## 2024-03-28 PROCEDURE — 87077 CULTURE AEROBIC IDENTIFY: CPT | Performed by: EMERGENCY MEDICINE

## 2024-03-28 PROCEDURE — 70496 CT ANGIOGRAPHY HEAD: CPT

## 2024-03-28 PROCEDURE — 36415 COLL VENOUS BLD VENIPUNCTURE: CPT | Performed by: EMERGENCY MEDICINE

## 2024-03-28 PROCEDURE — 87086 URINE CULTURE/COLONY COUNT: CPT | Performed by: EMERGENCY MEDICINE

## 2024-03-28 PROCEDURE — 81001 URINALYSIS AUTO W/SCOPE: CPT | Performed by: EMERGENCY MEDICINE

## 2024-03-28 PROCEDURE — 82248 BILIRUBIN DIRECT: CPT | Performed by: EMERGENCY MEDICINE

## 2024-03-28 PROCEDURE — 83690 ASSAY OF LIPASE: CPT | Performed by: EMERGENCY MEDICINE

## 2024-03-28 PROCEDURE — 93010 ELECTROCARDIOGRAM REPORT: CPT | Performed by: INTERNAL MEDICINE

## 2024-03-28 PROCEDURE — 83880 ASSAY OF NATRIURETIC PEPTIDE: CPT | Performed by: EMERGENCY MEDICINE

## 2024-03-28 PROCEDURE — 71045 X-RAY EXAM CHEST 1 VIEW: CPT

## 2024-03-28 PROCEDURE — 93005 ELECTROCARDIOGRAM TRACING: CPT

## 2024-03-28 PROCEDURE — 87186 SC STD MICRODIL/AGAR DIL: CPT | Performed by: EMERGENCY MEDICINE

## 2024-03-28 PROCEDURE — 99223 1ST HOSP IP/OBS HIGH 75: CPT | Performed by: FAMILY MEDICINE

## 2024-03-28 PROCEDURE — 85025 COMPLETE CBC W/AUTO DIFF WBC: CPT | Performed by: EMERGENCY MEDICINE

## 2024-03-28 PROCEDURE — 80053 COMPREHEN METABOLIC PANEL: CPT | Performed by: EMERGENCY MEDICINE

## 2024-03-28 PROCEDURE — 99285 EMERGENCY DEPT VISIT HI MDM: CPT

## 2024-03-28 PROCEDURE — 71260 CT THORAX DX C+: CPT

## 2024-03-28 PROCEDURE — 85730 THROMBOPLASTIN TIME PARTIAL: CPT | Performed by: EMERGENCY MEDICINE

## 2024-03-28 PROCEDURE — 99285 EMERGENCY DEPT VISIT HI MDM: CPT | Performed by: EMERGENCY MEDICINE

## 2024-03-28 PROCEDURE — 85049 AUTOMATED PLATELET COUNT: CPT | Performed by: FAMILY MEDICINE

## 2024-03-28 PROCEDURE — 74177 CT ABD & PELVIS W/CONTRAST: CPT

## 2024-03-28 PROCEDURE — 83735 ASSAY OF MAGNESIUM: CPT | Performed by: EMERGENCY MEDICINE

## 2024-03-28 PROCEDURE — 0241U HB NFCT DS VIR RESP RNA 4 TRGT: CPT | Performed by: EMERGENCY MEDICINE

## 2024-03-28 PROCEDURE — 84443 ASSAY THYROID STIM HORMONE: CPT | Performed by: EMERGENCY MEDICINE

## 2024-03-28 PROCEDURE — 85610 PROTHROMBIN TIME: CPT | Performed by: EMERGENCY MEDICINE

## 2024-03-28 PROCEDURE — 84484 ASSAY OF TROPONIN QUANT: CPT | Performed by: EMERGENCY MEDICINE

## 2024-03-28 RX ORDER — ACETAMINOPHEN 325 MG/1
650 TABLET ORAL EVERY 6 HOURS PRN
Status: DISCONTINUED | OUTPATIENT
Start: 2024-03-28 | End: 2024-04-01 | Stop reason: HOSPADM

## 2024-03-28 RX ORDER — DOCUSATE SODIUM 100 MG/1
100 CAPSULE, LIQUID FILLED ORAL 2 TIMES DAILY PRN
Status: DISCONTINUED | OUTPATIENT
Start: 2024-03-28 | End: 2024-04-01 | Stop reason: HOSPADM

## 2024-03-28 RX ORDER — SENNOSIDES 8.6 MG
8.6 TABLET ORAL 2 TIMES DAILY
Status: DISCONTINUED | OUTPATIENT
Start: 2024-03-28 | End: 2024-04-01 | Stop reason: HOSPADM

## 2024-03-28 RX ORDER — KETOTIFEN FUMARATE 0.35 MG/ML
1 SOLUTION/ DROPS OPHTHALMIC 2 TIMES DAILY
Status: DISCONTINUED | OUTPATIENT
Start: 2024-03-28 | End: 2024-04-01 | Stop reason: HOSPADM

## 2024-03-28 RX ORDER — INSULIN LISPRO 100 [IU]/ML
1-6 INJECTION, SOLUTION INTRAVENOUS; SUBCUTANEOUS
Status: DISCONTINUED | OUTPATIENT
Start: 2024-03-29 | End: 2024-04-01 | Stop reason: HOSPADM

## 2024-03-28 RX ORDER — FUROSEMIDE 40 MG/1
40 TABLET ORAL DAILY
Status: DISCONTINUED | OUTPATIENT
Start: 2024-03-29 | End: 2024-04-01 | Stop reason: HOSPADM

## 2024-03-28 RX ORDER — BUDESONIDE AND FORMOTEROL FUMARATE DIHYDRATE 160; 4.5 UG/1; UG/1
2 AEROSOL RESPIRATORY (INHALATION) 2 TIMES DAILY
Status: DISCONTINUED | OUTPATIENT
Start: 2024-03-28 | End: 2024-04-01 | Stop reason: HOSPADM

## 2024-03-28 RX ORDER — LISINOPRIL 20 MG/1
20 TABLET ORAL DAILY
Status: DISCONTINUED | OUTPATIENT
Start: 2024-03-29 | End: 2024-04-01 | Stop reason: HOSPADM

## 2024-03-28 RX ORDER — RISPERIDONE 1 MG/1
1 TABLET ORAL 2 TIMES DAILY
Status: DISCONTINUED | OUTPATIENT
Start: 2024-03-28 | End: 2024-04-01 | Stop reason: HOSPADM

## 2024-03-28 RX ORDER — ATORVASTATIN CALCIUM 40 MG/1
40 TABLET, FILM COATED ORAL
Status: DISCONTINUED | OUTPATIENT
Start: 2024-03-28 | End: 2024-04-01 | Stop reason: HOSPADM

## 2024-03-28 RX ORDER — INSULIN LISPRO 100 [IU]/ML
1-5 INJECTION, SOLUTION INTRAVENOUS; SUBCUTANEOUS
Status: DISCONTINUED | OUTPATIENT
Start: 2024-03-28 | End: 2024-04-01 | Stop reason: HOSPADM

## 2024-03-28 RX ORDER — ALBUTEROL SULFATE 90 UG/1
2 AEROSOL, METERED RESPIRATORY (INHALATION) EVERY 4 HOURS PRN
Status: DISCONTINUED | OUTPATIENT
Start: 2024-03-28 | End: 2024-04-01 | Stop reason: HOSPADM

## 2024-03-28 RX ORDER — HEPARIN SODIUM 5000 [USP'U]/ML
7500 INJECTION, SOLUTION INTRAVENOUS; SUBCUTANEOUS EVERY 8 HOURS SCHEDULED
Status: DISCONTINUED | OUTPATIENT
Start: 2024-03-28 | End: 2024-04-01 | Stop reason: HOSPADM

## 2024-03-28 RX ORDER — ASPIRIN 81 MG/1
81 TABLET, CHEWABLE ORAL DAILY
Status: DISCONTINUED | OUTPATIENT
Start: 2024-03-29 | End: 2024-04-01 | Stop reason: HOSPADM

## 2024-03-28 RX ORDER — ARIPIPRAZOLE 5 MG/1
20 TABLET ORAL DAILY
Status: DISCONTINUED | OUTPATIENT
Start: 2024-03-29 | End: 2024-04-01 | Stop reason: HOSPADM

## 2024-03-28 RX ADMIN — SODIUM CHLORIDE 500 ML: 0.9 INJECTION, SOLUTION INTRAVENOUS at 17:45

## 2024-03-28 RX ADMIN — SENNOSIDES 8.6 MG: 8.6 TABLET, FILM COATED ORAL at 19:47

## 2024-03-28 RX ADMIN — IOHEXOL 100 ML: 350 INJECTION, SOLUTION INTRAVENOUS at 15:10

## 2024-03-28 RX ADMIN — ATORVASTATIN CALCIUM 40 MG: 40 TABLET, FILM COATED ORAL at 19:47

## 2024-03-28 RX ADMIN — HEPARIN SODIUM 7500 UNITS: 5000 INJECTION INTRAVENOUS; SUBCUTANEOUS at 21:35

## 2024-03-28 RX ADMIN — KETOTIFEN FUMARATE 1 DROP: 0.25 SOLUTION/ DROPS OPHTHALMIC at 21:39

## 2024-03-28 RX ADMIN — RISPERIDONE 1 MG: 1 TABLET ORAL at 19:47

## 2024-03-28 RX ADMIN — BUDESONIDE AND FORMOTEROL FUMARATE DIHYDRATE 2 PUFF: 160; 4.5 AEROSOL RESPIRATORY (INHALATION) at 21:31

## 2024-03-28 RX ADMIN — METOPROLOL TARTRATE 25 MG: 25 TABLET, FILM COATED ORAL at 21:43

## 2024-03-28 RX ADMIN — INSULIN LISPRO 1 UNITS: 100 INJECTION, SOLUTION INTRAVENOUS; SUBCUTANEOUS at 22:57

## 2024-03-28 NOTE — ED NOTES
Pt ambulated in hallway with walker.  Noted left sided gait has slight shuffle.  MD and RN notified.     Nirali Dotson  03/28/24 [id number]

## 2024-03-28 NOTE — ED PROVIDER NOTES
History  Chief Complaint   Patient presents with    Abdominal Pain    Dizziness    Fall     BIBA from home for complaints of fall: pt. States that his legs gave in. And fell, denies LOC, Denies head trauma. Pt. Also complaints of abdominal pain and dizziness.     73-year-old male with history of reported leg weakness was actually at a rehab and recently left and was doing PT OT, has history of Parkinson's brought in for evaluation of he stood up at home and had some epigastric discomfort and chest discomfort felt lightheaded/dizzy patient cannot really differentiate to 1.  Did not actually syncopized or pass out and reports of started to fall, reports that he caught himself on the bedpost and did not fully fall or have any head injury.  EMS report notes fall but patient is denying a true fall.       History provided by:  Patient  Abdominal Pain  Pain location:  Epigastric  Pain quality: aching    Pain radiates to:  Does not radiate  Pain severity:  Moderate  Onset quality:  Sudden  Duration:  1 hour  Timing:  Intermittent  Progression:  Partially resolved  Chronicity:  New  Relieved by:  Nothing  Worsened by:  Movement  Ineffective treatments:  None tried  Associated symptoms: chest pain, chills, nausea and shortness of breath    Associated symptoms: no diarrhea and no vomiting    Dizziness  Quality:  Lightheadedness and room spinning  Severity:  Moderate  Onset quality:  Sudden  Duration:  1 hour  Timing:  Rare  Progression:  Resolved  Chronicity:  New  Context: standing up    Context: not with loss of consciousness    Relieved by:  Nothing  Worsened by:  Being still  Ineffective treatments:  None tried  Associated symptoms: chest pain, nausea and shortness of breath    Associated symptoms: no diarrhea and no vomiting    Fall  Mechanism of injury: fall    Mechanism of injury comment:  Report says fell from bed, pt stating stood up and fell towards bed post but caught himself and didn't fall  Injury location: none,  denies injury.  Incident location:  Home  Time since incident:  1 hour  Arrived directly from scene: yes    Associated symptoms: abdominal pain, chest pain and nausea    Associated symptoms: no vomiting        Prior to Admission Medications   Prescriptions Last Dose Informant Patient Reported? Taking?   ARIPiprazole (ABILIFY) 20 MG tablet   No No   Sig: Take 1 tablet (20 mg total) by mouth daily   Multiple Vitamins-Iron (MULTIVITAMIN/IRON PO)   Yes No   Sig: TAKE ONE CAP/TAB BY MOUTH EVERY MORNING   Patient not taking: Reported on 2/22/2024   albuterol (ProAir HFA) 90 mcg/act inhaler   No No   Sig: Inhale 2 puffs every 4 (four) hours as needed for wheezing   aluminum-magnesium hydroxide-simethicone (MYLANTA) 200-200-20 mg/5 mL suspension   No No   Sig: Take 30 mL by mouth every 6 (six) hours as needed for indigestion or heartburn   aspirin 81 mg chewable tablet   No No   Sig: Chew 1 tablet (81 mg total) daily   atorvastatin (LIPITOR) 40 mg tablet   No No   Sig: Take 1 tablet (40 mg total) by mouth daily with dinner And stop Pravastatin when starting this medication   budesonide-formoterol (SYMBICORT) 160-4.5 mcg/act inhaler   Yes No   Sig: INHALE 2 PUFFS BY MOUTH TWICE A DAY *RINSE MOUTH WITH WATER AND SPIT*   docusate sodium (COLACE) 100 mg capsule   No No   Sig: Take 1 capsule (100 mg total) by mouth 2 (two) times a day as needed for constipation   furosemide (LASIX) 40 mg tablet   No No   Sig: Take 1 tablet (40 mg total) by mouth daily Do not start before January 25, 2023.   lisinopril (ZESTRIL) 20 mg tablet   No No   Sig: Take 1 tablet (20 mg total) by mouth daily   magnesium oxide (MAG-OX) 400 mg   No No   Sig: Take 1 tablet (400 mg total) by mouth 2 (two) times a day   metFORMIN (GLUCOPHAGE) 500 mg tablet   No No   Sig: Take 1 tablet (500 mg total) by mouth 2 (two) times a day with meals   methocarbamol (ROBAXIN) 750 mg tablet   No No   Sig: Take 1 tablet (750 mg total) by mouth every 6 (six) hours as needed  for muscle spasms (back pain/initial rx.) for up to 5 days   metoprolol tartrate (LOPRESSOR) 25 mg tablet   No No   Sig: Take 1 tablet (25 mg total) by mouth every 12 (twelve) hours   risperiDONE (RisperDAL) 1 mg tablet   Yes No   Sig: TAKE ONE TABLET BY MOUTH TWICE A DAY FOR MOOD   senna (SENOKOT) 8.6 mg   No No   Sig: Take 1 tablet (8.6 mg total) by mouth 2 (two) times a day      Facility-Administered Medications: None       Past Medical History:   Diagnosis Date    Anxiety     CHF (congestive heart failure) ([facility])     COPD (chronic obstructive pulmonary disease) ([facility])     Depression     Diabetes mellitus ([facility])     Enlarged prostate     Hallucination     Hyperlipidemia     Hypertension     Memory loss     Panic attack     Panic disorder     Psychiatric disorder     Psychiatric illness     Psychosis ([facility])     PTSD (post-traumatic stress disorder)     Schizoaffective disorder ([facility])     Schizophrenia ([facility])        No past surgical history on file.    No family history on file.  I have reviewed and agree with the history as documented.    E-Cigarette/Vaping    E-Cigarette Use Never User      E-Cigarette/Vaping Substances    Nicotine No     THC No     CBD No     Flavoring No     Other No     Unknown No      Social History     Tobacco Use    Smoking status: Former     Current packs/day: 0.00     Average packs/day: 2.0 packs/day for 0.5 years (1.0 ttl pk-yrs)     Types: Cigarettes     Start date: 1992     Quit date:      Years since quittin.2    Smokeless tobacco: Never    Tobacco comments:     quit in    Vaping Use    Vaping status: Never Used   Substance Use Topics    Alcohol use: Never    Drug use: Never       Review of Systems   Constitutional:  Positive for chills.   Respiratory:  Positive for shortness of breath.    Cardiovascular:  Positive for chest pain.   Gastrointestinal:  Positive for abdominal pain and nausea. Negative for diarrhea and vomiting.   Neurological:  Positive for dizziness.   All  other systems reviewed and are negative.      Physical Exam  Physical Exam    Vital Signs  ED Triage Vitals   Temperature Pulse Respirations Blood Pressure SpO2   03/28/24 1211 03/28/24 1211 03/28/24 1211 03/28/24 1211 03/28/24 1211   99 °F (37.2 °C) 88 16 154/82 96 %      Temp Source Heart Rate Source Patient Position - Orthostatic VS BP Location FiO2 (%)   03/28/24 1211 03/28/24 1211 03/28/24 1211 03/28/24 1211 --   Oral Monitor Sitting Right arm       Pain Score       03/28/24 1530       No Pain           Vitals:    03/28/24 1215 03/28/24 1530 03/28/24 1630 03/28/24 1745   BP: 154/82 143/76 (!) 177/89 136/77   Pulse: 89 84 78 79   Patient Position - Orthostatic VS: Sitting Lying  Lying         Visual Acuity  Visual Acuity      Flowsheet Row Most Recent Value   L Pupil Size (mm) 2   R Pupil Size (mm) 2            ED Medications  Medications   sodium chloride 0.9 % bolus 500 mL (500 mL Intravenous New Bag 3/28/24 1745)   iohexol (OMNIPAQUE) 350 MG/ML injection (MULTI-DOSE) 100 mL (100 mL Intravenous Given 3/28/24 1510)       Diagnostic Studies  Results Reviewed       Procedure Component Value Units Date/Time    HS Troponin I 4hr [[id number]] Collected: 03/28/24 1729    Lab Status: In process Specimen: Blood from Arm, Right Updated: 03/28/24 1751    HS Troponin I 2hr [[id number]]  (Normal) Collected: 03/28/24 1532    Lab Status: Final result Specimen: Blood from Arm, Right Updated: 03/28/24 1601     hs TnI 2hr 3 ng/L      Delta 2hr hsTnI 0 ng/L     Urine Microscopic [[id number]]  (Abnormal) Collected: 03/28/24 1529    Lab Status: Final result Specimen: Urine, Clean Catch Updated: 03/28/24 1545     RBC, UA 1-2 /hpf      WBC, UA 10-20 /hpf      Epithelial Cells Occasional /hpf      Bacteria, UA Occasional /hpf      MUCUS THREADS Occasional    Urine culture [[id number]] Collected: 03/28/24 1529    Lab Status: In process Specimen: Urine, Clean Catch Updated: 03/28/24 1545    UA w Reflex to Microscopic w Reflex to Culture  [[id number]]  (Abnormal) Collected: 03/28/24 1529    Lab Status: Final result Specimen: Urine, Clean Catch Updated: 03/28/24 1542     Color, UA Yellow     Clarity, UA Clear     Specific Gravity, UA 1.035     pH, UA 5.5     Leukocytes, UA Small     Nitrite, UA Negative     Protein, UA Trace mg/dl      Glucose,  (1/10%) mg/dl      Ketones, UA Negative mg/dl      Urobilinogen, UA <2.0 mg/dl      Bilirubin, UA Negative     Occult Blood, UA Negative    HS Troponin 0hr (reflex protocol) [[id number]]  (Normal) Collected: 03/28/24 1331    Lab Status: Final result Specimen: Blood from Arm, Right Updated: 03/28/24 1407     hs TnI 0hr 3 ng/L     B-Type Natriuretic Peptide(BNP) [[id number]]  (Normal) Collected: 03/28/24 1331    Lab Status: Final result Specimen: Blood from Arm, Right Updated: 03/28/24 1405     BNP 11 pg/mL     Comprehensive metabolic panel [[id number]]  (Abnormal) Collected: 03/28/24 1331    Lab Status: Final result Specimen: Blood from Arm, Right Updated: 03/28/24 1403     Sodium 140 mmol/L      Potassium 4.2 mmol/L      Chloride 107 mmol/L      CO2 26 mmol/L      ANION GAP 7 mmol/L      BUN 19 mg/dL      Creatinine 1.15 mg/dL      Glucose 162 mg/dL      Calcium 9.5 mg/dL      AST 14 U/L      ALT 31 U/L      Alkaline Phosphatase 52 U/L      Total Protein 7.4 g/dL      Albumin 4.3 g/dL      Total Bilirubin 0.33 mg/dL      eGFR 61 ml/min/1.73sq m     Narrative:      National Kidney Disease Foundation guidelines for Chronic Kidney Disease (CKD):     Stage 1 with normal or high GFR (GFR > 90 mL/min/1.73 square meters)    Stage 2 Mild CKD (GFR = 60-89 mL/min/1.73 square meters)    Stage 3A Moderate CKD (GFR = 45-59 mL/min/1.73 square meters)    Stage 3B Moderate CKD (GFR = 30-44 mL/min/1.73 square meters)    Stage 4 Severe CKD (GFR = 15-29 mL/min/1.73 square meters)    Stage 5 End Stage CKD (GFR <15 mL/min/1.73 square meters)  Note: GFR calculation is accurate only with a steady state creatinine    Magnesium  [[id number]]  (Normal) Collected: 03/28/24 1331    Lab Status: Final result Specimen: Blood from Arm, Right Updated: 03/28/24 1403     Magnesium 1.9 mg/dL     Lipase [[id number]]  (Normal) Collected: 03/28/24 1331    Lab Status: Final result Specimen: Blood from Arm, Right Updated: 03/28/24 1403     Lipase 14 u/L     Bilirubin, direct [[id number]]  (Normal) Collected: 03/28/24 1331    Lab Status: Final result Specimen: Blood from Arm, Right Updated: 03/28/24 1403     Bilirubin, Direct 0.10 mg/dL     Protime-INR [[id number]]  (Normal) Collected: 03/28/24 1331    Lab Status: Final result Specimen: Blood from Arm, Right Updated: 03/28/24 1402     Protime 14.1 seconds      INR 1.03    APTT [[id number]]  (Normal) Collected: 03/28/24 1331    Lab Status: Final result Specimen: Blood from Arm, Right Updated: 03/28/24 1402     PTT 29 seconds     FLU/RSV/COVID - if FLU/RSV clinically relevant [[id number]]  (Normal) Collected: 03/28/24 1307    Lab Status: Final result Specimen: Nares from Nose Updated: 03/28/24 1357     SARS-CoV-2 Negative     INFLUENZA A PCR Negative     INFLUENZA B PCR Negative     RSV PCR Negative    Narrative:      FOR PEDIATRIC PATIENTS - copy/paste COVID Guidelines URL to browser: [email or web address]    SARS-CoV-2 assay is a Nucleic Acid Amplification assay intended for the  qualitative detection of nucleic acid from SARS-CoV-2 in nasopharyngeal  swabs. Results are for the presumptive identification of SARS-CoV-2 RNA.    Positive results are indicative of infection with SARS-CoV-2, the virus  causing COVID-19, but do not rule out bacterial infection or co-infection  with other viruses. Laboratories within the [place] and its  territories are required to report all positive results to the appropriate  public health authorities. Negative results do not preclude SARS-CoV-2  infection and should not be used as the sole basis for treatment or  other  patient management decisions. Negative results must be combined with  clinical observations, patient history, and epidemiological information.  This test has not been FDA cleared or approved.    This test has been authorized by FDA under an Emergency Use Authorization  (EUA). This test is only authorized for the duration of time the  declaration that circumstances exist justifying the authorization of the  emergency use of an in vitro diagnostic tests for detection of SARS-CoV-2  virus and/or diagnosis of COVID-19 infection under section 564(b)(1) of  the Act, 21 U.S.C. 360bbb-3(b)(1), unless the authorization is terminated  or revoked sooner. The test has been validated but independent review by FDA  and CLIA is pending.    Test performed using [employer]: This RT-PCR assay targets N2,  a region unique to SARS-CoV-2. A conserved region in the E-gene was chosen  for pan-Sarbecovirus detection which includes SARS-CoV-2.    According to CMS-2020-01-R, this platform meets the definition of high-throughput technology.    TSH, 3rd generation with Free T4 reflex [[id number]]  (Normal) Collected: 03/28/24 1307    Lab Status: Final result Specimen: Blood from Arm, Left Updated: 03/28/24 1347     TSH 3RD GENERATON 0.954 uIU/mL     CBC and differential [[id number]]  (Abnormal) Collected: 03/28/24 1307    Lab Status: Final result Specimen: Blood from Arm, Left Updated: 03/28/24 1314     WBC 8.50 Thousand/uL      RBC 4.82 Million/uL      Hemoglobin 11.5 g/dL      Hematocrit 37.6 %      MCV 78 fL      MCH 23.9 pg      MCHC 30.6 g/dL      RDW 14.6 %      Platelets 151 Thousands/uL      nRBC 0 /100 WBCs      Neutrophils Relative 48 %      Immature Grans % 0 %      Lymphocytes Relative 42 %      Monocytes Relative 7 %      Eosinophils Relative 2 %      Basophils Relative 1 %      Neutrophils Absolute 4.15 Thousands/µL      Absolute Immature Grans 0.02 Thousand/uL      Absolute Lymphocytes 3.55 Thousands/µL      Absolute  Monocytes 0.55 Thousand/µL      Eosinophils Absolute 0.19 Thousand/µL      Basophils Absolute 0.04 Thousands/µL     Fingerstick Glucose (POCT) [[id number]]  (Abnormal) Collected: 03/28/24 1225    Lab Status: Final result Specimen: Blood Updated: 03/28/24 1226     POC Glucose 182 mg/dl                    CTA head and neck with and without contrast   Final Result by Sabi Krishna MD (03/28 1603)      1.  No acute intracranial CT abnormality. Chronic microangiopathic change.   2.  Patent major vessels of the [place] without high-grade stenosis.   3.  Severely narrowed caliber left vertebral artery. Cannot reliably evaluate the origin and proximal V1 segment due to significant small caliber and artifactually distorted image quality in the neck base. The remainder very small caliber left cervical    and intracranial vertebral artery is patent. Relatively smooth wall throughout the neck and intracranial segments favor developmentally hypoplastic vessel (normal variant) over dissection.   4.  No hemodynamically significant stenosis of the cervical carotid and right vertebral arteries.   5.  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 Recommend follow-up with neurovascular service.                  The study was marked in EPIC for significant notification.                        Workstation performed: [id number]         CT chest abdomen pelvis w contrast   Final Result by Balaji Mcgill MD (03/28 1638)      1.  No acute traumatic injury to the chest, abdomen, or pelvis.      2.  Severe prostatomegaly.               Workstation performed: [id number]         XR chest 1 view portable   ED Interpretation by Michelle Magallanes MD (03/28 1612)   NAD                 Procedures  ECG 12 Lead Documentation Only    Date/Time: 3/28/2024 12:15 PM    Performed by: Michelle Magallanes MD  Authorized by: Michelle Magallanes MD    Indications / Diagnosis:  Dizzy/fall  ECG reviewed by me, the ED  Provider: yes    Patient location:  ED  Rate:     ECG rate:  90    ECG rate assessment: normal    Rhythm:     Rhythm: sinus rhythm    ST segments:     ST segments:  Normal  T waves:     T waves: normal             ED Course                               SBIRT 20yo+      Flowsheet Row Most Recent Value   Initial Alcohol Screen: US AUDIT-C     1. How often do you have a drink containing alcohol? 0 Filed at: 03/28/2024 1212   2. How many drinks containing alcohol do you have on a typical day you are drinking?  0 Filed at: 03/28/2024 1212   3a. Male UNDER 65: How often do you have five or more drinks on one occasion? 0 Filed at: 03/28/2024 1212   3b. FEMALE Any Age, or MALE 65+: How often do you have 4 or more drinks on one occassion? 0 Filed at: 03/28/2024 1212   Audit-C Score 0 Filed at: 03/28/2024 1212   COMFORT: How many times in the past year have you...    Used an illegal drug or used a prescription medication for non-medical reasons? Never Filed at: 03/28/2024 1212                      Medical Decision Making  73-year-old male coming in with epigastric discomfort with potential lightheadedness or dizziness complaint of general leg weakness but this is reportedly old and went to rehab facility was doing PT OT for this.  Potential fall but patient reports that he called himself and did not actually fall.  Will evaluate with labs for ruling out ACS or dehydration or electrolyte abnormalities, dizziness could have been lightheadedness versus vertiginous patient has some limited history and not clear so we will do CT of his head but patient is currently denying any dizziness or any new neurologic complaints.  The dizziness was for a few minutes.  Now with complaint of still some epigastric discomfort.    Amount and/or Complexity of Data Reviewed  Labs: ordered.  Radiology: ordered and independent interpretation performed.    Risk  Prescription drug management.  Decision regarding hospitalization.  Risk Details: Discussed  with neuro and patient since unclear if his symptoms were more dizziness versus lightheadedness although he did not have any symptoms at present and has some gait weakness which just got out of rehab for similar so likely acute on chronic and no other neurosymptoms but CTA which patient did not have in the past with some vertebral narrowing would recommend obs with potential MRI tomorrow.  Given patient had potential fall although he did not recall and is a poor historian and originally described more lightheadedness versus vertiginous symptoms and had no dizziness lightheadedness at the time discussed with neuro stroke alert had not been called upon presentation because numerous other potential etiologies and no clear new deficit and neurology agreed.             Disposition  Final diagnoses:   Lightheadedness   Fall, initial encounter   Gait disorder     Time reflects when diagnosis was documented in both MDM as applicable and the Disposition within this note       Time User Action Codes Description Comment    3/28/2024  5:48 PM Michelle Magallanes [R42] Lightheadedness     3/28/2024  5:49 PM Michelle Magallanes [W19.XXXA] Fall, initial encounter     3/28/2024  5:49 PM Michelle Magallanes [R26.9] Gait disorder           ED Disposition       ED Disposition   Admit    Condition   Stable    Date/Time   Thu Mar 28, 2024 0977    Comment                  Follow-up Information    None         Patient's Medications   Discharge Prescriptions    No medications on file       No discharge procedures on file.    PDMP Review         Value Time User    PDMP Reviewed  Yes 1/24/2023 11:42 AM Niranjan GARCIA MD            ED Provider  Electronically Signed by             Michelle Magallanes MD  03/28/24 1561

## 2024-03-29 LAB
ANION GAP SERPL CALCULATED.3IONS-SCNC: 8 MMOL/L (ref 4–13)
ATRIAL RATE: 79 BPM
BUN SERPL-MCNC: 16 MG/DL (ref 5–25)
CALCIUM SERPL-MCNC: 9.2 MG/DL (ref 8.4–10.2)
CHLORIDE SERPL-SCNC: 104 MMOL/L (ref 96–108)
CO2 SERPL-SCNC: 26 MMOL/L (ref 21–32)
CREAT SERPL-MCNC: 1.09 MG/DL (ref 0.6–1.3)
ERYTHROCYTE [DISTWIDTH] IN BLOOD BY AUTOMATED COUNT: 14.4 % (ref 11.6–15.1)
GFR SERPL CREATININE-BSD FRML MDRD: 66 ML/MIN/1.73SQ M
GLUCOSE P FAST SERPL-MCNC: 163 MG/DL (ref 65–99)
GLUCOSE SERPL-MCNC: 161 MG/DL (ref 65–140)
GLUCOSE SERPL-MCNC: 163 MG/DL (ref 65–140)
GLUCOSE SERPL-MCNC: 176 MG/DL (ref 65–140)
GLUCOSE SERPL-MCNC: 177 MG/DL (ref 65–140)
GLUCOSE SERPL-MCNC: 233 MG/DL (ref 65–140)
HCT VFR BLD AUTO: 33.9 % (ref 36.5–49.3)
HGB BLD-MCNC: 10.5 G/DL (ref 12–17)
MAGNESIUM SERPL-MCNC: 1.7 MG/DL (ref 1.9–2.7)
MCH RBC QN AUTO: 23.8 PG (ref 26.8–34.3)
MCHC RBC AUTO-ENTMCNC: 31 G/DL (ref 31.4–37.4)
MCV RBC AUTO: 77 FL (ref 82–98)
P AXIS: 48 DEGREES
PLATELET # BLD AUTO: 164 THOUSANDS/UL (ref 149–390)
PMV BLD AUTO: 13.1 FL (ref 8.9–12.7)
POTASSIUM SERPL-SCNC: 4.1 MMOL/L (ref 3.5–5.3)
PR INTERVAL: 154 MS
QRS AXIS: 84 DEGREES
QRSD INTERVAL: 96 MS
QT INTERVAL: 350 MS
QTC INTERVAL: 401 MS
RBC # BLD AUTO: 4.41 MILLION/UL (ref 3.88–5.62)
SODIUM SERPL-SCNC: 138 MMOL/L (ref 135–147)
T WAVE AXIS: 42 DEGREES
VENTRICULAR RATE: 79 BPM
WBC # BLD AUTO: 7.29 THOUSAND/UL (ref 4.31–10.16)

## 2024-03-29 PROCEDURE — 83735 ASSAY OF MAGNESIUM: CPT | Performed by: FAMILY MEDICINE

## 2024-03-29 PROCEDURE — 85027 COMPLETE CBC AUTOMATED: CPT | Performed by: FAMILY MEDICINE

## 2024-03-29 PROCEDURE — 80048 BASIC METABOLIC PNL TOTAL CA: CPT | Performed by: FAMILY MEDICINE

## 2024-03-29 PROCEDURE — 99232 SBSQ HOSP IP/OBS MODERATE 35: CPT | Performed by: NURSE PRACTITIONER

## 2024-03-29 PROCEDURE — 82948 REAGENT STRIP/BLOOD GLUCOSE: CPT

## 2024-03-29 PROCEDURE — 97163 PT EVAL HIGH COMPLEX 45 MIN: CPT

## 2024-03-29 PROCEDURE — 97167 OT EVAL HIGH COMPLEX 60 MIN: CPT

## 2024-03-29 PROCEDURE — 93005 ELECTROCARDIOGRAM TRACING: CPT

## 2024-03-29 PROCEDURE — 93010 ELECTROCARDIOGRAM REPORT: CPT | Performed by: INTERNAL MEDICINE

## 2024-03-29 PROCEDURE — 99205 OFFICE O/P NEW HI 60 MIN: CPT | Performed by: STUDENT IN AN ORGANIZED HEALTH CARE EDUCATION/TRAINING PROGRAM

## 2024-03-29 RX ADMIN — INSULIN LISPRO 1 UNITS: 100 INJECTION, SOLUTION INTRAVENOUS; SUBCUTANEOUS at 16:00

## 2024-03-29 RX ADMIN — SENNOSIDES 8.6 MG: 8.6 TABLET, FILM COATED ORAL at 18:03

## 2024-03-29 RX ADMIN — INSULIN LISPRO 2 UNITS: 100 INJECTION, SOLUTION INTRAVENOUS; SUBCUTANEOUS at 21:31

## 2024-03-29 RX ADMIN — ARIPIPRAZOLE 20 MG: 5 TABLET ORAL at 09:46

## 2024-03-29 RX ADMIN — SENNOSIDES 8.6 MG: 8.6 TABLET, FILM COATED ORAL at 09:46

## 2024-03-29 RX ADMIN — ATORVASTATIN CALCIUM 40 MG: 40 TABLET, FILM COATED ORAL at 17:00

## 2024-03-29 RX ADMIN — METOPROLOL TARTRATE 25 MG: 25 TABLET, FILM COATED ORAL at 21:25

## 2024-03-29 RX ADMIN — LISINOPRIL 20 MG: 20 TABLET ORAL at 09:46

## 2024-03-29 RX ADMIN — BUDESONIDE AND FORMOTEROL FUMARATE DIHYDRATE 2 PUFF: 160; 4.5 AEROSOL RESPIRATORY (INHALATION) at 09:47

## 2024-03-29 RX ADMIN — RISPERIDONE 1 MG: 1 TABLET ORAL at 18:03

## 2024-03-29 RX ADMIN — INSULIN LISPRO 1 UNITS: 100 INJECTION, SOLUTION INTRAVENOUS; SUBCUTANEOUS at 07:30

## 2024-03-29 RX ADMIN — INSULIN LISPRO 1 UNITS: 100 INJECTION, SOLUTION INTRAVENOUS; SUBCUTANEOUS at 12:00

## 2024-03-29 RX ADMIN — BUDESONIDE AND FORMOTEROL FUMARATE DIHYDRATE 2 PUFF: 160; 4.5 AEROSOL RESPIRATORY (INHALATION) at 18:10

## 2024-03-29 RX ADMIN — RISPERIDONE 1 MG: 1 TABLET ORAL at 09:46

## 2024-03-29 RX ADMIN — METOPROLOL TARTRATE 25 MG: 25 TABLET, FILM COATED ORAL at 09:46

## 2024-03-29 RX ADMIN — HEPARIN SODIUM 7500 UNITS: 5000 INJECTION INTRAVENOUS; SUBCUTANEOUS at 21:26

## 2024-03-29 RX ADMIN — HEPARIN SODIUM 7500 UNITS: 5000 INJECTION INTRAVENOUS; SUBCUTANEOUS at 14:49

## 2024-03-29 RX ADMIN — ASPIRIN 81 MG: 81 TABLET, CHEWABLE ORAL at 09:46

## 2024-03-29 RX ADMIN — FUROSEMIDE 40 MG: 40 TABLET ORAL at 09:46

## 2024-03-29 RX ADMIN — HEPARIN SODIUM 7500 UNITS: 5000 INJECTION INTRAVENOUS; SUBCUTANEOUS at 06:19

## 2024-03-29 NOTE — ASSESSMENT & PLAN NOTE
73 y.o. right handed male presented to [facility] ED 3/28/24 with dizziness. CTH/CTA revealed severely hypoplastic/narrow left cervical segment vertebral artery as well as incidental 3mm right ICA terminus aneurysm. Initial /82, noted as low as 103/77, with .  Neuroimaging/work-up:  3/28/24 CTH/CTA:  1.  No acute intracranial CT abnormality. Chronic microangiopathic change.  2.  Patent major vessels of the [place] without high-grade stenosis.  3.  Severely narrowed caliber left vertebral artery. Cannot reliably evaluate the origin and proximal V1 segment due to significant small caliber and artifactually distorted image quality in the neck base. The remainder very small caliber left cervical   and intracranial vertebral artery is patent. Relatively smooth wall throughout the neck and intracranial segments favor developmentally hypoplastic vessel (normal variant) over dissection.  4.  No hemodynamically significant stenosis of the cervical carotid and right vertebral arteries.  5.  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 Recommend follow-up with neurovascular service.  Pertinent labs:  TSH, troponin WNL  UA +leukocytes, glucose  Relevant home meds:  Abilify 20mg daily  Risperidone 1mg bid  Asa 81mg daily  Atorvastatin  40mg daily  Recommendations:  Pending: MRI brain, urine culture  - Stroke pathway  MRI brain  Echo if MRI reveals stroke (recent echo 2/2024 with ef 55%)  Lipid Panel  Hemoglobin A1c  Continue home Aspirin 81 mg and Atorvastatin 40 mg daily  Permissive htn, avoid hypotension  Euglycemic, normothermic goal  Continue telemetry  PT/OT/ST  Stroke education  Continue to monitor and notify neurology with any changes.  - Medical management and correction of any metabolic or infectious disturbances per primary service.

## 2024-03-29 NOTE — ASSESSMENT & PLAN NOTE
Patient presents with dizziness when he tried to stand up and he almost fell but he was able to catch himself before falling  CTA shows severe left vertebral artery narrowing  MRI-Pending  Started on stoke pathway  Neurology consulted  Recent echocardiogram from February 2024 shows 55% ejection fraction with normal systolic function and abnormal diastolic function  Monitor on telemetry  PT OT consultation

## 2024-03-29 NOTE — ASSESSMENT & PLAN NOTE
Wt Readings from Last 3 Encounters:   02/23/24 130 kg (286 lb)   01/21/23 125 kg (275 lb 9.2 oz)   05/04/22 131 kg (288 lb 2.3 oz)     Currently euvolemic  Continue home dose of Lasix  Salt restricted diet

## 2024-03-29 NOTE — ASSESSMENT & PLAN NOTE
Patient presents with dizziness when he tried to stand up and he almost fell but he was able to catch himself before falling  CTA shows severe left vertebral artery narrowing  MRI ordered for further workup  Neurology consultation for further input  Recent echocardiogram from February 2024 shows 55% ejection fraction with normal systolic function and abnormal diastolic function  Monitor on telemetry  Stable BMP and electrolytes  PT OT consultation

## 2024-03-29 NOTE — ASSESSMENT & PLAN NOTE
Lab Results   Component Value Date    HGBA1C 8.7 (H) 02/22/2024       Recent Labs     03/28/24  1225 03/28/24  2311 03/29/24  0810   POCGLU 182* 198* 177*         Blood Sugar Average: Last 72 hrs:  (P) [id number]  Sliding scale insulin  Carb controlled diet  Hold home dose of metformin

## 2024-03-29 NOTE — CASE MANAGEMENT
Case Management Assessment & Discharge Planning Note    Patient name Lorne Waterman  Location /MS - MRN [id number]  : 1950 Date 3/29/2024       Current Admission Date: 3/28/2024  Current Admission Diagnosis:Dizziness   Patient Active Problem List    Diagnosis Date Noted    Vertebral artery narrowing 2024    Dizziness 2024    Fall 2024    Parkinson disease 2023    Type 2 diabetes mellitus without complication, without long-term current use of insulin ([facility]) 2023    Chronic left-sided low back pain without sciatica 2023    Left leg injury 2023    Morbid obesity due to excess calories ([facility]) 10/09/2020    Dyspnea 10/07/2020    Other emphysema ([facility]) 10/07/2020    Essential hypertension 10/07/2020    History of CHF (congestive heart failure) 10/07/2020    Acute pulmonary insufficiency 2020    Chronic combined systolic and diastolic congestive heart failure ([facility]) 2019    Osteoarthritis of both knees 2019    Near syncope 2019    Elevated troponin 2019    Anxiety 2018    Chronic post-traumatic stress disorder (PTSD) 2018    Tardive dyskinesia 2018    Encounter for examination and observation for other specified reasons     Constipated 2018    Other chest pain 2018    Schizophrenia ([facility]) 2018    Hypertension 2018    Hyperlipidemia 2018    Diabetes mellitus type 2 in obese  2018      LOS (days): 0  Geometric Mean LOS (GMLOS) (days):   Days to GMLOS:     OBJECTIVE:              Current admission status: Observation       Preferred Pharmacy:   [facility] - [place] - [address]  [address]  [place] [address]  Phone: [phone] Fax: [phone]    [id number] - [place] - [address]  [address]  [place] [address]  Phone: [phone] Fax: [phone]    Primary Care Provider: Alysa Santoro  MD    Primary Insurance: [facility]  Secondary Insurance:     ASSESSMENT:  Active Health Care Proxies       Isabelle Waterman Health Care Representative - Spouse   Primary Phone: [phone] (Home)                 Advance Directives  Does patient have a Health Care POA?: Yes  Does patient have Advance Directives?: Yes  Advance Directives: Living will, Power of  for health care, Power of  for finance  Primary Contact: Patient's Wife (Isabelle)    Readmission Root Cause  30 Day Readmission: No    Patient Information  Admitted from:: Home  Mental Status: Alert  During Assessment patient was accompanied by: Not accompanied during assessment  Assessment information provided by:: Patient  Primary Caregiver: Self  Support Systems: Self, Friend, Spouse/significant other  County of Residence: [place]  What [place] do you live in[place]  Home entry access options. Select all that apply.: Ramp  Type of Current Residence: 2 story home  Upon entering residence, is there a bedroom on the main floor (no further steps)?: Yes  Upon entering residence, is there a bathroom on the main floor (no further steps)?: Yes  Living Arrangements: Lives w/ Spouse/significant other  Is patient a ?: Yes  Is patient active with VA ( [facility])?: Yes  Is patient service connected?: Yes (Patient reported that he is 100% service connected through the VA.)    Activities of Daily Living Prior to Admission  Functional Status: Independent  Completes ADLs independently?: Yes  Ambulates independently?: Yes  Does patient use assisted devices?: Yes  Assisted Devices (DME) used: Stair Chair/Glide, Straight Cane  Does patient currently own DME?: Yes  What DME does the patient currently own?: Stair Chair/Glide, Straight Cane, Walker, Wheelchair  Does patient have a history of Outpatient Therapy (PT/OT)?: No  Does the patient have a history of Short-Term Rehab?: Yes (Patient reported that he has been to  [facility] in the past for STR.)  Does patient have a history of HHC?: No  Does patient currently have HHC?: No    Patient Information Continued  Income Source: Other (Comment) (Patient reported that his income is a combination of social security, VA, and a pension.)  Does patient have prescription coverage?: Yes  Does patient receive dialysis treatments?: No  Does patient have a history of substance abuse?: No  Does patient have a history of Mental Health Diagnosis?: Yes (Patient denied having any MH diagnoses, but he sees a psychiatrist through the [facility].)  Is patient receiving treatment for mental health?: Yes  Has patient received inpatient treatment related to mental health in the last 2 years?: No    PHQ 2/9 Screening   Reviewed PHQ 2/9 Depression Screening Score?: No    Means of Transportation  Means of Transport to Appts:: Drives Self    Social Determinants of Health (SDOH)      Flowsheet Row Most Recent Value   Housing Stability    In the last 12 months, was there a time when you were not able to pay the mortgage or rent on time? N   In the last 12 months, how many places have you lived? 1   In the last 12 months, was there a time when you did not have a steady place to sleep or slept in a shelter (including now)? N   Transportation Needs    In the past 12 months, has lack of transportation kept you from medical appointments or from getting medications? no   In the past 12 months, has lack of transportation kept you from meetings, work, or from getting things needed for daily living? No   Food Insecurity    Within the past 12 months, you worried that your food would run out before you got the money to buy more. Never true   Within the past 12 months, the food you bought just didn't last and you didn't have money to get more. Never true   Utilities    In the past 12 months has the electric, gas, oil, or water company threatened to shut off services in your home? No          DISCHARGE DETAILS:    Discharge  planning discussed with:: Patient  Freedom of Choice: Yes  Comments - Freedom of Choice: CM discussed FOC as it pertains to discharge planning. CM reviewed PT/OT recommendation, Level II. Patient agreeable to [facility] and prefers to return to [facility], if they are able to accept. Patient reported that he and his wife are going through a divorce, but he does not want her contacted at this time. He will reach out to her, as needed. CM agreeable to send a blanket referral to [facility] facilities and note that he prefers [facility]. Patient denied any additional needs at this time.  CM contacted family/caregiver?: No- see comments (Declined.)  Were Treatment Team discharge recommendations reviewed with patient/caregiver?: Yes  Did patient/caregiver verbalize understanding of patient care needs?: N/A- going to facility  Were patient/caregiver advised of the risks associated with not following Treatment Team discharge recommendations?: Yes    Requested Home Health Care         Is the patient interested in HHC at discharge?: No    DME Referral Provided  Referral made for DME?: No    Other Referral/Resources/Interventions Provided:  Interventions: Short Term Rehab  Referral Comments: CM sent a blanket referral to [facility] facilities to determine who can accept patient. CM noted that [facility] is the preference and sent them a message via [employer] informing them of the same. CM then attempted to call the [facility] at [phone], ext. [phone] ([facility]), nursing facility liaison, but the [facility] is closed for the holiday. CM left a message requesting a call back regarding SNF auth. CM also sent Valarie an email requesting the same. Response pending.    Would you like to participate in our [facility] service program?  : No - Declined    Treatment Team Recommendation: Short Term Rehab  Discharge Destination Plan:: Short Term Rehab  Transport at Discharge : Wheelchair van  Dispatcher Contacted: No

## 2024-03-29 NOTE — SPEECH THERAPY NOTE
Speech Language/Pathology      Patient passed nursing dysphagia screen; presently tolerating oral diet.  Need for formal evaluation of swallow function is not indicated at this time.  Please re-order should status change or concerns arise.     Cassy Dejesus MS, CCC-SLP  Speech-Language Pathologist  [place] #[id number]  [place] #[id number]

## 2024-03-29 NOTE — ASSESSMENT & PLAN NOTE
/73   Pulse 77   Temp 98.2 °F (36.8 °C) (Oral)   Resp 18   SpO2 (!) 83%   Stable pressures  As patient has severe vertebral artery narrowing, avoid hypotension.  May need blood pressure to be on the higher end of normal  Currently on lisinopril and metoprolol

## 2024-03-29 NOTE — INCIDENTAL FINDINGS
The following findings require follow up:  Radiographic finding   Finding: Prostatomegaly   Follow up required: Yes, outpatient urology   Follow up should be done within 1 to 2 weeks after discharge     Please notify the following clinician to assist with the follow up:    Primary care physician

## 2024-03-29 NOTE — OCCUPATIONAL THERAPY NOTE
Occupational Therapy Evaluation      Lorne Waterman    3/29/2024    Patient Active Problem List   Diagnosis    Other chest pain    Schizophrenia (HCC)    Hypertension    Hyperlipidemia    Diabetes mellitus type 2 in obese     Constipated    Anxiety    Chronic post-traumatic stress disorder (PTSD)    Tardive dyskinesia    Encounter for examination and observation for other specified reasons    Near syncope    Elevated troponin    Chronic combined systolic and diastolic congestive heart failure (HCC)    Osteoarthritis of both knees    Acute pulmonary insufficiency    Dyspnea    Other emphysema (HCC)    Essential hypertension    History of CHF (congestive heart failure)    Morbid obesity due to excess calories (HCC)    Type 2 diabetes mellitus without complication, without long-term current use of insulin (HCC)    Chronic left-sided low back pain without sciatica    Left leg injury    Parkinson disease    Fall    Vertebral artery narrowing    Dizziness       Past Medical History:   Diagnosis Date    Anxiety     CHF (congestive heart failure) (HCC)     COPD (chronic obstructive pulmonary disease) (HCC)     Depression     Diabetes mellitus (HCC)     Enlarged prostate     Hallucination     Hyperlipidemia     Hypertension     Memory loss     Panic attack     Panic disorder     Psychiatric disorder     Psychiatric illness     Psychosis (HCC)     PTSD (post-traumatic stress disorder)     Schizoaffective disorder (HCC)     Schizophrenia ([facility])           03/29/24 0758   OT Last Visit   OT Visit Date 03/29/24   Note Type   Note type Evaluation   Additional Comments Pt agreeable to OT eval. Upon arrival pt supine in bed with HOB elevated.   Pain Assessment   Pain Assessment Tool 0-10   Pain Score 9   Pain Location/Orientation Orientation: Left;Location: Rib Cage   Pain Onset/Description Onset: Ongoing;Frequency: Constant/Continuous   Hospital Pain Intervention(s) Ambulation/increased activity;Repositioned;Medication (See MAR)  "  Restrictions/Precautions   Weight Bearing Precautions Per Order No   Other Precautions Chair Alarm;Bed Alarm;Fall Risk;Pain;Telemetry   Home Living   Type of Home House   Home Layout Two level;Bed/bath upstairs;1/2 bath on main level;Ramped entrance  (pt has stairglide, however reports \"it doesn't work right now\".)   Bathroom Shower/Tub Walk-in shower   Bathroom Toilet Raised   Bathroom Equipment Shower chair;Grab bars in shower   Bathroom Accessibility Accessible   Home Equipment Walker;Cane;Wheelchair-manual;Stair glide;Electric scooter  (utilizes SPC at baseline)   Prior Function   Level of [place] Needs assistance with IADLS;Independent with ADLs   Lives With Spouse   Receives Help From Family   IADLs Family/Friend/Other provides transportation;Family/Friend/Other provides meals;Independent with medication management   Falls in the last 6 months 1 to 4  (2 falls per pt)   Vocational Retired   Lifestyle   Autonomy Patient reporting being independent with ADLs, ambulatory with SPC, and lives with wife   Reciprocal Relationships Wife, however pt reports currently seperated   Service to Others Retired   Intrinsic Gratification Fishing   ADL   Eating Assistance 6  Modified independent   Grooming Assistance 5  Supervision/Setup   UB Bathing Assistance 4  Minimal Assistance   LB Bathing Assistance 3  Moderate Assistance   UB Dressing Assistance 4  Minimal Assistance   LB Dressing Assistance 3  Moderate Assistance   Toileting Assistance  3  Moderate Assistance   Additional Comments ADL levels based on functional performance during OT eval.   Bed Mobility   Supine to Sit 5  Supervision   Additional items Assist x 1;HOB elevated;Bedrails;Increased time required;Verbal cues   Sit to Supine   (DNT: pt seated OOB in recliner at end of session)   Transfers   Sit to Stand 4  Minimal assistance   Additional items Assist x 1;Bedrails;Increased time required;Verbal cues   Stand to Sit 4  Minimal assistance   Additional " items Assist x 1;Armrests;Increased time required;Verbal cues   Functional Mobility   Functional Mobility 4  Minimal assistance  (Assist of 2)   Additional Comments Pt ambulated in room to recliner with no overt SOB. Pt unsteady and requires cues for sequencing/RW management   Additional items Rolling walker   Balance   Static Sitting Fair +   Dynamic Sitting Fair   Static Standing Poor +   Dynamic Standing Poor   Activity Tolerance   Activity Tolerance Patient limited by fatigue;Patient limited by pain   Medical Staff Made Aware Pt seen as a co-eval with PT due to the patient's co-morbidities and clinically unstable presentation indicated by chart review.   RUE Assessment   RUE Assessment WFL  (full AROM, 4-/5 MMT)   LUE Assessment   LUE Assessment WFL  (full AROM, 4-/5 MMT)   Hand Function   Gross Motor Coordination Functional   Fine Motor Coordination Functional   Sensation   Light Touch Partial deficits in the RUE;Partial deficits in the RLE;Partial deficits in the LLE;Partial deficits in the LUE  (occasional numbness/tingles reported)   Vision-Basic Assessment   Current Vision Wears glasses only for reading   Cognition   Overall Cognitive Status Impaired   Arousal/Participation Alert;Responsive;Cooperative   Attention Attends with cues to redirect   Orientation Level Oriented X4   Memory Decreased short term memory;Decreased recall of recent events;Decreased recall of precautions   Following Commands Follows one step commands without difficulty   Comments Mini-Cog assessment performed today.  Version 1 was given. Patient able to recall 0/3 words.  Patient able to draw a normal clock with the correct time.  Total score of test was 2/5 indicating  further screening of cognition is recommended.   Assessment   Limitation Decreased ADL status;Decreased UE strength;Decreased Safe judgement during ADL;Decreased cognition;Decreased endurance;Decreased self-care trans;Decreased high-level ADLs   Prognosis Good    Assessment Patient is a 73 y.o. male seen for OT evaluation s/p admit to [facility]  on 3/28/2024 w/Dizziness. Commorbidities affecting patient's functional performance at time of assessment include: HTN, HLD, DM2, parkinson disease, and vertebral artery narrowing. Orders placed for OT evaluation and treatment and up and OOB as tolerated . Performed at least two patient identifiers during session including name and wristband.  Prior to admission, Patient reporting being independent with ADLs, ambulatory with SPC, and lives with wife. Personal factors affecting patient at time of initial evaluation include: limited caregiver support, limited insight into deficits, difficulty performing ADLs, and difficulty performing IADLs. Upon evaluation, patient requires minimal  assist for UB ADLs, moderate assist for LB ADLs, transfers and functional ambulation in room and bathroom with minimal  assist, with the use of Rolling Walker.  Patient is oriented x 4.  Occupational performance is affected by the following deficits: decreased muscle strength, dynamic sit/ stand balance deficit with poor standing tolerance time for self care and functional mobility, decreased activity tolerance, impaired memory, impaired problem solving, decreased safety awareness, increased pain, and delayed righting and equilibrium reactions. Patient to benefit from continued Occupational Therapy treatment while in the hospital to address deficits as defined above and maximize level of functional independence with ADLs and functional mobility. Occupational Performance areas to address include: eating, grooming , bathing/ shower, dressing, toilet hygiene, transfer to all surfaces, and functional ambulation. From OT standpoint, recommendation at time of d/c would be Level II (moderate resource intensity).   Goals   Patient Goals to get stronger   Plan   Treatment Interventions ADL retraining;Functional transfer training;UE  strengthening/ROM;Endurance training;Cognitive reorientation;Patient/family training;Equipment evaluation/education;Compensatory technique education;Activityengagement   Goal Expiration Date 04/13/24   OT Treatment Day 0   OT Frequency 3-5x/wk   Discharge Recommendation   Rehab Resource Intensity Level, OT II (Moderate Resource Intensity)   AM-PAC Daily Activity Inpatient   Lower Body Dressing 2   Bathing 2   Toileting 2   Upper Body Dressing 3   Grooming 3   Eating 4   Daily Activity Raw Score 16   Daily Activity Standardized Score (Calc for Raw Score >=11) 35.96   AM-PAC Applied Cognition Inpatient   Following a Speech/Presentation 3   Understanding Ordinary Conversation 4   Taking Medications 3   Remembering Where Things Are Placed or Put Away 2   Remembering List of 4-5 Errands 2   Taking Care of Complicated Tasks 2   Applied Cognition Raw Score 16   Applied Cognition Standardized Score 35.03   Mini-Cog Assessment   Word Version Version 1: Banana, Sunrise, Chair   Clock Draw (CDT) 2   Word Recall 0   Mini-Cog Score 2   Mini-Cog Results Positive screen for dementia   Modified [place] Scale   Modified [place] Scale 4   End of Consult   Patient Position at End of Consult Bedside chair;Bed/Chair alarm activated;All needs within reach   Nurse Communication Nurse aware of consult     GOALS:    *ADL transfers with (S) for inc'd independence with ADLs/purposeful tasks    *UB ADL with (S) for inc'd independence with self cares    *LB ADL with CGA using AE prn for inc'd independence with self cares    *Toileting with CGA for clothing management and hygiene for return to PLOF with personal care    *Increase stand tolerance x 5  m for inc'd tolerance with standing purposeful tasks    *Participate in 10m UE therex to increase overall stamina/activity tolerance for purposeful tasks    *Bed mobility- (S) for inc'd independence to manage own comfort and initiate EOB & OOB purposeful tasks    *Patient will verbalize 3 safety  awareness/ principles to prevent falls in the home setting.     *Patient will increase OOB/sitting tolerance to 2-4 hours per day to increase participation in self-care and leisure tasks with no s/s of exertion.     *Patient will engage in ongoing cognitive assessment to assist with safe discharge planning/recommendations.     HECTOR Higgins, OTR/L

## 2024-03-29 NOTE — PROGRESS NOTES
"[facility]  Progress Note  Name: Lorne Waterman I  MRN: [id number]  Unit/Bed#: -01 I Date of Admission: 3/28/2024   Date of Service: 3/29/2024 I Hospital Day: 0    Assessment/Plan   * Dizziness  Assessment & Plan  Patient presents with dizziness when he tried to stand up and he almost fell but he was able to catch himself before falling  CTA shows severe left vertebral artery narrowing  MRI-Pending  Started on stoke pathway  Neurology consulted  Recent echocardiogram from February 2024 shows 55% ejection fraction with normal systolic function and abnormal diastolic function  Monitor on telemetry  PT OT consultation    Vertebral artery narrowing  Assessment & Plan  Further neurology consultation  Continue aspirin, statin    Parkinson disease  Assessment & Plan  Not on any Parkinson medication  Neurology consultation for further input    Type 2 diabetes mellitus without complication, without long-term current use of insulin (HCC)  Assessment & Plan  Lab Results   Component Value Date    HGBA1C 8.7 (H) 02/22/2024       Recent Labs     03/28/24  1225 03/28/24  2311 03/29/24  0810   POCGLU 182* 198* 177*         Blood Sugar Average: Last 72 hrs:  (P) [id number]  Sliding scale insulin  Carb controlled diet  Hold home dose of metformin    Hyperlipidemia  Assessment & Plan  Continue statin.      Hypertension  Assessment & Plan  /83   Pulse 78   Temp 98 °F (36.7 °C)   Resp 18   Ht 5' 10\" (1.778 m)   Wt 123 kg (271 lb 2.7 oz)   SpO2 99%   BMI 38.91 kg/m²   Stable pressures  As patient has severe vertebral artery narrowing, avoid hypotension.    Permissive hypertension  Currently on lisinopril and metoprolol         VTE Pharmacologic Prophylaxis:   Moderate Risk (Score 3-4) - Pharmacological DVT Prophylaxis Ordered: heparin.    Mobility:   Basic Mobility Inpatient Raw Score: 14  JH-HLM Goal: 4: Move to chair/commode  JH-HLM Achieved: 4: Move to chair/commode  JH-HLM " Goal achieved. Continue to encourage appropriate mobility.    Patient Centered Rounds: I performed bedside rounds with nursing staff today.  Discussed with Leigh RN 1122  Discussions with Specialists or Other Care Team Provider: filipe ryan from neurology    Education and Discussions with Family / Patient: Updated  (wife) via phone. Isabelle called at 1110 unable to leave voicemail since mailbox is full    Total Time Spent on Date of Encounter in care of patient: 35 mins. This time was spent on one or more of the following: performing physical exam; counseling and coordination of care; obtaining or reviewing history; documenting in the medical record; reviewing/ordering tests, medications or procedures; communicating with other healthcare professionals and discussing with patient's family/caregivers.    Current Length of Stay: 0 day(s)  Current Patient Status: Observation   Certification Statement: The patient will continue to require additional inpatient hospital stay due to stroke work up  Discharge Plan: Anticipate discharge tomorrow to discharge location to be determined pending rehab evaluations.    Code Status: Level 1 - Full Code    Subjective:   Spoke with patient patient's out of bed sitting in the chair denies any chest pain chest tightness shortness of breath or difficulty breathing reports that if he tries to speak too quickly his speech does get jumbled which is his baseline he ambulated to the restroom with a walker he lives at home with his wife he uses a cane or a walker at home as well.  Does report that his legs feel weak cannot determine if 1 feels weaker than the other he states.  Denies any difficulty using the restroom denies any difficulty eating denies any nausea vomiting or diarrhea.  Reports that he is going through divorce with his wife at this time however    Objective:     Vitals:   Temp (24hrs), Av.2 °F (36.8 °C), Min:98 °F (36.7 °C), Max:99 °F (37.2 °C)    Temp:  [98  °F (36.7 °C)-99 °F (37.2 °C)] 98 °F (36.7 °C)  HR:  [75-89] 78  Resp:  [16-22] 18  BP: (103-177)/(73-89) 136/83  SpO2:  [93 %-99 %] 99 %  Body mass index is 38.91 kg/m².     Input and Output Summary (last 24 hours):     Intake/Output Summary (Last 24 hours) at 3/29/2024 1047  Last data filed at 3/29/2024 0900  Gross per 24 hour   Intake 240 ml   Output 300 ml   Net -60 ml       Physical Exam:   Physical Exam  Vitals reviewed.   Constitutional:       General: He is not in acute distress.     Appearance: He is obese.   Cardiovascular:      Rate and Rhythm: Normal rate.   Pulmonary:      Effort: No respiratory distress.   Skin:     General: Skin is warm.      Coloration: Skin is pale.   Neurological:      Mental Status: He is alert.      Sensory: Sensory deficit present.      Motor: Weakness present.   Psychiatric:         Mood and Affect: Mood normal.        Additional Data:     Labs:  Results from last 7 days   Lab Units 03/29/24 0518 03/28/24 2001 03/28/24  1307   WBC Thousand/uL 7.29  --  8.50   HEMOGLOBIN g/dL 10.5*  --  11.5*   HEMATOCRIT % 33.9*  --  37.6   PLATELETS Thousands/uL 164   < > 151   NEUTROS PCT %  --   --  48   LYMPHS PCT %  --   --  42   MONOS PCT %  --   --  7   EOS PCT %  --   --  2    < > = values in this interval not displayed.     Results from last 7 days   Lab Units 03/29/24 0518 03/28/24  1331   SODIUM mmol/L 138 140   POTASSIUM mmol/L 4.1 4.2   CHLORIDE mmol/L 104 107   CO2 mmol/L 26 26   BUN mg/dL 16 19   CREATININE mg/dL 1.09 1.15   ANION GAP mmol/L 8 7   CALCIUM mg/dL 9.2 9.5   ALBUMIN g/dL  --  4.3   TOTAL BILIRUBIN mg/dL  --  0.33   ALK PHOS U/L  --  52   ALT U/L  --  31   AST U/L  --  14   GLUCOSE RANDOM mg/dL 163* 162*     Results from last 7 days   Lab Units 03/28/24  1331   INR  1.03     Results from last 7 days   Lab Units 03/29/24  0810 03/28/24  2311 03/28/24  1225   POC GLUCOSE mg/dl 177* 198* 182*               Lines/Drains:  Invasive Devices       Peripheral Intravenous  Line  Duration             Peripheral IV 03/28/24 Distal;Right;Upper;Ventral (anterior) Arm <1 day                      Telemetry:  Telemetry Orders (From admission, onward)               24 Hour Telemetry Monitoring  Continuous x 24 Hours (Telem)        Question:  Reason for 24 Hour Telemetry  Answer:  Syncope suspected to be cardiac in origin                     Recent Cultures (last 7 days):         Last 24 Hours Medication List:   Current Facility-Administered Medications   Medication Dose Route Frequency Provider Last Rate    acetaminophen  650 mg Oral Q6H PRN Archie Tse MD      albuterol  2 puff Inhalation Q4H PRN Archie Tse MD      ARIPiprazole  20 mg Oral Daily Archie Tse MD      aspirin  81 mg Oral Daily Archie Tse MD      atorvastatin  40 mg Oral Daily With Dinner Archie Tse MD      budesonide-formoterol  2 puff Inhalation BID Archie Tse MD      docusate sodium  100 mg Oral BID PRN Archie Tse MD      furosemide  40 mg Oral Daily Archie Tse MD      heparin (porcine)  7,500 Units Subcutaneous Q8H TREVOR Archie Tse MD      insulin lispro  1-5 Units Subcutaneous HS Archie Tse MD      insulin lispro  1-6 Units Subcutaneous TID AC Archie Tse MD      Ketotifen Fumarate  1 drop Both Eyes BID Archie Tse MD      lisinopril  20 mg Oral Daily Archie Tse MD      metoprolol tartrate  25 mg Oral Q12H TREVOR Archie Tse MD      risperiDONE  1 mg Oral BID Archie Tse MD      senna  8.6 mg Oral BID Archie Tse MD          Today, Patient Was Seen By: MACKENZIE Sosa    **Please Note: This note may have been constructed using a voice recognition system.**

## 2024-03-29 NOTE — PHYSICAL THERAPY NOTE
"   PT Evaluation (14min)  (7:44-7:58)    Past Medical History:   Diagnosis Date    Anxiety     CHF (congestive heart failure) ([facility])     COPD (chronic obstructive pulmonary disease) ([facility])     Depression     Diabetes mellitus ([facility])     Enlarged prostate     Hallucination     Hyperlipidemia     Hypertension     Memory loss     Panic attack     Panic disorder     Psychiatric disorder     Psychiatric illness     Psychosis (HCC)     PTSD (post-traumatic stress disorder)     Schizoaffective disorder ([facility])     Schizophrenia ([facility])          03/29/24 0744   PT Last Visit   PT Visit Date 03/29/24   Note Type   Note type Evaluation   Pain Assessment   Pain Assessment Tool 0-10   Pain Score 9   Pain Location/Orientation Orientation: Left;Other (Comment)  (L side)   Hospital Pain Intervention(s) Ambulation/increased activity   Restrictions/Precautions   Weight Bearing Precautions Per Order No   Other Precautions Chair Alarm;Bed Alarm;Telemetry;Fall Risk;Pain   Home Living   Type of Home House   Home Layout Two level;Bed/bath upstairs;Other (Comment);1/2 bath on main level;Ramped entrance  (pt has stairglide, however reports \"it doesn't work right now\".)   Bathroom Shower/Tub Walk-in shower   Bathroom Toilet Raised   Bathroom Equipment Shower chair;Grab bars in shower   Bathroom Accessibility Accessible   Home Equipment Walker;Cane;Wheelchair-manual;Stair glide;Electric scooter   Prior Function   Level of [place] Needs assistance with IADLS;Independent with ADLs;Needs assistance with ADLs  (ambulates c SPC)   Lives With Spouse   Receives Help From Family   IADLs Family/Friend/Other provides transportation;Family/Friend/Other provides meals;Independent with medication management   Falls in the last 6 months 1 to 4  (2)   Vocational Retired   Comments pt enjoys fishing   General   Additional Pertinent History pt presents to [place] c dizziness. recently d/c from [facility]. PT consulted for mobility + d/c planning.   Family/Caregiver " "Present No   Cognition   Orientation Level Oriented X4   Subjective   Subjective \"I get dizzy when I'm up for a while\".   RUE Assessment   RUE Assessment WFL   LUE Assessment   LUE Assessment WFL   RLE Assessment   RLE Assessment WFL  (4-/5)   LLE Assessment   LLE Assessment WFL  (4-/5)   Coordination   Sensation WFL   Bed Mobility   Supine to Sit 5  Supervision   Additional items HOB elevated;Bedrails;Increased time required;Verbal cues   Transfers   Sit to Stand 4  Minimal assistance   Additional items Assist x 1;Verbal cues;Increased time required   Stand to Sit 4  Minimal assistance   Additional items Assist x 1;Verbal cues;Increased time required   Ambulation/Elevation   Gait pattern Narrow HAO;Forward Flexion;Decreased foot clearance;Inconsistent librety;Decreased heel strike;Short stride   Gait Assistance 4  Minimal assist   Additional items Assist x 2;Verbal cues   Assistive Device Rolling walker   Distance 20'   Stair Management Assistance Not tested   Balance   Static Sitting Fair +   Dynamic Sitting Fair   Static Standing Poor +   Dynamic Standing Poor   Ambulatory Poor   Endurance Deficit   Endurance Deficit Yes   Activity Tolerance   Activity Tolerance Patient limited by fatigue;Patient limited by pain   Medical Staff Made Aware OT-Kathi (co-eval 2* pt's multiple co-morbidities + mobility deficits requiring (A)x2 to complete mobility tasks).   Nurse Made Aware RN Leigh   Assessment   Prognosis Good   Problem List Decreased strength;Decreased endurance;Impaired balance;Decreased mobility;Pain   Assessment pt is a 74y/o m who presnts to [facility] c dizziness. PMH significant for anxiety, CHF, COPD, PTSD, + schizophrenia. at baseline, pt mod (I) c functional mobility c RW. resides c spouse in 2 story home c stairglide access, however pt reports stair glide currently does not work. currently requires min (A)x1-2 to complete mobility tasks 2* deficits in strength, balance, gait quality, pain, + activity tolerance " "noted in PT exam above. Barthel Index 45/100, Modified Parker 4. ambulated 20' c RW c noted gait deviations above + min verbal cues for safety to prevent loss of balance. tolerated sitting OOB in chair at end of session c chair alarm activated. would benefit from skilled PT to maximize functional mobility + improve quality of life. PT eval of high complexity 2* unstable med status c pt requiring ongoing medical management 2* dizziness. pt c multiple co-morbidities + mobility deficits above requiring (A)x2 to complete mobility tasks. resides in 2 story home c stair glide, however stair glide is currently broken per pt.   Barriers to Discharge Inaccessible home environment   Goals   Patient Goals \"to get my strength back so I can go fishing\"   [facility] Expiration Date 04/08/24   Short Term Goal #1 1. increase strength 1/2 grade to improve overall functional mobility, 2. perform bed mobility mod (I) to decrease caregiver burden, 3. perform transfers mod (I) to safely perform ADLs, 4. ambulate 150' mod (I) c RW to safely navigate home environment   Plan   Treatment/Interventions Functional transfer training;LE strengthening/ROM;Therapeutic exercise;Endurance training;Patient/family training;Equipment eval/education;Bed mobility;Gait training;Spoke to nursing;OT   PT Frequency 3-5x/wk   Discharge Recommendation   Rehab Resource Intensity Level, PT II (Moderate Resource Intensity)   AM-PAC Basic Mobility Inpatient   Turning in Flat Bed Without Bedrails 3   Lying on Back to Sitting on Edge of Flat Bed Without Bedrails 3   Moving Bed to Chair 2   Standing Up From Chair Using Arms 2   Walk in Room 2   Climb 3-5 Stairs With Railing 2   Basic Mobility Inpatient Raw Score 14   Basic Mobility Standardized Score 35.55   [facility] Highest Level Of Mobility   -[facility] Goal 4: Move to chair/commode   -HL Achieved 6: Walk 10 steps or more   Modified Parker Scale   Modified Parker Scale 4   Barthel Index   Feeding 5   Bathing 0 "   Grooming Score 5   Dressing Score 5   Bladder Score 10   Bowels Score 10   Toilet Use Score 5   Transfers (Bed/Chair) Score 5   Mobility (Level Surface) Score 0   Stairs Score 0   Barthel Index Score 45   End of Consult   Patient Position at End of Consult Bedside chair;Bed/Chair alarm activated;All needs within reach     Almas Castellanos DPT

## 2024-03-29 NOTE — CASE MANAGEMENT
"   Case Management Progress Note    Patient name Lorne Waterman  Location /-01 MRN [id number]  : 1950 Date 3/29/2024       LOS (days): 0  Geometric Mean LOS (GMLOS) (days):   Days to GMLOS:        OBJECTIVE:        Current admission status: Observation  Preferred Pharmacy:   [facility] - [place] - [address]  [address]  [place] [address]  Phone: [phone] Fax: [phone]    [id number] - [place] - [address]  [place] [address]  Phone: [phone] Fax: [phone]    Primary Care Provider: Alysa Santoro MD    Primary Insurance: [facility]  Secondary Insurance:     PROGRESS NOTE:      CM received an email back from Valarie with the [facility] reporting the following information:       \"[place] was approved for [facility] program. This is different than his CCN approval from last hospitalization when he discharged to [place]. Team feels  may end up requiring more long-term care. Below is list of current [facility] facilities he would be eligible for under VA coverage -      [facility] ADDRESS ADDRESS PHONE FAX   [facility] - 2 stars [address] [place] [address] [phone]   [facility] - 2 stars [address] [place] [address] [phone]   [facility] - 3 stars [address] [place] [address] [phone]   [facility] - 3 stars [address] [place] [address] [phone]   [facility] - [address] stars [address] [place] [address] [phone]   [facility] - 2 stars [address] [address] [phone]   [facility] - 1 star [address] [place] [address] [phone]   [facility]   - 2 stars [address] [place] [address] [phone] " "[phone]   [facility] - 3 stars [address] [place] [address] [phone]   [facility] - 5 stars [address] [place] [address] [phone]      Please advise of anticipated discharge date/accepting facility and we will send authorization directly to the SNF.    To arrange transportation for , you can contact our transportation dept at [phone] ext. [phone] or ext. [phone]. He already has approval to transport via wheelchair van.\"      CM reviewed [facility] and found that all facilities have already been contacted from this list, and only [facility] has responded. CM to give facilities some additional time and then present list of choices to patient for review.        "

## 2024-03-29 NOTE — PLAN OF CARE
Problem: OCCUPATIONAL THERAPY ADULT  Goal: Performs self-care activities at highest level of function for planned discharge setting.  See evaluation for individualized goals.  Description: Treatment Interventions: ADL retraining, Functional transfer training, UE strengthening/ROM, Endurance training, Cognitive reorientation, Patient/family training, Equipment evaluation/education, Compensatory technique education, Activityengagement          See flowsheet documentation for full assessment, interventions and recommendations.   Note: Limitation: Decreased ADL status, Decreased UE strength, Decreased Safe judgement during ADL, Decreased cognition, Decreased endurance, Decreased self-care trans, Decreased high-level ADLs  Prognosis: Good  Assessment: Patient is a 73 y.o. male seen for OT evaluation s/p admit to [facility]  on 3/28/2024 w/Dizziness. Commorbidities affecting patient's functional performance at time of assessment include: HTN, HLD, DM2, parkinson disease, and vertebral artery narrowing. Orders placed for OT evaluation and treatment and up and OOB as tolerated . Performed at least two patient identifiers during session including name and wristband.  Prior to admission, Patient reporting being independent with ADLs, ambulatory with SPC, and lives with wife. Personal factors affecting patient at time of initial evaluation include: limited caregiver support, limited insight into deficits, difficulty performing ADLs, and difficulty performing IADLs. Upon evaluation, patient requires minimal  assist for UB ADLs, moderate assist for LB ADLs, transfers and functional ambulation in room and bathroom with minimal  assist, with the use of Rolling Walker.  Patient is oriented x 4.  Occupational performance is affected by the following deficits: decreased muscle strength, dynamic sit/ stand balance deficit with poor standing tolerance time for self care and functional mobility, decreased activity  tolerance, impaired memory, impaired problem solving, decreased safety awareness, increased pain, and delayed righting and equilibrium reactions. Patient to benefit from continued Occupational Therapy treatment while in the hospital to address deficits as defined above and maximize level of functional independence with ADLs and functional mobility. Occupational Performance areas to address include: eating, grooming , bathing/ shower, dressing, toilet hygiene, transfer to all surfaces, and functional ambulation. From OT standpoint, recommendation at time of d/c would be Level II (moderate resource intensity).     Rehab Resource Intensity Level, OT: II (Moderate Resource Intensity)        Kathi Hamlin OTD, OTR/L

## 2024-03-29 NOTE — CONSULTS
Consultation - Neurology   Lorne Waterman 73 y.o. male MRN: [id number]  Unit/Bed#: -01 Encounter: [id number]      Assessment/Plan     Vertebral artery narrowing  Assessment & Plan  73 y.o. right handed male presented to [facility] ED 3/28/24 with dizziness. CTH/CTA revealed severely hypoplastic/narrow left cervical segment vertebral artery as well as incidental 3mm right ICA terminus aneurysm. Initial /82, noted as low as 103/77, with .  Neuroimaging/work-up:  3/28/24 CTH/CTA:  1.  No acute intracranial CT abnormality. Chronic microangiopathic change.  2.  Patent major vessels of the [place] without high-grade stenosis.  3.  Severely narrowed caliber left vertebral artery. Cannot reliably evaluate the origin and proximal V1 segment due to significant small caliber and artifactually distorted image quality in the neck base. The remainder very small caliber left cervical   and intracranial vertebral artery is patent. Relatively smooth wall throughout the neck and intracranial segments favor developmentally hypoplastic vessel (normal variant) over dissection.  4.  No hemodynamically significant stenosis of the cervical carotid and right vertebral arteries.  5.  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 Recommend follow-up with neurovascular service.  Pertinent labs:  TSH, troponin WNL  UA +leukocytes, glucose  Relevant home meds:  Abilify 20mg daily  Risperidone 1mg bid  Asa 81mg daily  Atorvastatin  40mg daily  Recommendations:  Pending: MRI brain, urine culture  - Stroke pathway  MRI brain  Echo if MRI reveals stroke (recent echo 2/2024 with ef 55%)  Lipid Panel  Hemoglobin A1c  Continue home Aspirin 81 mg and Atorvastatin 40 mg daily  Permissive htn, avoid hypotension  Euglycemic, normothermic goal  Continue telemetry  PT/OT/ST  Stroke education  Continue to monitor and notify neurology with any changes.  - Medical management and correction of any metabolic or  "infectious disturbances per primary service.       Recommendations for outpatient neurological follow up have yet to be determined.    Reason for Consult / Principal Problem: \"vertebral artery stenosis\"  HPI: Lorne Waterman is a 73 y.o. right handed male with documented history of Parkinson's disease, schizophrenia, TD,  HTN, HLD, DM, COPD, CHF and CAD who presented to [facility] ED 3/28/24 with dizziness. CTH/CTA revealed severely hypoplastic/narrow left cervical segment vertebral artery as well as incidental 3mm right ICA terminus aneurysm. Initial /82, noted as low as 103/77, with .    Patient states that he returned home on Wednesday from rehab and when he stood up and felt dizzy/lightheaded resulting in a fall with brief LOC; denies head strike.  He describes his lightheadedness as feeling like he is about to pass out, not room spinning and reports a similar episode about 1-2 months ago. He additionally reports intermittent diplopia and bifrontal/retro-orbital headaches with associated phonophobia transiently relieved with tylenol. He also has left sided weakness with intermittent numbness and tingling that has been present for \"a while\"    Per chart review, patient was recently hospitalized 2/22-2/26/2024 with acute on chronic hypoxia and ambulatory dysfunction s/p fall. CTH/C-spine unremarkable and was discharged to [facility]. He was also evaluated at [facility] ED 3/12/24 with chest pain.    Inpatient consult to Neurology  Consult performed by: MACKENZIE Montelongo  Consult ordered by: Archie Tse MD        Review of Systems  Refer to HPI    Historical Information   Past Medical History:   Diagnosis Date    Anxiety     CHF (congestive heart failure) (HCC)     COPD (chronic obstructive pulmonary disease) (HCC)     Depression     Diabetes mellitus (HCC)     Enlarged prostate     Hallucination     Hyperlipidemia     Hypertension     Memory loss     Panic attack     Panic disorder     Psychiatric " "disorder     Psychiatric illness     Psychosis (HCC)     PTSD (post-traumatic stress disorder)     Schizoaffective disorder (HCC)     Schizophrenia (HCC)      No past surgical history on file.  Social History   Social History     Substance and Sexual Activity   Alcohol Use Never     Social History     Substance and Sexual Activity   Drug Use Never     E-Cigarette/Vaping    E-Cigarette Use Never User      E-Cigarette/Vaping Substances    Nicotine No     THC No     CBD No     Flavoring No     Other No     Unknown No      Social History     Tobacco Use   Smoking Status Former    Current packs/day: 0.00    Average packs/day: 2.0 packs/day for 0.5 years (1.0 ttl pk-yrs)    Types: Cigarettes    Start date: 1992    Quit date:     Years since quittin.2   Smokeless Tobacco Never   Tobacco Comments    quit in      Family History: No family history on file.    Review of previous medical records was completed.     Meds/Allergies   all current active meds have been reviewed    No Known Allergies    Objective   Vitals:Blood pressure 132/83, pulse 88, temperature 98.7 °F (37.1 °C), resp. rate 18, height 5' 10\" (1.778 m), weight 123 kg (271 lb 2.7 oz), SpO2 94%.,Body mass index is 38.91 kg/m².    Intake/Output Summary (Last 24 hours) at 3/29/2024 1201  Last data filed at 3/29/2024 0900  Gross per 24 hour   Intake 240 ml   Output 300 ml   Net -60 ml       Invasive Devices:   Invasive Devices       Peripheral Intravenous Line  Duration             Peripheral IV 24 Distal;Right;Upper;Ventral (anterior) Arm <1 day                    Physical Exam  Vitals reviewed.   Constitutional:       General: He is not in acute distress.     Appearance: He is obese.   HENT:      Head: Normocephalic and atraumatic.   Eyes:      Extraocular Movements: EOM normal.      Pupils: Pupils are equal, round, and reactive to light.   Pulmonary:      Effort: Pulmonary effort is normal.   Skin:     General: Skin is warm and dry. "   Neurological:      Mental Status: He is alert and oriented to person, place, and time.   Psychiatric:         Speech: Speech is slurred.       Neurologic Exam     Mental Status   Oriented to person, place, and time.   Follows 3 step commands.   Speech: slurred   Level of consciousness: alert  Able to name object. Able to repeat.   Mild dysarthria; edentulous       Cranial Nerves     CN II   Left visual field deficit: none     CN III, IV, VI   Pupils are equal, round, and reactive to light.  Extraocular motions are normal.   CN III: no CN III palsy  CN VI: no CN VI palsy  Nystagmus: none   Diplopia: left  Conjugate gaze: present    CN V   Left facial sensation deficit: complete    CN VII   Left facial weakness: central    CN VIII   Hearing: intact    CN XI   Right sternocleidomastoid strength: normal  Right trapezius strength: normal    CN XII   Tongue deviation: none  left facial droop  Left V1-3 decreased sharp  Left sided diplopia     Motor Exam   Overall muscle tone: normal  Right arm pronator drift: absent    Strength   Strength 5/5 except as noted. LUE/LLE 5-/5     Sensory Exam   Light touch normal.   Left arm pinprick: decreased from elbow  Left leg pinprick: decreased from knee    Gait, Coordination, and Reflexes     Tremor   Resting tremor: absent    Reflexes   Right plantar: normal  Left plantar: normalBUE dysmetria       Lab Results: I have personally reviewed pertinent reports.    Imaging Studies: I have personally reviewed pertinent reports.   and I have personally reviewed pertinent films in PACS  EKG, Pathology, and Other Studies: I have personally reviewed pertinent reports.      Code Status: Level 1 - Full Code    Counseling / Coordination of Care  Assessment, images, and plan reviewed with Dr. Morales. Plan discussed with patient and primary service

## 2024-03-29 NOTE — ASSESSMENT & PLAN NOTE
"/83   Pulse 78   Temp 98 °F (36.7 °C)   Resp 18   Ht 5' 10\" (1.778 m)   Wt 123 kg (271 lb 2.7 oz)   SpO2 99%   BMI 38.91 kg/m²   Stable pressures  As patient has severe vertebral artery narrowing, avoid hypotension.    Permissive hypertension  Currently on lisinopril and metoprolol  "

## 2024-03-29 NOTE — PLAN OF CARE
Problem: Potential for Falls  Goal: Patient will remain free of falls  Description: INTERVENTIONS:  - Educate patient/family on patient safety including physical limitations  - Instruct patient to call for assistance with activity   - Consult OT/PT to assist with strengthening/mobility   - Keep Call bell within reach  - Keep bed low and locked with side rails adjusted as appropriate  - Keep care items and personal belongings within reach  - Initiate and maintain comfort rounds  - Make Fall Risk Sign visible to staff    - Apply yellow socks and bracelet for high fall risk patients  - Consider moving patient to room near nurses station  Outcome: Progressing     Problem: PAIN - ADULT  Goal: Verbalizes/displays adequate comfort level or baseline comfort level  Description: Interventions:  - Encourage patient to monitor pain and request assistance  - Assess pain using appropriate pain scale  - Administer analgesics based on type and severity of pain and evaluate response  - Implement non-pharmacological measures as appropriate and evaluate response  - Consider cultural and social influences on pain and pain management  - Notify physician/advanced practitioner if interventions unsuccessful or patient reports new pain  Outcome: Progressing     Problem: INFECTION - ADULT  Goal: Absence or prevention of progression during hospitalization  Description: INTERVENTIONS:  - Assess and monitor for signs and symptoms of infection  - Monitor lab/diagnostic results  - Monitor all insertion sites, i.e. indwelling lines, tubes, and drains  - Monitor endotracheal if appropriate and nasal secretions for changes in amount and color  - [place] appropriate cooling/warming therapies per order  - Administer medications as ordered  - Instruct and encourage patient and family to use good hand hygiene technique  - Identify and instruct in appropriate isolation precautions for identified infection/condition  Outcome: Progressing  Goal:  Absence of fever/infection during neutropenic period  Description: INTERVENTIONS:  - Monitor WBC    Outcome: Progressing     Problem: SAFETY ADULT  Goal: Patient will remain free of falls  Description: INTERVENTIONS:  - Educate patient/family on patient safety including physical limitations  - Instruct patient to call for assistance with activity   - Consult OT/PT to assist with strengthening/mobility   - Keep Call bell within reach  - Keep bed low and locked with side rails adjusted as appropriate  - Keep care items and personal belongings within reach  - Initiate and maintain comfort rounds  - Make Fall Risk Sign visible to staff    - Consider moving patient to room near nurses station  Outcome: Progressing  Goal: Maintain or return to baseline ADL function  Description: INTERVENTIONS:  -  Assess patient's ability to carry out ADLs; assess patient's baseline for ADL function and identify physical deficits which impact ability to perform ADLs (bathing, care of mouth/teeth, toileting, grooming, dressing, etc.)  - Assess/evaluate cause of self-care deficits   - Assess range of motion  - Assess patient's mobility; develop plan if impaired  - Assess patient's need for assistive devices and provide as appropriate  - Encourage maximum independence but intervene and supervise when necessary  - Involve family in performance of ADLs  - Assess for home care needs following discharge   - Consider OT consult to assist with ADL evaluation and planning for discharge  - Provide patient education as appropriate  Outcome: Progressing  Goal: Maintains/Returns to pre admission functional level  Description: INTERVENTIONS:  - Perform AM-PAC 6 Click Basic Mobility/ Daily Activity assessment daily.  - Set and communicate daily mobility goal to care team and patient/family/caregiver.   - Collaborate with rehabilitation services on mobility goals if consulted      - Out of bed for toileting  - Record patient progress and toleration of  activity level   Outcome: Progressing     Problem: Knowledge Deficit  Goal: Patient/family/caregiver demonstrates understanding of disease process, treatment plan, medications, and discharge instructions  Description: Complete learning assessment and assess knowledge base.  Interventions:  - Provide teaching at level of understanding  - Provide teaching via preferred learning methods  Outcome: Progressing     Problem: Knowledge Deficit  Goal: Patient/family/caregiver demonstrates understanding of disease process, treatment plan, medications, and discharge instructions  Description: Complete learning assessment and assess knowledge base.  Interventions:  - Provide teaching at level of understanding  - Provide teaching via preferred learning methods  Outcome: Progressing

## 2024-03-29 NOTE — ASSESSMENT & PLAN NOTE
Lab Results   Component Value Date    HGBA1C 8.7 (H) 02/22/2024       Recent Labs     03/28/24  1225   POCGLU 182*       Blood Sugar Average: Last 72 hrs:  (P) 182  Sliding scale insulin  Carb controlled diet  Hold home dose of metformin

## 2024-03-29 NOTE — PLAN OF CARE
Problem: PHYSICAL THERAPY ADULT  Goal: Performs mobility at highest level of function for planned discharge setting.  See evaluation for individualized goals.  Description: Treatment/Interventions: Functional transfer training, LE strengthening/ROM, Therapeutic exercise, Endurance training, Patient/family training, Equipment eval/education, Bed mobility, Gait training, Spoke to nursing, OT          See flowsheet documentation for full assessment, interventions and recommendations.  Note: Prognosis: Good  Problem List: Decreased strength, Decreased endurance, Impaired balance, Decreased mobility, Pain  Assessment: pt is a 72y/o m who presnts to [facility] c dizziness. PMH significant for anxiety, CHF, COPD, PTSD, + schizophrenia. at baseline, pt mod (I) c functional mobility c RW. resides c spouse in 2 story home c stairglide access, however pt reports stair glide currently does not work. currently requires min (A)x1-2 to complete mobility tasks 2* deficits in strength, balance, gait quality, pain, + activity tolerance noted in PT exam above. Barthel Index 45/100, Modified [place] 4. ambulated 20' c RW c noted gait deviations above + min verbal cues for safety to prevent loss of balance. tolerated sitting OOB in chair at end of session c chair alarm activated. would benefit from skilled PT to maximize functional mobility + improve quality of life. PT eval of high complexity 2* unstable med status c pt requiring ongoing medical management 2* dizziness. pt c multiple co-morbidities + mobility deficits above requiring (A)x2 to complete mobility tasks. resides in 2 story home c stair glide, however stair glide is currently broken per pt.  Barriers to Discharge: Inaccessible home environment     Rehab Resource Intensity Level, PT: II (Moderate Resource Intensity)    See flowsheet documentation for full assessment.

## 2024-03-29 NOTE — CASE MANAGEMENT
Case Management Discharge Planning Note    Patient name Lorne Waterman  Location /- MRN [id number]  : 1950 Date 3/29/2024       Current Admission Date: 3/28/2024  Current Admission Diagnosis:Dizziness   Patient Active Problem List    Diagnosis Date Noted    Vertebral artery narrowing 2024    Dizziness 2024    Fall 2024    Parkinson disease 2023    Type 2 diabetes mellitus without complication, without long-term current use of insulin ([facility]) 2023    Chronic left-sided low back pain without sciatica 2023    Left leg injury 2023    Morbid obesity due to excess calories ([facility]) 10/09/2020    Dyspnea 10/07/2020    Other emphysema ([facility]) 10/07/2020    Essential hypertension 10/07/2020    History of CHF (congestive heart failure) 10/07/2020    Acute pulmonary insufficiency 2020    Chronic combined systolic and diastolic congestive heart failure ([facility]) 2019    Osteoarthritis of both knees 2019    Near syncope 2019    Elevated troponin 2019    Anxiety 2018    Chronic post-traumatic stress disorder (PTSD) 2018    Tardive dyskinesia 2018    Encounter for examination and observation for other specified reasons     Constipated 2018    Other chest pain 2018    Schizophrenia ([facility]) 2018    Hypertension 2018    Hyperlipidemia 2018    Diabetes mellitus type 2 in obese  2018      LOS (days): 0  Geometric Mean LOS (GMLOS) (days):   Days to GMLOS:     OBJECTIVE:            Current admission status: Observation   Preferred Pharmacy:   [facility] [place] - [address]  [address]  [place] [address]  Phone: [phone] Fax: [phone]    [id number] - [place] - [address]  [address]  [place] [address]  Phone: [phone] Fax: [phone]    Primary Care Provider: Alysa Santoro MD    Primary  Insurance: [facility] [employer] [employer] [facility]  Secondary Insurance:     DISCHARGE DETAILS:    Other Referral/Resources/Interventions Provided:  Interventions: Short Term Rehab  Referral Comments: ANAHY received an email from Valarie ([email or web address]), SNF liaison from the [facility]. She reported that clinicals were pulled from [employer] and have been sent for review. General SNF auth is pending at this time, and she will email CM back once auth is obtained. CM reviewed [facility] and found that [facility] is also still reviewing at this time.

## 2024-03-29 NOTE — H&P
[facility]  H&P  Name: Lorne Waterman 73 y.o. male I MRN: [id number]  Unit/Bed#: -01 I Date of Admission: 3/28/2024   Date of Service: 3/28/2024  Hospital Day: 0      Assessment/Plan   * Dizziness  Assessment & Plan  Patient presents with dizziness when he tried to stand up and he almost fell but he was able to catch himself before falling  CTA shows severe left vertebral artery narrowing  MRI ordered for further workup  Neurology consultation for further input  Recent echocardiogram from February 2024 shows 55% ejection fraction with normal systolic function and abnormal diastolic function  Monitor on telemetry  Stable BMP and electrolytes  PT OT consultation    Vertebral artery narrowing  Assessment & Plan  Further neurology consultation  Continue aspirin, statin    Parkinson disease  Assessment & Plan  Not on any Parkinson medication  Neurology consultation for further input    Type 2 diabetes mellitus without complication, without long-term current use of insulin (HCC)  Assessment & Plan  Lab Results   Component Value Date    HGBA1C 8.7 (H) 02/22/2024       Recent Labs     03/28/24  1225   POCGLU 182*       Blood Sugar Average: Last 72 hrs:  (P) 182  Sliding scale insulin  Carb controlled diet  Hold home dose of metformin    Chronic combined systolic and diastolic congestive heart failure (HCC)  Assessment & Plan  Wt Readings from Last 3 Encounters:   02/23/24 130 kg (286 lb)   01/21/23 125 kg (275 lb 9.2 oz)   05/04/22 131 kg (288 lb 2.3 oz)     Currently euvolemic  Continue home dose of Lasix  Salt restricted diet          Hyperlipidemia  Assessment & Plan  Continue statin.  Will need outpatient follow-up     Hypertension  Assessment & Plan  /73   Pulse 77   Temp 98.2 °F (36.8 °C) (Oral)   Resp 18   SpO2 (!) 83%   Stable pressures  As patient has severe vertebral artery narrowing, avoid hypotension.  May need blood pressure to be on the higher end of  normal  Currently on lisinopril and metoprolol    Schizophrenia ([facility])  Assessment & Plan  Mood currently stable  Continue Abilify and Risperdal         VTE Pharmacologic Prophylaxis:    Heparin  Code Status: Level 1 - Full Code   Discussion with family:  No family at bedside, call will be made later on.     Anticipated Length of Stay: Patient will be admitted on an observation basis with an anticipated length of stay of less than 2 midnights secondary to requiring further MRI and CT shows severe left vertebral artery narrowing and patient is having dizziness and gait abnormality which also require further neurology input.    Total Time Spent on Date of Encounter in care of patient: 65 mins. This time was spent on one or more of the following: performing physical exam; counseling and coordination of care; obtaining or reviewing history; documenting in the medical record; reviewing/ordering tests, medications or procedures; communicating with other healthcare professionals and discussing with patient's family/caregivers.    Chief Complaint: Ambulatory dysfunction, dizziness, nausea and epigastric pain    History of Present Illness:  Lorne Waetrman is a 73 y.o. male with a PMH of Parkinson's disease, schizophrenia, congestive heart failure, diabetes mellitus type 2, hypertension who presents with postural dizziness which almost made him fall but he was able to catch himself and prevented a fall.  He states that he gets dizzy when he moves around.  Today he when he got up he felt nausea and epigastric pain which has now completely resolved but continues to have lower extremity weakness which is chronic for him.  He was recently discharged which is yesterday from rehab but continues to have bilateral lower extremity weakness.  He denies any active chest pain, shortness of breath, nausea, vomiting, diarrhea, abdominal pain, fever or syncopal episodes or seizures.  In the ER he was found to have a shuffle and a  left-sided gait by the nursing staff as reported    Review of Systems:  Review of Systems   Constitutional:  Negative for chills and fever.   HENT:  Negative for ear pain and sore throat.    Eyes:  Negative for pain and visual disturbance.   Respiratory:  Negative for cough and shortness of breath.    Cardiovascular:  Negative for chest pain and palpitations.   Gastrointestinal:  Positive for abdominal pain and nausea. Negative for vomiting.   Genitourinary:  Negative for dysuria and hematuria.   Musculoskeletal:  Positive for gait problem. Negative for arthralgias and back pain.   Skin:  Negative for color change and rash.   Neurological:  Positive for dizziness and weakness. Negative for seizures and syncope.   Psychiatric/Behavioral:  Negative for agitation, behavioral problems, confusion and decreased concentration.    All other systems reviewed and are negative.      Past Medical and Surgical History:   Past Medical History:   Diagnosis Date    Anxiety     CHF (congestive heart failure) ([facility])     COPD (chronic obstructive pulmonary disease) ([facility])     Depression     Diabetes mellitus ([facility])     Enlarged prostate     Hallucination     Hyperlipidemia     Hypertension     Memory loss     Panic attack     Panic disorder     Psychiatric disorder     Psychiatric illness     Psychosis ([facility])     PTSD (post-traumatic stress disorder)     Schizoaffective disorder ([facility])     Schizophrenia ([facility])        No past surgical history on file.    Meds/Allergies:  Prior to Admission medications    Medication Sig Start Date End Date Taking? Authorizing Provider   albuterol (ProAir HFA) 90 mcg/act inhaler Inhale 2 puffs every 4 (four) hours as needed for wheezing 4/6/23   Prasanna [facility] MD   aluminum-magnesium hydroxide-simethicone (MYLANTA) 200-200-20 mg/5 mL suspension Take 30 mL by mouth every 6 (six) hours as needed for indigestion or heartburn 10/14/20   MACKENZIE Holbrook   ARIPiprazole (ABILIFY) 20 MG tablet Take 1 tablet  (20 mg total) by mouth daily 3/30/18   Shweta Coronado PA-C   aspirin 81 mg chewable tablet Chew 1 tablet (81 mg total) daily 3/24/18   MACKENZIE Alcala   atorvastatin (LIPITOR) 40 mg tablet Take 1 tablet (40 mg total) by mouth daily with dinner And stop Pravastatin when starting this medication 1/24/23   Niranjan GARCIA MD   budesonide-formoterol (SYMBICORT) 160-4.5 mcg/act inhaler INHALE 2 PUFFS BY MOUTH TWICE A DAY *RINSE MOUTH WITH WATER AND SPIT* 7/1/20   Historical Provider, MD   docusate sodium (COLACE) 100 mg capsule Take 1 capsule (100 mg total) by mouth 2 (two) times a day as needed for constipation 3/23/18   MACKENZIE Alcala   furosemide (LASIX) 40 mg tablet Take 1 tablet (40 mg total) by mouth daily Do not start before January 25, 2023. 1/25/23   Niranjan GARCIA MD   lisinopril (ZESTRIL) 20 mg tablet Take 1 tablet (20 mg total) by mouth daily 5/3/19   Bruna Huynh MD   magnesium oxide (MAG-OX) 400 mg Take 1 tablet (400 mg total) by mouth 2 (two) times a day 4/29/19   Bruna Huynh MD   metFORMIN (GLUCOPHAGE) 500 mg tablet Take 1 tablet (500 mg total) by mouth 2 (two) times a day with meals 3/29/18   Shweta Coronado PA-C   methocarbamol (ROBAXIN) 750 mg tablet Take 1 tablet (750 mg total) by mouth every 6 (six) hours as needed for muscle spasms (back pain/initial rx.) for up to 5 days 5/4/22 5/9/22  Austin Sharma PA-C   metoprolol tartrate (LOPRESSOR) 25 mg tablet Take 1 tablet (25 mg total) by mouth every 12 (twelve) hours 3/29/18   Shweta Coronado PA-C   Multiple Vitamins-Iron (MULTIVITAMIN/IRON PO) TAKE ONE CAP/TAB BY MOUTH EVERY MORNING  Patient not taking: Reported on 2/22/2024 6/8/20   Historical Provider, MD   risperiDONE (RisperDAL) 1 mg tablet TAKE ONE TABLET BY MOUTH TWICE A DAY FOR MOOD 4/22/20   Historical Provider, MD   senna (SENOKOT) 8.6 mg Take 1 tablet (8.6 mg total) by mouth 2 (two) times a day 10/14/20   MACKENZIE Holbrook     I have reviewed home  medications with patient personally.    Allergies: No Known Allergies    Social History:  Marital Status: /Civil Union     Substance Use History:   Social History     Substance and Sexual Activity   Alcohol Use Never     Social History     Tobacco Use   Smoking Status Former    Current packs/day: 0.00    Average packs/day: 2.0 packs/day for 0.5 years (1.0 ttl pk-yrs)    Types: Cigarettes    Start date: 1992    Quit date:     Years since quittin.2   Smokeless Tobacco Never   Tobacco Comments    quit in      Social History     Substance and Sexual Activity   Drug Use Never       Family History:  No family history on file.    Physical Exam:     Vitals:   Blood Pressure: 115/73 (24)  Pulse: 77 (24)  Temperature: 98.2 °F (36.8 °C) (24)  Temp Source: Oral (24)  Respirations: 18 (24)  SpO2: (!) 83 % (24) - checked with nursing- this is an error- actual 93%- nursing to make addendum, discussed with nurse derian    Physical Exam General- Awake, alert and oriented x 3, looks comfortable  HEENT- Normocephalic, atraumatic, oral mucosa- moist  Neck- Supple, No carotid bruit, no JVD  CVS- Normal S1/ S2, Regular rate and rhythm, No murmur, No edema  Respiratory system- B/L clear breath sounds, no wheezing  Abdomen- Soft, Non distended, no tenderness, Bowel sound- present 4 quads  Genitourinary- No suprapubic tenderness, No CVA tenderness  Skin- No new bruise or rash  Musculoskeletal- No gross deformity  Psych- No acute psychosis  CNS- CN II- XII grossly intact, generalized bilateral lower extremity weakness    Additional Data:     Lab Results:  Results from last 7 days   Lab Units 24  1307   WBC Thousand/uL  --  8.50   HEMOGLOBIN g/dL  --  11.5*   HEMATOCRIT %  --  37.6   PLATELETS Thousands/uL 170 151   NEUTROS PCT %  --  48   LYMPHS PCT %  --  42   MONOS PCT %  --  7   EOS PCT %  --  2     Results from last 7 days    Lab Units 03/28/24  1331   SODIUM mmol/L 140   POTASSIUM mmol/L 4.2   CHLORIDE mmol/L 107   CO2 mmol/L 26   BUN mg/dL 19   CREATININE mg/dL 1.15   ANION GAP mmol/L 7   CALCIUM mg/dL 9.5   ALBUMIN g/dL 4.3   TOTAL BILIRUBIN mg/dL 0.33   ALK PHOS U/L 52   ALT U/L 31   AST U/L 14   GLUCOSE RANDOM mg/dL 162*     Results from last 7 days   Lab Units 03/28/24  1331   INR  1.03     Results from last 7 days   Lab Units 03/28/24  1225   POC GLUCOSE mg/dl 182*               Lines/Drains:  Invasive Devices       Peripheral Intravenous Line  Duration             Peripheral IV 03/28/24 Distal;Right;Upper;Ventral (anterior) Arm <1 day                        Imaging: Reviewed radiology reports from this admission including: CTA head and neck, CT chest abdomen pelvis  CTA head and neck with and without contrast   Final Result by Sabi Krishna MD (03/28 7963)      1.  No acute intracranial CT abnormality. Chronic microangiopathic change.   2.  Patent major vessels of the [place] without high-grade stenosis.   3.  Severely narrowed caliber left vertebral artery. Cannot reliably evaluate the origin and proximal V1 segment due to significant small caliber and artifactually distorted image quality in the neck base. The remainder very small caliber left cervical    and intracranial vertebral artery is patent. Relatively smooth wall throughout the neck and intracranial segments favor developmentally hypoplastic vessel (normal variant) over dissection.   4.  No hemodynamically significant stenosis of the cervical carotid and right vertebral arteries.   5.  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 Recommend follow-up with neurovascular service.                  The study was marked in EPIC for significant notification.                        Workstation performed: [id number]         CT chest abdomen pelvis w contrast   Final Result by Balaji Mcgill MD (03/28 0311)      1.  No acute traumatic injury  to the chest, abdomen, or pelvis.      2.  Severe prostatomegaly.               Workstation performed: [id number]         XR chest 1 view portable   ED Interpretation by Michelle Magallanes MD (03/28 1612)   NAD      Final Result by Marisa Bull MD (03/28 2111)      No acute cardiopulmonary disease.            Workstation performed: [id number]         MRI inpatient order    (Results Pending)       EKG and Other Studies Reviewed on Admission:   EKG:  Normal sinus rhythm.    ** Please Note: This note has been constructed using a voice recognition system. **

## 2024-03-30 ENCOUNTER — APPOINTMENT (INPATIENT)
Dept: MRI IMAGING | Facility: HOSPITAL | Age: 74
DRG: 149 | End: 2024-03-30
Payer: COMMERCIAL

## 2024-03-30 LAB
ANION GAP SERPL CALCULATED.3IONS-SCNC: 6 MMOL/L (ref 4–13)
BACTERIA UR CULT: ABNORMAL
BUN SERPL-MCNC: 18 MG/DL (ref 5–25)
CALCIUM SERPL-MCNC: 9.7 MG/DL (ref 8.4–10.2)
CHLORIDE SERPL-SCNC: 101 MMOL/L (ref 96–108)
CHOLEST SERPL-MCNC: 76 MG/DL
CO2 SERPL-SCNC: 29 MMOL/L (ref 21–32)
CREAT SERPL-MCNC: 1.24 MG/DL (ref 0.6–1.3)
ERYTHROCYTE [DISTWIDTH] IN BLOOD BY AUTOMATED COUNT: 14.1 % (ref 11.6–15.1)
EST. AVERAGE GLUCOSE BLD GHB EST-MCNC: 203 MG/DL
GFR SERPL CREATININE-BSD FRML MDRD: 56 ML/MIN/1.73SQ M
GLUCOSE SERPL-MCNC: 139 MG/DL (ref 65–140)
GLUCOSE SERPL-MCNC: 163 MG/DL (ref 65–140)
GLUCOSE SERPL-MCNC: 173 MG/DL (ref 65–140)
GLUCOSE SERPL-MCNC: 230 MG/DL (ref 65–140)
GLUCOSE SERPL-MCNC: 265 MG/DL (ref 65–140)
HBA1C MFR BLD: 8.7 %
HCT VFR BLD AUTO: 36.2 % (ref 36.5–49.3)
HDLC SERPL-MCNC: 26 MG/DL
HGB BLD-MCNC: 11.1 G/DL (ref 12–17)
LDLC SERPL CALC-MCNC: 37 MG/DL (ref 0–100)
MAGNESIUM SERPL-MCNC: 1.7 MG/DL (ref 1.9–2.7)
MCH RBC QN AUTO: 23.8 PG (ref 26.8–34.3)
MCHC RBC AUTO-ENTMCNC: 30.7 G/DL (ref 31.4–37.4)
MCV RBC AUTO: 78 FL (ref 82–98)
PLATELET # BLD AUTO: 161 THOUSANDS/UL (ref 149–390)
PMV BLD AUTO: 12.2 FL (ref 8.9–12.7)
POTASSIUM SERPL-SCNC: 3.7 MMOL/L (ref 3.5–5.3)
RBC # BLD AUTO: 4.66 MILLION/UL (ref 3.88–5.62)
SODIUM SERPL-SCNC: 136 MMOL/L (ref 135–147)
TRIGL SERPL-MCNC: 67 MG/DL
WBC # BLD AUTO: 7.33 THOUSAND/UL (ref 4.31–10.16)

## 2024-03-30 PROCEDURE — 99232 SBSQ HOSP IP/OBS MODERATE 35: CPT | Performed by: NURSE PRACTITIONER

## 2024-03-30 PROCEDURE — 70551 MRI BRAIN STEM W/O DYE: CPT

## 2024-03-30 PROCEDURE — 80061 LIPID PANEL: CPT | Performed by: NURSE PRACTITIONER

## 2024-03-30 PROCEDURE — 83735 ASSAY OF MAGNESIUM: CPT | Performed by: NURSE PRACTITIONER

## 2024-03-30 PROCEDURE — 82948 REAGENT STRIP/BLOOD GLUCOSE: CPT

## 2024-03-30 PROCEDURE — 83036 HEMOGLOBIN GLYCOSYLATED A1C: CPT | Performed by: NURSE PRACTITIONER

## 2024-03-30 PROCEDURE — 80048 BASIC METABOLIC PNL TOTAL CA: CPT | Performed by: NURSE PRACTITIONER

## 2024-03-30 PROCEDURE — 85027 COMPLETE CBC AUTOMATED: CPT | Performed by: NURSE PRACTITIONER

## 2024-03-30 RX ORDER — MAGNESIUM SULFATE HEPTAHYDRATE 40 MG/ML
4 INJECTION, SOLUTION INTRAVENOUS ONCE
Qty: 100 ML | Refills: 0 | Status: COMPLETED | OUTPATIENT
Start: 2024-03-30 | End: 2024-03-30

## 2024-03-30 RX ADMIN — HEPARIN SODIUM 7500 UNITS: 5000 INJECTION INTRAVENOUS; SUBCUTANEOUS at 14:52

## 2024-03-30 RX ADMIN — SENNOSIDES 8.6 MG: 8.6 TABLET, FILM COATED ORAL at 08:31

## 2024-03-30 RX ADMIN — ASPIRIN 81 MG: 81 TABLET, CHEWABLE ORAL at 08:31

## 2024-03-30 RX ADMIN — HEPARIN SODIUM 7500 UNITS: 5000 INJECTION INTRAVENOUS; SUBCUTANEOUS at 21:39

## 2024-03-30 RX ADMIN — BUDESONIDE AND FORMOTEROL FUMARATE DIHYDRATE 2 PUFF: 160; 4.5 AEROSOL RESPIRATORY (INHALATION) at 17:30

## 2024-03-30 RX ADMIN — INSULIN LISPRO 1 UNITS: 100 INJECTION, SOLUTION INTRAVENOUS; SUBCUTANEOUS at 08:33

## 2024-03-30 RX ADMIN — RISPERIDONE 1 MG: 1 TABLET ORAL at 17:30

## 2024-03-30 RX ADMIN — MAGNESIUM SULFATE HEPTAHYDRATE 4 G: 40 INJECTION, SOLUTION INTRAVENOUS at 11:12

## 2024-03-30 RX ADMIN — ARIPIPRAZOLE 20 MG: 5 TABLET ORAL at 08:31

## 2024-03-30 RX ADMIN — ATORVASTATIN CALCIUM 40 MG: 40 TABLET, FILM COATED ORAL at 17:30

## 2024-03-30 RX ADMIN — BUDESONIDE AND FORMOTEROL FUMARATE DIHYDRATE 2 PUFF: 160; 4.5 AEROSOL RESPIRATORY (INHALATION) at 08:34

## 2024-03-30 RX ADMIN — SENNOSIDES 8.6 MG: 8.6 TABLET, FILM COATED ORAL at 17:30

## 2024-03-30 RX ADMIN — METOPROLOL TARTRATE 25 MG: 25 TABLET, FILM COATED ORAL at 08:31

## 2024-03-30 RX ADMIN — FUROSEMIDE 40 MG: 40 TABLET ORAL at 08:31

## 2024-03-30 RX ADMIN — INSULIN LISPRO 2 UNITS: 100 INJECTION, SOLUTION INTRAVENOUS; SUBCUTANEOUS at 22:21

## 2024-03-30 RX ADMIN — INSULIN LISPRO 3 UNITS: 100 INJECTION, SOLUTION INTRAVENOUS; SUBCUTANEOUS at 12:22

## 2024-03-30 RX ADMIN — ACETAMINOPHEN 650 MG: 325 TABLET, FILM COATED ORAL at 21:47

## 2024-03-30 RX ADMIN — METOPROLOL TARTRATE 25 MG: 25 TABLET, FILM COATED ORAL at 21:39

## 2024-03-30 RX ADMIN — HEPARIN SODIUM 7500 UNITS: 5000 INJECTION INTRAVENOUS; SUBCUTANEOUS at 05:48

## 2024-03-30 RX ADMIN — RISPERIDONE 1 MG: 1 TABLET ORAL at 08:32

## 2024-03-30 RX ADMIN — LISINOPRIL 20 MG: 20 TABLET ORAL at 08:31

## 2024-03-30 NOTE — PROGRESS NOTES
"[facility]  Progress Note  Name: Lorne Waterman I  MRN: [id number]  Unit/Bed#: -01 I Date of Admission: 3/28/2024   Date of Service: 3/30/2024 I Hospital Day: 1    Assessment/Plan   * Dizziness  Assessment & Plan  Patient presents with dizziness when he tried to stand up and he almost fell but he was able to catch himself before falling  CTA shows severe left vertebral artery narrowing  MRI-No acute abnormality. Mild chronic microangiopathic changes.   Started on stoke pathway  Neurology following  Recent echocardiogram from February 2024 shows 55% ejection fraction with normal systolic function and abnormal diastolic function  PT OT- Level II    Vertebral artery narrowing  Assessment & Plan  Further neurology consultation  Continue aspirin, statin    Parkinson disease  Assessment & Plan  Not on any Parkinson medication  Neurology consultation for further input    Type 2 diabetes mellitus without complication, without long-term current use of insulin (HCC)  Assessment & Plan  Lab Results   Component Value Date    HGBA1C 8.7 (H) 02/22/2024       Recent Labs     03/29/24  1118 03/29/24  1613 03/29/24 2039 03/30/24  0733   POCGLU 176* 161* 233* 163*         Blood Sugar Average: Last 72 hrs:  (P) 184.3637036756134690  Sliding scale insulin  Carb controlled diet  Hold home dose of metformin    Hyperlipidemia  Assessment & Plan  Continue statin.      Hypertension  Assessment & Plan  /84   Pulse 87   Temp 97.7 °F (36.5 °C) (Oral)   Resp 16   Ht 5' 10\" (1.778 m)   Wt 123 kg (271 lb 2.7 oz)   SpO2 94%   BMI 38.91 kg/m²   Stable pressures  As patient has severe vertebral artery narrowing, avoid hypotension.    Permissive hypertension  Currently on lisinopril and metoprolol           VTE Pharmacologic Prophylaxis:   Moderate Risk (Score 3-4) - Pharmacological DVT Prophylaxis Ordered: heparin.    Mobility:   Basic Mobility Inpatient Raw Score: 14  -M Goal: 4: Move to " chair/commode  -HLM Achieved: 4: Move to chair/commode  JH-HLM Goal achieved. Continue to encourage appropriate mobility.    Patient Centered Rounds: I performed bedside rounds with nursing staff today.  Tracey Calderon  Discussions with Specialists or Other Care Team Provider: Reviewed notes    Education and Discussions with Family / Patient: Patient declined call to .     Total Time Spent on Date of Encounter in care of patient: 35 mins. This time was spent on one or more of the following: performing physical exam; counseling and coordination of care; obtaining or reviewing history; documenting in the medical record; reviewing/ordering tests, medications or procedures; communicating with other healthcare professionals and discussing with patient's family/caregivers.    Current Length of Stay: 1 day(s)  Current Patient Status: Inpatient   Certification Statement: The patient will continue to require additional inpatient hospital stay due to rehab placement  Discharge Plan: Anticipate discharge tomorrow to rehab facility.    Code Status: Level 1 - Full Code    Subjective:   Patient is out of bed in the chair is resting comfortably he offers no acute complaints he does report that he wants to go to rehab denies any chest pain chest tightness shortness of breath or difficulty breathing    Objective:     Vitals:   Temp (24hrs), Av.1 °F (36.7 °C), Min:97.6 °F (36.4 °C), Max:98.6 °F (37 °C)    Temp:  [97.6 °F (36.4 °C)-98.6 °F (37 °C)] 98.2 °F (36.8 °C)  HR:  [68-93] 81  Resp:  [16-20] 16  BP: (110-140)/() 119/79  SpO2:  [91 %-97 %] 92 %  Body mass index is 38.91 kg/m².     Input and Output Summary (last 24 hours):     Intake/Output Summary (Last 24 hours) at 3/30/2024 1244  Last data filed at 3/30/2024 0500  Gross per 24 hour   Intake 120 ml   Output 270 ml   Net -150 ml       Physical Exam:   Physical Exam  Vitals reviewed.   Constitutional:       General: He is not in acute distress.      Appearance: He is not ill-appearing.   Cardiovascular:      Rate and Rhythm: Normal rate.   Pulmonary:      Effort: No respiratory distress.   Skin:     General: Skin is warm.      Coloration: Skin is pale.   Neurological:      Mental Status: He is alert. Mental status is at baseline.   Psychiatric:         Mood and Affect: Mood normal.        Additional Data:     Labs:  Results from last 7 days   Lab Units 03/30/24  0604 03/28/24 2001 03/28/24  1307   WBC Thousand/uL 7.33   < > 8.50   HEMOGLOBIN g/dL 11.1*   < > 11.5*   HEMATOCRIT % 36.2*   < > 37.6   PLATELETS Thousands/uL 161   < > 151   NEUTROS PCT %  --   --  48   LYMPHS PCT %  --   --  42   MONOS PCT %  --   --  7   EOS PCT %  --   --  2    < > = values in this interval not displayed.     Results from last 7 days   Lab Units 03/30/24  0604 03/29/24  0518 03/28/24  1331   SODIUM mmol/L 136   < > 140   POTASSIUM mmol/L 3.7   < > 4.2   CHLORIDE mmol/L 101   < > 107   CO2 mmol/L 29   < > 26   BUN mg/dL 18   < > 19   CREATININE mg/dL 1.24   < > 1.15   ANION GAP mmol/L 6   < > 7   CALCIUM mg/dL 9.7   < > 9.5   ALBUMIN g/dL  --   --  4.3   TOTAL BILIRUBIN mg/dL  --   --  0.33   ALK PHOS U/L  --   --  52   ALT U/L  --   --  31   AST U/L  --   --  14   GLUCOSE RANDOM mg/dL 173*   < > 162*    < > = values in this interval not displayed.     Results from last 7 days   Lab Units 03/28/24  1331   INR  1.03     Results from last 7 days   Lab Units 03/30/24  1103 03/30/24  0733 03/29/24  2039 03/29/24  1613 03/29/24  1118 03/29/24  0810 03/28/24  2311 03/28/24  1225   POC GLUCOSE mg/dl 265* 163* 233* 161* 176* 177* 198* 182*     Results from last 7 days   Lab Units 03/30/24  0604   HEMOGLOBIN A1C % 8.7*           Lines/Drains:  Invasive Devices       Peripheral Intravenous Line  Duration             Peripheral IV 03/28/24 Distal;Right;Upper;Ventral (anterior) Arm 1 day                        Recent Cultures (last 7 days):   Results from last 7 days   Lab Units  03/28/24  1529   URINE CULTURE  >100,000 cfu/ml Enterococcus faecalis*       Last 24 Hours Medication List:   Current Facility-Administered Medications   Medication Dose Route Frequency Provider Last Rate    acetaminophen  650 mg Oral Q6H PRN Archie Tse MD      albuterol  2 puff Inhalation Q4H PRN Archie Tse MD      ARIPiprazole  20 mg Oral Daily Archie Tse MD      aspirin  81 mg Oral Daily Archie Tse MD      atorvastatin  40 mg Oral Daily With Dinner Archie Tse MD      budesonide-formoterol  2 puff Inhalation BID Archie Tse MD      docusate sodium  100 mg Oral BID PRN Archie Tse MD      furosemide  40 mg Oral Daily Archie Tse MD      heparin (porcine)  7,500 Units Subcutaneous Q8H TREVOR Archie Tse MD      insulin lispro  1-5 Units Subcutaneous HS Archie Tse MD      insulin lispro  1-6 Units Subcutaneous TID AC Archie Tse MD      Ketotifen Fumarate  1 drop Both Eyes BID Archie Tse MD      lisinopril  20 mg Oral Daily Archie Tse MD      magnesium sulfate  4 g Intravenous Once MACKENZIE Sosa 4 g (03/30/24 1112)    metoprolol tartrate  25 mg Oral Q12H [facility] Archie Tse MD      risperiDONE  1 mg Oral BID Archie Tse MD      senna  8.6 mg Oral BID Archie Tse MD          Today, Patient Was Seen By: MACKENZIE Sosa    **Please Note: This note may have been constructed using a voice recognition system.**

## 2024-03-30 NOTE — ASSESSMENT & PLAN NOTE
"/84   Pulse 87   Temp 97.7 °F (36.5 °C) (Oral)   Resp 16   Ht 5' 10\" (1.778 m)   Wt 123 kg (271 lb 2.7 oz)   SpO2 94%   BMI 38.91 kg/m²   Stable pressures  As patient has severe vertebral artery narrowing, avoid hypotension.    Permissive hypertension  Currently on lisinopril and metoprolol  "

## 2024-03-30 NOTE — ASSESSMENT & PLAN NOTE
Lab Results   Component Value Date    HGBA1C 8.7 (H) 02/22/2024       Recent Labs     03/29/24  1118 03/29/24  1613 03/29/24 2039 03/30/24  0733   POCGLU 176* 161* 233* 163*         Blood Sugar Average: Last 72 hrs:  (P) [id number]  Sliding scale insulin  Carb controlled diet  Hold home dose of metformin

## 2024-03-30 NOTE — ASSESSMENT & PLAN NOTE
Patient presents with dizziness when he tried to stand up and he almost fell but he was able to catch himself before falling  CTA shows severe left vertebral artery narrowing  MRI-No acute abnormality. Mild chronic microangiopathic changes.   Neurology following  Patient MRI brain without acute abnormality. No ischemic stroke noted. Mild chronic microangiopathic changes.   Left vert stenosis is just incidental finding, continue ASA and statin  No further neurological work up required  Recent echocardiogram from February 2024 shows 55% ejection fraction with normal systolic function and abnormal diastolic function  PT OT- Level II

## 2024-03-30 NOTE — PLAN OF CARE
Problem: Potential for Falls  Goal: Patient will remain free of falls  Description: INTERVENTIONS:  - Educate patient/family on patient safety including physical limitations  - Instruct patient to call for assistance with activity   - Consult OT/PT to assist with strengthening/mobility   - Keep Call bell within reach  - Keep bed low and locked with side rails adjusted as appropriate  - Keep care items and personal belongings within reach  - Initiate and maintain comfort rounds  - Make Fall Risk Sign visible to staff  - Offer Toileting every  Hours, in advance of need  - Initiate/Maintainalarm  - Obtain necessary fall risk management equipment:   - Apply yellow socks and bracelet for high fall risk patients  - Consider moving patient to room near nurses station  Outcome: Progressing     Problem: PAIN - ADULT  Goal: Verbalizes/displays adequate comfort level or baseline comfort level  Description: Interventions:  - Encourage patient to monitor pain and request assistance  - Assess pain using appropriate pain scale  - Administer analgesics based on type and severity of pain and evaluate response  - Implement non-pharmacological measures as appropriate and evaluate response  - Consider cultural and social influences on pain and pain management  - Notify physician/advanced practitioner if interventions unsuccessful or patient reports new pain  Outcome: Progressing     Problem: INFECTION - ADULT  Goal: Absence or prevention of progression during hospitalization  Description: INTERVENTIONS:  - Assess and monitor for signs and symptoms of infection  - Monitor lab/diagnostic results  - Monitor all insertion sites, i.e. indwelling lines, tubes, and drains  - Monitor endotracheal if appropriate and nasal secretions for changes in amount and color  - [place] appropriate cooling/warming therapies per order  - Administer medications as ordered  - Instruct and encourage patient and family to use good hand hygiene technique  -  Identify and instruct in appropriate isolation precautions for identified infection/condition  Outcome: Progressing     Problem: SAFETY ADULT  Goal: Patient will remain free of falls  Description: INTERVENTIONS:  - Educate patient/family on patient safety including physical limitations  - Instruct patient to call for assistance with activity   - Consult OT/PT to assist with strengthening/mobility   - Keep Call bell within reach  - Keep bed low and locked with side rails adjusted as appropriate  - Keep care items and personal belongings within reach  - Initiate and maintain comfort rounds  - Make Fall Risk Sign visible to staff  - Offer Toileting every Hours, in advance of need  - Initiate/Maintain alarm  - Obtain necessary fall risk management equipment  - Apply yellow socks and bracelet for high fall risk patients  - Consider moving patient to room near nurses station  Outcome: Progressing

## 2024-03-30 NOTE — ASSESSMENT & PLAN NOTE
Patient presents with dizziness when he tried to stand up and he almost fell but he was able to catch himself before falling  CTA shows severe left vertebral artery narrowing  MRI-No acute abnormality. Mild chronic microangiopathic changes.   Started on stoke pathway  Neurology following  Recent echocardiogram from February 2024 shows 55% ejection fraction with normal systolic function and abnormal diastolic function  PT OT- Level II

## 2024-03-31 LAB
GLUCOSE SERPL-MCNC: 161 MG/DL (ref 65–140)
GLUCOSE SERPL-MCNC: 189 MG/DL (ref 65–140)
GLUCOSE SERPL-MCNC: 209 MG/DL (ref 65–140)
GLUCOSE SERPL-MCNC: 294 MG/DL (ref 65–140)

## 2024-03-31 PROCEDURE — 82948 REAGENT STRIP/BLOOD GLUCOSE: CPT

## 2024-03-31 PROCEDURE — 99232 SBSQ HOSP IP/OBS MODERATE 35: CPT | Performed by: NURSE PRACTITIONER

## 2024-03-31 RX ORDER — LANOLIN ALCOHOL/MO/W.PET/CERES
800 CREAM (GRAM) TOPICAL ONCE
Status: COMPLETED | OUTPATIENT
Start: 2024-03-31 | End: 2024-03-31

## 2024-03-31 RX ADMIN — BUDESONIDE AND FORMOTEROL FUMARATE DIHYDRATE 2 PUFF: 160; 4.5 AEROSOL RESPIRATORY (INHALATION) at 08:17

## 2024-03-31 RX ADMIN — METOPROLOL TARTRATE 25 MG: 25 TABLET, FILM COATED ORAL at 21:02

## 2024-03-31 RX ADMIN — FUROSEMIDE 40 MG: 40 TABLET ORAL at 08:18

## 2024-03-31 RX ADMIN — INSULIN LISPRO 2 UNITS: 100 INJECTION, SOLUTION INTRAVENOUS; SUBCUTANEOUS at 12:38

## 2024-03-31 RX ADMIN — SENNOSIDES 8.6 MG: 8.6 TABLET, FILM COATED ORAL at 08:18

## 2024-03-31 RX ADMIN — Medication 800 MG: at 17:18

## 2024-03-31 RX ADMIN — INSULIN LISPRO 4 UNITS: 100 INJECTION, SOLUTION INTRAVENOUS; SUBCUTANEOUS at 17:18

## 2024-03-31 RX ADMIN — METOPROLOL TARTRATE 25 MG: 25 TABLET, FILM COATED ORAL at 08:19

## 2024-03-31 RX ADMIN — HEPARIN SODIUM 7500 UNITS: 5000 INJECTION INTRAVENOUS; SUBCUTANEOUS at 05:08

## 2024-03-31 RX ADMIN — INSULIN LISPRO 1 UNITS: 100 INJECTION, SOLUTION INTRAVENOUS; SUBCUTANEOUS at 08:19

## 2024-03-31 RX ADMIN — SENNOSIDES 8.6 MG: 8.6 TABLET, FILM COATED ORAL at 17:18

## 2024-03-31 RX ADMIN — INSULIN LISPRO 1 UNITS: 100 INJECTION, SOLUTION INTRAVENOUS; SUBCUTANEOUS at 21:02

## 2024-03-31 RX ADMIN — RISPERIDONE 1 MG: 1 TABLET ORAL at 17:18

## 2024-03-31 RX ADMIN — ARIPIPRAZOLE 20 MG: 5 TABLET ORAL at 08:18

## 2024-03-31 RX ADMIN — ATORVASTATIN CALCIUM 40 MG: 40 TABLET, FILM COATED ORAL at 17:18

## 2024-03-31 RX ADMIN — LISINOPRIL 20 MG: 20 TABLET ORAL at 08:19

## 2024-03-31 RX ADMIN — ASPIRIN 81 MG: 81 TABLET, CHEWABLE ORAL at 08:19

## 2024-03-31 RX ADMIN — HEPARIN SODIUM 7500 UNITS: 5000 INJECTION INTRAVENOUS; SUBCUTANEOUS at 21:02

## 2024-03-31 RX ADMIN — BUDESONIDE AND FORMOTEROL FUMARATE DIHYDRATE 2 PUFF: 160; 4.5 AEROSOL RESPIRATORY (INHALATION) at 17:18

## 2024-03-31 RX ADMIN — RISPERIDONE 1 MG: 1 TABLET ORAL at 08:19

## 2024-03-31 RX ADMIN — HEPARIN SODIUM 7500 UNITS: 5000 INJECTION INTRAVENOUS; SUBCUTANEOUS at 14:02

## 2024-03-31 NOTE — ASSESSMENT & PLAN NOTE
Patient presents with dizziness when he tried to stand up and he almost fell but he was able to catch himself before falling  CTA shows severe left vertebral artery narrowing  MRI-No acute abnormality. Mild chronic microangiopathic changes.   Neurology following  Patient MRI brain without acute abnormality. No ischemic stroke noted. Mild chronic microangiopathic changes.   Left vert stenosis is just incidental finding, continue ASA and statin  No further neurological work up required  Recent echocardiogram from February 2024 shows 55% ejection fraction with normal systolic function and abnormal diastolic function  PT OT- Level II. Plan for STR [facility] Monday [facility]

## 2024-03-31 NOTE — PLAN OF CARE
Problem: Potential for Falls  Goal: Patient will remain free of falls  Description: INTERVENTIONS:  - Educate patient/family on patient safety including physical limitations  - Instruct patient to call for assistance with activity   - Consult OT/PT to assist with strengthening/mobility   - Keep Call bell within reach  - Keep bed low and locked with side rails adjusted as appropriate  - Keep care items and personal belongings within reach  - Initiate and maintain comfort rounds  - Make Fall Risk Sign visible to staff  - Offer Toileting every Hours, in advance of need  - Initiate/Maintain alarm  - Obtain necessary fall risk management equipment:   - Apply yellow socks and bracelet for high fall risk patients  - Consider moving patient to room near nurses station  Outcome: Progressing     Problem: PAIN - ADULT  Goal: Verbalizes/displays adequate comfort level or baseline comfort level  Description: Interventions:  - Encourage patient to monitor pain and request assistance  - Assess pain using appropriate pain scale  - Administer analgesics based on type and severity of pain and evaluate response  - Implement non-pharmacological measures as appropriate and evaluate response  - Consider cultural and social influences on pain and pain management  - Notify physician/advanced practitioner if interventions unsuccessful or patient reports new pain  Outcome: Progressing     Problem: INFECTION - ADULT  Goal: Absence or prevention of progression during hospitalization  Description: INTERVENTIONS:  - Assess and monitor for signs and symptoms of infection  - Monitor lab/diagnostic results  - Monitor all insertion sites, i.e. indwelling lines, tubes, and drains  - Monitor endotracheal if appropriate and nasal secretions for changes in amount and color  - [place] appropriate cooling/warming therapies per order  - Administer medications as ordered  - Instruct and encourage patient and family to use good hand hygiene technique  -  Identify and instruct in appropriate isolation precautions for identified infection/condition  Outcome: Progressing  Goal: Absence of fever/infection during neutropenic period  Description: INTERVENTIONS:  - Monitor WBC    Outcome: Progressing     Problem: SAFETY ADULT  Goal: Patient will remain free of falls  Description: INTERVENTIONS:  - Educate patient/family on patient safety including physical limitations  - Instruct patient to call for assistance with activity   - Consult OT/PT to assist with strengthening/mobility   - Keep Call bell within reach  - Keep bed low and locked with side rails adjusted as appropriate  - Keep care items and personal belongings within reach  - Initiate and maintain comfort rounds  - Make Fall Risk Sign visible to staff  - Offer Toileting every  Hours, in advance of need  - Initiate/Maintainalarm  - Obtain necessary fall risk management equipment:   - Apply yellow socks and bracelet for high fall risk patients  - Consider moving patient to room near nurses station  Outcome: Progressing  Goal: Maintain or return to baseline ADL function  Description: INTERVENTIONS:  -  Assess patient's ability to carry out ADLs; assess patient's baseline for ADL function and identify physical deficits which impact ability to perform ADLs (bathing, care of mouth/teeth, toileting, grooming, dressing, etc.)  - Assess/evaluate cause of self-care deficits   - Assess range of motion  - Assess patient's mobility; develop plan if impaired  - Assess patient's need for assistive devices and provide as appropriate  - Encourage maximum independence but intervene and supervise when necessary  - Involve family in performance of ADLs  - Assess for home care needs following discharge   - Consider OT consult to assist with ADL evaluation and planning for discharge  - Provide patient education as appropriate  Outcome: Progressing  Goal: Maintains/Returns to pre admission functional level  Description: INTERVENTIONS:  -  Perform AM-PAC 6 Click Basic Mobility/ Daily Activity assessment daily.  - Set and communicate daily mobility goal to care team and patient/family/caregiver.   - Collaborate with rehabilitation services on mobility goals if consulted  - Perform Range of Motion  times a day.  - Reposition patient every hours.  - Dangle patient times a day  - Stand patient  times a day  - Ambulate patient times a day  - Out of bed to chair  times a day   - Out of bed for meals times a day  - Out of bed for toileting  - Record patient progress and toleration of activity level   Outcome: Progressing     Problem: DISCHARGE PLANNING  Goal: Discharge to home or other facility with appropriate resources  Description: INTERVENTIONS:  - Identify barriers to discharge w/patient and caregiver  - Arrange for needed discharge resources and transportation as appropriate  - Identify discharge learning needs (meds, wound care, etc.)  - Arrange for interpretive services to assist at discharge as needed  - Refer to Case Management Department for coordinating discharge planning if the patient needs post-hospital services based on physician/advanced practitioner order or complex needs related to functional status, cognitive ability, or social support system  Outcome: Progressing     Problem: Knowledge Deficit  Goal: Patient/family/caregiver demonstrates understanding of disease process, treatment plan, medications, and discharge instructions  Description: Complete learning assessment and assess knowledge base.  Interventions:  - Provide teaching at level of understanding  - Provide teaching via preferred learning methods  Outcome: Progressing     Problem: MUSCULOSKELETAL - ADULT  Goal: Maintain or return mobility to safest level of function  Description: INTERVENTIONS:  - Assess patient's ability to carry out ADLs; assess patient's baseline for ADL function and identify physical deficits which impact ability to perform ADLs (bathing, care of  mouth/teeth, toileting, grooming, dressing, etc.)  - Assess/evaluate cause of self-care deficits   - Assess range of motion  - Assess patient's mobility  - Assess patient's need for assistive devices and provide as appropriate  - Encourage maximum independence but intervene and supervise when necessary  - Involve family in performance of ADLs  - Assess for home care needs following discharge   - Consider OT consult to assist with ADL evaluation and planning for discharge  - Provide patient education as appropriate  Outcome: Progressing  Goal: Maintain proper alignment of affected body part  Description: INTERVENTIONS:  - Support, maintain and protect limb and body alignment  - Provide patient/ family with appropriate education  Outcome: Progressing     Problem: Nutrition/Hydration-ADULT  Goal: Nutrient/Hydration intake appropriate for improving, restoring or maintaining nutritional needs  Description: Monitor and assess patient's nutrition/hydration status for malnutrition. Collaborate with interdisciplinary team and initiate plan and interventions as ordered.  Monitor patient's weight and dietary intake as ordered or per policy. Utilize nutrition screening tool and intervene as necessary. Determine patient's food preferences and provide high-protein, high-caloric foods as appropriate.     INTERVENTIONS:  - Monitor oral intake, urinary output, labs, and treatment plans  - Assess nutrition and hydration status and recommend course of action  - Evaluate amount of meals eaten  - Assist patient with eating if necessary   - Allow adequate time for meals  - Recommend/ encourage appropriate diets, oral nutritional supplements, and vitamin/mineral supplements  - Order, calculate, and assess calorie counts as needed  - Recommend, monitor, and adjust tube feedings and TPN/PPN based on assessed needs  - Assess need for intravenous fluids  - Provide specific nutrition/hydration education as appropriate  - Include  patient/family/caregiver in decisions related to nutrition  Outcome: Progressing

## 2024-03-31 NOTE — ASSESSMENT & PLAN NOTE
"/84   Pulse 82   Temp 98 °F (36.7 °C)   Resp 18   Ht 5' 10\" (1.778 m)   Wt 123 kg (271 lb 2.7 oz)   SpO2 94%   BMI 38.91 kg/m²   Stable pressures  As patient has severe vertebral artery narrowing, avoid hypotension.    Permissive hypertension  Currently on lisinopril and metoprolol  "

## 2024-03-31 NOTE — PLAN OF CARE
Problem: Potential for Falls  Goal: Patient will remain free of falls  Description: INTERVENTIONS:  - Educate patient/family on patient safety including physical limitations  - Instruct patient to call for assistance with activity   - Consult OT/PT to assist with strengthening/mobility   - Keep Call bell within reach  - Keep bed low and locked with side rails adjusted as appropriate  - Keep care items and personal belongings within reach  - Initiate and maintain comfort rounds  - Make Fall Risk Sign visible to staff  - Apply yellow socks and bracelet for high fall risk patients  - Consider moving patient to room near nurses station  Outcome: Progressing     Problem: PAIN - ADULT  Goal: Verbalizes/displays adequate comfort level or baseline comfort level  Description: Interventions:  - Encourage patient to monitor pain and request assistance  - Assess pain using appropriate pain scale  - Administer analgesics based on type and severity of pain and evaluate response  - Implement non-pharmacological measures as appropriate and evaluate response  - Consider cultural and social influences on pain and pain management  - Notify physician/advanced practitioner if interventions unsuccessful or patient reports new pain  Outcome: Progressing     Problem: INFECTION - ADULT  Goal: Absence or prevention of progression during hospitalization  Description: INTERVENTIONS:  - Assess and monitor for signs and symptoms of infection  - Monitor lab/diagnostic results  - Monitor all insertion sites, i.e. indwelling lines, tubes, and drains  - Monitor endotracheal if appropriate and nasal secretions for changes in amount and color  - [place] appropriate cooling/warming therapies per order  - Administer medications as ordered  - Instruct and encourage patient and family to use good hand hygiene technique  - Identify and instruct in appropriate isolation precautions for identified infection/condition  Outcome: Progressing  Goal: Absence  of fever/infection during neutropenic period  Description: INTERVENTIONS:  - Monitor WBC    Outcome: Progressing     Problem: SAFETY ADULT  Goal: Patient will remain free of falls  Description: INTERVENTIONS:  - Educate patient/family on patient safety including physical limitations  - Instruct patient to call for assistance with activity   - Consult OT/PT to assist with strengthening/mobility   - Keep Call bell within reach  - Keep bed low and locked with side rails adjusted as appropriate  - Keep care items and personal belongings within reach  - Initiate and maintain comfort rounds  - Make Fall Risk Sign visible to staff  - Apply yellow socks and bracelet for high fall risk patients  - Consider moving patient to room near nurses station  Outcome: Progressing  Goal: Maintain or return to baseline ADL function  Description: INTERVENTIONS:  -  Assess patient's ability to carry out ADLs; assess patient's baseline for ADL function and identify physical deficits which impact ability to perform ADLs (bathing, care of mouth/teeth, toileting, grooming, dressing, etc.)  - Assess/evaluate cause of self-care deficits   - Assess range of motion  - Assess patient's mobility; develop plan if impaired  - Assess patient's need for assistive devices and provide as appropriate  - Encourage maximum independence but intervene and supervise when necessary  - Involve family in performance of ADLs  - Assess for home care needs following discharge   - Consider OT consult to assist with ADL evaluation and planning for discharge  - Provide patient education as appropriate  Outcome: Progressing  Goal: Maintains/Returns to pre admission functional level  Description: INTERVENTIONS:  - Perform AM-PAC 6 Click Basic Mobility/ Daily Activity assessment daily.  - Set and communicate daily mobility goal to care team and patient/family/caregiver.   - Collaborate with rehabilitation services on mobility goals if consulted  - Out of bed for  toileting  - Record patient progress and toleration of activity level   Outcome: Progressing     Problem: DISCHARGE PLANNING  Goal: Discharge to home or other facility with appropriate resources  Description: INTERVENTIONS:  - Identify barriers to discharge w/patient and caregiver  - Arrange for needed discharge resources and transportation as appropriate  - Identify discharge learning needs (meds, wound care, etc.)  - Arrange for interpretive services to assist at discharge as needed  - Refer to Case Management Department for coordinating discharge planning if the patient needs post-hospital services based on physician/advanced practitioner order or complex needs related to functional status, cognitive ability, or social support system  Outcome: Progressing     Problem: Knowledge Deficit  Goal: Patient/family/caregiver demonstrates understanding of disease process, treatment plan, medications, and discharge instructions  Description: Complete learning assessment and assess knowledge base.  Interventions:  - Provide teaching at level of understanding  - Provide teaching via preferred learning methods  Outcome: Progressing     Problem: MUSCULOSKELETAL - ADULT  Goal: Maintain or return mobility to safest level of function  Description: INTERVENTIONS:  - Assess patient's ability to carry out ADLs; assess patient's baseline for ADL function and identify physical deficits which impact ability to perform ADLs (bathing, care of mouth/teeth, toileting, grooming, dressing, etc.)  - Assess/evaluate cause of self-care deficits   - Assess range of motion  - Assess patient's mobility  - Assess patient's need for assistive devices and provide as appropriate  - Encourage maximum independence but intervene and supervise when necessary  - Involve family in performance of ADLs  - Assess for home care needs following discharge   - Consider OT consult to assist with ADL evaluation and planning for discharge  - Provide patient education  as appropriate  Outcome: Progressing  Goal: Maintain proper alignment of affected body part  Description: INTERVENTIONS:  - Support, maintain and protect limb and body alignment  - Provide patient/ family with appropriate education  Outcome: Progressing     Problem: Nutrition/Hydration-ADULT  Goal: Nutrient/Hydration intake appropriate for improving, restoring or maintaining nutritional needs  Description: Monitor and assess patient's nutrition/hydration status for malnutrition. Collaborate with interdisciplinary team and initiate plan and interventions as ordered.  Monitor patient's weight and dietary intake as ordered or per policy. Utilize nutrition screening tool and intervene as necessary. Determine patient's food preferences and provide high-protein, high-caloric foods as appropriate.     INTERVENTIONS:  - Monitor oral intake, urinary output, labs, and treatment plans  - Assess nutrition and hydration status and recommend course of action  - Evaluate amount of meals eaten  - Assist patient with eating if necessary   - Allow adequate time for meals  - Recommend/ encourage appropriate diets, oral nutritional supplements, and vitamin/mineral supplements  - Order, calculate, and assess calorie counts as needed  - Recommend, monitor, and adjust tube feedings and TPN/PPN based on assessed needs  - Assess need for intravenous fluids  - Provide specific nutrition/hydration education as appropriate  - Include patient/family/caregiver in decisions related to nutrition  Outcome: Progressing

## 2024-03-31 NOTE — ASSESSMENT & PLAN NOTE
"/83   Pulse 90   Temp 97.9 °F (36.6 °C)   Resp 18   Ht 5' 10\" (1.778 m)   Wt 123 kg (271 lb 2.7 oz)   SpO2 92%   BMI 38.91 kg/m²   Stable pressures  As patient has severe vertebral artery narrowing, avoid hypotension.    Currently on lisinopril and metoprolol  "

## 2024-03-31 NOTE — PROGRESS NOTES
"Formerly [facility]  Progress Note  Name: Lorne Waterman I  MRN: [id number]  Unit/Bed#: -01 I Date of Admission: 3/28/2024   Date of Service: 3/31/2024 I Hospital Day: 2    Assessment/Plan   * Dizziness  Assessment & Plan  Patient presents with dizziness when he tried to stand up and he almost fell but he was able to catch himself before falling  CTA shows severe left vertebral artery narrowing  MRI-No acute abnormality. Mild chronic microangiopathic changes.   Neurology following  Patient MRI brain without acute abnormality. No ischemic stroke noted. Mild chronic microangiopathic changes.   Left vert stenosis is just incidental finding, continue ASA and statin  No further neurological work up required  Recent echocardiogram from February 2024 shows 55% ejection fraction with normal systolic function and abnormal diastolic function  PT OT- Level II    Parkinson disease  Assessment & Plan  Not on any Parkinson medication  Neurology consultation for further input    Hyperlipidemia  Assessment & Plan  Continue statin.      Hypertension  Assessment & Plan  /84   Pulse 82   Temp 98 °F (36.7 °C)   Resp 18   Ht 5' 10\" (1.778 m)   Wt 123 kg (271 lb 2.7 oz)   SpO2 94%   BMI 38.91 kg/m²   Stable pressures  As patient has severe vertebral artery narrowing, avoid hypotension.    Permissive hypertension  Currently on lisinopril and metoprolol           VTE Pharmacologic Prophylaxis:   Moderate Risk (Score 3-4) - Pharmacological DVT Prophylaxis Ordered: heparin.    Mobility:   Basic Mobility Inpatient Raw Score: 14  JH-HLM Goal: 4: Move to chair/commode  JH-HLM Achieved: 4: Move to chair/commode  JH-HLM Goal achieved. Continue to encourage appropriate mobility.    Patient Centered Rounds: I performed bedside rounds with nursing staff today. Tracey OLVELACE [id number]  Discussions with Specialists or Other Care Team Provider: ied neurology notes    Education and Discussions with Family / Patient: " Updated  (wife) at bedside. Isabelle 1102    Total Time Spent on Date of Encounter in care of patient: 35 mins. This time was spent on one or more of the following: performing physical exam; counseling and coordination of care; obtaining or reviewing history; documenting in the medical record; reviewing/ordering tests, medications or procedures; communicating with other healthcare professionals and discussing with patient's family/caregivers.    Current Length of Stay: 2 day(s)  Current Patient Status: Inpatient   Certification Statement: The patient will continue to require additional inpatient hospital stay due to STR placement . [facility]  Discharge Plan: Anticipate discharge later today or tomorrow to rehab facility.    Code Status: Level 1 - Full Code    Subjective:    Denies and chest pain chest tightness of SOB has some mild GALARZA but this is his baseline.. he is Oob to the chair and he offers no other complaints. He is still agreeable and wanting to go to STR. Seems to have a difficult relationship with his wife isabelle who is at the bedside today. Emotional support provided    Objective:     Vitals:   Temp (24hrs), Av.8 °F (36.6 °C), Min:97.5 °F (36.4 °C), Max:98 °F (36.7 °C)    Temp:  [97.5 °F (36.4 °C)-98 °F (36.7 °C)] 98 °F (36.7 °C)  HR:  [68-91] 82  Resp:  [18] 18  BP: (118-146)/(78-85) 139/84  SpO2:  [91 %-98 %] 94 %  Body mass index is 38.91 kg/m².     Input and Output Summary (last 24 hours):     Intake/Output Summary (Last 24 hours) at 3/31/2024 1107  Last data filed at 3/31/2024 0731  Gross per 24 hour   Intake --   Output 650 ml   Net -650 ml       Physical Exam:   Physical Exam  Vitals reviewed.   Constitutional:       General: He is not in acute distress.     Appearance: He is obese. He is ill-appearing.   Cardiovascular:      Rate and Rhythm: Normal rate.   Pulmonary:      Effort: No respiratory distress.   Abdominal:      Palpations: Abdomen is soft.    Musculoskeletal:         General: Normal range of motion.   Skin:     General: Skin is warm.   Neurological:      Mental Status: He is alert. Mental status is at baseline.   Psychiatric:         Mood and Affect: Mood normal.        Additional Data:     Labs:  Results from last 7 days   Lab Units 03/30/24  0604 03/28/24 2001 03/28/24  1307   WBC Thousand/uL 7.33   < > 8.50   HEMOGLOBIN g/dL 11.1*   < > 11.5*   HEMATOCRIT % 36.2*   < > 37.6   PLATELETS Thousands/uL 161   < > 151   NEUTROS PCT %  --   --  48   LYMPHS PCT %  --   --  42   MONOS PCT %  --   --  7   EOS PCT %  --   --  2    < > = values in this interval not displayed.     Results from last 7 days   Lab Units 03/30/24  0604 03/29/24  0518 03/28/24  1331   SODIUM mmol/L 136   < > 140   POTASSIUM mmol/L 3.7   < > 4.2   CHLORIDE mmol/L 101   < > 107   CO2 mmol/L 29   < > 26   BUN mg/dL 18   < > 19   CREATININE mg/dL 1.24   < > 1.15   ANION GAP mmol/L 6   < > 7   CALCIUM mg/dL 9.7   < > 9.5   ALBUMIN g/dL  --   --  4.3   TOTAL BILIRUBIN mg/dL  --   --  0.33   ALK PHOS U/L  --   --  52   ALT U/L  --   --  31   AST U/L  --   --  14   GLUCOSE RANDOM mg/dL 173*   < > 162*    < > = values in this interval not displayed.     Results from last 7 days   Lab Units 03/28/24  1331   INR  1.03     Results from last 7 days   Lab Units 03/31/24  0809 03/30/24 2029 03/30/24  1611 03/30/24  1103 03/30/24  0733 03/29/24  2039 03/29/24  1613 03/29/24  1118 03/29/24  0810 03/28/24  2311 03/28/24  1225   POC GLUCOSE mg/dl 161* 230* 139 265* 163* 233* 161* 176* 177* 198* 182*     Results from last 7 days   Lab Units 03/30/24  0604   HEMOGLOBIN A1C % 8.7*           Lines/Drains:  Invasive Devices       Peripheral Intravenous Line  Duration             Peripheral IV 03/28/24 Distal;Right;Upper;Ventral (anterior) Arm 2 days                          Recent Cultures (last 7 days):   Results from last 7 days   Lab Units 03/28/24  1529   URINE CULTURE  >100,000 cfu/ml Enterococcus  faecalis*       Last 24 Hours Medication List:   Current Facility-Administered Medications   Medication Dose Route Frequency Provider Last Rate    acetaminophen  650 mg Oral Q6H PRN Archie Tse MD      albuterol  2 puff Inhalation Q4H PRN Archie Tse MD      ARIPiprazole  20 mg Oral Daily Archie Tse MD      aspirin  81 mg Oral Daily Archie Tse MD      atorvastatin  40 mg Oral Daily With Dinner Archie Tse MD      budesonide-formoterol  2 puff Inhalation BID Archie Tse MD      docusate sodium  100 mg Oral BID PRN Archie Tse MD      furosemide  40 mg Oral Daily Archie Tse MD      heparin (porcine)  7,500 Units Subcutaneous Q8H TREVOR Archie Tse MD      insulin lispro  1-5 Units Subcutaneous HS Archie Tse MD      insulin lispro  1-6 Units Subcutaneous TID AC Archie Tse MD      Ketotifen Fumarate  1 drop Both Eyes BID Archie Tse MD      lisinopril  20 mg Oral Daily Archie Tse MD      metoprolol tartrate  25 mg Oral Q12H [facility] Archie Tse MD      risperiDONE  1 mg Oral BID Archie Tse MD      senna  8.6 mg Oral BID Archie Tse MD          Today, Patient Was Seen By: MACKENZIE Sosa    **Please Note: This note may have been constructed using a voice recognition system.**

## 2024-03-31 NOTE — QUICK NOTE
Patient MRI brain without acute abnormality. No ischemic stroke noted. Mild chronic microangiopathic changes.   Left vert stenosis is just incidental finding, continue ASA and statin  No further neurological work up required  No indication to start PD meds at this time, might be psych medication induced PD, follow up in general neurology clinic in 6-8 weeks

## 2024-03-31 NOTE — ASSESSMENT & PLAN NOTE
Lab Results   Component Value Date    HGBA1C 8.7 (H) 03/30/2024       Recent Labs     03/31/24  1650 03/31/24 2051 04/01/24  0708 04/01/24  1113   POCGLU 294* 189* 175* 217*   Blood Sugar Average: Last 72 hrs:  (P) 199.6490408429120801  Sliding scale insulin  Carb controlled diet  Hold home dose of metformin

## 2024-03-31 NOTE — CASE MANAGEMENT
Case Management Discharge Planning Note    Patient name Lorne Waterman  Location /- MRN [id number]  : 1950 Date 3/31/2024       Current Admission Date: 3/28/2024  Current Admission Diagnosis:Dizziness   Patient Active Problem List    Diagnosis Date Noted    Vertebral artery narrowing 2024    Dizziness 2024    Fall 2024    Parkinson disease 2023    Type 2 diabetes mellitus without complication, without long-term current use of insulin ([facility]) 2023    Chronic left-sided low back pain without sciatica 2023    Left leg injury 2023    Morbid obesity due to excess calories ([facility]) 10/09/2020    Dyspnea 10/07/2020    Other emphysema ([facility]) 10/07/2020    Essential hypertension 10/07/2020    History of CHF (congestive heart failure) 10/07/2020    Acute pulmonary insufficiency 2020    Chronic combined systolic and diastolic congestive heart failure ([facility]) 2019    Osteoarthritis of both knees 2019    Near syncope 2019    Elevated troponin 2019    Anxiety 2018    Chronic post-traumatic stress disorder (PTSD) 2018    Tardive dyskinesia 2018    Encounter for examination and observation for other specified reasons     Constipated 2018    Other chest pain 2018    Schizophrenia ([facility]) 2018    Hypertension 2018    Hyperlipidemia 2018    Diabetes mellitus type 2 in obese  2018      LOS (days): 2  Geometric Mean LOS (GMLOS) (days):   Days to GMLOS:     OBJECTIVE:  Risk of Unplanned Readmission Score: 16.9         Current admission status: Inpatient   Preferred Pharmacy:   [id number] - [place] - [address]  [place] [address]  Phone: [phone] Fax: [phone]    Primary Care Provider: Alysa Santoro MD    Primary Insurance: [employer]  Secondary Insurance:     DISCHARGE DETAILS:    Discharge planning discussed with:: patient  Freedom  of Choice: Yes  Comments - Freedom of Choice: ANAHY met with pt to discuss dcp-pt was accepted at [facility], but he is hoping for a facility in the [facility].  CM will f/u tomorrow to see if any other facilities accepted

## 2024-04-01 VITALS
HEART RATE: 90 BPM | BODY MASS INDEX: 38.82 KG/M2 | DIASTOLIC BLOOD PRESSURE: 83 MMHG | HEIGHT: 70 IN | TEMPERATURE: 97.9 F | RESPIRATION RATE: 18 BRPM | OXYGEN SATURATION: 92 % | WEIGHT: 271.17 LBS | SYSTOLIC BLOOD PRESSURE: 118 MMHG

## 2024-04-01 LAB
FLUAV RNA RESP QL NAA+PROBE: NEGATIVE
FLUBV RNA RESP QL NAA+PROBE: NEGATIVE
GLUCOSE SERPL-MCNC: 175 MG/DL (ref 65–140)
GLUCOSE SERPL-MCNC: 217 MG/DL (ref 65–140)
RSV RNA RESP QL NAA+PROBE: NEGATIVE
SARS-COV-2 RNA RESP QL NAA+PROBE: NEGATIVE

## 2024-04-01 PROCEDURE — 82948 REAGENT STRIP/BLOOD GLUCOSE: CPT

## 2024-04-01 PROCEDURE — 99239 HOSP IP/OBS DSCHRG MGMT >30: CPT

## 2024-04-01 PROCEDURE — 0241U HB NFCT DS VIR RESP RNA 4 TRGT: CPT

## 2024-04-01 RX ORDER — NITROFURANTOIN 25; 75 MG/1; MG/1
100 CAPSULE ORAL 2 TIMES DAILY
Start: 2024-04-01 | End: 2024-04-06

## 2024-04-01 RX ADMIN — BUDESONIDE AND FORMOTEROL FUMARATE DIHYDRATE 2 PUFF: 160; 4.5 AEROSOL RESPIRATORY (INHALATION) at 08:35

## 2024-04-01 RX ADMIN — HEPARIN SODIUM 7500 UNITS: 5000 INJECTION INTRAVENOUS; SUBCUTANEOUS at 14:05

## 2024-04-01 RX ADMIN — LISINOPRIL 20 MG: 20 TABLET ORAL at 08:29

## 2024-04-01 RX ADMIN — ASPIRIN 81 MG: 81 TABLET, CHEWABLE ORAL at 08:29

## 2024-04-01 RX ADMIN — KETOTIFEN FUMARATE 1 DROP: 0.25 SOLUTION/ DROPS OPHTHALMIC at 08:36

## 2024-04-01 RX ADMIN — INSULIN LISPRO 2 UNITS: 100 INJECTION, SOLUTION INTRAVENOUS; SUBCUTANEOUS at 14:05

## 2024-04-01 RX ADMIN — INSULIN LISPRO 1 UNITS: 100 INJECTION, SOLUTION INTRAVENOUS; SUBCUTANEOUS at 08:36

## 2024-04-01 RX ADMIN — METOPROLOL TARTRATE 25 MG: 25 TABLET, FILM COATED ORAL at 08:29

## 2024-04-01 RX ADMIN — SENNOSIDES 8.6 MG: 8.6 TABLET, FILM COATED ORAL at 08:29

## 2024-04-01 RX ADMIN — FUROSEMIDE 40 MG: 40 TABLET ORAL at 08:29

## 2024-04-01 RX ADMIN — HEPARIN SODIUM 7500 UNITS: 5000 INJECTION INTRAVENOUS; SUBCUTANEOUS at 05:50

## 2024-04-01 RX ADMIN — ARIPIPRAZOLE 20 MG: 5 TABLET ORAL at 08:29

## 2024-04-01 RX ADMIN — RISPERIDONE 1 MG: 1 TABLET ORAL at 08:29

## 2024-04-01 NOTE — CASE MANAGEMENT
Case Management Discharge Planning Note    Patient name Lorne Waterman  Location /- MRN [id number]  : 1950 Date 2024       Current Admission Date: 3/28/2024  Current Admission Diagnosis:Dizziness   Patient Active Problem List    Diagnosis Date Noted    Vertebral artery narrowing 2024    Dizziness 2024    Fall 2024    Parkinson disease 2023    Type 2 diabetes mellitus without complication, without long-term current use of insulin ([facility]) 2023    Chronic left-sided low back pain without sciatica 2023    Left leg injury 2023    Morbid obesity due to excess calories ([facility]) 10/09/2020    Dyspnea 10/07/2020    Other emphysema ([facility]) 10/07/2020    Essential hypertension 10/07/2020    History of CHF (congestive heart failure) 10/07/2020    Acute pulmonary insufficiency 2020    Chronic combined systolic and diastolic congestive heart failure ([facility]) 2019    Osteoarthritis of both knees 2019    Near syncope 2019    Elevated troponin 2019    Anxiety 2018    Chronic post-traumatic stress disorder (PTSD) 2018    Tardive dyskinesia 2018    Encounter for examination and observation for other specified reasons     Constipated 2018    Other chest pain 2018    Schizophrenia ([facility]) 2018    Hypertension 2018    Hyperlipidemia 2018    Type 2 diabetes mellitus with obesity  ([facility]) 2018      LOS (days): 3  Geometric Mean LOS (GMLOS) (days):   Days to GMLOS:     OBJECTIVE:  Risk of Unplanned Readmission Score: 17.14         Current admission status: Inpatient   Preferred Pharmacy:   [id number] - [place] - [address]  [place] [address]  Phone: [phone] Fax: [phone]    Primary Care Provider: Alysa Santoro MD    Primary Insurance: [employer]  Secondary Insurance:     DISCHARGE DETAILS:    Discharge planning discussed with::  patient  Freedom of Choice: Yes  Comments - Freedom of Choice: Pt is agreeable to d/c to [facility].  CM e-mailed Valarie Dozier and she will [facility] auth to [facility]  [facility] at [phone] and w/c van transport was arranged for 16:30.  PASSR done.  Covid test done.                               Other Referral/Resources/Interventions Provided:  Interventions: Short Term Rehab, Transportation  Referral Comments: [facility] reserved.  [facility]  Covid Test and PASSR done    Would you like to participate in our [facility] program?  : No - Declined    Treatment Team Recommendation: Short Term Rehab  Discharge Destination Plan:: Short Term Rehab  Transport at Discharge : Wheelchair van  Dispatcher Contacted: No  Number[facility] of Dispatcher: [facility]  Transported by (Company and Unit #): [employer]  ETA of Transport (Date): 04/01/24  ETA of Transport (Time): 1630                            Accepting Facility Name, City & State : [facility]  Receiving Facility/Agency Phone Number: [phone]  Facility/Agency Fax Number: [phone]

## 2024-04-01 NOTE — DISCHARGE SUMMARY
"[facility]  Discharge- Lorne Waterman 1950, 73 y.o. male MRN: [id number]  Unit/Bed#: -01 Encounter: [id number]  Primary Care Provider: Alysa Santoro MD   Date and time admitted to hospital: 3/28/2024 12:07 PM    * Dizziness  Assessment & Plan  Patient presents with dizziness when he tried to stand up and he almost fell but he was able to catch himself before falling  CTA shows severe left vertebral artery narrowing  MRI-No acute abnormality. Mild chronic microangiopathic changes.   Neurology following  Patient MRI brain without acute abnormality. No ischemic stroke noted. Mild chronic microangiopathic changes.   Left vert stenosis is just incidental finding, continue ASA and statin  No further neurological work up required  Recent echocardiogram from February 2024 shows 55% ejection fraction with normal systolic function and abnormal diastolic function  PT OT- Level II. [facility]    Vertebral artery narrowing  Assessment & Plan  Further neurology consultation  Continue aspirin, statin    Parkinson disease  Assessment & Plan  Not on any Parkinson medication  Neurology consultation for further input    Type 2 diabetes mellitus without complication, without long-term current use of insulin ([facility])  Assessment & Plan  Lab Results   Component Value Date    HGBA1C 8.7 (H) 03/30/2024       Recent Labs     03/31/24  1650 03/31/24  2051 04/01/24  0708 04/01/24  1113   POCGLU 294* 189* 175* 217*   Blood Sugar Average: Last 72 hrs:  (P) 199.5313534229573557  Sliding scale insulin  Carb controlled diet  Hold home dose of metformin    Hyperlipidemia  Assessment & Plan  Continue statin.      Hypertension  Assessment & Plan  /83   Pulse 90   Temp 97.9 °F (36.6 °C)   Resp 18   Ht 5' 10\" (1.778 m)   Wt 123 kg (271 lb 2.7 oz)   SpO2 92%   BMI 38.91 kg/m²   Stable pressures  As patient has severe vertebral artery narrowing, avoid hypotension.  "   Currently on lisinopril and metoprolol      Medical Problems       Resolved Problems  Date Reviewed: 3/28/2024   None       Discharging Physician / Practitioner: Roberto Carlos Sheehan PA-C  PCP: Alysa Santoro MD  Admission Date:   Admission Orders (From admission, onward)       Ordered        03/29/24 1544  INPATIENT ADMISSION  Once            03/28/24 1807  Place in Observation  Once                          Discharge Date: 04/01/24    Consultations During Hospital Stay:  Neurology    Procedures Performed:   MRI brain wo contrast  Impression: Motion degraded exam. No acute abnormality. Mild chronic microangiopathic changes. Left vertebral artery stenosis, further described on the recent CTA study. 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  No acute traumatic injury to the chest, abdomen, or pelvis. 2.  Severe prostatomegaly.     CTA head and neck with and without contrast  Impression: 1.  No acute intracranial CT abnormality. Chronic microangiopathic change. 2.  Patent major vessels of the [place] without high-grade stenosis. 3.  Severely narrowed caliber left vertebral artery. Cannot reliably evaluate the origin and proximal V1 segment due to significant small caliber and artifactually distorted image quality in the neck base. The remainder very small caliber left cervical and intracranial vertebral artery is patent. Relatively smooth wall throughout the neck and intracranial segments favor developmentally hypoplastic vessel (normal variant) over dissection. 4.  No hemodynamically significant stenosis of the cervical carotid and right vertebral arteries. 5.  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     XR chest pa & lateral  Impression: IMPRESSION:   No acute finding      Significant  "Findings / Test Results:   TSH with normal  Urine culture showed Enterococcus faecalis with pan susceptibility  Lipid panel showed total cholesterol 76, triglyceride 67, HDL 26, LDL 37    Incidental Findings:   Left ventricular stenosis  Follow-up with outpatient vascular team    Test Results Pending at Discharge (will require follow up):   None     Outpatient Tests Requested:  None    Complications: None    Reason for Admission: Dizziness    Hospital Course:   Lorne Waterman is a 73 y.o. male patient who originally presented to the hospital on 3/28/2024 due to patient having dizziness that changes with position and bilateral lower extremity weakness.  Patient was then admitted for stroke pathway.  CTA demonstrated severe left vertebral artery narrowing which can be followed up in the outpatient setting by the vascular surgical team.  An MRI was conducted which also showed mild chronic microangiopathic changes however no acute CVA. The neurology team recommended to continue ASA and statin and no further inpatient neurological workup was required.  Is recommended to follow-up with the neurology clinic within 6 to 8 weeks.  At this time the patient is medically stable to be discharged and agreed to discharge plan.  For further information regards to her course of hospitalization, please see notes attached.      Please see above list of diagnoses and related plan for additional information.     Condition at Discharge: good    Discharge Day Visit / Exam:   Subjective: Patient reports feeling well and like to be discharged.  He currently denies any chest pain/pressure, palpitations, lightness, nausea, shortness of breath, or chills.  Vitals: Blood Pressure: 118/83 (04/01/24 0834)  Pulse: 90 (04/01/24 0834)  Temperature: 97.9 °F (36.6 °C) (04/01/24 0710)  Temp Source: Oral (03/31/24 2001)  Respirations: 18 (03/31/24 2001)  Height: 5' 10\" (177.8 cm) (03/30/24 1545)  Weight - Scale: 123 kg (271 lb 2.7 oz) (03/30/24 " 1545)  SpO2: 92 % (04/01/24 0834)  Exam:   Physical Exam  Vitals and nursing note reviewed.   Constitutional:       Appearance: He is normal weight.   HENT:      Head: Normocephalic.      Nose: Nose normal.      Mouth/Throat:      Mouth: Mucous membranes are moist.      Pharynx: Oropharynx is clear.   Eyes:      General: No scleral icterus.     Conjunctiva/sclera: Conjunctivae normal.      Pupils: Pupils are equal, round, and reactive to light.   Cardiovascular:      Rate and Rhythm: Normal rate and regular rhythm.      Heart sounds: No murmur heard.     No friction rub. No gallop.   Pulmonary:      Effort: Pulmonary effort is normal. No respiratory distress.      Breath sounds: Normal breath sounds. No stridor. No wheezing, rhonchi or rales.   Abdominal:      General: Abdomen is flat.      Palpations: Abdomen is soft.   Musculoskeletal:         General: Normal range of motion.      Cervical back: Normal range of motion and neck supple.      Right lower leg: No edema.      Left lower leg: No edema.   Lymphadenopathy:      Cervical: No cervical adenopathy.   Skin:     General: Skin is warm.      Coloration: Skin is not jaundiced or pale.      Findings: No bruising, erythema or lesion.   Neurological:      General: No focal deficit present.      Mental Status: He is alert and oriented to person, place, and time. Mental status is at baseline.      Cranial Nerves: No cranial nerve deficit.      Motor: No weakness.   Psychiatric:         Mood and Affect: Mood normal.         Behavior: Behavior normal.         Thought Content: Thought content normal.          Discussion with Family: Updated  (wife) at bedside.    Discharge instructions/Information to patient and family:   See after visit summary for information provided to patient and family.      Provisions for Follow-Up Care:  See after visit summary for information related to follow-up care and any pertinent home health orders.      Mobility at time of  Discharge:   Basic Mobility Inpatient Raw Score: 14  JH-HLM Goal: 4: Move to chair/commode  JH-HLM Achieved: 4: Move to chair/commode  HLM Goal achieved. Continue to encourage appropriate mobility.     [facility]    Pl[facility] Readmission: No     Discharge Statement:  I spent 60 minutes discharging the patient. This time was spent on the day of discharge. I had direct contact with the patient on the day of discharge. Greater than 50% of the total time was spent examining patient, answering all patient questions, arranging and discussing plan of care with patient as well as directly providing post-discharge instructions.  Additional time then spent on discharge activities.    Discharge Medications:  See after visit summary for reconciled discharge medications provided to patient and/or family.      **Please Note: This note may have been constructed using a voice recognition system**

## 2024-04-01 NOTE — PLAN OF CARE
Problem: Potential for Falls  Goal: Patient will remain free of falls  Description: INTERVENTIONS:  - Educate patient/family on patient safety including physical limitations  - Instruct patient to call for assistance with activity   - Consult OT/PT to assist with strengthening/mobility   - Keep Call bell within reach  - Keep bed low and locked with side rails adjusted as appropriate  - Keep care items and personal belongings within reach  - Initiate and maintain comfort rounds  - Make Fall Risk Sign visible to staff  - Offer Toileting   - Initiate/Maintain alarm  - Obtain necessary fall risk management equipment  - Apply yellow socks and bracelet for high fall risk patients  - Consider moving patient to room near nurses station  Outcome: Progressing     Problem: PAIN - ADULT  Goal: Verbalizes/displays adequate comfort level or baseline comfort level  Description: Interventions:  - Encourage patient to monitor pain and request assistance  - Assess pain using appropriate pain scale  - Administer analgesics based on type and severity of pain and evaluate response  - Implement non-pharmacological measures as appropriate and evaluate response  - Consider cultural and social influences on pain and pain management  - Notify physician/advanced practitioner if interventions unsuccessful or patient reports new pain  Outcome: Progressing     Problem: INFECTION - ADULT  Goal: Absence or prevention of progression during hospitalization  Description: INTERVENTIONS:  - Assess and monitor for signs and symptoms of infection  - Monitor lab/diagnostic results  - Monitor all insertion sites, i.e. indwelling lines, tubes, and drains  - Monitor endotracheal if appropriate and nasal secretions for changes in amount and color  - [place] appropriate cooling/warming therapies per order  - Administer medications as ordered  - Instruct and encourage patient and family to use good hand hygiene technique  - Identify and instruct in  appropriate isolation precautions for identified infection/condition  Outcome: Progressing  Goal: Absence of fever/infection during neutropenic period  Description: INTERVENTIONS:  - Monitor WBC    Outcome: Progressing     Problem: SAFETY ADULT  Goal: Patient will remain free of falls  Description: INTERVENTIONS:  - Educate patient/family on patient safety including physical limitations  - Instruct patient to call for assistance with activity   - Consult OT/PT to assist with strengthening/mobility   - Keep Call bell within reach  - Keep bed low and locked with side rails adjusted as appropriate  - Keep care items and personal belongings within reach  - Initiate and maintain comfort rounds  - Make Fall Risk Sign visible to staff    - Initiate/Maintain alarm  - Obtain necessary fall risk management equipment  - Apply yellow socks and bracelet for high fall risk patients  - Consider moving patient to room near nurses station  Outcome: Progressing  Goal: Maintain or return to baseline ADL function  Description: INTERVENTIONS:  -  Assess patient's ability to carry out ADLs; assess patient's baseline for ADL function and identify physical deficits which impact ability to perform ADLs (bathing, care of mouth/teeth, toileting, grooming, dressing, etc.)  - Assess/evaluate cause of self-care deficits   - Assess range of motion  - Assess patient's mobility; develop plan if impaired  - Assess patient's need for assistive devices and provide as appropriate  - Encourage maximum independence but intervene and supervise when necessary  - Involve family in performance of ADLs  - Assess for home care needs following discharge   - Consider OT consult to assist with ADL evaluation and planning for discharge  - Provide patient education as appropriate  Outcome: Progressing  Goal: Maintains/Returns to pre admission functional level  Description: INTERVENTIONS:  - Perform AM-PAC 6 Click Basic Mobility/ Daily Activity assessment daily.  -  Set and communicate daily mobility goal to care team and patient/family/caregiver.   - Collaborate with rehabilitation services on mobility goals if consulted  - Perform Range of Motion 3 times a day.  - Reposition patient every 2 hours.  - Dangle patient 3 times a day  - Stand patient 3 times a day  - Ambulate patient 3 times a day  - Out of bed to chair 3 times a day   - Out of bed for meals 3 times a day  - Out of bed for toileting  - Record patient progress and toleration of activity level   Outcome: Progressing     Problem: DISCHARGE PLANNING  Goal: Discharge to home or other facility with appropriate resources  Description: INTERVENTIONS:  - Identify barriers to discharge w/patient and caregiver  - Arrange for needed discharge resources and transportation as appropriate  - Identify discharge learning needs (meds, wound care, etc.)  - Arrange for interpretive services to assist at discharge as needed  - Refer to Case Management Department for coordinating discharge planning if the patient needs post-hospital services based on physician/advanced practitioner order or complex needs related to functional status, cognitive ability, or social support system  Outcome: Progressing     Problem: Knowledge Deficit  Goal: Patient/family/caregiver demonstrates understanding of disease process, treatment plan, medications, and discharge instructions  Description: Complete learning assessment and assess knowledge base.  Interventions:  - Provide teaching at level of understanding  - Provide teaching via preferred learning methods  Outcome: Progressing     Problem: MUSCULOSKELETAL - ADULT  Goal: Maintain or return mobility to safest level of function  Description: INTERVENTIONS:  - Assess patient's ability to carry out ADLs; assess patient's baseline for ADL function and identify physical deficits which impact ability to perform ADLs (bathing, care of mouth/teeth, toileting, grooming, dressing, etc.)  - Assess/evaluate cause  of self-care deficits   - Assess range of motion  - Assess patient's mobility  - Assess patient's need for assistive devices and provide as appropriate  - Encourage maximum independence but intervene and supervise when necessary  - Involve family in performance of ADLs  - Assess for home care needs following discharge   - Consider OT consult to assist with ADL evaluation and planning for discharge  - Provide patient education as appropriate  Outcome: Progressing  Goal: Maintain proper alignment of affected body part  Description: INTERVENTIONS:  - Support, maintain and protect limb and body alignment  - Provide patient/ family with appropriate education  Outcome: Progressing     Problem: Nutrition/Hydration-ADULT  Goal: Nutrient/Hydration intake appropriate for improving, restoring or maintaining nutritional needs  Description: Monitor and assess patient's nutrition/hydration status for malnutrition. Collaborate with interdisciplinary team and initiate plan and interventions as ordered.  Monitor patient's weight and dietary intake as ordered or per policy. Utilize nutrition screening tool and intervene as necessary. Determine patient's food preferences and provide high-protein, high-caloric foods as appropriate.     INTERVENTIONS:  - Monitor oral intake, urinary output, labs, and treatment plans  - Assess nutrition and hydration status and recommend course of action  - Evaluate amount of meals eaten  - Assist patient with eating if necessary   - Allow adequate time for meals  - Recommend/ encourage appropriate diets, oral nutritional supplements, and vitamin/mineral supplements  - Order, calculate, and assess calorie counts as needed  - Recommend, monitor, and adjust tube feedings and TPN/PPN based on assessed needs  - Assess need for intravenous fluids  - Provide specific nutrition/hydration education as appropriate  - Include patient/family/caregiver in decisions related to nutrition  Outcome: Progressing

## 2024-04-01 NOTE — PLAN OF CARE
Problem: Potential for Falls  Goal: Patient will remain free of falls  Description: INTERVENTIONS:  - Educate patient/family on patient safety including physical limitations  - Instruct patient to call for assistance with activity   - Consult OT/PT to assist with strengthening/mobility   - Keep Call bell within reach  - Keep bed low and locked with side rails adjusted as appropriate  - Keep care items and personal belongings within reach  - Initiate and maintain comfort rounds  - Make Fall Risk Sign visible to staff  - Apply yellow socks and bracelet for high fall risk patients  - Consider moving patient to room near nurses station  Outcome: Progressing     Problem: PAIN - ADULT  Goal: Verbalizes/displays adequate comfort level or baseline comfort level  Description: Interventions:  - Encourage patient to monitor pain and request assistance  - Assess pain using appropriate pain scale  - Administer analgesics based on type and severity of pain and evaluate response  - Implement non-pharmacological measures as appropriate and evaluate response  - Consider cultural and social influences on pain and pain management  - Notify physician/advanced practitioner if interventions unsuccessful or patient reports new pain  Outcome: Progressing     Problem: INFECTION - ADULT  Goal: Absence or prevention of progression during hospitalization  Description: INTERVENTIONS:  - Assess and monitor for signs and symptoms of infection  - Monitor lab/diagnostic results  - Monitor all insertion sites, i.e. indwelling lines, tubes, and drains  - Monitor endotracheal if appropriate and nasal secretions for changes in amount and color  - [place] appropriate cooling/warming therapies per order  - Administer medications as ordered  - Instruct and encourage patient and family to use good hand hygiene technique  - Identify and instruct in appropriate isolation precautions for identified infection/condition  Outcome: Progressing  Goal: Absence  of fever/infection during neutropenic period  Description: INTERVENTIONS:  - Monitor WBC    Outcome: Progressing     Problem: SAFETY ADULT  Goal: Patient will remain free of falls  Description: INTERVENTIONS:  - Educate patient/family on patient safety including physical limitations  - Instruct patient to call for assistance with activity   - Consult OT/PT to assist with strengthening/mobility   - Keep Call bell within reach  - Keep bed low and locked with side rails adjusted as appropriate  - Keep care items and personal belongings within reach  - Initiate and maintain comfort rounds  - Make Fall Risk Sign visible to staff  - Apply yellow socks and bracelet for high fall risk patients  - Consider moving patient to room near nurses station  Outcome: Progressing  Goal: Maintain or return to baseline ADL function  Description: INTERVENTIONS:  -  Assess patient's ability to carry out ADLs; assess patient's baseline for ADL function and identify physical deficits which impact ability to perform ADLs (bathing, care of mouth/teeth, toileting, grooming, dressing, etc.)  - Assess/evaluate cause of self-care deficits   - Assess range of motion  - Assess patient's mobility; develop plan if impaired  - Assess patient's need for assistive devices and provide as appropriate  - Encourage maximum independence but intervene and supervise when necessary  - Involve family in performance of ADLs  - Assess for home care needs following discharge   - Consider OT consult to assist with ADL evaluation and planning for discharge  - Provide patient education as appropriate  Outcome: Progressing  Goal: Maintains/Returns to pre admission functional level  Description: INTERVENTIONS:  - Perform AM-PAC 6 Click Basic Mobility/ Daily Activity assessment daily.  - Set and communicate daily mobility goal to care team and patient/family/caregiver.   - Out of bed for toileting  - Record patient progress and toleration of activity level   Outcome:  Progressing     Problem: DISCHARGE PLANNING  Goal: Discharge to home or other facility with appropriate resources  Description: INTERVENTIONS:  - Identify barriers to discharge w/patient and caregiver  - Arrange for needed discharge resources and transportation as appropriate  - Identify discharge learning needs (meds, wound care, etc.)  - Arrange for interpretive services to assist at discharge as needed  - Refer to Case Management Department for coordinating discharge planning if the patient needs post-hospital services based on physician/advanced practitioner order or complex needs related to functional status, cognitive ability, or social support system  Outcome: Progressing     Problem: Knowledge Deficit  Goal: Patient/family/caregiver demonstrates understanding of disease process, treatment plan, medications, and discharge instructions  Description: Complete learning assessment and assess knowledge base.  Interventions:  - Provide teaching at level of understanding  - Provide teaching via preferred learning methods  Outcome: Progressing     Problem: MUSCULOSKELETAL - ADULT  Goal: Maintain or return mobility to safest level of function  Description: INTERVENTIONS:  - Assess patient's ability to carry out ADLs; assess patient's baseline for ADL function and identify physical deficits which impact ability to perform ADLs (bathing, care of mouth/teeth, toileting, grooming, dressing, etc.)  - Assess/evaluate cause of self-care deficits   - Assess range of motion  - Assess patient's mobility  - Assess patient's need for assistive devices and provide as appropriate  - Encourage maximum independence but intervene and supervise when necessary  - Involve family in performance of ADLs  - Assess for home care needs following discharge   - Consider OT consult to assist with ADL evaluation and planning for discharge  - Provide patient education as appropriate  Outcome: Progressing  Goal: Maintain proper alignment of  affected body part  Description: INTERVENTIONS:  - Support, maintain and protect limb and body alignment  - Provide patient/ family with appropriate education  Outcome: Progressing     Problem: Nutrition/Hydration-ADULT  Goal: Nutrient/Hydration intake appropriate for improving, restoring or maintaining nutritional needs  Description: Monitor and assess patient's nutrition/hydration status for malnutrition. Collaborate with interdisciplinary team and initiate plan and interventions as ordered.  Monitor patient's weight and dietary intake as ordered or per policy. Utilize nutrition screening tool and intervene as necessary. Determine patient's food preferences and provide high-protein, high-caloric foods as appropriate.     INTERVENTIONS:  - Monitor oral intake, urinary output, labs, and treatment plans  - Assess nutrition and hydration status and recommend course of action  - Evaluate amount of meals eaten  - Assist patient with eating if necessary   - Allow adequate time for meals  - Recommend/ encourage appropriate diets, oral nutritional supplements, and vitamin/mineral supplements  - Order, calculate, and assess calorie counts as needed  - Recommend, monitor, and adjust tube feedings and TPN/PPN based on assessed needs  - Assess need for intravenous fluids  - Provide specific nutrition/hydration education as appropriate  - Include patient/family/caregiver in decisions related to nutrition  Outcome: Progressing

## 2024-04-01 NOTE — DISCHARGE INSTR - AVS FIRST PAGE
Please be aware I have added an antibiotic to help cover for a possible UTI.  This antibiotic is Macrobid.  This advised to be taken twice a day for the next 5 days.  Please follow-up with your PCP within 1 week.      Please additionally follow-up with the neurology team within 6 to 8 weeks.  I have attached their information to the follow-up page.

## 2024-04-25 ENCOUNTER — TELEPHONE (OUTPATIENT)
Dept: NEUROLOGY | Facility: CLINIC | Age: 74
End: 2024-04-25

## 2024-04-25 NOTE — TELEPHONE ENCOUNTER
Hello,     Can you please advise  if patient can be seen by an Resident, AP and or Attending  only?    Thank you for your time,     Leonila       follow up in general neurology clinic in 6-8 weeks     HFU/ [facility] STUBBS/ Vertebral artery narrowing     DC- FACILITY- 4/1/2024

## 2025-03-18 ENCOUNTER — HOSPITAL ENCOUNTER (INPATIENT)
Facility: HOSPITAL | Age: 75
LOS: 3 days | Discharge: NURSING, REHAB OR LONG-TERM CARE | End: 2025-03-21
Attending: EMERGENCY MEDICINE | Admitting: INTERNAL MEDICINE
Payer: COMMERCIAL

## 2025-03-18 ENCOUNTER — APPOINTMENT (EMERGENCY)
Dept: CT IMAGING | Facility: HOSPITAL | Age: 75
End: 2025-03-18
Payer: COMMERCIAL

## 2025-03-18 DIAGNOSIS — N17.9 ACUTE RENAL FAILURE (ARF) (HCC): ICD-10-CM

## 2025-03-18 DIAGNOSIS — N17.9 AKI (ACUTE KIDNEY INJURY) (HCC): ICD-10-CM

## 2025-03-18 DIAGNOSIS — R39.198 DIFFICULTY URINATING: ICD-10-CM

## 2025-03-18 DIAGNOSIS — R10.9 ABDOMINAL PAIN: ICD-10-CM

## 2025-03-18 DIAGNOSIS — R31.9 HEMATURIA: Primary | ICD-10-CM

## 2025-03-18 PROBLEM — J44.9 COPD WITHOUT EXACERBATION (HCC): Status: ACTIVE | Noted: 2025-03-18

## 2025-03-18 PROBLEM — N32.89 BLADDER WALL THICKENING: Status: ACTIVE | Noted: 2025-03-18

## 2025-03-18 LAB
ALBUMIN SERPL BCG-MCNC: 4.3 G/DL (ref 3.5–5)
ALP SERPL-CCNC: 63 U/L (ref 34–104)
ALT SERPL W P-5'-P-CCNC: 18 U/L (ref 7–52)
ANION GAP SERPL CALCULATED.3IONS-SCNC: 9 MMOL/L (ref 4–13)
AST SERPL W P-5'-P-CCNC: 17 U/L (ref 13–39)
BASOPHILS # BLD AUTO: 0.01 THOUSANDS/ÂΜL (ref 0–0.1)
BASOPHILS NFR BLD AUTO: 0 % (ref 0–1)
BILIRUB SERPL-MCNC: 0.66 MG/DL (ref 0.2–1)
BUN SERPL-MCNC: 64 MG/DL (ref 5–25)
CALCIUM SERPL-MCNC: 9.4 MG/DL (ref 8.4–10.2)
CHLORIDE SERPL-SCNC: 101 MMOL/L (ref 96–108)
CO2 SERPL-SCNC: 22 MMOL/L (ref 21–32)
CREAT SERPL-MCNC: 7.49 MG/DL (ref 0.6–1.3)
EOSINOPHIL # BLD AUTO: 0.19 THOUSAND/ÂΜL (ref 0–0.61)
EOSINOPHIL NFR BLD AUTO: 2 % (ref 0–6)
ERYTHROCYTE [DISTWIDTH] IN BLOOD BY AUTOMATED COUNT: 14.3 % (ref 11.6–15.1)
GFR SERPL CREATININE-BSD FRML MDRD: 6 ML/MIN/1.73SQ M
GLUCOSE SERPL-MCNC: 128 MG/DL (ref 65–140)
HCT VFR BLD AUTO: 35.5 % (ref 36.5–49.3)
HGB BLD-MCNC: 11 G/DL (ref 12–17)
IMM GRANULOCYTES # BLD AUTO: 0.03 THOUSAND/UL (ref 0–0.2)
IMM GRANULOCYTES NFR BLD AUTO: 0 % (ref 0–2)
LIPASE SERPL-CCNC: 12 U/L (ref 11–82)
LYMPHOCYTES # BLD AUTO: 2.88 THOUSANDS/ÂΜL (ref 0.6–4.47)
LYMPHOCYTES NFR BLD AUTO: 33 % (ref 14–44)
MCH RBC QN AUTO: 24.3 PG (ref 26.8–34.3)
MCHC RBC AUTO-ENTMCNC: 31 G/DL (ref 31.4–37.4)
MCV RBC AUTO: 79 FL (ref 82–98)
MONOCYTES # BLD AUTO: 0.65 THOUSAND/ÂΜL (ref 0.17–1.22)
MONOCYTES NFR BLD AUTO: 7 % (ref 4–12)
NEUTROPHILS # BLD AUTO: 5 THOUSANDS/ÂΜL (ref 1.85–7.62)
NEUTS SEG NFR BLD AUTO: 58 % (ref 43–75)
NRBC BLD AUTO-RTO: 0 /100 WBCS
PLATELET # BLD AUTO: 165 THOUSANDS/UL (ref 149–390)
PMV BLD AUTO: 14 FL (ref 8.9–12.7)
POTASSIUM SERPL-SCNC: 4.4 MMOL/L (ref 3.5–5.3)
PROT SERPL-MCNC: 7.6 G/DL (ref 6.4–8.4)
RBC # BLD AUTO: 4.52 MILLION/UL (ref 3.88–5.62)
SODIUM SERPL-SCNC: 132 MMOL/L (ref 135–147)
WBC # BLD AUTO: 8.76 THOUSAND/UL (ref 4.31–10.16)

## 2025-03-18 PROCEDURE — 99284 EMERGENCY DEPT VISIT MOD MDM: CPT

## 2025-03-18 PROCEDURE — 99223 1ST HOSP IP/OBS HIGH 75: CPT | Performed by: INTERNAL MEDICINE

## 2025-03-18 PROCEDURE — 83690 ASSAY OF LIPASE: CPT

## 2025-03-18 PROCEDURE — 96360 HYDRATION IV INFUSION INIT: CPT

## 2025-03-18 PROCEDURE — 85025 COMPLETE CBC W/AUTO DIFF WBC: CPT

## 2025-03-18 PROCEDURE — 51798 US URINE CAPACITY MEASURE: CPT

## 2025-03-18 PROCEDURE — 36415 COLL VENOUS BLD VENIPUNCTURE: CPT

## 2025-03-18 PROCEDURE — 80053 COMPREHEN METABOLIC PANEL: CPT

## 2025-03-18 PROCEDURE — 74176 CT ABD & PELVIS W/O CONTRAST: CPT

## 2025-03-18 PROCEDURE — 99291 CRITICAL CARE FIRST HOUR: CPT | Performed by: EMERGENCY MEDICINE

## 2025-03-18 RX ORDER — ALBUTEROL SULFATE 90 UG/1
2 INHALANT RESPIRATORY (INHALATION) EVERY 4 HOURS PRN
Status: DISCONTINUED | OUTPATIENT
Start: 2025-03-18 | End: 2025-03-21 | Stop reason: HOSPADM

## 2025-03-18 RX ORDER — ASPIRIN 81 MG/1
81 TABLET, CHEWABLE ORAL DAILY
Status: DISCONTINUED | OUTPATIENT
Start: 2025-03-19 | End: 2025-03-21 | Stop reason: HOSPADM

## 2025-03-18 RX ORDER — ACETAMINOPHEN 325 MG/1
650 TABLET ORAL EVERY 4 HOURS
Status: DISCONTINUED | OUTPATIENT
Start: 2025-03-18 | End: 2025-03-21 | Stop reason: HOSPADM

## 2025-03-18 RX ORDER — ARIPIPRAZOLE 10 MG/1
20 TABLET ORAL DAILY
Status: DISCONTINUED | OUTPATIENT
Start: 2025-03-19 | End: 2025-03-21 | Stop reason: HOSPADM

## 2025-03-18 RX ORDER — RISPERIDONE 1 MG/1
1 TABLET ORAL 2 TIMES DAILY
Status: DISCONTINUED | OUTPATIENT
Start: 2025-03-18 | End: 2025-03-21 | Stop reason: HOSPADM

## 2025-03-18 RX ORDER — ACETAMINOPHEN 325 MG/1
650 TABLET ORAL EVERY 4 HOURS
COMMUNITY

## 2025-03-18 RX ORDER — SODIUM CHLORIDE, SODIUM GLUCONATE, SODIUM ACETATE, POTASSIUM CHLORIDE, MAGNESIUM CHLORIDE, SODIUM PHOSPHATE, DIBASIC, AND POTASSIUM PHOSPHATE .53; .5; .37; .037; .03; .012; .00082 G/100ML; G/100ML; G/100ML; G/100ML; G/100ML; G/100ML; G/100ML
125 INJECTION, SOLUTION INTRAVENOUS CONTINUOUS
Status: DISCONTINUED | OUTPATIENT
Start: 2025-03-18 | End: 2025-03-19

## 2025-03-18 RX ORDER — LOPERAMIDE HYDROCHLORIDE 2 MG/1
2 TABLET ORAL EVERY 6 HOURS
COMMUNITY
End: 2025-03-21

## 2025-03-18 RX ORDER — BUDESONIDE AND FORMOTEROL FUMARATE DIHYDRATE 160; 4.5 UG/1; UG/1
2 AEROSOL RESPIRATORY (INHALATION) 2 TIMES DAILY
Status: DISCONTINUED | OUTPATIENT
Start: 2025-03-18 | End: 2025-03-21 | Stop reason: HOSPADM

## 2025-03-18 RX ORDER — HEPARIN SODIUM 5000 [USP'U]/ML
5000 INJECTION, SOLUTION INTRAVENOUS; SUBCUTANEOUS EVERY 8 HOURS SCHEDULED
Status: DISCONTINUED | OUTPATIENT
Start: 2025-03-18 | End: 2025-03-21 | Stop reason: HOSPADM

## 2025-03-18 RX ORDER — ONDANSETRON 4 MG/1
4 TABLET, FILM COATED ORAL EVERY 6 HOURS
COMMUNITY
End: 2025-03-21

## 2025-03-18 RX ADMIN — HEPARIN SODIUM 5000 UNITS: 5000 INJECTION INTRAVENOUS; SUBCUTANEOUS at 22:59

## 2025-03-18 RX ADMIN — ACETAMINOPHEN 650 MG: 325 TABLET, FILM COATED ORAL at 22:59

## 2025-03-18 RX ADMIN — BUDESONIDE AND FORMOTEROL FUMARATE DIHYDRATE 2 PUFF: 160; 4.5 AEROSOL RESPIRATORY (INHALATION) at 17:57

## 2025-03-18 RX ADMIN — SODIUM CHLORIDE, SODIUM GLUCONATE, SODIUM ACETATE, POTASSIUM CHLORIDE, MAGNESIUM CHLORIDE, SODIUM PHOSPHATE, DIBASIC, AND POTASSIUM PHOSPHATE 125 ML/HR: .53; .5; .37; .037; .03; .012; .00082 INJECTION, SOLUTION INTRAVENOUS at 17:51

## 2025-03-18 RX ADMIN — RISPERIDONE 1 MG: 1 TABLET, FILM COATED ORAL at 17:54

## 2025-03-18 RX ADMIN — SODIUM CHLORIDE 1000 ML: 0.9 INJECTION, SOLUTION INTRAVENOUS at 12:41

## 2025-03-18 RX ADMIN — ACETAMINOPHEN 650 MG: 325 TABLET, FILM COATED ORAL at 17:54

## 2025-03-18 NOTE — ED PROVIDER NOTES
Time reflects when diagnosis was documented in both MDM as applicable and the Disposition within this note       Time User Action Codes Description Comment    3/18/2025  8:13 AM Ki Walker [R31.9] Hematuria     3/18/2025  8:13 AM Ki Walker [R39.198] Difficulty urinating     3/18/2025  8:13 AM Ki Walker [R10.9] Abdominal pain     3/18/2025 11:37 AM Ki Walker [N17.9] Acute renal failure (ARF) (HCC)           ED Disposition       ED Disposition   Admit    Condition   Stable    Date/Time   Tue Mar 18, 2025 11:37 AM    Comment   --             Assessment & Plan       Medical Decision Making  Patient is a 74-year-old male presenting for evaluation of hematuria and abdominal pain    Differential: Traumatic secondary to straight cath versus UTI will assess for obstructive uropathy, renal failure, malignancy    Plan: Abdominal labs, UA, bladder scan and postvoid residual, CT abdomen pelvis, Ignacio catheter as needed    See ED course    Discussed with medicine who accepts patient for admission.  Patient hemodynamically stable at time of admission    Amount and/or Complexity of Data Reviewed  Labs: ordered. Decision-making details documented in ED Course.  Radiology: ordered.    Risk  Decision regarding hospitalization.        ED Course as of 03/18/25 1353   Tue Mar 18, 2025   0815 WBC: 8.76   0815 Hemoglobin(!): 11.0  At baseline   0815 MCV(!): 79  Microcytic, at baseline   0815 Platelet Count: 165   0904 Per RN 16cc urine in bladder on scan   1137 BUN(!): 64   1137 Creatinine(!): 7.49  Pt in acute renal failure - will order fluids and admit       Medications   sodium chloride 0.9 % bolus 1,000 mL (1,000 mL Intravenous New Bag 3/18/25 1241)       ED Risk Strat Scores                                                History of Present Illness       Chief Complaint   Patient presents with    Blood in Urine     Pt straight cath'd yesterday at facility and now blood in urine. Pt complains  of pain in the penis along with belly pain.        Past Medical History:   Diagnosis Date    Anxiety     CHF (congestive heart failure) (HCC)     COPD (chronic obstructive pulmonary disease) (HCC)     Depression     Diabetes mellitus (HCC)     Enlarged prostate     Hallucination     Hyperlipidemia     Hypertension     Memory loss     Panic attack     Panic disorder     Psychiatric disorder     Psychiatric illness     Psychosis (HCC)     PTSD (post-traumatic stress disorder)     Schizoaffective disorder (HCC)     Schizophrenia (HCC)       No past surgical history on file.   No family history on file.   Social History     Tobacco Use    Smoking status: Former     Current packs/day: 0.00     Average packs/day: 2.0 packs/day for 0.5 years (1.0 ttl pk-yrs)     Types: Cigarettes     Start date: 1992     Quit date:      Years since quittin.2    Smokeless tobacco: Never    Tobacco comments:     quit in    Vaping Use    Vaping status: Never Used   Substance Use Topics    Alcohol use: Never    Drug use: Never      E-Cigarette/Vaping    E-Cigarette Use Never User       E-Cigarette/Vaping Substances    Nicotine No     THC No     CBD No     Flavoring No     Other No     Unknown No       I have reviewed and agree with the history as documented.     Patient is a 74-year-old male with past medical history of schizophrenia Parkinson's diabetes hypertension CHF COPD that presents for evaluation of hematuria and abdominal pain.  Reports that yesterday need to be straight cathed at facility due to difficulty urinating states that since then he said penile pain and blood in his urine.  Feels that he has the urge to urinate but is unable to fully void.  Also complaining of lower abdominal pain.  Denying any other complaints      Blood in Urine  Associated symptoms include abdominal pain. Pertinent negatives include no chills, dysuria, fever, nausea or vomiting.       Review of Systems   Constitutional:  Negative for  chills and fever.   HENT:  Negative for ear pain and sore throat.    Eyes:  Negative for pain and visual disturbance.   Respiratory:  Negative for cough and shortness of breath.    Cardiovascular:  Negative for chest pain and palpitations.   Gastrointestinal:  Positive for abdominal pain. Negative for nausea and vomiting.   Genitourinary:  Positive for difficulty urinating and hematuria. Negative for dysuria.   Musculoskeletal:  Negative for arthralgias and back pain.   Skin:  Negative for color change and rash.   Neurological:  Negative for seizures and syncope.   All other systems reviewed and are negative.          Objective       ED Triage Vitals   Temperature Pulse Blood Pressure Respirations SpO2 Patient Position - Orthostatic VS   03/18/25 0811 03/18/25 0743 03/18/25 0743 03/18/25 0743 03/18/25 0743 03/18/25 0743   98 °F (36.7 °C) 81 112/62 18 96 % Lying      Temp Source Heart Rate Source BP Location FiO2 (%) Pain Score    03/18/25 0811 03/18/25 0743 03/18/25 0743 -- 03/18/25 0743    Oral Monitor Right arm  8      Vitals      Date and Time Temp Pulse SpO2 Resp BP Pain Score FACES Pain Rating User   03/18/25 1245 -- 80 99 % 18 85/56 provider aware -- -- AW   03/18/25 1145 -- 78 98 % -- 95/57 -- --    03/18/25 0811 98 °F (36.7 °C) -- -- -- -- -- -- AW   03/18/25 0743 -- 81 96 % 18 112/62 8 -- AW            Physical Exam  Vitals and nursing note reviewed.   Constitutional:       General: He is not in acute distress.     Appearance: He is well-developed. He is not ill-appearing.   HENT:      Head: Normocephalic and atraumatic.      Mouth/Throat:      Mouth: Mucous membranes are moist.   Eyes:      Extraocular Movements: Extraocular movements intact.      Conjunctiva/sclera: Conjunctivae normal.   Cardiovascular:      Rate and Rhythm: Normal rate and regular rhythm.      Heart sounds: No murmur heard.  Pulmonary:      Effort: Pulmonary effort is normal. No respiratory distress.      Breath sounds: Normal breath  sounds.   Abdominal:      Palpations: Abdomen is soft.      Tenderness: There is abdominal tenderness in the suprapubic area. There is no guarding or rebound.   Genitourinary:     Penis: Normal and uncircumcised. No tenderness.       Comments: Blood at the meatus and in diaper  Musculoskeletal:         General: Normal range of motion.      Cervical back: Normal range of motion and neck supple.      Right lower leg: No edema.      Left lower leg: No edema.   Skin:     General: Skin is warm and dry.      Capillary Refill: Capillary refill takes less than 2 seconds.   Neurological:      General: No focal deficit present.      Mental Status: He is alert.         Results Reviewed       Procedure Component Value Units Date/Time    Comprehensive metabolic panel [[id number]]  (Abnormal) Collected: 03/18/25 1115    Lab Status: Final result Specimen: Blood from Arm, Right Updated: 03/18/25 1136     Sodium 132 mmol/L      Potassium 4.4 mmol/L      Chloride 101 mmol/L      CO2 22 mmol/L      ANION GAP 9 mmol/L      BUN 64 mg/dL      Creatinine 7.49 mg/dL      Glucose 128 mg/dL      Calcium 9.4 mg/dL      AST 17 U/L      ALT 18 U/L      Alkaline Phosphatase 63 U/L      Total Protein 7.6 g/dL      Albumin 4.3 g/dL      Total Bilirubin 0.66 mg/dL      eGFR 6 ml/min/1.73sq m     Narrative:      [employer] guidelines for Chronic Kidney Disease (CKD):     Stage 1 with normal or high GFR (GFR > 90 mL/min/1.73 square meters)    Stage 2 Mild CKD (GFR = 60-89 mL/min/1.73 square meters)    Stage 3A Moderate CKD (GFR = 45-59 mL/min/1.73 square meters)    Stage 3B Moderate CKD (GFR = 30-44 mL/min/1.73 square meters)    Stage 4 Severe CKD (GFR = 15-29 mL/min/1.73 square meters)    Stage 5 End Stage CKD (GFR <15 mL/min/1.73 square meters)  Note: GFR calculation is accurate only with a steady state creatinine    Lipase [[id number]]  (Normal) Collected: 03/18/25 1115    Lab Status: Final result Specimen: Blood from Arm,  Right Updated: 03/18/25 1136     Lipase 12 u/L     CBC and differential [[id number]]  (Abnormal) Collected: 03/18/25 0808    Lab Status: Final result Specimen: Blood from Arm, Left Updated: 03/18/25 0814     WBC 8.76 Thousand/uL      RBC 4.52 Million/uL      Hemoglobin 11.0 g/dL      Hematocrit 35.5 %      MCV 79 fL      MCH 24.3 pg      MCHC 31.0 g/dL      RDW 14.3 %      MPV 14.0 fL      Platelets 165 Thousands/uL      nRBC 0 /100 WBCs      Segmented % 58 %      Immature Grans % 0 %      Lymphocytes % 33 %      Monocytes % 7 %      Eosinophils Relative 2 %      Basophils Relative 0 %      Absolute Neutrophils 5.00 Thousands/µL      Absolute Immature Grans 0.03 Thousand/uL      Absolute Lymphocytes 2.88 Thousands/µL      Absolute Monocytes 0.65 Thousand/µL      Eosinophils Absolute 0.19 Thousand/µL      Basophils Absolute 0.01 Thousands/µL     UA w Reflex to Microscopic w Reflex to Culture [[id number]]     Lab Status: No result Specimen: Urine             CT abdomen pelvis wo contrast    (Results Pending)   CT follow up    (Results Pending)       Procedures    ED Medication and Procedure Management   Prior to Admission Medications   Prescriptions Last Dose Informant Patient Reported? Taking?   ARIPiprazole (ABILIFY) 20 MG tablet   No Yes   Sig: Take 1 tablet (20 mg total) by mouth daily   Multiple Vitamins-Iron (MULTIVITAMIN/IRON PO) Not Taking  Yes No   Patient not taking: Reported on 3/18/2025   acetaminophen (TYLENOL) 325 mg tablet   Yes Yes   Sig: Take 650 mg by mouth every 4 (four) hours   albuterol (ProAir HFA) 90 mcg/act inhaler Not Taking  No No   Sig: Inhale 2 puffs every 4 (four) hours as needed for wheezing   Patient not taking: Reported on 3/18/2025   aluminum-magnesium hydroxide-simethicone (MYLANTA) 200-200-20 mg/5 mL suspension Not Taking  No No   Sig: Take 30 mL by mouth every 6 (six) hours as needed for indigestion or heartburn   Patient not taking: Reported on 3/18/2025   aspirin 81 mg chewable  tablet   No Yes   Sig: Chew 1 tablet (81 mg total) daily   atorvastatin (LIPITOR) 40 mg tablet   No Yes   Sig: Take 1 tablet (40 mg total) by mouth daily with dinner And stop Pravastatin when starting this medication   budesonide-formoterol (SYMBICORT) 160-4.5 mcg/act inhaler   Yes Yes   Sig: INHALE 2 PUFFS BY MOUTH TWICE A DAY *RINSE MOUTH WITH WATER AND SPIT*   docusate sodium (COLACE) 100 mg capsule Not Taking  No No   Sig: Take 1 capsule (100 mg total) by mouth 2 (two) times a day as needed for constipation   Patient not taking: Reported on 3/18/2025   furosemide (LASIX) 40 mg tablet Not Taking  No No   Sig: Take 1 tablet (40 mg total) by mouth daily Do not start before January 25, 2023.   Patient not taking: Reported on 3/18/2025   lisinopril (ZESTRIL) 20 mg tablet Not Taking  No No   Sig: Take 1 tablet (20 mg total) by mouth daily   Patient not taking: Reported on 3/18/2025   loperamide (IMODIUM A-D) 2 MG tablet   Yes Yes   Sig: Take 2 mg by mouth every 6 (six) hours   magnesium oxide (MAG-OX) 400 mg   No Yes   Sig: Take 1 tablet (400 mg total) by mouth 2 (two) times a day   metFORMIN (GLUCOPHAGE) 500 mg tablet Not Taking  No No   Sig: Take 1 tablet (500 mg total) by mouth 2 (two) times a day with meals   Patient not taking: Reported on 3/18/2025   methocarbamol (ROBAXIN) 750 mg tablet   No No   Sig: Take 1 tablet (750 mg total) by mouth every 6 (six) hours as needed for muscle spasms (back pain/initial rx.) for up to 5 days   metoprolol tartrate (LOPRESSOR) 25 mg tablet Not Taking  No No   Sig: Take 1 tablet (25 mg total) by mouth every 12 (twelve) hours   Patient not taking: Reported on 3/18/2025   mineral oil enema   Yes Yes   Sig: Insert 1 enema into the rectum once   ondansetron (ZOFRAN) 4 mg tablet   Yes Yes   Sig: Take 4 mg by mouth every 6 (six) hours   risperiDONE (RisperDAL) 1 mg tablet   Yes Yes   Sig: TAKE ONE TABLET BY MOUTH TWICE A DAY FOR MOOD   senna (SENOKOT) 8.6 mg Not Taking  No No   Sig:  Take 1 tablet (8.6 mg total) by mouth 2 (two) times a day   Patient not taking: Reported on 3/18/2025      Facility-Administered Medications: None     Patient's Medications   Discharge Prescriptions    No medications on file     No discharge procedures on file.  ED SEPSIS DOCUMENTATION   Time reflects when diagnosis was documented in both MDM as applicable and the Disposition within this note       Time User Action Codes Description Comment    3/18/2025  8:13 AM iK Walker [R31.9] Hematuria     3/18/2025  8:13 AM Ki Walker [R39.198] Difficulty urinating     3/18/2025  8:13 AM Ki Walker [R10.9] Abdominal pain     3/18/2025 11:37 AM Ki Walker [N17.9] Acute renal failure (ARF) (HCC)                  Ki Walker DO  03/18/25 1443

## 2025-03-18 NOTE — PLAN OF CARE
Problem: PAIN - ADULT  Goal: Verbalizes/displays adequate comfort level or baseline comfort level  Description: Interventions:  - Encourage patient to monitor pain and request assistance  - Assess pain using appropriate pain scale  - Administer analgesics based on type and severity of pain and evaluate response  - Implement non-pharmacological measures as appropriate and evaluate response  - Consider cultural and social influences on pain and pain management  - Notify physician/advanced practitioner if interventions unsuccessful or patient reports new pain  Outcome: Progressing     Problem: INFECTION - ADULT  Goal: Absence or prevention of progression during hospitalization  Description: INTERVENTIONS:  - Assess and monitor for signs and symptoms of infection  - Monitor lab/diagnostic results  - Monitor all insertion sites, i.e. indwelling lines, tubes, and drains  - Monitor endotracheal if appropriate and nasal secretions for changes in amount and color  - [place] appropriate cooling/warming therapies per order  - Administer medications as ordered  - Instruct and encourage patient and family to use good hand hygiene technique  - Identify and instruct in appropriate isolation precautions for identified infection/condition  Outcome: Progressing  Goal: Absence of fever/infection during neutropenic period  Description: INTERVENTIONS:  - Monitor WBC    Outcome: Progressing     Problem: SAFETY ADULT  Goal: Patient will remain free of falls  Description: INTERVENTIONS:  - Educate patient/family on patient safety including physical limitations  - Instruct patient to call for assistance with activity   - Consult OT/PT to assist with strengthening/mobility   - Keep Call bell within reach  - Keep bed low and locked with side rails adjusted as appropriate  - Keep care items and personal belongings within reach  - Initiate and maintain comfort rounds  - Make Fall Risk Sign visible to staff  - Offer Toileting every 2 Hours,  in advance of need  - Initiate/Maintain bed alarm  - Obtain necessary fall risk management equipment: yellow socks  - Apply yellow socks and bracelet for high fall risk patients  - Consider moving patient to room near nurses station  Outcome: Progressing  Goal: Maintain or return to baseline ADL function  Description: INTERVENTIONS:  -  Assess patient's ability to carry out ADLs; assess patient's baseline for ADL function and identify physical deficits which impact ability to perform ADLs (bathing, care of mouth/teeth, toileting, grooming, dressing, etc.)  - Assess/evaluate cause of self-care deficits   - Assess range of motion  - Assess patient's mobility; develop plan if impaired  - Assess patient's need for assistive devices and provide as appropriate  - Encourage maximum independence but intervene and supervise when necessary  - Involve family in performance of ADLs  - Assess for home care needs following discharge   - Consider OT consult to assist with ADL evaluation and planning for discharge  - Provide patient education as appropriate  Outcome: Progressing  Goal: Maintains/Returns to pre admission functional level  Description: INTERVENTIONS:  - Perform AM-PAC 6 Click Basic Mobility/ Daily Activity assessment daily.  - Set and communicate daily mobility goal to care team and patient/family/caregiver.   - Collaborate with rehabilitation services on mobility goals if consulted  - Perform Range of Motion 3 times a day.  - Reposition patient every 2 hours.  - Dangle patient 3 times a day  - Stand patient 3 times a day  - Ambulate patient 3 times a day  - Out of bed to chair 3 times a day   - Out of bed for meals 3 times a day  - Out of bed for toileting  - Record patient progress and toleration of activity level   Outcome: Progressing     Problem: DISCHARGE PLANNING  Goal: Discharge to home or other facility with appropriate resources  Description: INTERVENTIONS:  - Identify barriers to discharge w/patient and  caregiver  - Arrange for needed discharge resources and transportation as appropriate  - Identify discharge learning needs (meds, wound care, etc.)  - Arrange for interpretive services to assist at discharge as needed  - Refer to Case Management Department for coordinating discharge planning if the patient needs post-hospital services based on physician/advanced practitioner order or complex needs related to functional status, cognitive ability, or social support system  Outcome: Progressing     Problem: Knowledge Deficit  Goal: Patient/family/caregiver demonstrates understanding of disease process, treatment plan, medications, and discharge instructions  Description: Complete learning assessment and assess knowledge base.  Interventions:  - Provide teaching at level of understanding  - Provide teaching via preferred learning methods  Outcome: Progressing     Problem: GENITOURINARY - ADULT  Goal: Maintains or returns to baseline urinary function  Description: INTERVENTIONS:  - Assess urinary function  - Encourage oral fluids to ensure adequate hydration if ordered  - Administer IV fluids as ordered to ensure adequate hydration  - Administer ordered medications as needed  - Offer frequent toileting  - Follow urinary retention protocol if ordered  Outcome: Progressing  Goal: Absence of urinary retention  Description: INTERVENTIONS:  - Assess patient’s ability to void and empty bladder  - Monitor I/O  - Bladder scan as needed  - Discuss with physician/AP medications to alleviate retention as needed  - Discuss catheterization for long term situations as appropriate  Outcome: Progressing  Goal: Urinary catheter remains patent  Description: INTERVENTIONS:  - Assess patency of urinary catheter  - If patient has a chronic otero, consider changing catheter if non-functioning  - Follow guidelines for intermittent irrigation of non-functioning urinary catheter  Outcome: Progressing     Problem: METABOLIC, FLUID AND  ELECTROLYTES - ADULT  Goal: Electrolytes maintained within normal limits  Description: INTERVENTIONS:  - Monitor labs and assess patient for signs and symptoms of electrolyte imbalances  - Administer electrolyte replacement as ordered  - Monitor response to electrolyte replacements, including repeat lab results as appropriate  - Instruct patient on fluid and nutrition as appropriate  Outcome: Progressing  Goal: Fluid balance maintained  Description: INTERVENTIONS:  - Monitor labs   - Monitor I/O and WT  - Instruct patient on fluid and nutrition as appropriate  - Assess for signs & symptoms of volume excess or deficit  Outcome: Progressing  Goal: Glucose maintained within target range  Description: INTERVENTIONS:  - Monitor Blood Glucose as ordered  - Assess for signs and symptoms of hyperglycemia and hypoglycemia  - Administer ordered medications to maintain glucose within target range  - Assess nutritional intake and initiate nutrition service referral as needed  Outcome: Progressing     Problem: MUSCULOSKELETAL - ADULT  Goal: Maintain or return mobility to safest level of function  Description: INTERVENTIONS:  - Assess patient's ability to carry out ADLs; assess patient's baseline for ADL function and identify physical deficits which impact ability to perform ADLs (bathing, care of mouth/teeth, toileting, grooming, dressing, etc.)  - Assess/evaluate cause of self-care deficits   - Assess range of motion  - Assess patient's mobility  - Assess patient's need for assistive devices and provide as appropriate  - Encourage maximum independence but intervene and supervise when necessary  - Involve family in performance of ADLs  - Assess for home care needs following discharge   - Consider OT consult to assist with ADL evaluation and planning for discharge  - Provide patient education as appropriate  Outcome: Progressing  Goal: Maintain proper alignment of affected body part  Description: INTERVENTIONS:  - Support,  maintain and protect limb and body alignment  - Provide patient/ family with appropriate education  Outcome: Progressing

## 2025-03-18 NOTE — ASSESSMENT & PLAN NOTE
Medications verified from facility list.  Continue aripiprazole 20 mg daily, Risperdal 1 mg twice a day

## 2025-03-18 NOTE — ASSESSMENT & PLAN NOTE
Suspect this is traumatic due to recent straight catheterization.  Observe and monitor for further hematuria.

## 2025-03-18 NOTE — ED NOTES
RN confirmed the diet of the pt and notified facility that pt was being admitted     Mya Hidalgo RN  03/18/25 3254

## 2025-03-18 NOTE — ED ATTENDING ATTESTATION
3/18/2025  I, Eufemia Miles DO, saw and evaluated the patient. I have discussed the patient with the resident/non-physician practitioner and agree with the resident's/non-physician practitioner's findings, Plan of Care, and MDM as documented in the resident's/non-physician practitioner's note, except where noted. All available labs and Radiology studies were reviewed.  I was present for key portions of any procedure(s) performed by the resident/non-physician practitioner and I was immediately available to provide assistance.       At this point I agree with the current assessment done in the Emergency Department.  I have conducted an independent evaluation of this patient a history and physical is as follows:    ED Course     74 y.o. M w/h/o schizophrenia, enlarged prostate p/w abd pain/hematuria.  From facility.  Had difficulty urinating yesterday and was straight cath'd.  Started to have lower abd pain afterward. Noticed blood in diaper.  On exam, pt in NAD.  Heart RRR. Lungs CTAB. Abd soft with suprapubic TTP without peritoneal signs.  Blood at urethral meatus. Plan: Bladder scan, labs, UA, CT A/P.    Critical Care Time  Procedures    Critical Care Time Statement: Upon my evaluation, this patient had a high probability of imminent or life-threatening deterioration due to hypotension, which required my direct attention, intervention, and personal management.  I spent a total of 31 minutes directly providing critical care services, including interpretation of complex medical databases, evaluating for the presence of life-threatening injuries or illnesses, management of organ system failure(s) , and complex medical decision making (to support/prevent further life-threatening deterioration).. This time is exclusive of procedures, teaching, treating other patients, family meetings, and any prior time recorded by providers other than myself.

## 2025-03-18 NOTE — ASSESSMENT & PLAN NOTE
BUN to creatinine ratio less than 20 suggestive of intrinsic kidney disease,  possible ATN.  CT without obstruction.  Hydrate with Plasma-Lyte 125 mL/h   Consult nephrology given severity of disease.  Monitor weights, ins and outs and electrolytes.

## 2025-03-18 NOTE — ASSESSMENT & PLAN NOTE
Due to under distention in the setting of dehydration and anuria.  UA not consistent with infection  Observe  off antibiotics.

## 2025-03-18 NOTE — H&P
"H&P - Hospitalist   Name: Lorne Waterman 74 y.o. male I MRN: [id number]  Unit/Bed#: ED-30 I Date of Admission: 3/18/2025   Date of Service: 3/18/2025 I Hospital Day: 0     Assessment & Plan  DARIO (acute kidney injury) (HCC)  BUN to creatinine ratio less than 20 suggestive of intrinsic kidney disease,  possible ATN.  CT without obstruction.  Hydrate with Plasma-Lyte 125 mL/h   Consult nephrology given severity of disease.  Monitor weights, ins and outs and electrolytes.  Hematuria  Suspect this is traumatic due to recent straight catheterization.  Observe and monitor for further hematuria.  Bladder wall thickening  Due to under distention in the setting of dehydration and anuria.  UA not consistent with infection  Observe  off antibiotics.  Schizophrenia ([facility])  Medications verified from facility list.  Continue aripiprazole 20 mg daily, Risperdal 1 mg twice a day  COPD without exacerbation ([facility])  Stable.  Continue budesonide/formoterol 160-4.5 mg 2 puffs twice daily.  Albuterol as needed.      VTE Pharmacologic Prophylaxis:   Heparin  Code Status: Prior full code per patient  Anticipated Length of Stay: Patient will be admitted on an inpatient basis with an anticipated length of stay of greater than 2 midnights secondary to DARIO.    History of Present Illness   Chief Complaint:  sent from facility.    Lorne Waterman is a 74 y.o. male with a PMH of schizophrenia, COPD, diet-controlled diabetes, PTSD, hypertension, ambulatory dysfunction who was previously residing at [facility].  He was sent to the hospital due to hematuria.  History was obtained from ER notes, patient and nurse from the facility.  According to Carmen, he was not feeling well over the weekend.  He complained of nausea and poor appetite.  He also had diarrhea.  Labs were drawn there and he was noted to be \" dehydrated\" so they planned to give him IV fluids.  They also did a straight cath for urine sample yesterday.  Since the " straight cath he has had gross hematuria so he was sent here.  In the ED they did a bladder scan and only had 16 mL seen.  There was no report of gross hematuria but his briefs were bloodstained cording to his nurse.  She also confirmed that he is not on lisinopril, furosemide or metformin.        Review of Systems   Constitutional:  Positive for appetite change.   HENT: Negative.     Respiratory: Negative.     Genitourinary:  Positive for decreased urine volume and hematuria.   Musculoskeletal: Negative.    Neurological: Negative.    Psychiatric/Behavioral: Negative.         Historical Information   Past Medical History:   Diagnosis Date    Anxiety     CHF (congestive heart failure) ([facility])     COPD (chronic obstructive pulmonary disease) ([facility])     Depression     Diabetes mellitus ([facility])     Enlarged prostate     Hallucination     Hyperlipidemia     Hypertension     Memory loss     Panic attack     Panic disorder     Psychiatric disorder     Psychiatric illness     Psychosis ([facility])     PTSD (post-traumatic stress disorder)     Schizoaffective disorder ([facility])     Schizophrenia ([facility])      No past surgical history on file.  Social History     Tobacco Use    Smoking status: Former     Current packs/day: 0.00     Average packs/day: 2.0 packs/day for 0.5 years (1.0 ttl pk-yrs)     Types: Cigarettes     Start date: 1992     Quit date:      Years since quittin.2    Smokeless tobacco: Never    Tobacco comments:     quit in    Vaping Use    Vaping status: Never Used   Substance and Sexual Activity    Alcohol use: Never    Drug use: Never    Sexual activity: Never     E-Cigarette/Vaping    E-Cigarette Use Never User      E-Cigarette/Vaping Substances    Nicotine No     THC No     CBD No     Flavoring No     Other No     Unknown No      No family history on file.  Social History:  Marital Status: /Civil Union   Occupation: none  Patient Pre-hospital Living Situation: Long Term Care Facility  Patient  Pre-hospital Level of Mobility: unable to be assessed at time of evaluation  Patient Pre-hospital Diet Restrictions: none    Meds/Allergies   I have reveiwed home medications using records provided by SNF.  Prior to Admission medications    Medication Sig Start Date End Date Taking? Authorizing Provider   acetaminophen (TYLENOL) 325 mg tablet Take 650 mg by mouth every 4 (four) hours   Yes Historical Provider, MD   ARIPiprazole (ABILIFY) 20 MG tablet Take 1 tablet (20 mg total) by mouth daily 3/30/18  Yes Shweta Coronado PA-C   aspirin 81 mg chewable tablet Chew 1 tablet (81 mg total) daily 3/24/18  Yes MACKENZIE Alcala   atorvastatin (LIPITOR) 40 mg tablet Take 1 tablet (40 mg total) by mouth daily with dinner And stop Pravastatin when starting this medication 1/24/23  Yes Niranjan GARCIA MD   budesonide-formoterol (SYMBICORT) 160-4.5 mcg/act inhaler INHALE 2 PUFFS BY MOUTH TWICE A DAY *RINSE MOUTH WITH WATER AND SPIT* 7/1/20  Yes Historical Provider, MD   loperamide (IMODIUM A-D) 2 MG tablet Take 2 mg by mouth every 6 (six) hours   Yes Historical Provider, MD   magnesium oxide (MAG-OX) 400 mg Take 1 tablet (400 mg total) by mouth 2 (two) times a day 4/29/19  Yes Bruna Huynh MD   mineral oil enema Insert 1 enema into the rectum once   Yes Historical Provider, MD   ondansetron (ZOFRAN) 4 mg tablet Take 4 mg by mouth every 6 (six) hours   Yes Historical Provider, MD   risperiDONE (RisperDAL) 1 mg tablet TAKE ONE TABLET BY MOUTH TWICE A DAY FOR MOOD 4/22/20  Yes Historical Provider, MD   albuterol (ProAir HFA) 90 mcg/act inhaler Inhale 2 puffs every 4 (four) hours as needed for wheezing  Patient not taking: Reported on 3/18/2025 4/6/23   Prasanna [facility] MD   aluminum-magnesium hydroxide-simethicone (MYLANTA) 200-200-20 mg/5 mL suspension Take 30 mL by mouth every 6 (six) hours as needed for indigestion or heartburn  Patient not taking: Reported on 3/18/2025 10/14/20   MACKENZIE Holbrookte  sodium (COLACE) 100 mg capsule Take 1 capsule (100 mg total) by mouth 2 (two) times a day as needed for constipation  Patient not taking: Reported on 3/18/2025 3/23/18   MACKENZIE Alcala   furosemide (LASIX) 40 mg tablet Take 1 tablet (40 mg total) by mouth daily Do not start before January 25, 2023.  Patient not taking: Reported on 3/18/2025 1/25/23   Niranjan GARCIA MD   lisinopril (ZESTRIL) 20 mg tablet Take 1 tablet (20 mg total) by mouth daily  Patient not taking: Reported on 3/18/2025 5/3/19   Bruna Huynh MD   metFORMIN (GLUCOPHAGE) 500 mg tablet Take 1 tablet (500 mg total) by mouth 2 (two) times a day with meals  Patient not taking: Reported on 3/18/2025 3/29/18   Shweta Coronado PA-C   methocarbamol (ROBAXIN) 750 mg tablet Take 1 tablet (750 mg total) by mouth every 6 (six) hours as needed for muscle spasms (back pain/initial rx.) for up to 5 days 5/4/22 5/9/22  Austin Sharma PA-C   metoprolol tartrate (LOPRESSOR) 25 mg tablet Take 1 tablet (25 mg total) by mouth every 12 (twelve) hours  Patient not taking: Reported on 3/18/2025 3/29/18   Shweta Coronado PA-C   Multiple Vitamins-Iron (MULTIVITAMIN/IRON PO)  6/8/20   Historical Provider, MD   senna (SENOKOT) 8.6 mg Take 1 tablet (8.6 mg total) by mouth 2 (two) times a day  Patient not taking: Reported on 3/18/2025 10/14/20   MACKENZIE Holbrook     No Known Allergies    Objective :  Temp:  [98 °F (36.7 °C)] 98 °F (36.7 °C)  HR:  [78-84] 84  BP: ()/(53-62) 87/53  Resp:  [18] 18  SpO2:  [96 %-99 %] 96 %  O2 Device: None (Room air)    Physical Exam  Vitals reviewed.   Constitutional:       Appearance: He is not ill-appearing.   HENT:      Head: Normocephalic and atraumatic.      Nose: No congestion or rhinorrhea.   Eyes:      General: No scleral icterus.  Cardiovascular:      Rate and Rhythm: Normal rate and regular rhythm.   Pulmonary:      Breath sounds: No wheezing or rhonchi.   Abdominal:      Palpations: Abdomen is soft.       Tenderness: There is no abdominal tenderness. There is no guarding.   Musculoskeletal:      Right lower leg: No edema.      Left lower leg: No edema.   Skin:     General: Skin is warm and dry.   Neurological:      Comments: Awake alert cooperative   Psychiatric:         Mood and Affect: Mood normal.         Behavior: Behavior normal.          Lines/Drains:            Lab Results: I have reviewed the following results:  Results from last 7 days   Lab Units 03/18/25  0808   WBC Thousand/uL 8.76   HEMOGLOBIN g/dL 11.0*   HEMATOCRIT % 35.5*   PLATELETS Thousands/uL 165   SEGS PCT % 58   LYMPHO PCT % 33   MONO PCT % 7   EOS PCT % 2     Results from last 7 days   Lab Units 03/18/25  1115   SODIUM mmol/L 132*   POTASSIUM mmol/L 4.4   CHLORIDE mmol/L 101   CO2 mmol/L 22   BUN mg/dL 64*   CREATININE mg/dL 7.49*   ANION GAP mmol/L 9   CALCIUM mg/dL 9.4   ALBUMIN g/dL 4.3   TOTAL BILIRUBIN mg/dL 0.66   ALK PHOS U/L 63   ALT U/L 18   AST U/L 17   GLUCOSE RANDOM mg/dL 128             Lab Results   Component Value Date    HGBA1C 8.8 (H) 12/21/2024    HGBA1C 8.7 (H) 03/30/2024    HGBA1C 8.7 (H) 02/22/2024           Imaging Results Review: I reviewed radiology reports from this admission including: CT abdomen/pelvis.      Administrative Statements       ** Please Note: This note has been constructed using a voice recognition system. **

## 2025-03-19 PROBLEM — E87.20 METABOLIC ACIDOSIS: Status: ACTIVE | Noted: 2025-03-19

## 2025-03-19 PROBLEM — D63.1 ANEMIA DUE TO CHRONIC KIDNEY DISEASE: Status: ACTIVE | Noted: 2025-03-19

## 2025-03-19 PROBLEM — E87.1 HYPONATREMIA: Status: ACTIVE | Noted: 2025-03-19

## 2025-03-19 PROBLEM — N18.9 ANEMIA DUE TO CHRONIC KIDNEY DISEASE: Status: ACTIVE | Noted: 2025-03-19

## 2025-03-19 LAB
ANION GAP SERPL CALCULATED.3IONS-SCNC: 10 MMOL/L (ref 4–13)
BACTERIA UR QL AUTO: ABNORMAL /HPF
BILIRUB UR QL STRIP: NEGATIVE
BUN SERPL-MCNC: 72 MG/DL (ref 5–25)
CALCIUM SERPL-MCNC: 8.6 MG/DL (ref 8.4–10.2)
CHLORIDE SERPL-SCNC: 104 MMOL/L (ref 96–108)
CLARITY UR: CLEAR
CO2 SERPL-SCNC: 19 MMOL/L (ref 21–32)
COLOR UR: YELLOW
CREAT SERPL-MCNC: 6.75 MG/DL (ref 0.6–1.3)
ERYTHROCYTE [DISTWIDTH] IN BLOOD BY AUTOMATED COUNT: 14.1 % (ref 11.6–15.1)
EST. AVERAGE GLUCOSE BLD GHB EST-MCNC: 174 MG/DL
FERRITIN SERPL-MCNC: 87 NG/ML (ref 24–336)
GFR SERPL CREATININE-BSD FRML MDRD: 7 ML/MIN/1.73SQ M
GLUCOSE SERPL-MCNC: 124 MG/DL (ref 65–140)
GLUCOSE SERPL-MCNC: 171 MG/DL (ref 65–140)
GLUCOSE SERPL-MCNC: 200 MG/DL (ref 65–140)
GLUCOSE SERPL-MCNC: 216 MG/DL (ref 65–140)
GLUCOSE UR STRIP-MCNC: NEGATIVE MG/DL
HBA1C MFR BLD: 7.7 %
HCT VFR BLD AUTO: 31.2 % (ref 36.5–49.3)
HGB BLD-MCNC: 9.7 G/DL (ref 12–17)
HGB UR QL STRIP.AUTO: ABNORMAL
HYALINE CASTS #/AREA URNS LPF: ABNORMAL /LPF
IRON SATN MFR SERPL: 17 % (ref 15–50)
IRON SERPL-MCNC: 40 UG/DL (ref 50–212)
KETONES UR STRIP-MCNC: NEGATIVE MG/DL
LEUKOCYTE ESTERASE UR QL STRIP: NEGATIVE
MCH RBC QN AUTO: 23.8 PG (ref 26.8–34.3)
MCHC RBC AUTO-ENTMCNC: 31.1 G/DL (ref 31.4–37.4)
MCV RBC AUTO: 77 FL (ref 82–98)
NITRITE UR QL STRIP: NEGATIVE
NON-SQ EPI CELLS URNS QL MICRO: ABNORMAL /HPF
PH UR STRIP.AUTO: 5 [PH]
PLATELET # BLD AUTO: 122 THOUSANDS/UL (ref 149–390)
PLATELET # BLD AUTO: 164 THOUSANDS/UL (ref 149–390)
PMV BLD AUTO: 13.2 FL (ref 8.9–12.7)
PMV BLD AUTO: 13.3 FL (ref 8.9–12.7)
POTASSIUM SERPL-SCNC: 4.2 MMOL/L (ref 3.5–5.3)
PROT UR STRIP-MCNC: ABNORMAL MG/DL
RBC # BLD AUTO: 4.07 MILLION/UL (ref 3.88–5.62)
RBC #/AREA URNS AUTO: ABNORMAL /HPF
SODIUM SERPL-SCNC: 133 MMOL/L (ref 135–147)
SP GR UR STRIP.AUTO: 1.01 (ref 1–1.03)
TIBC SERPL-MCNC: 242.2 UG/DL (ref 250–450)
TRANSFERRIN SERPL-MCNC: 173 MG/DL (ref 203–362)
UIBC SERPL-MCNC: 202 UG/DL (ref 155–355)
UROBILINOGEN UR STRIP-ACNC: <2 MG/DL
WBC # BLD AUTO: 7.19 THOUSAND/UL (ref 4.31–10.16)
WBC #/AREA URNS AUTO: ABNORMAL /HPF

## 2025-03-19 PROCEDURE — 83036 HEMOGLOBIN GLYCOSYLATED A1C: CPT | Performed by: STUDENT IN AN ORGANIZED HEALTH CARE EDUCATION/TRAINING PROGRAM

## 2025-03-19 PROCEDURE — 85049 AUTOMATED PLATELET COUNT: CPT | Performed by: INTERNAL MEDICINE

## 2025-03-19 PROCEDURE — 82948 REAGENT STRIP/BLOOD GLUCOSE: CPT

## 2025-03-19 PROCEDURE — 81001 URINALYSIS AUTO W/SCOPE: CPT | Performed by: INTERNAL MEDICINE

## 2025-03-19 PROCEDURE — 82728 ASSAY OF FERRITIN: CPT | Performed by: STUDENT IN AN ORGANIZED HEALTH CARE EDUCATION/TRAINING PROGRAM

## 2025-03-19 PROCEDURE — 99223 1ST HOSP IP/OBS HIGH 75: CPT | Performed by: UROLOGY

## 2025-03-19 PROCEDURE — 99223 1ST HOSP IP/OBS HIGH 75: CPT | Performed by: INTERNAL MEDICINE

## 2025-03-19 PROCEDURE — 80048 BASIC METABOLIC PNL TOTAL CA: CPT | Performed by: INTERNAL MEDICINE

## 2025-03-19 PROCEDURE — 83550 IRON BINDING TEST: CPT | Performed by: STUDENT IN AN ORGANIZED HEALTH CARE EDUCATION/TRAINING PROGRAM

## 2025-03-19 PROCEDURE — 87081 CULTURE SCREEN ONLY: CPT | Performed by: INTERNAL MEDICINE

## 2025-03-19 PROCEDURE — 85027 COMPLETE CBC AUTOMATED: CPT | Performed by: INTERNAL MEDICINE

## 2025-03-19 PROCEDURE — 99232 SBSQ HOSP IP/OBS MODERATE 35: CPT | Performed by: STUDENT IN AN ORGANIZED HEALTH CARE EDUCATION/TRAINING PROGRAM

## 2025-03-19 PROCEDURE — 83540 ASSAY OF IRON: CPT | Performed by: STUDENT IN AN ORGANIZED HEALTH CARE EDUCATION/TRAINING PROGRAM

## 2025-03-19 RX ORDER — INSULIN LISPRO 100 [IU]/ML
2-12 INJECTION, SOLUTION INTRAVENOUS; SUBCUTANEOUS
Status: DISCONTINUED | OUTPATIENT
Start: 2025-03-19 | End: 2025-03-21 | Stop reason: HOSPADM

## 2025-03-19 RX ADMIN — ACETAMINOPHEN 650 MG: 325 TABLET, FILM COATED ORAL at 05:05

## 2025-03-19 RX ADMIN — BUDESONIDE AND FORMOTEROL FUMARATE DIHYDRATE 2 PUFF: 160; 4.5 AEROSOL RESPIRATORY (INHALATION) at 17:26

## 2025-03-19 RX ADMIN — HEPARIN SODIUM 5000 UNITS: 5000 INJECTION INTRAVENOUS; SUBCUTANEOUS at 13:47

## 2025-03-19 RX ADMIN — ACETAMINOPHEN 650 MG: 325 TABLET, FILM COATED ORAL at 21:15

## 2025-03-19 RX ADMIN — ARIPIPRAZOLE 20 MG: 10 TABLET ORAL at 08:39

## 2025-03-19 RX ADMIN — RISPERIDONE 1 MG: 1 TABLET, FILM COATED ORAL at 08:40

## 2025-03-19 RX ADMIN — BUDESONIDE AND FORMOTEROL FUMARATE DIHYDRATE 2 PUFF: 160; 4.5 AEROSOL RESPIRATORY (INHALATION) at 08:40

## 2025-03-19 RX ADMIN — INSULIN LISPRO 2 UNITS: 100 INJECTION, SOLUTION INTRAVENOUS; SUBCUTANEOUS at 11:45

## 2025-03-19 RX ADMIN — INSULIN LISPRO 2 UNITS: 100 INJECTION, SOLUTION INTRAVENOUS; SUBCUTANEOUS at 16:29

## 2025-03-19 RX ADMIN — ACETAMINOPHEN 650 MG: 325 TABLET, FILM COATED ORAL at 17:26

## 2025-03-19 RX ADMIN — ASPIRIN 81 MG: 81 TABLET, CHEWABLE ORAL at 08:39

## 2025-03-19 RX ADMIN — HEPARIN SODIUM 5000 UNITS: 5000 INJECTION INTRAVENOUS; SUBCUTANEOUS at 21:15

## 2025-03-19 RX ADMIN — RISPERIDONE 1 MG: 1 TABLET, FILM COATED ORAL at 17:26

## 2025-03-19 RX ADMIN — ACETAMINOPHEN 650 MG: 325 TABLET, FILM COATED ORAL at 09:33

## 2025-03-19 RX ADMIN — ACETAMINOPHEN 650 MG: 325 TABLET, FILM COATED ORAL at 13:47

## 2025-03-19 RX ADMIN — SODIUM BICARBONATE 125 ML/HR: 84 INJECTION, SOLUTION INTRAVENOUS at 22:05

## 2025-03-19 RX ADMIN — SODIUM BICARBONATE 125 ML/HR: 84 INJECTION, SOLUTION INTRAVENOUS at 08:30

## 2025-03-19 RX ADMIN — HEPARIN SODIUM 5000 UNITS: 5000 INJECTION INTRAVENOUS; SUBCUTANEOUS at 05:05

## 2025-03-19 RX ADMIN — SODIUM CHLORIDE, SODIUM GLUCONATE, SODIUM ACETATE, POTASSIUM CHLORIDE, MAGNESIUM CHLORIDE, SODIUM PHOSPHATE, DIBASIC, AND POTASSIUM PHOSPHATE 125 ML/HR: .53; .5; .37; .037; .03; .012; .00082 INJECTION, SOLUTION INTRAVENOUS at 01:52

## 2025-03-19 NOTE — ASSESSMENT & PLAN NOTE
CT of the chest: Mild emphysematous changes central pulmonary arterial enlargement which may be seen setting of pulmonary hypertension

## 2025-03-19 NOTE — ASSESSMENT & PLAN NOTE
mild diffuse bladder wall thickening in an otherwise underdistended bladder. notable prostatomegaly measuring approximately 115cc gland volume; Likely some chronic bladder outlet obstruction from the BPH. Can consider initiation of tamsulosin 0.4mg qhs if symptomatic.  can followup in the outpatient setting

## 2025-03-19 NOTE — NURSING NOTE
Patient came from complete care with midline on the right basilic vein.  Informed SLIM and nurse manager.  The midline is checked by the vascular nurse, documented on the avatar in EPIC and redressed with chlorhexidine dressing, saline locked.  As per policy it can be used.

## 2025-03-19 NOTE — ASSESSMENT & PLAN NOTE
Etiology: Prerenal with furosemide/ACE inhibitor/hypotension leading to ATN doubt intrinsic disease  Baseline creatinine 1-1.28  Creatinine on admission which was peak was 7.49 improving now to 6.75  UA: Moderate heme/1+ proteinuria/30-50 RBCs 4-10 WBCs  Imaging: CT without contrast demonstrates bilateral simple renal cysts no hydronephrosis no hydroureter; bladder mild circumferential wall thickening secondary incomplete distention however cystitis cannot be ruled out    Recommend:  Workup:  - PVR with bladder scans  -  evaluation regarding gross hematuria    Treatment:  - IV fluids  - Hold ACE inhibitor and furosemide  - Avoid hypotension and nephrotoxic agents

## 2025-03-19 NOTE — ASSESSMENT & PLAN NOTE
Unclear if this is purely traumatic in nature secondary to catheterization. Urology consulted at the request of nephrology.

## 2025-03-19 NOTE — ASSESSMENT & PLAN NOTE
74-year-old male was admitted due to acute kidney injury.  Etiology deemed renal likely secondary to Lasix, ACE inhibitor, and hypotension potentially leading to ATN.  Management per nephrology.  Continue IV hydration.  CT without contrast demonstrates bilateral simple renal cysts no hydronephrosis no hydroureter; bladder mild circumferential wall thickening secondary incomplete distention however cystitis cannot be ruled out  I/Os

## 2025-03-19 NOTE — ASSESSMENT & PLAN NOTE
found to have creatinine over 7, with baseline 1.0  has not been making much urine the past 2-3 days  he was straight cathed due to concern for urinary retention, but not much urine there  bladder scan 16ml, 29ml, 126ml  He just voided for first time 150cc of clear yellow urine into the urinal    Lab Results   Component Value Date    CREATININE 6.75 (H) 03/19/2025

## 2025-03-19 NOTE — ASSESSMENT & PLAN NOTE
Not in acute exacerbation  Continue budesonide/formoterol 160-4.5 mg 2 puffs twice daily.  Albuterol as needed.

## 2025-03-19 NOTE — ASSESSMENT & PLAN NOTE
?  Etiology probable component of blood loss but also chronic  Recommend  - Workup: SPEP UPEP light chain ratio rule out myeloma/FIT/iron studies/B12/folate

## 2025-03-19 NOTE — ASSESSMENT & PLAN NOTE
Wt Readings from Last 3 Encounters:   03/18/25 121 kg (266 lb 12.1 oz)   03/30/24 123 kg (271 lb 2.7 oz)   02/23/24 130 kg (286 lb)     Patient appears hypovolemic. Echo from 2/2024 showing EF of 55% and grade 1 DD

## 2025-03-19 NOTE — CONSULTS
Consultation - Nephrology   Name: Lorne Waterman 74 y.o. male I MRN: [id number]  Unit/Bed#: E2 -01 I Date of Admission: 3/18/2025   Date of Service: 3/19/2025 I Hospital Day: 1   Inpatient consult to Nephrology  Consult performed by: Prasanna Soto MD  Consult ordered by: Dakota Santo MD        Physician Requesting Evaluation: Denilson Caldera MD   Reason for Evaluation / Principal Problem: DARIO    Assessment & Plan  DARIO (acute kidney injury) (HCC)  Etiology: Prerenal with furosemide/ACE inhibitor/hypotension leading to ATN doubt intrinsic disease  Baseline creatinine 1-1.28  Creatinine on admission which was peak was 7.49 improving now to 6.75  UA: Moderate heme/1+ proteinuria/30-50 RBCs 4-10 WBCs  Imaging: CT without contrast demonstrates bilateral simple renal cysts no hydronephrosis no hydroureter; bladder mild circumferential wall thickening secondary incomplete distention however cystitis cannot be ruled out    Recommend:  Workup:  - PVR with bladder scans  -  evaluation regarding gross hematuria    Treatment:  - IV fluids  - Hold ACE inhibitor and furosemide  - Avoid hypotension and nephrotoxic agents  Hyponatremia  May be related to DARIO and decreased solute intake  Recc  -isotonic IVF   -ensure for solute   -fluid rest of 1500 ml  -nutritional supplement   -if persists would do further w/u  Metabolic acidosis  Secondary to DARIO will place on bicarbonate infusion for now  Bladder wall thickening  See above would recommend urology consultation  Hematuria  Unclear etiology would recommend urology consultation  Anemia due to chronic kidney disease  ?  Etiology probable component of blood loss but also chronic  Recommend  - Workup: SPEP UPEP light chain ratio rule out myeloma/FIT/iron studies/B12/folate      COPD without exacerbation (HCC)  CT of the chest: Mild emphysematous changes central pulmonary arterial enlargement which may be seen setting of pulmonary hypertension  Schizophrenia  ([facility])  Per primary service      I have reviewed the nephrology recommendations including IVF holding ACE and diuretic , with SLIM, and we are in agreement with renal plan including the information outlined above.     History of Present Illness   Lorne Waterman is a 74 y.o. male with a PMH of CHF/COPD/depression/schizoaffective disorder/diabetes mellitus type 2/BPH/dyslipidemia/hypertension/memory loss who was admitted to [facility] after presenting with gross hematuria. A renal consultation is requested today for assistance in the management of DARIO.    Patient unaware of any prior kidney disease  States he had a kidney stone in the past  No current dysuria or other voiding symptoms  Gross hematuria is passed after straight catheterization  No NSAID use  Baseline creatinine 1-1.3 last creatinine 12/20/2024 was 1.28  Creatinine admission 7.49  Creatinine currently 6.75  Noted hypotension on admission    Review of Systems  General: No fevers some mild chills, decreased p.o. intake  Skin:  No acute rash  GI:  No nausea vomiting or diarrhea.  No abdominal pain.  No bright red blood per rectum or melanotic/black tarry stools  :  Please see HPI  Cardiac:  No chest pain, shortness of breath or leg swelling and no palpitations  Pulmonary: Mild chronic cough with white sputum  Musculoskeletal:  No joint pain or myalgias  Neuro: Slight headache and slight dizziness    The rest review of systems is otherwise completely reviewed with the patient are negative    Historical Information   Past Medical History:   Diagnosis Date    Anxiety     CHF (congestive heart failure) ([facility])     COPD (chronic obstructive pulmonary disease) ([facility])     Depression     Diabetes mellitus ([facility])     Enlarged prostate     Hallucination     Hyperlipidemia     Hypertension     Memory loss     Panic attack     Panic disorder     Psychiatric disorder     Psychiatric illness     Psychosis ([facility])     PTSD (post-traumatic stress disorder)      Schizoaffective disorder (HCC)     Schizophrenia (HCC)      No past surgical history on file.  Social History     Tobacco Use    Smoking status: Former     Current packs/day: 0.00     Average packs/day: 2.0 packs/day for 0.5 years (1.0 ttl pk-yrs)     Types: Cigarettes     Start date: 1992     Quit date:      Years since quittin.2    Smokeless tobacco: Never    Tobacco comments:     quit in    Vaping Use    Vaping status: Never Used   Substance and Sexual Activity    Alcohol use: Never    Drug use: Never    Sexual activity: Never     E-Cigarette/Vaping    E-Cigarette Use Never User      E-Cigarette/Vaping Substances    Nicotine No     THC No     CBD No     Flavoring No     Other No     Unknown No      Family history non-contributory  Social History     Tobacco Use    Smoking status: Former     Current packs/day: 0.00     Average packs/day: 2.0 packs/day for 0.5 years (1.0 ttl pk-yrs)     Types: Cigarettes     Start date: 1992     Quit date:      Years since quittin.2    Smokeless tobacco: Never    Tobacco comments:     quit in    Vaping Use    Vaping status: Never Used   Substance and Sexual Activity    Alcohol use: Never    Drug use: Never    Sexual activity: Never       Current Facility-Administered Medications:     acetaminophen (TYLENOL) tablet 650 mg, Q4H    albuterol (PROVENTIL HFA,VENTOLIN HFA) inhaler 2 puff, Q4H PRN    ARIPiprazole (ABILIFY) tablet 20 mg, Daily    aspirin chewable tablet 81 mg, Daily    budesonide-formoterol (SYMBICORT) 160-4.5 mcg/act inhaler 2 puff, BID    heparin (porcine) subcutaneous injection 5,000 Units, Q8H TREVOR **AND** Platelet count, Once    risperiDONE (RisperDAL) tablet 1 mg, BID    sodium bicarbonate 150 mEq in dextrose 5 % 1,000 mL infusion, Continuous, Last Rate: 125 mL/hr (25 0830)  Prior to Admission Medications   Prescriptions Last Dose Informant Patient Reported? Taking?   ARIPiprazole (ABILIFY) 20 MG tablet   No Yes   Sig: Take 1  tablet (20 mg total) by mouth daily   Multiple Vitamins-Iron (MULTIVITAMIN/IRON PO) Not Taking  Yes No   Patient not taking: Reported on 3/18/2025   acetaminophen (TYLENOL) 325 mg tablet   Yes Yes   Sig: Take 650 mg by mouth every 4 (four) hours   albuterol (ProAir HFA) 90 mcg/act inhaler Not Taking  No No   Sig: Inhale 2 puffs every 4 (four) hours as needed for wheezing   Patient not taking: Reported on 3/18/2025   aluminum-magnesium hydroxide-simethicone (MYLANTA) 200-200-20 mg/5 mL suspension Not Taking  No No   Sig: Take 30 mL by mouth every 6 (six) hours as needed for indigestion or heartburn   Patient not taking: Reported on 3/18/2025   aspirin 81 mg chewable tablet   No Yes   Sig: Chew 1 tablet (81 mg total) daily   atorvastatin (LIPITOR) 40 mg tablet   No Yes   Sig: Take 1 tablet (40 mg total) by mouth daily with dinner And stop Pravastatin when starting this medication   budesonide-formoterol (SYMBICORT) 160-4.5 mcg/act inhaler   Yes Yes   Sig: INHALE 2 PUFFS BY MOUTH TWICE A DAY *RINSE MOUTH WITH WATER AND SPIT*   docusate sodium (COLACE) 100 mg capsule Not Taking  No No   Sig: Take 1 capsule (100 mg total) by mouth 2 (two) times a day as needed for constipation   Patient not taking: Reported on 3/18/2025   furosemide (LASIX) 40 mg tablet Not Taking  No No   Sig: Take 1 tablet (40 mg total) by mouth daily Do not start before January 25, 2023.   Patient not taking: Reported on 3/18/2025   lisinopril (ZESTRIL) 20 mg tablet Not Taking  No No   Sig: Take 1 tablet (20 mg total) by mouth daily   Patient not taking: Reported on 3/18/2025   loperamide (IMODIUM A-D) 2 MG tablet   Yes Yes   Sig: Take 2 mg by mouth every 6 (six) hours   magnesium oxide (MAG-OX) 400 mg   No Yes   Sig: Take 1 tablet (400 mg total) by mouth 2 (two) times a day   metFORMIN (GLUCOPHAGE) 500 mg tablet Not Taking  No No   Sig: Take 1 tablet (500 mg total) by mouth 2 (two) times a day with meals   Patient not taking: Reported on 3/18/2025    methocarbamol (ROBAXIN) 750 mg tablet   No No   Sig: Take 1 tablet (750 mg total) by mouth every 6 (six) hours as needed for muscle spasms (back pain/initial rx.) for up to 5 days   metoprolol tartrate (LOPRESSOR) 25 mg tablet Not Taking  No No   Sig: Take 1 tablet (25 mg total) by mouth every 12 (twelve) hours   Patient not taking: Reported on 3/18/2025   mineral oil enema   Yes Yes   Sig: Insert 1 enema into the rectum once   ondansetron (ZOFRAN) 4 mg tablet   Yes Yes   Sig: Take 4 mg by mouth every 6 (six) hours   risperiDONE (RisperDAL) 1 mg tablet   Yes Yes   Sig: TAKE ONE TABLET BY MOUTH TWICE A DAY FOR MOOD   senna (SENOKOT) 8.6 mg Not Taking  No No   Sig: Take 1 tablet (8.6 mg total) by mouth 2 (two) times a day   Patient not taking: Reported on 3/18/2025      Facility-Administered Medications: None     Patient has no known allergies.    Objective :  Temp:  [97.8 °F (36.6 °C)-99 °F (37.2 °C)] 98.7 °F (37.1 °C)  HR:  [75-92] 75  BP: ()/(53-72) 109/72  Resp:  [16-18] 16  SpO2:  [93 %-99 %] 96 %  O2 Device: None (Room air)    Current Weight: Weight - Scale: 121 kg (266 lb 12.1 oz)  First Weight: Weight - Scale: 121 kg (266 lb 12.1 oz)  I/O         03/17 0701  03/18 0700 03/18 0701  03/19 0700 03/19 0701  03/20 0700    P.O.  240     I.V. (mL/kg)  1520.8 (12.6)     Total Intake(mL/kg)  1760.8 (14.6)     Urine (mL/kg/hr)   150 (0.5)    Total Output   150    Net  +1760.8 -150           Unmeasured Urine Occurrence  1 x           Physical Exam  General: Well-developed well-nourished, no acute distress/obese  Skin:  No acute rash  Eyes: No scleral icterus and noninjected  ENT: Normocephalic/atraumatic moist mucous membranes  Neck:  Supple, no jugular venous distention, trachea midline, overall appearance is normal; no  carotid bruits, 1+ carotid upstroke  Back: No CVA tenderness spine midline  Chest: Slight rhonchi inspiratory expiratory diffusely perhaps slight crackles at left base but no dullness to  percussion, good respiratory effort no use of accessory respiratory muscles  CVS:  Regular rate and rhythm, without a rub or gallops or murmurs, normal S3 and S4  Abdomen:  obese,Normal bowel sounds, soft and nontender except perhaps very minimal tenderness to palpation in the mid lower quadrant, and nondistended; no overt hepatosplenomegaly or bruits appreciable  Extremities:  No clubbing, cyanosis or edema; 1+ dorsalis pedis pulse, no femoral bruits and no significant arthritic changes  Neuro:  No gross focality  Psych:  Alert and oriented and appropriate   Medications:    Current Facility-Administered Medications:     acetaminophen (TYLENOL) tablet 650 mg, 650 mg, Oral, Q4H, Dakota Santo MD, 650 mg at 03/19/25 0505    albuterol (PROVENTIL HFA,VENTOLIN HFA) inhaler 2 puff, 2 puff, Inhalation, Q4H PRN, Dakota Santo MD    ARIPiprazole (ABILIFY) tablet 20 mg, 20 mg, Oral, Daily, Dakota Santo MD, 20 mg at 03/19/25 0839    aspirin chewable tablet 81 mg, 81 mg, Oral, Daily, Dakota Santo MD, 81 mg at 03/19/25 0839    budesonide-formoterol (SYMBICORT) 160-4.5 mcg/act inhaler 2 puff, 2 puff, Inhalation, BID, Dakota Santo MD, 2 puff at 03/19/25 0840    heparin (porcine) subcutaneous injection 5,000 Units, 5,000 Units, Subcutaneous, Q8H TREVOR, 5,000 Units at 03/19/25 0505 **AND** Platelet count, , , Once, Dakota Santo MD    risperiDONE (RisperDAL) tablet 1 mg, 1 mg, Oral, BID, Dakota Santo MD, 1 mg at 03/19/25 0840    sodium bicarbonate 150 mEq in dextrose 5 % 1,000 mL infusion, 125 mL/hr, Intravenous, Continuous, Prasanna Soto MD, Last Rate: 125 mL/hr at 03/19/25 0830, 125 mL/hr at 03/19/25 0830      Lab Results: I have reviewed the following results:  Results from last 7 days   Lab Units 03/19/25  0500 03/18/25  1115 03/18/25  0808   WBC Thousand/uL 7.19  --  8.76   HEMOGLOBIN g/dL 9.7*  --  11.0*   HEMATOCRIT %  "31.2*  --  35.5*   PLATELETS Thousands/uL 164  --  165   POTASSIUM mmol/L 4.2 4.4  --    CHLORIDE mmol/L 104 101  --    CO2 mmol/L 19* 22  --    BUN mg/dL 72* 64*  --    CREATININE mg/dL 6.75* 7.49*  --    CALCIUM mg/dL 8.6 9.4  --    ALBUMIN g/dL  --  4.3  --        Administrative Statements     Portions of the record may have been created with voice recognition software. Occasional wrong word or \"sound a like\" substitutions may have occurred due to the inherent limitations of voice recognition software. Read the chart carefully and recognize, using context, where substitutions have occurred.If you have any questions, please contact the dictating provider.  "

## 2025-03-19 NOTE — ASSESSMENT & PLAN NOTE
Add on iron panel    Results from last 7 days   Lab Units 03/19/25  0500 03/18/25  0808   HEMOGLOBIN g/dL 9.7* 11.0*   MCV fL 77* 79*   RDW % 14.1 14.3        Patient requests all Lab, Cardiology, and Radiology Results on their Discharge Instructions

## 2025-03-19 NOTE — ASSESSMENT & PLAN NOTE
Currently on sodium bicarbonate    Recent Labs     03/18/25  1115 03/19/25  0500   CO2 22 19*

## 2025-03-19 NOTE — PLAN OF CARE
Problem: PAIN - ADULT  Goal: Verbalizes/displays adequate comfort level or baseline comfort level  Description: Interventions:  - Encourage patient to monitor pain and request assistance  - Assess pain using appropriate pain scale  - Administer analgesics based on type and severity of pain and evaluate response  - Implement non-pharmacological measures as appropriate and evaluate response  - Consider cultural and social influences on pain and pain management  - Notify physician/advanced practitioner if interventions unsuccessful or patient reports new pain  Outcome: Progressing     Problem: INFECTION - ADULT  Goal: Absence or prevention of progression during hospitalization  Description: INTERVENTIONS:  - Assess and monitor for signs and symptoms of infection  - Monitor lab/diagnostic results  - Monitor all insertion sites, i.e. indwelling lines, tubes, and drains  - Monitor endotracheal if appropriate and nasal secretions for changes in amount and color  - [place] appropriate cooling/warming therapies per order  - Administer medications as ordered  - Instruct and encourage patient and family to use good hand hygiene technique  - Identify and instruct in appropriate isolation precautions for identified infection/condition  Outcome: Progressing     Problem: SAFETY ADULT  Goal: Patient will remain free of falls  Description: INTERVENTIONS:  - Educate patient/family on patient safety including physical limitations  - Instruct patient to call for assistance with activity   - Consult OT/PT to assist with strengthening/mobility   - Keep Call bell within reach  - Keep bed low and locked with side rails adjusted as appropriate  - Keep care items and personal belongings within reach  - Initiate and maintain comfort rounds  - Make Fall Risk Sign visible to staff  - Offer Toileting every 2 Hours, in advance of need  - Initiate/Maintain bed alarm  - Obtain necessary fall risk management equipment: yellow socks  - Apply  yellow socks and bracelet for high fall risk patients  - Consider moving patient to room near nurses station  Outcome: Progressing  Goal: Maintain or return to baseline ADL function  Description: INTERVENTIONS:  -  Assess patient's ability to carry out ADLs; assess patient's baseline for ADL function and identify physical deficits which impact ability to perform ADLs (bathing, care of mouth/teeth, toileting, grooming, dressing, etc.)  - Assess/evaluate cause of self-care deficits   - Assess range of motion  - Assess patient's mobility; develop plan if impaired  - Assess patient's need for assistive devices and provide as appropriate  - Encourage maximum independence but intervene and supervise when necessary  - Involve family in performance of ADLs  - Assess for home care needs following discharge   - Consider OT consult to assist with ADL evaluation and planning for discharge  - Provide patient education as appropriate  Outcome: Progressing  Goal: Maintains/Returns to pre admission functional level  Description: INTERVENTIONS:  - Perform AM-PAC 6 Click Basic Mobility/ Daily Activity assessment daily.  - Set and communicate daily mobility goal to care team and patient/family/caregiver.   - Collaborate with rehabilitation services on mobility goals if consulted  - Perform Range of Motion 3 times a day.  - Reposition patient every 2 hours.  - Dangle patient 3 times a day  - Stand patient 3 times a day  - Ambulate patient 3 times a day  - Out of bed to chair 3 times a day   - Out of bed for meals 3 times a day  - Out of bed for toileting  - Record patient progress and toleration of activity level   Outcome: Progressing     Problem: GENITOURINARY - ADULT  Goal: Maintains or returns to baseline urinary function  Description: INTERVENTIONS:  - Assess urinary function  - Encourage oral fluids to ensure adequate hydration if ordered  - Administer IV fluids as ordered to ensure adequate hydration  - Administer ordered  medications as needed  - Offer frequent toileting  - Follow urinary retention protocol if ordered  Outcome: Progressing  Goal: Absence of urinary retention  Description: INTERVENTIONS:  - Assess patient’s ability to void and empty bladder  - Monitor I/O  - Bladder scan as needed  - Discuss with physician/AP medications to alleviate retention as needed  - Discuss catheterization for long term situations as appropriate  Outcome: Progressing     Problem: METABOLIC, FLUID AND ELECTROLYTES - ADULT  Goal: Electrolytes maintained within normal limits  Description: INTERVENTIONS:  - Monitor labs and assess patient for signs and symptoms of electrolyte imbalances  - Administer electrolyte replacement as ordered  - Monitor response to electrolyte replacements, including repeat lab results as appropriate  - Instruct patient on fluid and nutrition as appropriate  Outcome: Progressing  Goal: Fluid balance maintained  Description: INTERVENTIONS:  - Monitor labs   - Monitor I/O and WT  - Instruct patient on fluid and nutrition as appropriate  - Assess for signs & symptoms of volume excess or deficit  Outcome: Progressing  Goal: Glucose maintained within target range  Description: INTERVENTIONS:  - Monitor Blood Glucose as ordered  - Assess for signs and symptoms of hyperglycemia and hypoglycemia  - Administer ordered medications to maintain glucose within target range  - Assess nutritional intake and initiate nutrition service referral as needed  Outcome: Progressing     Problem: MUSCULOSKELETAL - ADULT  Goal: Maintain or return mobility to safest level of function  Description: INTERVENTIONS:  - Assess patient's ability to carry out ADLs; assess patient's baseline for ADL function and identify physical deficits which impact ability to perform ADLs (bathing, care of mouth/teeth, toileting, grooming, dressing, etc.)  - Assess/evaluate cause of self-care deficits   - Assess range of motion  - Assess patient's mobility  - Assess  patient's need for assistive devices and provide as appropriate  - Encourage maximum independence but intervene and supervise when necessary  - Involve family in performance of ADLs  - Assess for home care needs following discharge   - Consider OT consult to assist with ADL evaluation and planning for discharge  - Provide patient education as appropriate  Outcome: Progressing  Goal: Maintain proper alignment of affected body part  Description: INTERVENTIONS:  - Support, maintain and protect limb and body alignment  - Provide patient/ family with appropriate education  Outcome: Progressing     Problem: Prexisting or High Potential for Compromised Skin Integrity  Goal: Skin integrity is maintained or improved  Description: INTERVENTIONS:  - Identify patients at risk for skin breakdown  - Assess and monitor skin integrity  - Assess and monitor nutrition and hydration status  - Monitor labs   - Assess for incontinence   - Turn and reposition patient  - Assist with mobility/ambulation  - Relieve pressure over bony prominences  - Avoid friction and shearing  - Provide appropriate hygiene as needed including keeping skin clean and dry  - Evaluate need for skin moisturizer/barrier cream  - Collaborate with interdisciplinary team   - Patient/family teaching  - Consider wound care consult   Outcome: Progressing     Problem: Knowledge Deficit  Goal: Patient/family/caregiver demonstrates understanding of disease process, treatment plan, medications, and discharge instructions  Description: Complete learning assessment and assess knowledge base.  Interventions:  - Provide teaching at level of understanding  - Provide teaching via preferred learning methods  Outcome: Progressing     Problem: DISCHARGE PLANNING  Goal: Discharge to home or other facility with appropriate resources  Description: INTERVENTIONS:  - Identify barriers to discharge w/patient and caregiver  - Arrange for needed discharge resources and transportation as  appropriate  - Identify discharge learning needs (meds, wound care, etc.)  - Arrange for interpretive services to assist at discharge as needed  - Refer to Case Management Department for coordinating discharge planning if the patient needs post-hospital services based on physician/advanced practitioner order or complex needs related to functional status, cognitive ability, or social support system  Outcome: Progressing

## 2025-03-19 NOTE — ASSESSMENT & PLAN NOTE
May be related to DARIO and decreased solute intake  Recc  -isotonic IVF   -ensure for solute   -fluid rest of 1500 ml  -nutritional supplement   -if persists would do further w/u

## 2025-03-19 NOTE — ASSESSMENT & PLAN NOTE
gross hematuria after straight catheterization at his facility 3/18. he was not voiding much which prompted the straight cath. No significant urine volume returned on catheter.  He was not in urinary retention, in fact he was found to have renal failure and is oliguric  His bladder scan is 16ml. CT no hydronephrosis or obstructive uropathy, bladder is empty.  urinalysis clear yellow no leukocytes, no nitrites, small protein, microhematuria 30-50 rbcs, and hyaline casts. no bacteria seen.    continue to allow volitional voids to urinal. his current urine is clear yellow  close I/Os with hydration and renal management.  urinary retention protocol with bladder scans  repeat UA micro in 1 month to reasses hematuria when not provoked by procedure/trauma

## 2025-03-19 NOTE — PROGRESS NOTES
Progress Note - Hospitalist   Name: Lorne Waterman 74 y.o. male I MRN: [id number]  Unit/Bed#: E2 -01 I Date of Admission: 3/18/2025   Date of Service: 3/19/2025 I Hospital Day: 1    Assessment & Plan  DARIO (acute kidney injury) (HCC)  74-year-old male was admitted due to acute kidney injury.  Etiology deemed renal likely secondary to Lasix, ACE inhibitor, and hypotension potentially leading to ATN.  Management per nephrology.  Continue IV hydration.  CT without contrast demonstrates bilateral simple renal cysts no hydronephrosis no hydroureter; bladder mild circumferential wall thickening secondary incomplete distention however cystitis cannot be ruled out  I/Os  Hematuria  Unclear if this is purely traumatic in nature secondary to catheterization. Urology consulted at the request of nephrology.   Bladder wall thickening  CT Showing: Mild circumferential wall thickening, which is favored to be secondary to incomplete distention, however cystitis cannot be excluded.   Schizophrenia (HCC)  Mood stable.  Continue Aripiprazole / Risperidal  COPD without exacerbation ([facility])  Not in acute exacerbation  Continue budesonide/formoterol 160-4.5 mg 2 puffs twice daily.  Albuterol as needed.  Chronic combined systolic and diastolic congestive heart failure (HCC)  Wt Readings from Last 3 Encounters:   03/18/25 121 kg (266 lb 12.1 oz)   03/30/24 123 kg (271 lb 2.7 oz)   02/23/24 130 kg (286 lb)     Patient appears hypovolemic. Echo from 2/2024 showing EF of 55% and grade 1 DD  Type 2 diabetes mellitus without complication, without long-term current use of insulin ([facility])  Lab Results   Component Value Date    HGBA1C 8.8 (H) 12/21/2024       Recent Labs     03/19/25  1100   POCGLU 216*       Blood Sugar Average: Last 72 hrs:  (P) 216  Sliding scale, Refresh A1C  Hyponatremia  Improving    Recent Labs     03/18/25  1115 03/19/25  0500   SODIUM 132* 133*           Anemia due to chronic kidney disease  Add on iron  panel    Results from last 7 days   Lab Units 25  0500 25  0808   HEMOGLOBIN g/dL 9.7* 11.0*   MCV fL 77* 79*   RDW % 14.1 14.3       Metabolic acidosis  Currently on sodium bicarbonate    Recent Labs     25  1115 25  0500   CO2 22 19*         VTE Pharmacologic Prophylaxis: VTE Score: 5 Moderate Risk (Score 3-4) - Pharmacological DVT Prophylaxis Ordered: heparin.    Mobility:   Basic Mobility Inpatient Raw Score: 15  -[facility] Goal: 4: Move to chair/commode  -[facility] Achieved: 3: Sit at edge of bed    Discussions with Specialists or Other Care Team Provider: nephrology    Education and Discussions with Family / Patient: patient, called wife Isabelle     Current Length of Stay: 1 day(s)  Current Patient Status: Inpatient   Certification Statement: The patient will continue to require additional inpatient hospital stay due to iv hydration, DARIO management  Discharge Plan: 48-72 hours    Code Status: Level 1 - Full Code    Subjective   Patient seen and examined. No new complaints overnight. Feeling better today.     Objective   Vitals:   Temp (24hrs), Av.5 °F (36.9 °C), Min:97.8 °F (36.6 °C), Max:99 °F (37.2 °C)    Temp:  [97.8 °F (36.6 °C)-99 °F (37.2 °C)] 98.7 °F (37.1 °C)  HR:  [75-92] 75  Resp:  [16-18] 16  BP: ()/(53-72) 109/72  SpO2:  [93 %-99 %] 96 %  Body mass index is 38.28 kg/m².     Input and Output Summary (last 24 hours):     Intake/Output Summary (Last 24 hours) at 3/19/2025 1224  Last data filed at 3/19/2025 0901  Gross per 24 hour   Intake 1760.83 ml   Output 150 ml   Net 1610.83 ml       Physical Exam  Vitals reviewed.   Constitutional:       General: He is not in acute distress.  HENT:      Head: Normocephalic.      Nose: Nose normal.      Mouth/Throat:      Mouth: Mucous membranes are moist.   Eyes:      General: No scleral icterus.  Cardiovascular:      Rate and Rhythm: Normal rate.   Pulmonary:      Effort: Pulmonary effort is normal. No respiratory distress.    Abdominal:      Palpations: Abdomen is soft.      Tenderness: There is no abdominal tenderness.   Skin:     General: Skin is warm.   Neurological:      Mental Status: He is alert.   Psychiatric:         Mood and Affect: Mood normal.         Behavior: Behavior normal.       Lines/Drains:              Lab Results: I have reviewed the following results:   Results from last 7 days   Lab Units 03/19/25  1143 03/19/25  0500 03/18/25  0808   WBC Thousand/uL  --  7.19 8.76   HEMOGLOBIN g/dL  --  9.7* 11.0*   PLATELETS Thousands/uL 122* 164 165   MCV fL  --  77* 79*     Results from last 7 days   Lab Units 03/19/25  0500 03/18/25  1115   SODIUM mmol/L 133* 132*   POTASSIUM mmol/L 4.2 4.4   CHLORIDE mmol/L 104 101   CO2 mmol/L 19* 22   ANION GAP mmol/L 10 9   BUN mg/dL 72* 64*   CREATININE mg/dL 6.75* 7.49*   CALCIUM mg/dL 8.6 9.4   ALBUMIN g/dL  --  4.3   TOTAL BILIRUBIN mg/dL  --  0.66   ALK PHOS U/L  --  63   ALT U/L  --  18   AST U/L  --  17   EGFR ml/min/1.73sq m 7 6   GLUCOSE RANDOM mg/dL 124 128                          Results from last 7 days   Lab Units 03/19/25  1100   POC GLUCOSE mg/dl 216*               Recent Cultures (last 7 days):         Imaging:  Reviewed radiology reports from this admission: ct a/p    Last 24 Hours Medication List:     Current Facility-Administered Medications:     acetaminophen (TYLENOL) tablet 650 mg, Q4H    albuterol (PROVENTIL HFA,VENTOLIN HFA) inhaler 2 puff, Q4H PRN    ARIPiprazole (ABILIFY) tablet 20 mg, Daily    aspirin chewable tablet 81 mg, Daily    budesonide-formoterol (SYMBICORT) 160-4.5 mcg/act inhaler 2 puff, BID    heparin (porcine) subcutaneous injection 5,000 Units, Q8H TREVOR **AND** [COMPLETED] Platelet count, Once    insulin lispro (HumALOG/ADMELOG) 100 units/mL subcutaneous injection 2-12 Units, TID AC **AND** Fingerstick Glucose (POCT), TID AC    risperiDONE (RisperDAL) tablet 1 mg, BID    sodium bicarbonate 150 mEq in dextrose 5 % 1,000 mL infusion, Continuous, Last  Rate: 125 mL/hr (03/19/25 2330)      **Please Note: This note may have been constructed using a voice recognition system.**

## 2025-03-19 NOTE — PLAN OF CARE
Problem: METABOLIC, FLUID AND ELECTROLYTES - ADULT  Goal: Electrolytes maintained within normal limits  Description: INTERVENTIONS:  - Monitor labs and assess patient for signs and symptoms of electrolyte imbalances  - Administer electrolyte replacement as ordered  - Monitor response to electrolyte replacements, including repeat lab results as appropriate  - Instruct patient on fluid and nutrition as appropriate  Outcome: Progressing  Goal: Fluid balance maintained  Description: INTERVENTIONS:  - Monitor labs   - Monitor I/O and WT  - Instruct patient on fluid and nutrition as appropriate  - Assess for signs & symptoms of volume excess or deficit  Outcome: Progressing  Goal: Glucose maintained within target range  Description: INTERVENTIONS:  - Monitor Blood Glucose as ordered  - Assess for signs and symptoms of hyperglycemia and hypoglycemia  - Administer ordered medications to maintain glucose within target range  - Assess nutritional intake and initiate nutrition service referral as needed  Outcome: Progressing     Problem: GENITOURINARY - ADULT  Goal: Maintains or returns to baseline urinary function  Description: INTERVENTIONS:  - Assess urinary function  - Encourage oral fluids to ensure adequate hydration if ordered  - Administer IV fluids as ordered to ensure adequate hydration  - Administer ordered medications as needed  - Offer frequent toileting  - Follow urinary retention protocol if ordered  Outcome: Progressing     Problem: SAFETY ADULT  Goal: Patient will remain free of falls  Description: INTERVENTIONS:  - Educate patient/family on patient safety including physical limitations  - Instruct patient to call for assistance with activity   - Consult OT/PT to assist with strengthening/mobility   - Keep Call bell within reach  - Keep bed low and locked with side rails adjusted as appropriate  - Keep care items and personal belongings within reach  - Initiate and maintain comfort rounds  - Make Fall Risk  Sign visible to staff  - Offer Toileting every 2 Hours, in advance of need  - Initiate/Maintain bed alarm  - Obtain necessary fall risk management equipment: yellow socks  - Apply yellow socks and bracelet for high fall risk patients  - Consider moving patient to room near nurses station  Outcome: Progressing

## 2025-03-19 NOTE — ASSESSMENT & PLAN NOTE
CT Showing: Mild circumferential wall thickening, which is favored to be secondary to incomplete distention, however cystitis cannot be excluded.

## 2025-03-19 NOTE — CONSULTS
Consultation - Urology   Name: Lorne Waterman 74 y.o. male I MRN: [id number]  Unit/Bed#: E2 -01 I Date of Admission: 3/18/2025   Date of Service: 3/19/2025 I Hospital Day: 1   Inpatient consult to Urology  Consult performed by: Norma Hannah PA-C  Consult ordered by: Denilson Caldera MD        Physician Requesting Evaluation: Denilson Caldera MD   Reason for Evaluation / Principal Problem: dario, hematuria, bladder wall thickening    Assessment & Plan  DARIO (acute kidney injury) (HCC)  found to have creatinine over 7, with baseline 1.0  has not been making much urine the past 2-3 days  he was straight cathed due to concern for urinary retention, but not much urine there  bladder scan 16ml, 29ml, 126ml  He just voided for first time 150cc of clear yellow urine into the urinal    Lab Results   Component Value Date    CREATININE 6.75 (H) 03/19/2025       Hematuria  gross hematuria after straight catheterization at his facility 3/18. he was not voiding much which prompted the straight cath. No significant urine volume returned on catheter.  He was not in urinary retention, in fact he was found to have renal failure and is oliguric  His bladder scan is 16ml. CT no hydronephrosis or obstructive uropathy, bladder is empty.  urinalysis clear yellow no leukocytes, no nitrites, small protein, microhematuria 30-50 rbcs, and hyaline casts. no bacteria seen.    continue to allow volitional voids to urinal. his current urine is clear yellow  close I/Os with hydration and renal management.  urinary retention protocol with bladder scans  repeat UA micro in 1 month to reasses hematuria when not provoked by procedure/trauma  Bladder wall thickening  mild diffuse bladder wall thickening in an otherwise underdistended bladder. notable prostatomegaly measuring approximately 115cc gland volume; Likely some chronic bladder outlet obstruction from the BPH. Can consider initiation of tamsulosin 0.4mg qhs if symptomatic.  can followup in  the outpatient setting  Urology service signing off.    History of Present Illness   Lorne Waterman is a 74 y.o. male who presents with admitted for acute kidney injury unknown trigger no injury surgery or new medication. had not been urinating much the last few days. facility staff straight cathed him and had some urethral bleeding with that but not much urine. he was admitted for hydration. today he voided small volume and clear yellow no further bleeding. Patient denies prior kidney bladder prostate or penis troubles. he was not a fan of the catheter and hopeful to avoid that again. There was one e.faecalis urine culture from one year ago. No current UTI symptoms.    Review of Systems   Constitutional:  Negative for activity change, appetite change, chills, fever and unexpected weight change.   HENT: Negative.     Respiratory: Negative.  Negative for shortness of breath.    Cardiovascular: Negative.  Negative for chest pain.   Gastrointestinal:  Negative for abdominal pain, diarrhea, nausea and vomiting.   Endocrine: Negative.    Genitourinary:  Positive for decreased urine volume. Negative for difficulty urinating, dysuria, flank pain, frequency, hematuria, scrotal swelling, testicular pain and urgency.   Musculoskeletal:  Negative for back pain and gait problem.   Skin: Negative.    Allergic/Immunologic: Negative.    Neurological: Negative.    Hematological:  Negative for adenopathy. Does not bruise/bleed easily.     Medical History Review: I have reviewed the patient's PMH, PSH, Social History, Family History, Meds, and Allergies     Objective :  Temp:  [97.8 °F (36.6 °C)-99 °F (37.2 °C)] 98.7 °F (37.1 °C)  HR:  [75-92] 75  BP: ()/(53-72) 109/72  Resp:  [16-18] 16  SpO2:  [93 %-97 %] 96 %  O2 Device: None (Room air)    I/O         03/17 0701  03/18 0700 03/18 0701  03/19 0700 03/19 0701  03/20 0700    P.O.  240     I.V. (mL/kg)  1520.8 (12.6)     Total Intake(mL/kg)  1760.8 (14.6)     Urine (mL/kg/hr)    150 (0.2)    Total Output   150    Net  +1760.8 -150           Unmeasured Urine Occurrence  1 x             Physical Exam  Vitals and nursing note reviewed.   Constitutional:       General: He is not in acute distress.     Appearance: He is well-developed. He is not diaphoretic.   HENT:      Head: Normocephalic and atraumatic.   Pulmonary:      Effort: Pulmonary effort is normal.   Genitourinary:     Comments: uncircumcised penis, normal phallus, orthotopic patent meatus.  no bleeding or urine leakage. Testes smooth descended bilaterally into the scrotum nontender with no palpable mass.  Musculoskeletal:      Right lower leg: No edema.      Left lower leg: No edema.   Skin:     General: Skin is warm.   Neurological:      Mental Status: He is alert and oriented to person, place, and time.           Lab Results: I have reviewed the following results:  Recent Labs     03/18/25  1115 03/19/25  0500 03/19/25  1143   WBC  --  7.19  --    HGB  --  9.7*  --    HCT  --  31.2*  --    PLT  --  164 122*   SODIUM 132* 133*  --    K 4.4 4.2  --     104  --    CO2 22 19*  --    BUN 64* 72*  --    CREATININE 7.49* 6.75*  --    GLUC 128 124  --    AST 17  --   --    ALT 18  --   --    ALB 4.3  --   --    TBILI 0.66  --   --    ALKPHOS 63  --   --      Lab Results   Component Value Date    COLORU Yellow 03/19/2025    CLARITYU Clear 03/19/2025    SPECGRAV 1.014 03/19/2025    PHUR 5.0 03/19/2025    PHUR 6.5 05/23/2018    LEUKOCYTESUR Negative 03/19/2025    NITRITE Negative 03/19/2025    GLUCOSEU Negative 03/19/2025    KETONESU Negative 03/19/2025    BILIRUBINUR Negative 03/19/2025    BLOODU Moderate (A) 03/19/2025   ,   Lab Results   Component Value Date    URINECX >100,000 cfu/ml Enterococcus faecalis (A) 03/28/2024       Imaging Results Review: I personally reviewed the following image studies/reports in PACS and discussed pertinent findings with Radiology: CT abdomen/pelvis. My interpretation of the radiology images/reports  is: two kidneys no ureteral or bladder calculi no hydronephrosis bladder wall thickened diffusely, bladder empty. large prostate measures 7x5x6 estimate 115cc by bullet volume calculator. .  Other Study Results Review: No additional pertinent studies reviewed.    VTE Prophylaxis: VTE covered by:  heparin (porcine), Subcutaneous, 5,000 Units at 03/19/25 7098

## 2025-03-20 LAB
ANION GAP SERPL CALCULATED.3IONS-SCNC: 8 MMOL/L (ref 4–13)
BASE EX.OXY STD BLDV CALC-SCNC: 62.6 % (ref 60–80)
BASE EXCESS BLDV CALC-SCNC: 2.1 MMOL/L
BUN SERPL-MCNC: 62 MG/DL (ref 5–25)
CALCIUM SERPL-MCNC: 8.7 MG/DL (ref 8.4–10.2)
CHLORIDE SERPL-SCNC: 102 MMOL/L (ref 96–108)
CO2 SERPL-SCNC: 26 MMOL/L (ref 21–32)
CREAT SERPL-MCNC: 3.05 MG/DL (ref 0.6–1.3)
ERYTHROCYTE [DISTWIDTH] IN BLOOD BY AUTOMATED COUNT: 13.6 % (ref 11.6–15.1)
GFR SERPL CREATININE-BSD FRML MDRD: 18 ML/MIN/1.73SQ M
GLUCOSE SERPL-MCNC: 155 MG/DL (ref 65–140)
GLUCOSE SERPL-MCNC: 166 MG/DL (ref 65–140)
GLUCOSE SERPL-MCNC: 173 MG/DL (ref 65–140)
GLUCOSE SERPL-MCNC: 219 MG/DL (ref 65–140)
GLUCOSE SERPL-MCNC: 220 MG/DL (ref 65–140)
HCO3 BLDV-SCNC: 27.9 MMOL/L (ref 24–30)
HCT VFR BLD AUTO: 28.8 % (ref 36.5–49.3)
HGB BLD-MCNC: 9.4 G/DL (ref 12–17)
MAGNESIUM SERPL-MCNC: 1.8 MG/DL (ref 1.9–2.7)
MCH RBC QN AUTO: 23.6 PG (ref 26.8–34.3)
MCHC RBC AUTO-ENTMCNC: 32.6 G/DL (ref 31.4–37.4)
MCV RBC AUTO: 72 FL (ref 82–98)
MRSA NOSE QL CULT: NORMAL
O2 CT BLDV-SCNC: 10.5 ML/DL
PCO2 BLDV: 48.2 MM HG (ref 42–50)
PH BLDV: 7.38 [PH] (ref 7.3–7.4)
PLATELET # BLD AUTO: 156 THOUSANDS/UL (ref 149–390)
PMV BLD AUTO: 12.4 FL (ref 8.9–12.7)
PO2 BLDV: 34.4 MM HG (ref 35–45)
POTASSIUM SERPL-SCNC: 3.7 MMOL/L (ref 3.5–5.3)
RBC # BLD AUTO: 3.98 MILLION/UL (ref 3.88–5.62)
SODIUM SERPL-SCNC: 136 MMOL/L (ref 135–147)
WBC # BLD AUTO: 5.94 THOUSAND/UL (ref 4.31–10.16)

## 2025-03-20 PROCEDURE — 83735 ASSAY OF MAGNESIUM: CPT | Performed by: STUDENT IN AN ORGANIZED HEALTH CARE EDUCATION/TRAINING PROGRAM

## 2025-03-20 PROCEDURE — 99232 SBSQ HOSP IP/OBS MODERATE 35: CPT | Performed by: INTERNAL MEDICINE

## 2025-03-20 PROCEDURE — 82948 REAGENT STRIP/BLOOD GLUCOSE: CPT

## 2025-03-20 PROCEDURE — 80048 BASIC METABOLIC PNL TOTAL CA: CPT | Performed by: STUDENT IN AN ORGANIZED HEALTH CARE EDUCATION/TRAINING PROGRAM

## 2025-03-20 PROCEDURE — 82805 BLOOD GASES W/O2 SATURATION: CPT | Performed by: INTERNAL MEDICINE

## 2025-03-20 PROCEDURE — 99232 SBSQ HOSP IP/OBS MODERATE 35: CPT | Performed by: STUDENT IN AN ORGANIZED HEALTH CARE EDUCATION/TRAINING PROGRAM

## 2025-03-20 PROCEDURE — 85027 COMPLETE CBC AUTOMATED: CPT | Performed by: STUDENT IN AN ORGANIZED HEALTH CARE EDUCATION/TRAINING PROGRAM

## 2025-03-20 RX ORDER — SODIUM CHLORIDE 9 MG/ML
75 INJECTION, SOLUTION INTRAVENOUS CONTINUOUS
Status: DISCONTINUED | OUTPATIENT
Start: 2025-03-20 | End: 2025-03-21

## 2025-03-20 RX ORDER — MAGNESIUM SULFATE HEPTAHYDRATE 40 MG/ML
2 INJECTION, SOLUTION INTRAVENOUS ONCE
Status: COMPLETED | OUTPATIENT
Start: 2025-03-20 | End: 2025-03-20

## 2025-03-20 RX ORDER — POTASSIUM CHLORIDE 1500 MG/1
20 TABLET, EXTENDED RELEASE ORAL ONCE
Status: COMPLETED | OUTPATIENT
Start: 2025-03-20 | End: 2025-03-20

## 2025-03-20 RX ADMIN — BUDESONIDE AND FORMOTEROL FUMARATE DIHYDRATE 2 PUFF: 160; 4.5 AEROSOL RESPIRATORY (INHALATION) at 17:52

## 2025-03-20 RX ADMIN — SODIUM CHLORIDE 75 ML/HR: 0.9 INJECTION, SOLUTION INTRAVENOUS at 09:58

## 2025-03-20 RX ADMIN — RISPERIDONE 1 MG: 1 TABLET, FILM COATED ORAL at 08:12

## 2025-03-20 RX ADMIN — INSULIN LISPRO 2 UNITS: 100 INJECTION, SOLUTION INTRAVENOUS; SUBCUTANEOUS at 07:49

## 2025-03-20 RX ADMIN — INSULIN LISPRO 2 UNITS: 100 INJECTION, SOLUTION INTRAVENOUS; SUBCUTANEOUS at 16:27

## 2025-03-20 RX ADMIN — SODIUM BICARBONATE 125 ML/HR: 84 INJECTION, SOLUTION INTRAVENOUS at 08:19

## 2025-03-20 RX ADMIN — HEPARIN SODIUM 5000 UNITS: 5000 INJECTION INTRAVENOUS; SUBCUTANEOUS at 14:03

## 2025-03-20 RX ADMIN — RISPERIDONE 1 MG: 1 TABLET, FILM COATED ORAL at 17:51

## 2025-03-20 RX ADMIN — HEPARIN SODIUM 5000 UNITS: 5000 INJECTION INTRAVENOUS; SUBCUTANEOUS at 05:06

## 2025-03-20 RX ADMIN — HEPARIN SODIUM 5000 UNITS: 5000 INJECTION INTRAVENOUS; SUBCUTANEOUS at 21:08

## 2025-03-20 RX ADMIN — IRON SUCROSE 300 MG: 20 INJECTION, SOLUTION INTRAVENOUS at 11:17

## 2025-03-20 RX ADMIN — BUDESONIDE AND FORMOTEROL FUMARATE DIHYDRATE 2 PUFF: 160; 4.5 AEROSOL RESPIRATORY (INHALATION) at 08:14

## 2025-03-20 RX ADMIN — ASPIRIN 81 MG: 81 TABLET, CHEWABLE ORAL at 08:11

## 2025-03-20 RX ADMIN — ARIPIPRAZOLE 20 MG: 10 TABLET ORAL at 08:12

## 2025-03-20 RX ADMIN — ACETAMINOPHEN 650 MG: 325 TABLET, FILM COATED ORAL at 14:03

## 2025-03-20 RX ADMIN — ACETAMINOPHEN 650 MG: 325 TABLET, FILM COATED ORAL at 09:52

## 2025-03-20 RX ADMIN — ACETAMINOPHEN 650 MG: 325 TABLET, FILM COATED ORAL at 02:23

## 2025-03-20 RX ADMIN — ACETAMINOPHEN 650 MG: 325 TABLET, FILM COATED ORAL at 17:51

## 2025-03-20 RX ADMIN — MAGNESIUM SULFATE HEPTAHYDRATE 2 G: 40 INJECTION, SOLUTION INTRAVENOUS at 12:40

## 2025-03-20 RX ADMIN — POTASSIUM CHLORIDE 20 MEQ: 1500 TABLET, EXTENDED RELEASE ORAL at 09:52

## 2025-03-20 RX ADMIN — INSULIN LISPRO 4 UNITS: 100 INJECTION, SOLUTION INTRAVENOUS; SUBCUTANEOUS at 11:21

## 2025-03-20 NOTE — ASSESSMENT & PLAN NOTE
Unclear if this is purely traumatic in nature secondary to catheterization.   Urology recommends repeat urinalysis in one month and potential flomax consideration if symptomatic

## 2025-03-20 NOTE — PROGRESS NOTES
Progress Note - Hospitalist   Name: Lorne Waterman 74 y.o. male I MRN: [id number]  Unit/Bed#: E2 -01 I Date of Admission: 3/18/2025   Date of Service: 3/20/2025 I Hospital Day: 2    Assessment & Plan  DARIO (acute kidney injury) (HCC)  74-year-old male was admitted due to acute kidney injury.  Etiology deemed renal likely secondary to Lasix, ACE inhibitor, and hypotension potentially leading to ATN.  Management per nephrology.  Continue IV hydration.  I/Os  Hematuria  Unclear if this is purely traumatic in nature secondary to catheterization.   Urology recommends repeat urinalysis in one month and potential flomax consideration if symptomatic  Bladder wall thickening  CT Showing: Mild circumferential wall thickening, which is favored to be secondary to incomplete distention, however cystitis cannot be excluded.   Repeat urinalysis in one month was recommended  Schizophrenia (HCC)  Mood stable.  Continue Aripiprazole / Risperidal  COPD without exacerbation ([facility])  Not in acute exacerbation  Continue budesonide/formoterol 160-4.5 mg 2 puffs twice daily.  Albuterol as needed.  Chronic combined systolic and diastolic congestive heart failure (HCC)  Wt Readings from Last 3 Encounters:   03/18/25 121 kg (266 lb 12.1 oz)   03/30/24 123 kg (271 lb 2.7 oz)   02/23/24 130 kg (286 lb)     Patient appears hypovolemic. Echo from 2/2024 showing EF of 55% and grade 1 DD  Type 2 diabetes mellitus without complication, without long-term current use of insulin ([facility])  Lab Results   Component Value Date    HGBA1C 7.7 (H) 03/19/2025       Recent Labs     03/19/25  1100 03/19/25  1456 03/19/25  2055 03/20/25  0611   POCGLU 216* 171* 200* 173*       Blood Sugar Average: Last 72 hrs:  (P) 190  Sliding scale, Refresh A1C  Hyponatremia  Improving    Recent Labs     03/18/25  1115 03/19/25  0500 03/20/25  0443   SODIUM 132* 133* 136           Anemia due to chronic kidney disease  Add on iron panel    Results from last 7 days   Lab  Units 25  0443 25  1143 25  0500 25  0808   HEMOGLOBIN g/dL 9.4*  --  9.7* 11.0*   MCV fL 72*  --  77* 79*   RDW % 13.6  --  14.1 14.3   IRON ug/dL  --   --  40*  --    TIBC ug/dL  --   --  242.2*  --    FERRITIN ng/mL  --  87  --   --        Metabolic acidosis  Sodium bicarbonate discontinued    Recent Labs     25  1115 25  0500 25  0443   CO2 22 19* 26         VTE Pharmacologic Prophylaxis: VTE Score: 5 Moderate Risk (Score 3-4) - Pharmacological DVT Prophylaxis Ordered: heparin.    Mobility:   Basic Mobility Inpatient Raw Score: 15  -[facility] Goal: 4: Move to chair/commode  -[facility] Achieved: 4: Move to chair/commode    Discussions with Specialists or Other Care Team Provider: urology, nephrology    Education and Discussions with Family / Patient: patient    Current Length of Stay: 2 day(s)  Current Patient Status: Inpatient   Certification Statement: The patient will continue to require additional inpatient hospital stay due to iv hydration, yamel  Discharge Plan: 24 to 48 hours    Code Status: Level 1 - Full Code    Subjective   Patient seen and examined. No new complaints overnight. Comfortable.    Objective   Vitals:   Temp (24hrs), Av °F (36.7 °C), Min:97.7 °F (36.5 °C), Max:98.2 °F (36.8 °C)    Temp:  [97.7 °F (36.5 °C)-98.2 °F (36.8 °C)] 97.7 °F (36.5 °C)  HR:  [] 86  Resp:  [18-20] 20  BP: (122-157)/(71-99) 157/99  SpO2:  [90 %-94 %] 92 %  Body mass index is 38.28 kg/m².     Input and Output Summary (last 24 hours):     Intake/Output Summary (Last 24 hours) at 3/20/2025 1048  Last data filed at 3/20/2025 0551  Gross per 24 hour   Intake 2210.83 ml   Output 2375 ml   Net -164.17 ml       Physical Exam  Vitals reviewed.   Constitutional:       General: He is not in acute distress.  HENT:      Head: Normocephalic.      Nose: Nose normal.      Mouth/Throat:      Mouth: Mucous membranes are moist.   Eyes:      General: No scleral icterus.  Cardiovascular:      Rate  and Rhythm: Normal rate and regular rhythm.   Pulmonary:      Effort: Pulmonary effort is normal. No respiratory distress.      Breath sounds: No wheezing.   Abdominal:      General: There is no distension.      Palpations: Abdomen is soft.      Tenderness: There is no abdominal tenderness.   Skin:     General: Skin is warm.   Neurological:      Mental Status: He is alert.   Psychiatric:         Mood and Affect: Mood normal.         Behavior: Behavior normal.       Lines/Drains:              Lab Results: I have reviewed the following results:   Results from last 7 days   Lab Units 03/20/25  0443 03/19/25  1143 03/19/25  0500 03/18/25  0808   WBC Thousand/uL 5.94  --  7.19 8.76   HEMOGLOBIN g/dL 9.4*  --  9.7* 11.0*   PLATELETS Thousands/uL 156 122* 164 165   MCV fL 72*  --  77* 79*     Results from last 7 days   Lab Units 03/20/25  0443 03/19/25  0500 03/18/25  1115   SODIUM mmol/L 136 133* 132*   POTASSIUM mmol/L 3.7 4.2 4.4   CHLORIDE mmol/L 102 104 101   CO2 mmol/L 26 19* 22   ANION GAP mmol/L 8 10 9   BUN mg/dL 62* 72* 64*   CREATININE mg/dL 3.05* 6.75* 7.49*   CALCIUM mg/dL 8.7 8.6 9.4   ALBUMIN g/dL  --   --  4.3   TOTAL BILIRUBIN mg/dL  --   --  0.66   ALK PHOS U/L  --   --  63   ALT U/L  --   --  18   AST U/L  --   --  17   EGFR ml/min/1.73sq m 18 7 6   GLUCOSE RANDOM mg/dL 166* 124 128     Results from last 7 days   Lab Units 03/20/25  0443   MAGNESIUM mg/dL 1.8*                      Results from last 7 days   Lab Units 03/20/25  0611 03/19/25  2055 03/19/25  1456 03/19/25  1100   POC GLUCOSE mg/dl 173* 200* 171* 216*     Results from last 7 days   Lab Units 03/19/25  0500   HEMOGLOBIN A1C % 7.7*           Recent Cultures (last 7 days):         Imaging:  Reviewed radiology reports from this admission: ct abdomen pelvis    Last 24 Hours Medication List:     Current Facility-Administered Medications:     acetaminophen (TYLENOL) tablet 650 mg, Q4H    albuterol (PROVENTIL HFA,VENTOLIN HFA) inhaler 2 puff, Q4H  PRN    ARIPiprazole (ABILIFY) tablet 20 mg, Daily    aspirin chewable tablet 81 mg, Daily    budesonide-formoterol (SYMBICORT) 160-4.5 mcg/act inhaler 2 puff, BID    heparin (porcine) subcutaneous injection 5,000 Units, Q8H TREVOR **AND** [COMPLETED] Platelet count, Once    insulin lispro (HumALOG/ADMELOG) 100 units/mL subcutaneous injection 2-12 Units, TID AC **AND** Fingerstick Glucose (POCT), TID AC    iron sucrose (VENOFER) 300 mg in sodium chloride 0.9 % 250 mL IVPB, Daily    magnesium sulfate 2 g/50 mL IVPB (premix) 2 g, Once    risperiDONE (RisperDAL) tablet 1 mg, BID    sodium chloride 0.9 % infusion, Continuous, Last Rate: 75 mL/hr (03/20/25 0958)      **Please Note: This note may have been constructed using a voice recognition system.**

## 2025-03-20 NOTE — ASSESSMENT & PLAN NOTE
74-year-old male was admitted due to acute kidney injury.  Etiology deemed renal likely secondary to Lasix, ACE inhibitor, and hypotension potentially leading to ATN.  Management per nephrology.  Continue IV hydration.  I/Os

## 2025-03-20 NOTE — ASSESSMENT & PLAN NOTE
CT Showing: Mild circumferential wall thickening, which is favored to be secondary to incomplete distention, however cystitis cannot be excluded.   Repeat urinalysis in one month was recommended

## 2025-03-20 NOTE — ASSESSMENT & PLAN NOTE
Etiology: Prerenal with furosemide/ACE inhibitor/hypotension leading to ATN doubt intrinsic disease  Baseline creatinine 1-1.28  Creatinine on admission which was peak was 7.49 improving now to 6.75  UA: Moderate heme/1+ proteinuria/30-50 RBCs 4-10 WBCs  Imaging: CT without contrast demonstrates bilateral simple renal cysts no hydronephrosis no hydroureter; bladder mild circumferential wall thickening secondary incomplete distention however cystitis cannot be ruled out  Bladder scans negative    Current creatinine: 3.05 significantly improved!    Recommend:  Workup:  -  evaluation regarding gross hematuria: Felt enlarged prostate otherwise nothing significant plan for UA in 1 month consideration of Flomax if not voiding well once DARIO resolves    Treatment:  - IV fluids to continue can taper  - Hold ACE inhibitor and furosemide  - Avoid hypotension and nephrotoxic agents  - Replete magnesium

## 2025-03-20 NOTE — PLAN OF CARE
Problem: PAIN - ADULT  Goal: Verbalizes/displays adequate comfort level or baseline comfort level  Description: Interventions:  - Encourage patient to monitor pain and request assistance  - Assess pain using appropriate pain scale  - Administer analgesics based on type and severity of pain and evaluate response  - Implement non-pharmacological measures as appropriate and evaluate response  - Consider cultural and social influences on pain and pain management  - Notify physician/advanced practitioner if interventions unsuccessful or patient reports new pain  Outcome: Progressing     Problem: INFECTION - ADULT  Goal: Absence or prevention of progression during hospitalization  Description: INTERVENTIONS:  - Assess and monitor for signs and symptoms of infection  - Monitor lab/diagnostic results  - Monitor all insertion sites, i.e. indwelling lines, tubes, and drains  - Monitor endotracheal if appropriate and nasal secretions for changes in amount and color  - [place] appropriate cooling/warming therapies per order  - Administer medications as ordered  - Instruct and encourage patient and family to use good hand hygiene technique  - Identify and instruct in appropriate isolation precautions for identified infection/condition  Outcome: Progressing     Problem: SAFETY ADULT  Goal: Patient will remain free of falls  Description: INTERVENTIONS:  - Educate patient/family on patient safety including physical limitations  - Instruct patient to call for assistance with activity   - Consult OT/PT to assist with strengthening/mobility   - Keep Call bell within reach  - Keep bed low and locked with side rails adjusted as appropriate  - Keep care items and personal belongings within reach  - Initiate and maintain comfort rounds  - Make Fall Risk Sign visible to staff  - Offer Toileting every 2 Hours, in advance of need  - Initiate/Maintain bed alarm  - Obtain necessary fall risk management equipment: yellow socks  - Apply  yellow socks and bracelet for high fall risk patients  - Consider moving patient to room near nurses station  Outcome: Progressing  Goal: Maintain or return to baseline ADL function  Description: INTERVENTIONS:  -  Assess patient's ability to carry out ADLs; assess patient's baseline for ADL function and identify physical deficits which impact ability to perform ADLs (bathing, care of mouth/teeth, toileting, grooming, dressing, etc.)  - Assess/evaluate cause of self-care deficits   - Assess range of motion  - Assess patient's mobility; develop plan if impaired  - Assess patient's need for assistive devices and provide as appropriate  - Encourage maximum independence but intervene and supervise when necessary  - Involve family in performance of ADLs  - Assess for home care needs following discharge   - Consider OT consult to assist with ADL evaluation and planning for discharge  - Provide patient education as appropriate  Outcome: Progressing  Goal: Maintains/Returns to pre admission functional level  Description: INTERVENTIONS:  - Perform AM-PAC 6 Click Basic Mobility/ Daily Activity assessment daily.  - Set and communicate daily mobility goal to care team and patient/family/caregiver.   - Collaborate with rehabilitation services on mobility goals if consulted  - Perform Range of Motion 3 times a day.  - Reposition patient every 2 hours.  - Dangle patient 3 times a day  - Stand patient 3 times a day  - Ambulate patient 3 times a day  - Out of bed to chair 3 times a day   - Out of bed for meals 3 times a day  - Out of bed for toileting  - Record patient progress and toleration of activity level   Outcome: Progressing     Problem: DISCHARGE PLANNING  Goal: Discharge to home or other facility with appropriate resources  Description: INTERVENTIONS:  - Identify barriers to discharge w/patient and caregiver  - Arrange for needed discharge resources and transportation as appropriate  - Identify discharge learning needs  (meds, wound care, etc.)  - Arrange for interpretive services to assist at discharge as needed  - Refer to Case Management Department for coordinating discharge planning if the patient needs post-hospital services based on physician/advanced practitioner order or complex needs related to functional status, cognitive ability, or social support system  Outcome: Progressing     Problem: Knowledge Deficit  Goal: Patient/family/caregiver demonstrates understanding of disease process, treatment plan, medications, and discharge instructions  Description: Complete learning assessment and assess knowledge base.  Interventions:  - Provide teaching at level of understanding  - Provide teaching via preferred learning methods  Outcome: Progressing     Problem: GENITOURINARY - ADULT  Goal: Maintains or returns to baseline urinary function  Description: INTERVENTIONS:  - Assess urinary function  - Encourage oral fluids to ensure adequate hydration if ordered  - Administer IV fluids as ordered to ensure adequate hydration  - Administer ordered medications as needed  - Offer frequent toileting  - Follow urinary retention protocol if ordered  Outcome: Progressing  Goal: Absence of urinary retention  Description: INTERVENTIONS:  - Assess patient’s ability to void and empty bladder  - Monitor I/O  - Bladder scan as needed  - Discuss with physician/AP medications to alleviate retention as needed  - Discuss catheterization for long term situations as appropriate  Outcome: Progressing     Problem: METABOLIC, FLUID AND ELECTROLYTES - ADULT  Goal: Electrolytes maintained within normal limits  Description: INTERVENTIONS:  - Monitor labs and assess patient for signs and symptoms of electrolyte imbalances  - Administer electrolyte replacement as ordered  - Monitor response to electrolyte replacements, including repeat lab results as appropriate  - Instruct patient on fluid and nutrition as appropriate  Outcome: Progressing  Goal: Fluid balance  maintained  Description: INTERVENTIONS:  - Monitor labs   - Monitor I/O and WT  - Instruct patient on fluid and nutrition as appropriate  - Assess for signs & symptoms of volume excess or deficit  Outcome: Progressing  Goal: Glucose maintained within target range  Description: INTERVENTIONS:  - Monitor Blood Glucose as ordered  - Assess for signs and symptoms of hyperglycemia and hypoglycemia  - Administer ordered medications to maintain glucose within target range  - Assess nutritional intake and initiate nutrition service referral as needed  Outcome: Progressing     Problem: MUSCULOSKELETAL - ADULT  Goal: Maintain or return mobility to safest level of function  Description: INTERVENTIONS:  - Assess patient's ability to carry out ADLs; assess patient's baseline for ADL function and identify physical deficits which impact ability to perform ADLs (bathing, care of mouth/teeth, toileting, grooming, dressing, etc.)  - Assess/evaluate cause of self-care deficits   - Assess range of motion  - Assess patient's mobility  - Assess patient's need for assistive devices and provide as appropriate  - Encourage maximum independence but intervene and supervise when necessary  - Involve family in performance of ADLs  - Assess for home care needs following discharge   - Consider OT consult to assist with ADL evaluation and planning for discharge  - Provide patient education as appropriate  Outcome: Progressing  Goal: Maintain proper alignment of affected body part  Description: INTERVENTIONS:  - Support, maintain and protect limb and body alignment  - Provide patient/ family with appropriate education  Outcome: Progressing     Problem: Prexisting or High Potential for Compromised Skin Integrity  Goal: Skin integrity is maintained or improved  Description: INTERVENTIONS:  - Identify patients at risk for skin breakdown  - Assess and monitor skin integrity  - Assess and monitor nutrition and hydration status  - Monitor labs   - Assess  for incontinence   - Turn and reposition patient  - Assist with mobility/ambulation  - Relieve pressure over bony prominences  - Avoid friction and shearing  - Provide appropriate hygiene as needed including keeping skin clean and dry  - Evaluate need for skin moisturizer/barrier cream  - Collaborate with interdisciplinary team   - Patient/family teaching  - Consider wound care consult   Outcome: Progressing

## 2025-03-20 NOTE — ASSESSMENT & PLAN NOTE
Improving    Recent Labs     03/18/25  1115 03/19/25  0500 03/20/25  0443   SODIUM 132* 133* 136

## 2025-03-20 NOTE — PLAN OF CARE
Problem: PAIN - ADULT  Goal: Verbalizes/displays adequate comfort level or baseline comfort level  Description: Interventions:  - Encourage patient to monitor pain and request assistance  - Assess pain using appropriate pain scale  - Administer analgesics based on type and severity of pain and evaluate response  - Implement non-pharmacological measures as appropriate and evaluate response  - Consider cultural and social influences on pain and pain management  - Notify physician/advanced practitioner if interventions unsuccessful or patient reports new pain  Outcome: Progressing     Problem: INFECTION - ADULT  Goal: Absence or prevention of progression during hospitalization  Description: INTERVENTIONS:  - Assess and monitor for signs and symptoms of infection  - Monitor lab/diagnostic results  - Monitor all insertion sites, i.e. indwelling lines, tubes, and drains  - Monitor endotracheal if appropriate and nasal secretions for changes in amount and color  - [place] appropriate cooling/warming therapies per order  - Administer medications as ordered  - Instruct and encourage patient and family to use good hand hygiene technique  - Identify and instruct in appropriate isolation precautions for identified infection/condition  Outcome: Progressing     Problem: SAFETY ADULT  Goal: Patient will remain free of falls  Description: INTERVENTIONS:  - Educate patient/family on patient safety including physical limitations  - Instruct patient to call for assistance with activity   - Consult OT/PT to assist with strengthening/mobility   - Keep Call bell within reach  - Keep bed low and locked with side rails adjusted as appropriate  - Keep care items and personal belongings within reach  - Initiate and maintain comfort rounds  - Make Fall Risk Sign visible to staff  - Offer Toileting every 2 Hours, in advance of need  - Initiate/Maintain bed alarm  - Obtain necessary fall risk management equipment: yellow socks  - Apply  yellow socks and bracelet for high fall risk patients  - Consider moving patient to room near nurses station  Outcome: Progressing  Goal: Maintain or return to baseline ADL function  Description: INTERVENTIONS:  -  Assess patient's ability to carry out ADLs; assess patient's baseline for ADL function and identify physical deficits which impact ability to perform ADLs (bathing, care of mouth/teeth, toileting, grooming, dressing, etc.)  - Assess/evaluate cause of self-care deficits   - Assess range of motion  - Assess patient's mobility; develop plan if impaired  - Assess patient's need for assistive devices and provide as appropriate  - Encourage maximum independence but intervene and supervise when necessary  - Involve family in performance of ADLs  - Assess for home care needs following discharge   - Consider OT consult to assist with ADL evaluation and planning for discharge  - Provide patient education as appropriate  Outcome: Progressing  Goal: Maintains/Returns to pre admission functional level  Description: INTERVENTIONS:  - Perform AM-PAC 6 Click Basic Mobility/ Daily Activity assessment daily.  - Set and communicate daily mobility goal to care team and patient/family/caregiver.   - Collaborate with rehabilitation services on mobility goals if consulted  - Perform Range of Motion 3 times a day.  - Reposition patient every 2 hours.  - Dangle patient 3 times a day  - Stand patient 3 times a day  - Ambulate patient 3 times a day  - Out of bed to chair 3 times a day   - Out of bed for meals 3 times a day  - Out of bed for toileting  - Record patient progress and toleration of activity level   Outcome: Progressing     Problem: DISCHARGE PLANNING  Goal: Discharge to home or other facility with appropriate resources  Description: INTERVENTIONS:  - Identify barriers to discharge w/patient and caregiver  - Arrange for needed discharge resources and transportation as appropriate  - Identify discharge learning needs  (meds, wound care, etc.)  - Arrange for interpretive services to assist at discharge as needed  - Refer to Case Management Department for coordinating discharge planning if the patient needs post-hospital services based on physician/advanced practitioner order or complex needs related to functional status, cognitive ability, or social support system  Outcome: Progressing     Problem: Knowledge Deficit  Goal: Patient/family/caregiver demonstrates understanding of disease process, treatment plan, medications, and discharge instructions  Description: Complete learning assessment and assess knowledge base.  Interventions:  - Provide teaching at level of understanding  - Provide teaching via preferred learning methods  Outcome: Progressing     Problem: GENITOURINARY - ADULT  Goal: Maintains or returns to baseline urinary function  Description: INTERVENTIONS:  - Assess urinary function  - Encourage oral fluids to ensure adequate hydration if ordered  - Administer IV fluids as ordered to ensure adequate hydration  - Administer ordered medications as needed  - Offer frequent toileting  - Follow urinary retention protocol if ordered  Outcome: Progressing  Goal: Absence of urinary retention  Description: INTERVENTIONS:  - Assess patient’s ability to void and empty bladder  - Monitor I/O  - Bladder scan as needed  - Discuss with physician/AP medications to alleviate retention as needed  - Discuss catheterization for long term situations as appropriate  Outcome: Progressing     Problem: METABOLIC, FLUID AND ELECTROLYTES - ADULT  Goal: Electrolytes maintained within normal limits  Description: INTERVENTIONS:  - Monitor labs and assess patient for signs and symptoms of electrolyte imbalances  - Administer electrolyte replacement as ordered  - Monitor response to electrolyte replacements, including repeat lab results as appropriate  - Instruct patient on fluid and nutrition as appropriate  Outcome: Progressing  Goal: Fluid balance  maintained  Description: INTERVENTIONS:  - Monitor labs   - Monitor I/O and WT  - Instruct patient on fluid and nutrition as appropriate  - Assess for signs & symptoms of volume excess or deficit  Outcome: Progressing  Goal: Glucose maintained within target range  Description: INTERVENTIONS:  - Monitor Blood Glucose as ordered  - Assess for signs and symptoms of hyperglycemia and hypoglycemia  - Administer ordered medications to maintain glucose within target range  - Assess nutritional intake and initiate nutrition service referral as needed  Outcome: Progressing     Problem: MUSCULOSKELETAL - ADULT  Goal: Maintain or return mobility to safest level of function  Description: INTERVENTIONS:  - Assess patient's ability to carry out ADLs; assess patient's baseline for ADL function and identify physical deficits which impact ability to perform ADLs (bathing, care of mouth/teeth, toileting, grooming, dressing, etc.)  - Assess/evaluate cause of self-care deficits   - Assess range of motion  - Assess patient's mobility  - Assess patient's need for assistive devices and provide as appropriate  - Encourage maximum independence but intervene and supervise when necessary  - Involve family in performance of ADLs  - Assess for home care needs following discharge   - Consider OT consult to assist with ADL evaluation and planning for discharge  - Provide patient education as appropriate  Outcome: Progressing  Goal: Maintain proper alignment of affected body part  Description: INTERVENTIONS:  - Support, maintain and protect limb and body alignment  - Provide patient/ family with appropriate education  Outcome: Progressing     Problem: Prexisting or High Potential for Compromised Skin Integrity  Goal: Skin integrity is maintained or improved  Description: INTERVENTIONS:  - Identify patients at risk for skin breakdown  - Assess and monitor skin integrity  - Assess and monitor nutrition and hydration status  - Monitor labs   - Assess  for incontinence   - Turn and reposition patient  - Assist with mobility/ambulation  - Relieve pressure over bony prominences  - Avoid friction and shearing  - Provide appropriate hygiene as needed including keeping skin clean and dry  - Evaluate need for skin moisturizer/barrier cream  - Collaborate with interdisciplinary team   - Patient/family teaching  - Consider wound care consult   Outcome: Progressing

## 2025-03-20 NOTE — ASSESSMENT & PLAN NOTE
Add on iron panel    Results from last 7 days   Lab Units 03/20/25  0443 03/19/25  1143 03/19/25  0500 03/18/25  0808   HEMOGLOBIN g/dL 9.4*  --  9.7* 11.0*   MCV fL 72*  --  77* 79*   RDW % 13.6  --  14.1 14.3   IRON ug/dL  --   --  40*  --    TIBC ug/dL  --   --  242.2*  --    FERRITIN ng/mL  --  87  --   --

## 2025-03-20 NOTE — ASSESSMENT & PLAN NOTE
Sodium bicarbonate discontinued    Recent Labs     03/18/25  1115 03/19/25  0500 03/20/25  0443   CO2 22 19* 26

## 2025-03-20 NOTE — ASSESSMENT & PLAN NOTE
?  Etiology probable component of blood loss but also chronic  - Current hemoglobin 9.4 relatively stable  - Iron studies are low intravenous iron  Recommend  - Workup: SPEP UPEP light chain ratio to rule out multiple myeloma

## 2025-03-20 NOTE — PLAN OF CARE
Problem: PAIN - ADULT  Goal: Verbalizes/displays adequate comfort level or baseline comfort level  Description: Interventions:  - Encourage patient to monitor pain and request assistance  - Assess pain using appropriate pain scale  - Administer analgesics based on type and severity of pain and evaluate response  - Implement non-pharmacological measures as appropriate and evaluate response  - Consider cultural and social influences on pain and pain management  - Notify physician/advanced practitioner if interventions unsuccessful or patient reports new pain  Outcome: Progressing     Problem: INFECTION - ADULT  Goal: Absence or prevention of progression during hospitalization  Description: INTERVENTIONS:  - Assess and monitor for signs and symptoms of infection  - Monitor lab/diagnostic results  - Monitor all insertion sites, i.e. indwelling lines, tubes, and drains  - Monitor endotracheal if appropriate and nasal secretions for changes in amount and color  - [place] appropriate cooling/warming therapies per order  - Administer medications as ordered  - Instruct and encourage patient and family to use good hand hygiene technique  - Identify and instruct in appropriate isolation precautions for identified infection/condition  Outcome: Progressing     Problem: SAFETY ADULT  Goal: Patient will remain free of falls  Description: INTERVENTIONS:  - Educate patient/family on patient safety including physical limitations  - Instruct patient to call for assistance with activity   - Consult OT/PT to assist with strengthening/mobility   - Keep Call bell within reach  - Keep bed low and locked with side rails adjusted as appropriate  - Keep care items and personal belongings within reach  - Initiate and maintain comfort rounds  - Make Fall Risk Sign visible to staff  - Offer Toileting every 2 Hours, in advance of need  - Initiate/Maintain bed alarm  - Obtain necessary fall risk management equipment: yellow socks  - Apply  yellow socks and bracelet for high fall risk patients  - Consider moving patient to room near nurses station  Outcome: Progressing  Goal: Maintain or return to baseline ADL function  Description: INTERVENTIONS:  -  Assess patient's ability to carry out ADLs; assess patient's baseline for ADL function and identify physical deficits which impact ability to perform ADLs (bathing, care of mouth/teeth, toileting, grooming, dressing, etc.)  - Assess/evaluate cause of self-care deficits   - Assess range of motion  - Assess patient's mobility; develop plan if impaired  - Assess patient's need for assistive devices and provide as appropriate  - Encourage maximum independence but intervene and supervise when necessary  - Involve family in performance of ADLs  - Assess for home care needs following discharge   - Consider OT consult to assist with ADL evaluation and planning for discharge  - Provide patient education as appropriate  Outcome: Progressing  Goal: Maintains/Returns to pre admission functional level  Description: INTERVENTIONS:  - Perform AM-PAC 6 Click Basic Mobility/ Daily Activity assessment daily.  - Set and communicate daily mobility goal to care team and patient/family/caregiver.   - Collaborate with rehabilitation services on mobility goals if consulted  - Perform Range of Motion 3 times a day.  - Reposition patient every 2 hours.  - Dangle patient 3 times a day  - Stand patient 3 times a day  - Ambulate patient 3 times a day  - Out of bed to chair 3 times a day   - Out of bed for meals 3 times a day  - Out of bed for toileting  - Record patient progress and toleration of activity level   Outcome: Progressing     Problem: DISCHARGE PLANNING  Goal: Discharge to home or other facility with appropriate resources  Description: INTERVENTIONS:  - Identify barriers to discharge w/patient and caregiver  - Arrange for needed discharge resources and transportation as appropriate  - Identify discharge learning needs  (meds, wound care, etc.)  - Arrange for interpretive services to assist at discharge as needed  - Refer to Case Management Department for coordinating discharge planning if the patient needs post-hospital services based on physician/advanced practitioner order or complex needs related to functional status, cognitive ability, or social support system  Outcome: Progressing     Problem: Knowledge Deficit  Goal: Patient/family/caregiver demonstrates understanding of disease process, treatment plan, medications, and discharge instructions  Description: Complete learning assessment and assess knowledge base.  Interventions:  - Provide teaching at level of understanding  - Provide teaching via preferred learning methods  Outcome: Progressing     Problem: GENITOURINARY - ADULT  Goal: Maintains or returns to baseline urinary function  Description: INTERVENTIONS:  - Assess urinary function  - Encourage oral fluids to ensure adequate hydration if ordered  - Administer IV fluids as ordered to ensure adequate hydration  - Administer ordered medications as needed  - Offer frequent toileting  - Follow urinary retention protocol if ordered  Outcome: Progressing  Goal: Absence of urinary retention  Description: INTERVENTIONS:  - Assess patient’s ability to void and empty bladder  - Monitor I/O  - Bladder scan as needed  - Discuss with physician/AP medications to alleviate retention as needed  - Discuss catheterization for long term situations as appropriate  Outcome: Progressing     Problem: METABOLIC, FLUID AND ELECTROLYTES - ADULT  Goal: Electrolytes maintained within normal limits  Description: INTERVENTIONS:  - Monitor labs and assess patient for signs and symptoms of electrolyte imbalances  - Administer electrolyte replacement as ordered  - Monitor response to electrolyte replacements, including repeat lab results as appropriate  - Instruct patient on fluid and nutrition as appropriate  Outcome: Progressing  Goal: Fluid balance  maintained  Description: INTERVENTIONS:  - Monitor labs   - Monitor I/O and WT  - Instruct patient on fluid and nutrition as appropriate  - Assess for signs & symptoms of volume excess or deficit  Outcome: Progressing  Goal: Glucose maintained within target range  Description: INTERVENTIONS:  - Monitor Blood Glucose as ordered  - Assess for signs and symptoms of hyperglycemia and hypoglycemia  - Administer ordered medications to maintain glucose within target range  - Assess nutritional intake and initiate nutrition service referral as needed  Outcome: Progressing     Problem: MUSCULOSKELETAL - ADULT  Goal: Maintain or return mobility to safest level of function  Description: INTERVENTIONS:  - Assess patient's ability to carry out ADLs; assess patient's baseline for ADL function and identify physical deficits which impact ability to perform ADLs (bathing, care of mouth/teeth, toileting, grooming, dressing, etc.)  - Assess/evaluate cause of self-care deficits   - Assess range of motion  - Assess patient's mobility  - Assess patient's need for assistive devices and provide as appropriate  - Encourage maximum independence but intervene and supervise when necessary  - Involve family in performance of ADLs  - Assess for home care needs following discharge   - Consider OT consult to assist with ADL evaluation and planning for discharge  - Provide patient education as appropriate  Outcome: Progressing  Goal: Maintain proper alignment of affected body part  Description: INTERVENTIONS:  - Support, maintain and protect limb and body alignment  - Provide patient/ family with appropriate education  Outcome: Progressing     Problem: Prexisting or High Potential for Compromised Skin Integrity  Goal: Skin integrity is maintained or improved  Description: INTERVENTIONS:  - Identify patients at risk for skin breakdown  - Assess and monitor skin integrity  - Assess and monitor nutrition and hydration status  - Monitor labs   - Assess  for incontinence   - Turn and reposition patient  - Assist with mobility/ambulation  - Relieve pressure over bony prominences  - Avoid friction and shearing  - Provide appropriate hygiene as needed including keeping skin clean and dry  - Evaluate need for skin moisturizer/barrier cream  - Collaborate with interdisciplinary team   - Patient/family teaching  - Consider wound care consult   Outcome: Progressing

## 2025-03-20 NOTE — ASSESSMENT & PLAN NOTE
Lab Results   Component Value Date    HGBA1C 7.7 (H) 03/19/2025       Recent Labs     03/19/25  1100 03/19/25  1456 03/19/25 2055 03/20/25  0611   POCGLU 216* 171* 200* 173*       Blood Sugar Average: Last 72 hrs:  (P) 190  Sliding scale, Refresh A1C

## 2025-03-20 NOTE — PROGRESS NOTES
Progress Note - Nephrology   Name: Lorne Waterman 74 y.o. male I MRN: [id number]  Unit/Bed#: E2 -01 I Date of Admission: 3/18/2025   Date of Service: 3/20/2025 I Hospital Day: 2     Assessment & Plan  DARIO (acute kidney injury) (HCC)  Etiology: Prerenal with furosemide/ACE inhibitor/hypotension leading to ATN doubt intrinsic disease  Baseline creatinine 1-1.28  Creatinine on admission which was peak was 7.49 improving now to 6.75  UA: Moderate heme/1+ proteinuria/30-50 RBCs 4-10 WBCs  Imaging: CT without contrast demonstrates bilateral simple renal cysts no hydronephrosis no hydroureter; bladder mild circumferential wall thickening secondary incomplete distention however cystitis cannot be ruled out  Bladder scans negative    Current creatinine: 3.05 significantly improved!    Recommend:  Workup:  -  evaluation regarding gross hematuria: Felt enlarged prostate otherwise nothing significant plan for UA in 1 month consideration of Flomax if not voiding well once DARIO resolves    Treatment:  - IV fluids to continue can taper  - Hold ACE inhibitor and furosemide  - Avoid hypotension and nephrotoxic agents  - Replete magnesium  Hyponatremia  Resolved now 136-most likely prerenal plus DARIO/decreased solute intake  Isotonic fluids  Metabolic acidosis  Resolved secondary to DARIO we will switch over to isotonic saline  Bladder wall thickening  Now followed by urology  Hematuria  No followed by urology status post straight catheterization  Anemia due to chronic kidney disease  ?  Etiology probable component of blood loss but also chronic  - Current hemoglobin 9.4 relatively stable  - Iron studies are low intravenous iron  Recommend  - Workup: SPEP UPEP light chain ratio to rule out multiple myeloma     COPD without exacerbation (HCC)  CT of the chest: Mild emphysematous changes central pulmonary arterial enlargement which may be seen setting of pulmonary hypertension  Schizophrenia (HCC)  Per primary service    I have  reviewed the nephrology recommendations including management of DARIO with adjustment to isotonic IV fluids, with SLIM, and we are in agreement with renal plan including the information outlined above.     Subjective   Brief History of Admission -we are seeing for acute kidney injury and hyponatremia  Overall feeling well  No chest pain or shortness of breath  No nausea vomiting or diarrhea  No urinary symptoms    Objective :  Temp:  [97.7 °F (36.5 °C)-98.2 °F (36.8 °C)] 97.7 °F (36.5 °C)  HR:  [] 86  BP: (122-157)/(71-99) 157/99  Resp:  [18-20] 20  SpO2:  [90 %-94 %] 92 %  O2 Device: None (Room air)    Current Weight: Weight - Scale: 121 kg (266 lb 12.1 oz)  First Weight: Weight - Scale: 121 kg (266 lb 12.1 oz)  I/O         03/18 0701  03/19 0700 03/19 0701  03/20 0700 03/20 0701  03/21 0700    P.O. 240 360     I.V. (mL/kg) 1520.8 (12.6) 1970.8 (16.3)     Total Intake(mL/kg) 1760.8 (14.6) 2330.8 (19.3)     Urine (mL/kg/hr)  2825 (1)     Total Output  2825     Net +1760.8 -494.2            Unmeasured Urine Occurrence 1 x            Physical Exam  Physical Exam: General:  No acute distress/obese  Skin:  No acute rash  Eyes:  No scleral icterus and noninjected  ENT:  Moist mucous membranes  Neck:  Supple, no jugular venous distention, trachea midline, overall appearance is normal  Chest:  Clear to auscultation  CVS:  Regular rate and rhythm, without a rub or gallops  Abdomen:  Normal bowel sounds, soft and nontender and nondistended  Extremities: Trace bilateral ankle edema, and no cyanosis, no significant arthritic changes  Neuro:  No gross focality  Psych:  Alert and oriented and appropriate    Medications:    Current Facility-Administered Medications:     acetaminophen (TYLENOL) tablet 650 mg, 650 mg, Oral, Q4H, Dakota Santo MD, 650 mg at 03/20/25 0223    albuterol (PROVENTIL HFA,VENTOLIN HFA) inhaler 2 puff, 2 puff, Inhalation, Q4H PRN, Dakota Santo MD    ARIPiprazole (ABILIFY)  "tablet 20 mg, 20 mg, Oral, Daily, Dakota Santo MD, 20 mg at 03/20/25 0812    aspirin chewable tablet 81 mg, 81 mg, Oral, Daily, Dakota Santo MD, 81 mg at 03/20/25 0811    budesonide-formoterol (SYMBICORT) 160-4.5 mcg/act inhaler 2 puff, 2 puff, Inhalation, BID, Dakota Santo MD, 2 puff at 03/20/25 0814    heparin (porcine) subcutaneous injection 5,000 Units, 5,000 Units, Subcutaneous, Q8H TREVOR, 5,000 Units at 03/20/25 0506 **AND** [COMPLETED] Platelet count, , , Once, Dakota Santo MD    insulin lispro (HumALOG/ADMELOG) 100 units/mL subcutaneous injection 2-12 Units, 2-12 Units, Subcutaneous, TID AC, 2 Units at 03/20/25 0749 **AND** Fingerstick Glucose (POCT), , , TID AC, Denilson Caldera MD    risperiDONE (RisperDAL) tablet 1 mg, 1 mg, Oral, BID, Dakota Santo MD, 1 mg at 03/20/25 0812    sodium bicarbonate 150 mEq in dextrose 5 % 1,000 mL infusion, 125 mL/hr, Intravenous, Continuous, Prasanna Soto MD, Last Rate: 125 mL/hr at 03/20/25 0819, 125 mL/hr at 03/20/25 0819      Lab Results: I have reviewed the following results:  Results from last 7 days   Lab Units 03/20/25  0443 03/19/25  1143 03/19/25  0500 03/18/25  1115 03/18/25  0808   WBC Thousand/uL 5.94  --  7.19  --  8.76   HEMOGLOBIN g/dL 9.4*  --  9.7*  --  11.0*   HEMATOCRIT % 28.8*  --  31.2*  --  35.5*   PLATELETS Thousands/uL 156 122* 164  --  165   POTASSIUM mmol/L 3.7  --  4.2 4.4  --    CHLORIDE mmol/L 102  --  104 101  --    CO2 mmol/L 26  --  19* 22  --    BUN mg/dL 62*  --  72* 64*  --    CREATININE mg/dL 3.05*  --  6.75* 7.49*  --    CALCIUM mg/dL 8.7  --  8.6 9.4  --    MAGNESIUM mg/dL 1.8*  --   --   --   --    ALBUMIN g/dL  --   --   --  4.3  --        Administrative Statements     Portions of the record may have been created with voice recognition software. Occasional wrong word or \"sound a like\" substitutions may have occurred due to the inherent limitations of voice " recognition software. Read the chart carefully and recognize, using context, where substitutions have occurred.If you have any questions, please contact the dictating provider.

## 2025-03-21 VITALS
WEIGHT: 266.76 LBS | SYSTOLIC BLOOD PRESSURE: 162 MMHG | HEART RATE: 105 BPM | OXYGEN SATURATION: 90 % | RESPIRATION RATE: 18 BRPM | DIASTOLIC BLOOD PRESSURE: 88 MMHG | TEMPERATURE: 99.2 F | BODY MASS INDEX: 38.28 KG/M2

## 2025-03-21 LAB
ANION GAP SERPL CALCULATED.3IONS-SCNC: 7 MMOL/L (ref 4–13)
BASOPHILS # BLD AUTO: 0.04 THOUSANDS/ÂΜL (ref 0–0.1)
BASOPHILS NFR BLD AUTO: 1 % (ref 0–1)
BUN SERPL-MCNC: 35 MG/DL (ref 5–25)
CALCIUM SERPL-MCNC: 8.9 MG/DL (ref 8.4–10.2)
CHLORIDE SERPL-SCNC: 107 MMOL/L (ref 96–108)
CO2 SERPL-SCNC: 28 MMOL/L (ref 21–32)
CREAT SERPL-MCNC: 1.53 MG/DL (ref 0.6–1.3)
EOSINOPHIL # BLD AUTO: 0.15 THOUSAND/ÂΜL (ref 0–0.61)
EOSINOPHIL NFR BLD AUTO: 3 % (ref 0–6)
ERYTHROCYTE [DISTWIDTH] IN BLOOD BY AUTOMATED COUNT: 14 % (ref 11.6–15.1)
GFR SERPL CREATININE-BSD FRML MDRD: 43 ML/MIN/1.73SQ M
GLUCOSE SERPL-MCNC: 140 MG/DL (ref 65–140)
GLUCOSE SERPL-MCNC: 155 MG/DL (ref 65–140)
GLUCOSE SERPL-MCNC: 178 MG/DL (ref 65–140)
HCT VFR BLD AUTO: 29.8 % (ref 36.5–49.3)
HGB BLD-MCNC: 9.7 G/DL (ref 12–17)
IMM GRANULOCYTES # BLD AUTO: 0.01 THOUSAND/UL (ref 0–0.2)
IMM GRANULOCYTES NFR BLD AUTO: 0 % (ref 0–2)
LYMPHOCYTES # BLD AUTO: 2.1 THOUSANDS/ÂΜL (ref 0.6–4.47)
LYMPHOCYTES NFR BLD AUTO: 35 % (ref 14–44)
MAGNESIUM SERPL-MCNC: 2.1 MG/DL (ref 1.9–2.7)
MCH RBC QN AUTO: 23.7 PG (ref 26.8–34.3)
MCHC RBC AUTO-ENTMCNC: 32.6 G/DL (ref 31.4–37.4)
MCV RBC AUTO: 73 FL (ref 82–98)
MONOCYTES # BLD AUTO: 0.6 THOUSAND/ÂΜL (ref 0.17–1.22)
MONOCYTES NFR BLD AUTO: 10 % (ref 4–12)
NEUTROPHILS # BLD AUTO: 3.13 THOUSANDS/ÂΜL (ref 1.85–7.62)
NEUTS SEG NFR BLD AUTO: 51 % (ref 43–75)
NRBC BLD AUTO-RTO: 0 /100 WBCS
PLATELET # BLD AUTO: 171 THOUSANDS/UL (ref 149–390)
PMV BLD AUTO: 12.8 FL (ref 8.9–12.7)
POTASSIUM SERPL-SCNC: 4 MMOL/L (ref 3.5–5.3)
RBC # BLD AUTO: 4.09 MILLION/UL (ref 3.88–5.62)
SODIUM SERPL-SCNC: 142 MMOL/L (ref 135–147)
WBC # BLD AUTO: 6.03 THOUSAND/UL (ref 4.31–10.16)

## 2025-03-21 PROCEDURE — 82948 REAGENT STRIP/BLOOD GLUCOSE: CPT

## 2025-03-21 PROCEDURE — 80048 BASIC METABOLIC PNL TOTAL CA: CPT | Performed by: STUDENT IN AN ORGANIZED HEALTH CARE EDUCATION/TRAINING PROGRAM

## 2025-03-21 PROCEDURE — 83735 ASSAY OF MAGNESIUM: CPT | Performed by: INTERNAL MEDICINE

## 2025-03-21 PROCEDURE — 99232 SBSQ HOSP IP/OBS MODERATE 35: CPT | Performed by: INTERNAL MEDICINE

## 2025-03-21 PROCEDURE — 85025 COMPLETE CBC W/AUTO DIFF WBC: CPT | Performed by: INTERNAL MEDICINE

## 2025-03-21 PROCEDURE — 99239 HOSP IP/OBS DSCHRG MGMT >30: CPT | Performed by: STUDENT IN AN ORGANIZED HEALTH CARE EDUCATION/TRAINING PROGRAM

## 2025-03-21 RX ADMIN — RISPERIDONE 1 MG: 1 TABLET, FILM COATED ORAL at 08:05

## 2025-03-21 RX ADMIN — BUDESONIDE AND FORMOTEROL FUMARATE DIHYDRATE 2 PUFF: 160; 4.5 AEROSOL RESPIRATORY (INHALATION) at 08:08

## 2025-03-21 RX ADMIN — ACETAMINOPHEN 650 MG: 325 TABLET, FILM COATED ORAL at 10:24

## 2025-03-21 RX ADMIN — ASPIRIN 81 MG: 81 TABLET, CHEWABLE ORAL at 08:05

## 2025-03-21 RX ADMIN — SODIUM CHLORIDE 75 ML/HR: 0.9 INJECTION, SOLUTION INTRAVENOUS at 02:43

## 2025-03-21 RX ADMIN — INSULIN LISPRO 2 UNITS: 100 INJECTION, SOLUTION INTRAVENOUS; SUBCUTANEOUS at 07:49

## 2025-03-21 RX ADMIN — INSULIN LISPRO 2 UNITS: 100 INJECTION, SOLUTION INTRAVENOUS; SUBCUTANEOUS at 11:43

## 2025-03-21 RX ADMIN — ACETAMINOPHEN 650 MG: 325 TABLET, FILM COATED ORAL at 13:55

## 2025-03-21 RX ADMIN — ACETAMINOPHEN 650 MG: 325 TABLET, FILM COATED ORAL at 01:57

## 2025-03-21 RX ADMIN — ARIPIPRAZOLE 20 MG: 10 TABLET ORAL at 08:05

## 2025-03-21 RX ADMIN — IRON SUCROSE 300 MG: 20 INJECTION, SOLUTION INTRAVENOUS at 08:12

## 2025-03-21 RX ADMIN — HEPARIN SODIUM 5000 UNITS: 5000 INJECTION INTRAVENOUS; SUBCUTANEOUS at 13:55

## 2025-03-21 RX ADMIN — HEPARIN SODIUM 5000 UNITS: 5000 INJECTION INTRAVENOUS; SUBCUTANEOUS at 05:14

## 2025-03-21 NOTE — ASSESSMENT & PLAN NOTE
Lab Results   Component Value Date    HGBA1C 7.7 (H) 03/19/2025       Recent Labs     03/20/25  1109 03/20/25  1528 03/20/25  2043 03/21/25  0622   POCGLU 220* 155* 219* 155*       Blood Sugar Average: Last 72 hrs:  (P) 188.625  Resume home regimen on discharge

## 2025-03-21 NOTE — ASSESSMENT & PLAN NOTE
Resolved    Recent Labs     03/18/25  1115 03/19/25  0500 03/20/25  0443 03/21/25  0514   SODIUM 132* 133* 136 142

## 2025-03-21 NOTE — ASSESSMENT & PLAN NOTE
Add on iron panel    Results from last 7 days   Lab Units 03/21/25  0514 03/20/25  0443 03/19/25  1143 03/19/25  0500 03/18/25  0808   HEMOGLOBIN g/dL 9.7* 9.4*  --  9.7* 11.0*   MCV fL 73* 72*  --  77* 79*   RDW % 14.0 13.6  --  14.1 14.3   IRON ug/dL  --   --   --  40*  --    TIBC ug/dL  --   --   --  242.2*  --    FERRITIN ng/mL  --   --  87  --   --

## 2025-03-21 NOTE — DISCHARGE SUMMARY
Discharge Summary - Hospitalist   Name: Lorne Waterman 74 y.o. male I MRN: [id number]  Unit/Bed#: E2 -01 I Date of Admission: 3/18/2025   Date of Service: 3/21/2025 I Hospital Day: 3     Assessment & Plan  DARIO (acute kidney injury) (HCC)  74-year-old male was admitted due to acute kidney injury.  Etiology deemed renal likely secondary to Lasix, ACE inhibitor, and hypotension potentially leading to ATN.  Management per nephrology. Improved, cleared by nephrology for discharge  Hematuria  Unclear if this is purely traumatic in nature secondary to catheterization.   Urology recommends repeat urinalysis in one month and potential flomax consideration if symptomatic  Bladder wall thickening  CT Showing: Mild circumferential wall thickening, which is favored to be secondary to incomplete distention, however cystitis cannot be excluded.   Repeat urinalysis in one month was recommended  Schizophrenia (HCC)  Mood stable.  Continue Aripiprazole / Risperidal  COPD without exacerbation ([facility])  Not in acute exacerbation  Continue budesonide/formoterol 160-4.5 mg 2 puffs twice daily.  Albuterol as needed.  Chronic combined systolic and diastolic congestive heart failure (HCC)  Wt Readings from Last 3 Encounters:   03/18/25 121 kg (266 lb 12.1 oz)   03/30/24 123 kg (271 lb 2.7 oz)   02/23/24 130 kg (286 lb)     Patient appears hypovolemic. Echo from 2/2024 showing EF of 55% and grade 1 DD  Type 2 diabetes mellitus without complication, without long-term current use of insulin ([facility])  Lab Results   Component Value Date    HGBA1C 7.7 (H) 03/19/2025       Recent Labs     03/20/25  1109 03/20/25  1528 03/20/25  2043 03/21/25  0622   POCGLU 220* 155* 219* 155*       Blood Sugar Average: Last 72 hrs:  (P) 188.625  Resume home regimen on discharge  Hyponatremia  Resolved    Recent Labs     03/18/25  1115 03/19/25  0500 03/20/25  0443 03/21/25  0514   SODIUM 132* 133* 136 142       Anemia due to chronic kidney disease  Add on iron  panel    Results from last 7 days   Lab Units 03/21/25  0514 03/20/25  0443 03/19/25  1143 03/19/25  0500 03/18/25  0808   HEMOGLOBIN g/dL 9.7* 9.4*  --  9.7* 11.0*   MCV fL 73* 72*  --  77* 79*   RDW % 14.0 13.6  --  14.1 14.3   IRON ug/dL  --   --   --  40*  --    TIBC ug/dL  --   --   --  242.2*  --    FERRITIN ng/mL  --   --  87  --   --        Metabolic acidosis  Sodium bicarbonate discontinued    Recent Labs     03/19/25  0500 03/20/25  0443 03/21/25  0514   CO2 19* 26 28          Discharging Physician / Practitioner: Denilson Caldera MD  PCP: Alysa Santoro MD (Inactive)  Admission Date:   Admission Orders (From admission, onward)       Ordered        03/18/25 1353  INPATIENT ADMISSION  Once                          Discharge Date: 03/21/25    Medical Problems       Resolved Problems  Date Reviewed: 3/28/2024   None         Consultations During Hospital Stay:  Nephrology, urology    Procedures Performed:   none    Significant Findings / Test Results:   Imaging  CT Follow-up      No acute thoracic findings.     Mild emphysematous changes.     Central pulmonary arterial enlargement which may be seen in the setting of pulmonary hypertension.    CT A/P:    Mild circumferential wall thickening, which is favored to be secondary to incomplete distention, however cystitis cannot be excluded. Recommend correlation with urinalysis.     Additional findings as above.    Incidental Findings:   As written above      Outpatient Tests Requested:  Pcp, nephrology  Cbc, bmp  Urinalysis in one month    Complications:  none    Reason for Admission: DARIO    Hospital Course:     Lorne Waterman is a 74 y.o. male patient who originally presented to the hospital on 3/18/2025 due to DARIO.    Patient 74-year-old male with history of schizophrenia, COPD, type 2 diabetes mellitus, hypertension who presented here due to decreased urine and hematuria.  CT abdomen and pelvis showed mild circumferential wall thickening.  Negative  nitrites and leukocytes.  He was found to have acute kidney injury.  Etiology deemed to be intravascular in etiology.  He was given IV hydration.  Nephrology was consulted.  Fortunately, renal function continued to improve.  She is back to his baseline levels and was cleared by nephrology for discharge.  Nephrology recommended that we resume his diuretic and ACE inhibitor upon discharge and recommended repeat BMP within a week from discharge.    Urology stating that he would benefit from repeat urinalysis in 1 week months to ensure resolution of hematuria to determine if further workup would be necessary.  Patient was made aware of this.    Patient agrees to follow-up with his providers as scheduled and to take his medications as prescribed. All questions were addressed.    he understood the need to seek immediate medical attention should he develop any chest pain, shortness of breath, severe pain, fever, chills, or any other concerning symptoms.    Please see above list of diagnoses and related plan for additional information.     Condition at Discharge: fair     Discharge Day Visit / Exam:     Subjective:  Patient seen and examined at bedside. No new issues overnight.   Vitals: Blood Pressure: 162/88 (03/21/25 0805)  Pulse: 105 (03/21/25 0805)  Temperature: 99.2 °F (37.3 °C) (03/21/25 0805)  Temp Source: Oral (03/21/25 0805)  Respirations: 18 (03/21/25 0805)  Weight - Scale: 121 kg (266 lb 12.1 oz) (03/18/25 1652)  SpO2: 90 % (03/21/25 0805)  Exam:   Physical Exam  Vitals reviewed.   Constitutional:       General: He is not in acute distress.  HENT:      Head: Normocephalic.      Nose: Nose normal.      Mouth/Throat:      Mouth: Mucous membranes are moist.   Eyes:      General: No scleral icterus.  Cardiovascular:      Rate and Rhythm: Normal rate and regular rhythm.   Pulmonary:      Effort: Pulmonary effort is normal. No respiratory distress.   Abdominal:      General: There is no distension.      Palpations:  Abdomen is soft.      Tenderness: There is no abdominal tenderness.   Skin:     General: Skin is warm.   Neurological:      Mental Status: He is alert.   Psychiatric:         Mood and Affect: Mood normal.         Behavior: Behavior normal.       Discharge instructions/Information to patient and family:   See after visit summary for information provided to patient and family.      Provisions for Follow-Up Care:  See after visit summary for information related to follow-up care and any pertinent home health orders.      Disposition:     Home    Planned Readmission: No     Discharge Statement:  I spent 40 minutes discharging the patient. This time was spent on the day of discharge. I had direct contact with the patient on the day of discharge. Greater than 50% of the total time was spent examining patient, answering all patient questions, arranging and discussing plan of care with patient as well as directly providing post-discharge instructions.  Additional time then spent on discharge activities.    Discharge Medications:  See after visit summary for reconciled discharge medications provided to patient and family.      ** Please Note: This note has been constructed using a voice recognition system **

## 2025-03-21 NOTE — ASSESSMENT & PLAN NOTE
Sodium bicarbonate discontinued    Recent Labs     03/19/25  0500 03/20/25  0443 03/21/25  0514   CO2 19* 26 28

## 2025-03-21 NOTE — CASE MANAGEMENT
Case Management Discharge Planning Note    Patient name Lorne Waterman  Location [facility] /E2 -* MRN [id number]  : 1950 Date 3/21/2025       Current Admission Date: 3/18/2025  Current Admission Diagnosis:DARIO (acute kidney injury) ([facility])   Patient Active Problem List    Diagnosis Date Noted Date Diagnosed    Hyponatremia 2025     Anemia due to chronic kidney disease 2025     Metabolic acidosis 2025     DARIO (acute kidney injury) ([facility]) 2025     Hematuria 2025     Bladder wall thickening 2025     COPD without exacerbation ([facility]) 2025     Vertebral artery narrowing 2024     Dizziness 2024     Fall 2024     Parkinson disease ([facility]) 2023     Type 2 diabetes mellitus without complication, without long-term current use of insulin ([facility]) 2023     Chronic left-sided low back pain without sciatica 2023     Left leg injury 2023     Morbid obesity due to excess calories ([facility]) 10/09/2020     Dyspnea 10/07/2020     Other emphysema ([facility]) 10/07/2020     Essential hypertension 10/07/2020     History of CHF (congestive heart failure) 10/07/2020     Acute pulmonary insufficiency 2020     Chronic combined systolic and diastolic congestive heart failure ([facility]) 2019     Osteoarthritis of both knees 2019     Near syncope 2019     Elevated troponin 2019     Anxiety 2018     Chronic post-traumatic stress disorder (PTSD) 2018     Tardive dyskinesia 2018     Encounter for examination and observation for other specified reasons      Constipated 2018     Other chest pain 2018     Schizophrenia ([facility]) 2018     Hypertension 2018     Hyperlipidemia 2018     Type 2 diabetes mellitus with obesity  ([facility]) 2018       LOS (days): 3  Geometric Mean LOS (GMLOS) (days): 3  Days to GMLOS:0.1     OBJECTIVE:  Risk of Unplanned Readmission Score: 20.25         Current admission  status: Inpatient   Preferred Pharmacy: No Pharmacies Listed  Primary Care Provider: Alysa Santoro MD (Inactive)    Primary Insurance: [facility]  Secondary Insurance: MEDICARE    DISCHARGE DETAILS:    Discharge planning discussed with:: Patient  Freedom of Choice: Yes  Comments - Freedom of Choice: Agreeable to return to [facility]  CM contacted family/caregiver?: No- see comments (Provider spoke with spouse and updated on discharge today)  Were Treatment Team discharge recommendations reviewed with patient/caregiver?: Yes  Did patient/caregiver verbalize understanding of patient care needs?: N/A- going to facility            Requested Home Health Care         Is the patient interested in HHC at discharge?: No    DME Referral Provided  Referral made for DME?: No    Other Referral/Resources/Interventions Provided:  Interventions: Facility Return  Referral Comments: SHARONA sent to [facility]         Treatment Team Recommendation: Facility Return  Discharge Destination Plan:: Facility Return  Transport at Discharge : Wheel[facility])        Transported by (Company and Unit #): [employer] ([phone])  ETA of Transport (Date): 03/21/25  ETA of Transport (Time): 1600              IMM Given (Date):: 03/21/25  IMM Given to:: Patient  IMM was reviewed with the pt. Pt reports understanding of the ability to appeal discharge if feeling as though not medically stable for discharge. IMM was signed and placed in the scan bin.            Accepting Facility Name, City & State : [facility] [place]  Receiving Facility/Agency Phone Number: [phone]  Facility/Agency Fax Number: [phone]

## 2025-03-21 NOTE — PROGRESS NOTES
Progress Note - Nephrology   Name: Lorne Waterman 74 y.o. male I MRN: [id number]  Unit/Bed#: E2 -01 I Date of Admission: 3/18/2025   Date of Service: 3/21/2025 I Hospital Day: 3    Assessment & Plan  DARIO (acute kidney injury) (HCC)  Etiology: Prerenal with furosemide/ACE inhibitor/hypotension leading to ATN doubt intrinsic disease  Baseline creatinine 1-1.28  Creatinine on admission which was peak was 7.49 improving now   UA: Moderate heme/1+ proteinuria/30-50 RBCs 4-10 WBCs  Imaging: CT without contrast demonstrates bilateral simple renal cysts no hydronephrosis no hydroureter; bladder mild circumferential wall thickening secondary incomplete distention however cystitis cannot be ruled out  Bladder scans negative  Today's labs are pending made 1.5 L of urine does not appear to have a Ignacio catheter in  His blood pressure is adequate he is on a diet and appears stable.  Will discontinue IV fluids  Continue to hold furosemide and lisinopril for today    Hyponatremia  His sodium level has improved  Metabolic acidosis  No anion gap, bicarb is stable  Bladder wall thickening  Now followed by urology  Hematuria  No followed by urology this has essentially cleared up  Anemia due to chronic kidney disease  Currently receiving intravenous iron  Should have further workup    COPD without exacerbation (HCC)  CT of the chest: Mild emphysematous changes central pulmonary arterial enlargement which may be seen setting of pulmonary hypertension  Schizophrenia (HCC)  Per primary service    I have reviewed the nephrology recommendations including discontinuing IV fluids, with Dr. Caldera, and we are in agreement with renal plan including the information outlined above. Please contact the SecureChat role for the Nephrology service with any questions/concerns.    Subjective     The patient was seen today.  He is sitting up resting comfortably and has no acute complaints    Objective :  Temp:  [98.1 °F (36.7 °C)-99.2 °F  (37.3 °C)] 99.2 °F (37.3 °C)  HR:  [] 105  BP: (140-162)/(81-88) 162/88  Resp:  [18] 18  SpO2:  [90 %-94 %] 90 %  O2 Device: None (Room air)    Current Weight: Weight - Scale: 121 kg (266 lb 12.1 oz)  First Weight: Weight - Scale: 121 kg (266 lb 12.1 oz)  I/O         03/19 0701  03/20 0700 03/20 0701  03/21 0700 03/21 0701  03/22 0700    P.O. 360 480     I.V. (mL/kg) 1970.8 (16.3) 1256.3 (10.4)     Total Intake(mL/kg) 2330.8 (19.3) 1736.3 (14.3)     Urine (mL/kg/hr) 2825 (1) 1500 (0.5)     Total Output 2825 1500     Net -494.2 +236.3                  Physical Exam  Vitals and nursing note reviewed.   Constitutional:       General: He is not in acute distress.     Appearance: He is well-developed.   HENT:      Head: Normocephalic and atraumatic.   Eyes:      Conjunctiva/sclera: Conjunctivae normal.   Cardiovascular:      Rate and Rhythm: Normal rate and regular rhythm.      Heart sounds: No murmur heard.  Pulmonary:      Effort: Pulmonary effort is normal. No respiratory distress.      Breath sounds: Normal breath sounds.   Abdominal:      Palpations: Abdomen is soft.      Tenderness: There is no abdominal tenderness.   Musculoskeletal:         General: No swelling.      Cervical back: Neck supple.   Skin:     General: Skin is warm and dry.      Capillary Refill: Capillary refill takes less than 2 seconds.   Neurological:      Mental Status: He is alert.   Psychiatric:         Mood and Affect: Mood normal.       Medications:    Current Facility-Administered Medications:     acetaminophen (TYLENOL) tablet 650 mg, 650 mg, Oral, Q4H, Dakota Santo MD, 650 mg at 03/21/25 0157    albuterol (PROVENTIL HFA,VENTOLIN HFA) inhaler 2 puff, 2 puff, Inhalation, Q4H PRN, Dakota Santo MD    ARIPiprazole (ABILIFY) tablet 20 mg, 20 mg, Oral, Daily, Dakota Santo MD, 20 mg at 03/21/25 0805    aspirin chewable tablet 81 mg, 81 mg, Oral, Daily, Dakota Santo MD, 81 mg at  "03/21/25 0805    budesonide-formoterol (SYMBICORT) 160-4.5 mcg/act inhaler 2 puff, 2 puff, Inhalation, BID, Dakota Santo MD, 2 puff at 03/21/25 0808    heparin (porcine) subcutaneous injection 5,000 Units, 5,000 Units, Subcutaneous, Q8H TREVOR, 5,000 Units at 03/21/25 0514 **AND** [COMPLETED] Platelet count, , , Once, Dakota Santo MD    insulin lispro (HumALOG/ADMELOG) 100 units/mL subcutaneous injection 2-12 Units, 2-12 Units, Subcutaneous, TID AC, 2 Units at 03/21/25 0749 **AND** Fingerstick Glucose (POCT), , , TID AC, Denilson Caldera MD    iron sucrose (VENOFER) 300 mg in sodium chloride 0.9 % 250 mL IVPB, 300 mg, Intravenous, Daily, Prasanna Soto MD, Last Rate: 166.7 mL/hr at 03/21/25 0812, 300 mg at 03/21/25 0812    risperiDONE (RisperDAL) tablet 1 mg, 1 mg, Oral, BID, Dakota Santo MD, 1 mg at 03/21/25 0805    sodium chloride 0.9 % infusion, 75 mL/hr, Intravenous, Continuous, Prasanna Soto MD, Last Rate: 75 mL/hr at 03/21/25 0243, 75 mL/hr at 03/21/25 0243      Lab Results: I have reviewed the following results:  Results from last 7 days   Lab Units 03/21/25  0514 03/20/25  0443 03/19/25  1143 03/19/25  0500 03/18/25  1115 03/18/25  0808   WBC Thousand/uL 6.03 5.94  --  7.19  --  8.76   HEMOGLOBIN g/dL 9.7* 9.4*  --  9.7*  --  11.0*   HEMATOCRIT % 29.8* 28.8*  --  31.2*  --  35.5*   PLATELETS Thousands/uL 171 156 122* 164  --  165   POTASSIUM mmol/L  --  3.7  --  4.2 4.4  --    CHLORIDE mmol/L  --  102  --  104 101  --    CO2 mmol/L  --  26  --  19* 22  --    BUN mg/dL  --  62*  --  72* 64*  --    CREATININE mg/dL  --  3.05*  --  6.75* 7.49*  --    CALCIUM mg/dL  --  8.7  --  8.6 9.4  --    MAGNESIUM mg/dL 2.1 1.8*  --   --   --   --    ALBUMIN g/dL  --   --   --   --  4.3  --        Administrative Statements     Portions of the record may have been created with voice recognition software. Occasional wrong word or \"sound a like\" substitutions may have occurred due to " the inherent limitations of voice recognition software. Read the chart carefully and recognize, using context, where substitutions have occurred.If you have any questions, please contact the dictating provider.

## 2025-03-21 NOTE — ASSESSMENT & PLAN NOTE
Etiology: Prerenal with furosemide/ACE inhibitor/hypotension leading to ATN doubt intrinsic disease  Baseline creatinine 1-1.28  Creatinine on admission which was peak was 7.49 improving now   UA: Moderate heme/1+ proteinuria/30-50 RBCs 4-10 WBCs  Imaging: CT without contrast demonstrates bilateral simple renal cysts no hydronephrosis no hydroureter; bladder mild circumferential wall thickening secondary incomplete distention however cystitis cannot be ruled out  Bladder scans negative  Today's labs are pending made 1.5 L of urine does not appear to have a Ignacio catheter in  His blood pressure is adequate he is on a diet and appears stable.  Will discontinue IV fluids  Continue to hold furosemide and lisinopril for today

## 2025-03-21 NOTE — PLAN OF CARE
Problem: PAIN - ADULT  Goal: Verbalizes/displays adequate comfort level or baseline comfort level  Description: Interventions:  - Encourage patient to monitor pain and request assistance  - Assess pain using appropriate pain scale  - Administer analgesics based on type and severity of pain and evaluate response  - Implement non-pharmacological measures as appropriate and evaluate response  - Consider cultural and social influences on pain and pain management  - Notify physician/advanced practitioner if interventions unsuccessful or patient reports new pain  Outcome: Progressing     Problem: INFECTION - ADULT  Goal: Absence or prevention of progression during hospitalization  Description: INTERVENTIONS:  - Assess and monitor for signs and symptoms of infection  - Monitor lab/diagnostic results  - Monitor all insertion sites, i.e. indwelling lines, tubes, and drains  - Monitor endotracheal if appropriate and nasal secretions for changes in amount and color  - [place] appropriate cooling/warming therapies per order  - Administer medications as ordered  - Instruct and encourage patient and family to use good hand hygiene technique  - Identify and instruct in appropriate isolation precautions for identified infection/condition  Outcome: Progressing     Problem: SAFETY ADULT  Goal: Patient will remain free of falls  Description: INTERVENTIONS:  - Educate patient/family on patient safety including physical limitations  - Instruct patient to call for assistance with activity   - Consult OT/PT to assist with strengthening/mobility   - Keep Call bell within reach  - Keep bed low and locked with side rails adjusted as appropriate  - Keep care items and personal belongings within reach  - Initiate and maintain comfort rounds  - Make Fall Risk Sign visible to staff  - Offer Toileting every 2 Hours, in advance of need  - Initiate/Maintain bed alarm  - Obtain necessary fall risk management equipment: yellow socks  - Apply  yellow socks and bracelet for high fall risk patients  - Consider moving patient to room near nurses station  Outcome: Progressing  Goal: Maintain or return to baseline ADL function  Description: INTERVENTIONS:  -  Assess patient's ability to carry out ADLs; assess patient's baseline for ADL function and identify physical deficits which impact ability to perform ADLs (bathing, care of mouth/teeth, toileting, grooming, dressing, etc.)  - Assess/evaluate cause of self-care deficits   - Assess range of motion  - Assess patient's mobility; develop plan if impaired  - Assess patient's need for assistive devices and provide as appropriate  - Encourage maximum independence but intervene and supervise when necessary  - Involve family in performance of ADLs  - Assess for home care needs following discharge   - Consider OT consult to assist with ADL evaluation and planning for discharge  - Provide patient education as appropriate  Outcome: Progressing  Goal: Maintains/Returns to pre admission functional level  Description: INTERVENTIONS:  - Perform AM-PAC 6 Click Basic Mobility/ Daily Activity assessment daily.  - Set and communicate daily mobility goal to care team and patient/family/caregiver.   - Collaborate with rehabilitation services on mobility goals if consulted  - Perform Range of Motion 3 times a day.  - Reposition patient every 2 hours.  - Dangle patient 3 times a day  - Stand patient 3 times a day  - Ambulate patient 3 times a day  - Out of bed to chair 3 times a day   - Out of bed for meals 3 times a day  - Out of bed for toileting  - Record patient progress and toleration of activity level   Outcome: Progressing     Problem: DISCHARGE PLANNING  Goal: Discharge to home or other facility with appropriate resources  Description: INTERVENTIONS:  - Identify barriers to discharge w/patient and caregiver  - Arrange for needed discharge resources and transportation as appropriate  - Identify discharge learning needs  (meds, wound care, etc.)  - Arrange for interpretive services to assist at discharge as needed  - Refer to Case Management Department for coordinating discharge planning if the patient needs post-hospital services based on physician/advanced practitioner order or complex needs related to functional status, cognitive ability, or social support system  Outcome: Progressing     Problem: Knowledge Deficit  Goal: Patient/family/caregiver demonstrates understanding of disease process, treatment plan, medications, and discharge instructions  Description: Complete learning assessment and assess knowledge base.  Interventions:  - Provide teaching at level of understanding  - Provide teaching via preferred learning methods  Outcome: Progressing     Problem: GENITOURINARY - ADULT  Goal: Maintains or returns to baseline urinary function  Description: INTERVENTIONS:  - Assess urinary function  - Encourage oral fluids to ensure adequate hydration if ordered  - Administer IV fluids as ordered to ensure adequate hydration  - Administer ordered medications as needed  - Offer frequent toileting  - Follow urinary retention protocol if ordered  Outcome: Progressing  Goal: Absence of urinary retention  Description: INTERVENTIONS:  - Assess patient’s ability to void and empty bladder  - Monitor I/O  - Bladder scan as needed  - Discuss with physician/AP medications to alleviate retention as needed  - Discuss catheterization for long term situations as appropriate  Outcome: Progressing     Problem: METABOLIC, FLUID AND ELECTROLYTES - ADULT  Goal: Electrolytes maintained within normal limits  Description: INTERVENTIONS:  - Monitor labs and assess patient for signs and symptoms of electrolyte imbalances  - Administer electrolyte replacement as ordered  - Monitor response to electrolyte replacements, including repeat lab results as appropriate  - Instruct patient on fluid and nutrition as appropriate  Outcome: Progressing  Goal: Fluid balance  maintained  Description: INTERVENTIONS:  - Monitor labs   - Monitor I/O and WT  - Instruct patient on fluid and nutrition as appropriate  - Assess for signs & symptoms of volume excess or deficit  Outcome: Progressing  Goal: Glucose maintained within target range  Description: INTERVENTIONS:  - Monitor Blood Glucose as ordered  - Assess for signs and symptoms of hyperglycemia and hypoglycemia  - Administer ordered medications to maintain glucose within target range  - Assess nutritional intake and initiate nutrition service referral as needed  Outcome: Progressing     Problem: MUSCULOSKELETAL - ADULT  Goal: Maintain or return mobility to safest level of function  Description: INTERVENTIONS:  - Assess patient's ability to carry out ADLs; assess patient's baseline for ADL function and identify physical deficits which impact ability to perform ADLs (bathing, care of mouth/teeth, toileting, grooming, dressing, etc.)  - Assess/evaluate cause of self-care deficits   - Assess range of motion  - Assess patient's mobility  - Assess patient's need for assistive devices and provide as appropriate  - Encourage maximum independence but intervene and supervise when necessary  - Involve family in performance of ADLs  - Assess for home care needs following discharge   - Consider OT consult to assist with ADL evaluation and planning for discharge  - Provide patient education as appropriate  Outcome: Progressing  Goal: Maintain proper alignment of affected body part  Description: INTERVENTIONS:  - Support, maintain and protect limb and body alignment  - Provide patient/ family with appropriate education  Outcome: Progressing     Problem: Prexisting or High Potential for Compromised Skin Integrity  Goal: Skin integrity is maintained or improved  Description: INTERVENTIONS:  - Identify patients at risk for skin breakdown  - Assess and monitor skin integrity  - Assess and monitor nutrition and hydration status  - Monitor labs   - Assess  for incontinence   - Turn and reposition patient  - Assist with mobility/ambulation  - Relieve pressure over bony prominences  - Avoid friction and shearing  - Provide appropriate hygiene as needed including keeping skin clean and dry  - Evaluate need for skin moisturizer/barrier cream  - Collaborate with interdisciplinary team   - Patient/family teaching  - Consider wound care consult   Outcome: Progressing

## 2025-03-21 NOTE — ASSESSMENT & PLAN NOTE
74-year-old male was admitted due to acute kidney injury.  Etiology deemed renal likely secondary to Lasix, ACE inhibitor, and hypotension potentially leading to ATN.  Management per nephrology. Improved, cleared by nephrology for discharge